# Patient Record
Sex: FEMALE | Race: WHITE | Employment: UNEMPLOYED | URBAN - METROPOLITAN AREA
[De-identification: names, ages, dates, MRNs, and addresses within clinical notes are randomized per-mention and may not be internally consistent; named-entity substitution may affect disease eponyms.]

---

## 2018-07-28 ENCOUNTER — HOSPITAL ENCOUNTER (INPATIENT)
Age: 58
LOS: 5 days | Discharge: HOME | DRG: 121 | End: 2018-08-02
Attending: EMERGENCY MEDICINE | Admitting: INTERNAL MEDICINE
Payer: MEDICAID

## 2018-07-28 ENCOUNTER — APPOINTMENT (OUTPATIENT)
Dept: GENERAL RADIOLOGY | Age: 58
DRG: 121 | End: 2018-07-28
Attending: EMERGENCY MEDICINE
Payer: MEDICAID

## 2018-07-28 DIAGNOSIS — E11.9 DIABETES MELLITUS WITHOUT COMPLICATION (HCC): Chronic | ICD-10-CM

## 2018-07-28 DIAGNOSIS — R26.9 ABNORMALITY OF GAIT AND MOBILITY: ICD-10-CM

## 2018-07-28 DIAGNOSIS — R91.8 PULMONARY INFILTRATES: ICD-10-CM

## 2018-07-28 DIAGNOSIS — J96.01 ACUTE RESPIRATORY FAILURE WITH HYPOXIA AND HYPERCAPNIA (HCC): Primary | ICD-10-CM

## 2018-07-28 DIAGNOSIS — J96.22 ACUTE ON CHRONIC RESPIRATORY FAILURE WITH HYPOXIA AND HYPERCAPNIA (HCC): ICD-10-CM

## 2018-07-28 DIAGNOSIS — E66.01 MORBID OBESITY (HCC): Chronic | ICD-10-CM

## 2018-07-28 DIAGNOSIS — G47.33 OSA (OBSTRUCTIVE SLEEP APNEA): Chronic | ICD-10-CM

## 2018-07-28 DIAGNOSIS — R91.8 MASS OF LEFT LUNG: ICD-10-CM

## 2018-07-28 DIAGNOSIS — I27.81 COR PULMONALE (HCC): ICD-10-CM

## 2018-07-28 DIAGNOSIS — C34.12 PRIMARY CANCER OF LEFT UPPER LOBE OF LUNG (HCC): ICD-10-CM

## 2018-07-28 DIAGNOSIS — J96.21 ACUTE ON CHRONIC RESPIRATORY FAILURE WITH HYPOXIA AND HYPERCAPNIA (HCC): ICD-10-CM

## 2018-07-28 DIAGNOSIS — J44.1 COPD EXACERBATION (HCC): ICD-10-CM

## 2018-07-28 DIAGNOSIS — R59.0 MEDIASTINAL LYMPHADENOPATHY: ICD-10-CM

## 2018-07-28 DIAGNOSIS — I27.0 PRIMARY PULMONARY HYPERTENSION (HCC): ICD-10-CM

## 2018-07-28 DIAGNOSIS — J43.9 PULMONARY EMPHYSEMA, UNSPECIFIED EMPHYSEMA TYPE (HCC): Chronic | ICD-10-CM

## 2018-07-28 DIAGNOSIS — J96.02 ACUTE RESPIRATORY FAILURE WITH HYPOXIA AND HYPERCAPNIA (HCC): Primary | ICD-10-CM

## 2018-07-28 DIAGNOSIS — E66.2: Chronic | ICD-10-CM

## 2018-07-28 DIAGNOSIS — C34.92 SQUAMOUS CELL CARCINOMA OF LEFT LUNG (HCC): ICD-10-CM

## 2018-07-28 PROBLEM — G89.29 CHRONIC PAIN: Status: ACTIVE | Noted: 2018-07-28

## 2018-07-28 PROBLEM — J96.20 ACUTE ON CHRONIC RESPIRATORY FAILURE (HCC): Status: ACTIVE | Noted: 2018-07-28

## 2018-07-28 PROBLEM — J96.00 ACUTE RESPIRATORY FAILURE (HCC): Status: ACTIVE | Noted: 2018-07-28
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
## 2018-07-28 LAB
ALBUMIN SERPL-MCNC: 3.1 G/DL (ref 3.5–5)
ALBUMIN/GLOB SERPL: 0.6 {RATIO} (ref 1.2–3.5)
ALP SERPL-CCNC: 82 U/L (ref 50–136)
ALT SERPL-CCNC: 22 U/L (ref 12–65)
AMPHET UR QL SCN: NEGATIVE
ANION GAP SERPL CALC-SCNC: 3 MMOL/L (ref 7–16)
ANION GAP SERPL CALC-SCNC: 3 MMOL/L (ref 7–16)
ARTERIAL PATENCY WRIST A: POSITIVE
ARTERIAL PATENCY WRIST A: POSITIVE
AST SERPL-CCNC: 21 U/L (ref 15–37)
ATRIAL RATE: 86 BPM
BARBITURATES UR QL SCN: NEGATIVE
BASE EXCESS BLDA CALC-SCNC: 3.3 MMOL/L (ref 0–3)
BASE EXCESS BLDA CALC-SCNC: 5.4 MMOL/L (ref 0–3)
BASOPHILS # BLD: 0 K/UL (ref 0–0.2)
BASOPHILS NFR BLD: 0 % (ref 0–2)
BDY SITE: ABNORMAL
BDY SITE: ABNORMAL
BENZODIAZ UR QL: NEGATIVE
BILIRUB SERPL-MCNC: 0.7 MG/DL (ref 0.2–1.1)
BNP SERPL-MCNC: 308 PG/ML
BUN SERPL-MCNC: 20 MG/DL (ref 6–23)
BUN SERPL-MCNC: 24 MG/DL (ref 6–23)
CALCIUM SERPL-MCNC: 8.5 MG/DL (ref 8.3–10.4)
CALCIUM SERPL-MCNC: 8.6 MG/DL (ref 8.3–10.4)
CALCULATED P AXIS, ECG09: 53 DEGREES
CALCULATED R AXIS, ECG10: 113 DEGREES
CALCULATED T AXIS, ECG11: 17 DEGREES
CANNABINOIDS UR QL SCN: NEGATIVE
CHLORIDE SERPL-SCNC: 98 MMOL/L (ref 98–107)
CHLORIDE SERPL-SCNC: 98 MMOL/L (ref 98–107)
CO2 SERPL-SCNC: 35 MMOL/L (ref 21–32)
CO2 SERPL-SCNC: 37 MMOL/L (ref 21–32)
COCAINE UR QL SCN: NEGATIVE
COHGB MFR BLD: 1.5 % (ref 0.5–1.5)
COHGB MFR BLD: 1.6 % (ref 0.5–1.5)
CREAT SERPL-MCNC: 1.18 MG/DL (ref 0.6–1)
CREAT SERPL-MCNC: 1.48 MG/DL (ref 0.6–1)
DIAGNOSIS, 93000: NORMAL
DIFFERENTIAL METHOD BLD: ABNORMAL
DO-HGB BLD-MCNC: 8 % (ref 0–5)
DO-HGB BLD-MCNC: 9 % (ref 0–5)
EOSINOPHIL # BLD: 0 K/UL (ref 0–0.8)
EOSINOPHIL NFR BLD: 0 % (ref 0.5–7.8)
ERYTHROCYTE [DISTWIDTH] IN BLOOD BY AUTOMATED COUNT: 16.8 % (ref 11.9–14.6)
FIO2 ON VENT: 45 %
GAS FLOW.O2 O2 DELIVERY SYS: 4 L/MIN
GLOBULIN SER CALC-MCNC: 5.2 G/DL (ref 2.3–3.5)
GLUCOSE BLD STRIP.AUTO-MCNC: 193 MG/DL (ref 65–100)
GLUCOSE BLD STRIP.AUTO-MCNC: 204 MG/DL (ref 65–100)
GLUCOSE SERPL-MCNC: 206 MG/DL (ref 65–100)
GLUCOSE SERPL-MCNC: 217 MG/DL (ref 65–100)
HCO3 BLDA-SCNC: 34 MMOL/L (ref 22–26)
HCO3 BLDA-SCNC: 37 MMOL/L (ref 22–26)
HCT VFR BLD AUTO: 45.1 % (ref 35.8–46.3)
HGB BLD-MCNC: 13 G/DL (ref 11.7–15.4)
HGB BLDMV-MCNC: 13.3 GM/DL (ref 11.7–15)
HGB BLDMV-MCNC: 13.5 GM/DL (ref 11.7–15)
IMM GRANULOCYTES # BLD: 0.1 K/UL (ref 0–0.5)
IMM GRANULOCYTES NFR BLD AUTO: 1 % (ref 0–5)
LACTATE BLD-SCNC: 1.1 MMOL/L (ref 0.5–1.9)
LYMPHOCYTES # BLD: 1 K/UL (ref 0.5–4.6)
LYMPHOCYTES NFR BLD: 11 % (ref 13–44)
MAGNESIUM SERPL-MCNC: 2.2 MG/DL (ref 1.8–2.4)
MAGNESIUM SERPL-MCNC: 2.3 MG/DL (ref 1.8–2.4)
MCH RBC QN AUTO: 28.8 PG (ref 26.1–32.9)
MCHC RBC AUTO-ENTMCNC: 28.8 G/DL (ref 31.4–35)
MCV RBC AUTO: 100 FL (ref 79.6–97.8)
METHADONE UR QL: NEGATIVE
METHGB MFR BLD: 0.4 % (ref 0–1.5)
METHGB MFR BLD: 0.4 % (ref 0–1.5)
MONOCYTES # BLD: 0.4 K/UL (ref 0.1–1.3)
MONOCYTES NFR BLD: 5 % (ref 4–12)
NEUTS SEG # BLD: 7.4 K/UL (ref 1.7–8.2)
NEUTS SEG NFR BLD: 83 % (ref 43–78)
OPIATES UR QL: POSITIVE
OXYHGB MFR BLDA: 88.8 % (ref 94–97)
OXYHGB MFR BLDA: 89.7 % (ref 94–97)
P-R INTERVAL, ECG05: 150 MS
PCO2 BLDA: 84 MMHG (ref 35–45)
PCO2 BLDA: 97 MMHG (ref 35–45)
PCP UR QL: NEGATIVE
PH BLDA: 7.2 [PH] (ref 7.35–7.45)
PH BLDA: 7.22 [PH] (ref 7.35–7.45)
PHOSPHATE SERPL-MCNC: 3.2 MG/DL (ref 2.5–4.5)
PLATELET # BLD AUTO: 227 K/UL (ref 150–450)
PMV BLD AUTO: 10 FL (ref 10.8–14.1)
PO2 BLDA: 67 MMHG (ref 80–105)
PO2 BLDA: 70 MMHG (ref 80–105)
POTASSIUM SERPL-SCNC: 4.7 MMOL/L (ref 3.5–5.1)
POTASSIUM SERPL-SCNC: 5 MMOL/L (ref 3.5–5.1)
PROCALCITONIN SERPL-MCNC: <0.1 NG/ML
PROT SERPL-MCNC: 8.3 G/DL (ref 6.3–8.2)
Q-T INTERVAL, ECG07: 338 MS
QRS DURATION, ECG06: 64 MS
QTC CALCULATION (BEZET), ECG08: 404 MS
RBC # BLD AUTO: 4.51 M/UL (ref 4.05–5.25)
RESP RATE: 16
SAO2 % BLD: 91 % (ref 92–98.5)
SAO2 % BLD: 91 % (ref 92–98.5)
SERVICE CMNT-IMP: ABNORMAL
SERVICE CMNT-IMP: ABNORMAL
SODIUM SERPL-SCNC: 136 MMOL/L (ref 136–145)
SODIUM SERPL-SCNC: 138 MMOL/L (ref 136–145)
TROPONIN I BLD-MCNC: 0.05 NG/ML (ref 0.02–0.05)
VENTILATION MODE VENT: ABNORMAL
VENTILATION MODE VENT: ABNORMAL
VENTRICULAR RATE, ECG03: 86 BPM
WBC # BLD AUTO: 8.9 K/UL (ref 4.3–11.1)

## 2018-07-28 PROCEDURE — 74011636637 HC RX REV CODE- 636/637: Performed by: INTERNAL MEDICINE

## 2018-07-28 PROCEDURE — 84100 ASSAY OF PHOSPHORUS: CPT | Performed by: INTERNAL MEDICINE

## 2018-07-28 PROCEDURE — 77030019605

## 2018-07-28 PROCEDURE — 74011000250 HC RX REV CODE- 250: Performed by: EMERGENCY MEDICINE

## 2018-07-28 PROCEDURE — 74011250637 HC RX REV CODE- 250/637: Performed by: INTERNAL MEDICINE

## 2018-07-28 PROCEDURE — 94640 AIRWAY INHALATION TREATMENT: CPT

## 2018-07-28 PROCEDURE — 93005 ELECTROCARDIOGRAM TRACING: CPT | Performed by: EMERGENCY MEDICINE

## 2018-07-28 PROCEDURE — 99223 1ST HOSP IP/OBS HIGH 75: CPT | Performed by: INTERNAL MEDICINE

## 2018-07-28 PROCEDURE — 82962 GLUCOSE BLOOD TEST: CPT

## 2018-07-28 PROCEDURE — 80307 DRUG TEST PRSMV CHEM ANLYZR: CPT | Performed by: NURSE PRACTITIONER

## 2018-07-28 PROCEDURE — 94660 CPAP INITIATION&MGMT: CPT

## 2018-07-28 PROCEDURE — 84145 PROCALCITONIN (PCT): CPT | Performed by: EMERGENCY MEDICINE

## 2018-07-28 PROCEDURE — 83735 ASSAY OF MAGNESIUM: CPT | Performed by: EMERGENCY MEDICINE

## 2018-07-28 PROCEDURE — 74011250636 HC RX REV CODE- 250/636: Performed by: EMERGENCY MEDICINE

## 2018-07-28 PROCEDURE — 51702 INSERT TEMP BLADDER CATH: CPT | Performed by: EMERGENCY MEDICINE

## 2018-07-28 PROCEDURE — 87040 BLOOD CULTURE FOR BACTERIA: CPT | Performed by: EMERGENCY MEDICINE

## 2018-07-28 PROCEDURE — 82803 BLOOD GASES ANY COMBINATION: CPT

## 2018-07-28 PROCEDURE — 83880 ASSAY OF NATRIURETIC PEPTIDE: CPT | Performed by: EMERGENCY MEDICINE

## 2018-07-28 PROCEDURE — 83735 ASSAY OF MAGNESIUM: CPT | Performed by: INTERNAL MEDICINE

## 2018-07-28 PROCEDURE — 77010033678 HC OXYGEN DAILY

## 2018-07-28 PROCEDURE — 77030005515 HC CATH URETH FOL14 BARD -B

## 2018-07-28 PROCEDURE — 74011250636 HC RX REV CODE- 250/636: Performed by: INTERNAL MEDICINE

## 2018-07-28 PROCEDURE — 80053 COMPREHEN METABOLIC PANEL: CPT | Performed by: EMERGENCY MEDICINE

## 2018-07-28 PROCEDURE — 99285 EMERGENCY DEPT VISIT HI MDM: CPT | Performed by: EMERGENCY MEDICINE

## 2018-07-28 PROCEDURE — 65610000001 HC ROOM ICU GENERAL

## 2018-07-28 PROCEDURE — 36415 COLL VENOUS BLD VENIPUNCTURE: CPT | Performed by: INTERNAL MEDICINE

## 2018-07-28 PROCEDURE — 36600 WITHDRAWAL OF ARTERIAL BLOOD: CPT

## 2018-07-28 PROCEDURE — 74011250636 HC RX REV CODE- 250/636: Performed by: NURSE PRACTITIONER

## 2018-07-28 PROCEDURE — 84484 ASSAY OF TROPONIN QUANT: CPT

## 2018-07-28 PROCEDURE — 85025 COMPLETE CBC W/AUTO DIFF WBC: CPT | Performed by: EMERGENCY MEDICINE

## 2018-07-28 PROCEDURE — 71045 X-RAY EXAM CHEST 1 VIEW: CPT

## 2018-07-28 PROCEDURE — 74011000250 HC RX REV CODE- 250: Performed by: INTERNAL MEDICINE

## 2018-07-28 PROCEDURE — 83605 ASSAY OF LACTIC ACID: CPT

## 2018-07-28 PROCEDURE — 96374 THER/PROPH/DIAG INJ IV PUSH: CPT | Performed by: EMERGENCY MEDICINE

## 2018-07-28 RX ORDER — ALBUTEROL SULFATE 2.5 MG/.5ML
10 SOLUTION RESPIRATORY (INHALATION)
Status: COMPLETED | OUTPATIENT
Start: 2018-07-28 | End: 2018-07-28

## 2018-07-28 RX ORDER — GABAPENTIN 400 MG/1
800 CAPSULE ORAL 4 TIMES DAILY
Status: DISCONTINUED | OUTPATIENT
Start: 2018-07-28 | End: 2018-08-02 | Stop reason: HOSPADM

## 2018-07-28 RX ORDER — MELATONIN 5 MG
5 CAPSULE ORAL
COMMUNITY
End: 2019-03-14

## 2018-07-28 RX ORDER — INSULIN LISPRO 100 [IU]/ML
INJECTION, SOLUTION INTRAVENOUS; SUBCUTANEOUS EVERY 6 HOURS
Status: DISCONTINUED | OUTPATIENT
Start: 2018-07-28 | End: 2018-07-31

## 2018-07-28 RX ORDER — POTASSIUM CHLORIDE 750 MG/1
20 TABLET, FILM COATED, EXTENDED RELEASE ORAL DAILY
COMMUNITY
End: 2018-08-04

## 2018-07-28 RX ORDER — GABAPENTIN 800 MG/1
800 TABLET ORAL 4 TIMES DAILY
COMMUNITY
End: 2019-03-14

## 2018-07-28 RX ORDER — AZITHROMYCIN 250 MG/1
500 TABLET, FILM COATED ORAL EVERY 24 HOURS
Status: DISCONTINUED | OUTPATIENT
Start: 2018-07-28 | End: 2018-07-30

## 2018-07-28 RX ORDER — ENOXAPARIN SODIUM 100 MG/ML
40 INJECTION SUBCUTANEOUS EVERY 24 HOURS
Status: DISCONTINUED | OUTPATIENT
Start: 2018-07-28 | End: 2018-07-28

## 2018-07-28 RX ORDER — FAMOTIDINE 20 MG/1
20 TABLET, FILM COATED ORAL 2 TIMES DAILY
Status: DISCONTINUED | OUTPATIENT
Start: 2018-07-28 | End: 2018-07-30

## 2018-07-28 RX ORDER — LISINOPRIL 20 MG/1
20 TABLET ORAL DAILY
Status: DISCONTINUED | OUTPATIENT
Start: 2018-07-29 | End: 2018-08-02 | Stop reason: HOSPADM

## 2018-07-28 RX ORDER — SODIUM CHLORIDE 0.9 % (FLUSH) 0.9 %
5-10 SYRINGE (ML) INJECTION AS NEEDED
Status: DISCONTINUED | OUTPATIENT
Start: 2018-07-28 | End: 2018-08-02 | Stop reason: HOSPADM

## 2018-07-28 RX ORDER — ENOXAPARIN SODIUM 100 MG/ML
40 INJECTION SUBCUTANEOUS EVERY 12 HOURS
Status: DISCONTINUED | OUTPATIENT
Start: 2018-07-28 | End: 2018-08-02 | Stop reason: HOSPADM

## 2018-07-28 RX ORDER — LANOLIN ALCOHOL/MO/W.PET/CERES
400 CREAM (GRAM) TOPICAL
COMMUNITY
End: 2018-08-04

## 2018-07-28 RX ORDER — SPIRONOLACTONE 25 MG/1
25 TABLET ORAL DAILY
Status: DISCONTINUED | OUTPATIENT
Start: 2018-07-29 | End: 2018-08-02 | Stop reason: HOSPADM

## 2018-07-28 RX ORDER — FUROSEMIDE 10 MG/ML
40 INJECTION INTRAMUSCULAR; INTRAVENOUS ONCE
Status: COMPLETED | OUTPATIENT
Start: 2018-07-28 | End: 2018-07-28

## 2018-07-28 RX ORDER — LANOLIN ALCOHOL/MO/W.PET/CERES
400 CREAM (GRAM) TOPICAL
Status: DISCONTINUED | OUTPATIENT
Start: 2018-07-28 | End: 2018-08-02 | Stop reason: HOSPADM

## 2018-07-28 RX ORDER — IPRATROPIUM BROMIDE AND ALBUTEROL SULFATE 2.5; .5 MG/3ML; MG/3ML
3 SOLUTION RESPIRATORY (INHALATION)
Status: COMPLETED | OUTPATIENT
Start: 2018-07-28 | End: 2018-07-28

## 2018-07-28 RX ORDER — SODIUM CHLORIDE 0.9 % (FLUSH) 0.9 %
5-10 SYRINGE (ML) INJECTION EVERY 8 HOURS
Status: DISCONTINUED | OUTPATIENT
Start: 2018-07-28 | End: 2018-08-02 | Stop reason: HOSPADM

## 2018-07-28 RX ORDER — POTASSIUM CHLORIDE 20 MEQ/1
20 TABLET, EXTENDED RELEASE ORAL DAILY
Status: DISCONTINUED | OUTPATIENT
Start: 2018-07-29 | End: 2018-07-31

## 2018-07-28 RX ORDER — MONTELUKAST SODIUM 10 MG/1
10 TABLET ORAL
Status: DISCONTINUED | OUTPATIENT
Start: 2018-07-28 | End: 2018-08-02 | Stop reason: HOSPADM

## 2018-07-28 RX ORDER — SIMVASTATIN 20 MG/1
TABLET, FILM COATED ORAL
COMMUNITY
End: 2018-08-04

## 2018-07-28 RX ORDER — FUROSEMIDE 40 MG/1
40 TABLET ORAL DAILY
Status: DISCONTINUED | OUTPATIENT
Start: 2018-07-29 | End: 2018-07-29

## 2018-07-28 RX ORDER — ALBUTEROL SULFATE 0.83 MG/ML
2.5 SOLUTION RESPIRATORY (INHALATION)
Status: DISCONTINUED | OUTPATIENT
Start: 2018-07-28 | End: 2018-08-02 | Stop reason: HOSPADM

## 2018-07-28 RX ORDER — ATENOLOL 25 MG/1
50 TABLET ORAL DAILY
Status: DISCONTINUED | OUTPATIENT
Start: 2018-07-29 | End: 2018-08-02 | Stop reason: HOSPADM

## 2018-07-28 RX ORDER — BACLOFEN 10 MG/1
20 TABLET ORAL 4 TIMES DAILY
Status: DISCONTINUED | OUTPATIENT
Start: 2018-07-28 | End: 2018-08-02 | Stop reason: HOSPADM

## 2018-07-28 RX ADMIN — MONTELUKAST SODIUM 10 MG: 10 TABLET, FILM COATED ORAL at 21:32

## 2018-07-28 RX ADMIN — Medication 10 ML: at 21:38

## 2018-07-28 RX ADMIN — FUROSEMIDE 40 MG: 10 INJECTION, SOLUTION INTRAMUSCULAR; INTRAVENOUS at 14:39

## 2018-07-28 RX ADMIN — METHYLPREDNISOLONE SODIUM SUCCINATE 60 MG: 125 INJECTION, POWDER, FOR SOLUTION INTRAMUSCULAR; INTRAVENOUS at 21:31

## 2018-07-28 RX ADMIN — IPRATROPIUM BROMIDE AND ALBUTEROL SULFATE 3 ML: .5; 3 SOLUTION RESPIRATORY (INHALATION) at 11:04

## 2018-07-28 RX ADMIN — GABAPENTIN 800 MG: 400 CAPSULE ORAL at 18:03

## 2018-07-28 RX ADMIN — ALBUTEROL SULFATE 10 MG: 2.5 SOLUTION RESPIRATORY (INHALATION) at 11:37

## 2018-07-28 RX ADMIN — METHYLPREDNISOLONE SODIUM SUCCINATE 60 MG: 125 INJECTION, POWDER, FOR SOLUTION INTRAMUSCULAR; INTRAVENOUS at 16:15

## 2018-07-28 RX ADMIN — BACLOFEN 20 MG: 10 TABLET ORAL at 21:32

## 2018-07-28 RX ADMIN — AZITHROMYCIN 500 MG: 250 TABLET, FILM COATED ORAL at 16:15

## 2018-07-28 RX ADMIN — BACLOFEN 20 MG: 10 TABLET ORAL at 18:03

## 2018-07-28 RX ADMIN — Medication 10 ML: at 16:00

## 2018-07-28 RX ADMIN — INSULIN LISPRO 4 UNITS: 100 INJECTION, SOLUTION INTRAVENOUS; SUBCUTANEOUS at 21:31

## 2018-07-28 RX ADMIN — Medication 400 MG: at 21:32

## 2018-07-28 RX ADMIN — ENOXAPARIN SODIUM 40 MG: 40 INJECTION, SOLUTION INTRAVENOUS; SUBCUTANEOUS at 18:03

## 2018-07-28 RX ADMIN — FAMOTIDINE 20 MG: 20 TABLET ORAL at 18:03

## 2018-07-28 RX ADMIN — GABAPENTIN 800 MG: 400 CAPSULE ORAL at 21:32

## 2018-07-28 RX ADMIN — ALBUTEROL SULFATE 2.5 MG: 2.5 SOLUTION RESPIRATORY (INHALATION) at 23:01

## 2018-07-28 RX ADMIN — METHYLPREDNISOLONE SODIUM SUCCINATE 125 MG: 125 INJECTION, POWDER, FOR SOLUTION INTRAMUSCULAR; INTRAVENOUS at 12:49

## 2018-07-28 RX ADMIN — ALBUTEROL SULFATE 2.5 MG: 2.5 SOLUTION RESPIRATORY (INHALATION) at 19:13

## 2018-07-28 RX ADMIN — ALBUTEROL SULFATE 2.5 MG: 2.5 SOLUTION RESPIRATORY (INHALATION) at 15:59

## 2018-07-28 NOTE — IP AVS SNAPSHOT
[address] [facility] 
 
 
 [address] 
[phone] Patient: Gwendolyn Lynch MRN: [id number] About your hospitalization You were admitted on:  July 28, 2018 You last received care in the:  [address] You were discharged on:  August 2, 2018 Why you were hospitalized Your primary diagnosis was:  Acute On Chronic Respiratory Failure (Hcc) Your diagnoses also included: Morbid Obesity (Hcc), Charanjit (Obstructive Sleep Apnea), Diabetes Mellitus Without Complication (Hcc), Fall, Pulmonary Infiltrates, Obesity With Alveolar Hypoventilation (Hcc), Chronic Pain, Acute Respiratory Failure (Hcc), Copd Exacerbation (Hcc), Mass Of Left Lung, Mediastinal Lymphadenopathy, Primary Cancer Of Left Upper Lobe Of Lung (Hcc) Follow-up Information Follow up With Details Comments Contact Info Not On File Bshsi   Not On File (62) Patient has a PCP but that physician is not listed in [address] [facility]. Chandra Andres NP On 8/10/2018 appointment at 8:30. [address] [place] [address] 300 [facility] [address] 
[phone] Merly [facility]  scan in to [employer]. [address] 
[phone] [phone] Your Scheduled Appointments Friday August 10, 2018  9:00 AM EDT  
(Arrive by 8:30 AM) HOSPITAL with Chandra Andres NP [facility] ([facility]) [facility] [address]  
[phone] Discharge Orders None A check caitlyn indicates which time of day the medication should be taken. My Medications START taking these medications Instructions Each Dose to Equal  
 Morning Noon Evening Bedtime  
 levoFLOXacin 750 mg tablet Commonly known as:  Patricia Pugh Your last dose was: Your next dose is: Take 1 Tab by mouth daily for 4 days. 750 mg  
    
   
   
   
  
 predniSONE 10 mg tablet Commonly known as:  Marcelene Im Your last dose was: Your next dose is:    
   
   
 Four tabs daily x 3 days then three tabs daily x 3 days then two tabs daily x 3 days then one tab daily x 3 days CONTINUE taking these medications Instructions Each Dose to Equal  
 Morning Noon Evening Bedtime ADVAIR -21 mcg/actuation inhaler Generic drug:  fluticasone-salmeterol Your last dose was: Your next dose is: Take 2 Puffs by inhalation two (2) times a day. 2 Puff * albuterol 2.5 mg /3 mL (0.083 %) nebulizer solution Commonly known as:  PROVENTIL VENTOLIN Your last dose was: Your next dose is:    
   
   
 2 puffs as needed * PROAIR HFA 90 mcg/actuation inhaler Generic drug:  albuterol Your last dose was: Your next dose is:    
   
   
 1-2 puffs Q 6hours ALDACTONE 25 mg tablet Generic drug:  spironolactone Your last dose was: Your next dose is: Take 25 mg by mouth daily. 25 mg  
    
   
   
   
  
 AMBIEN 5 mg tablet Generic drug:  zolpidem Your last dose was: Your next dose is: Take 5 mg by mouth as needed for Sleep.  
 5 mg  
    
   
   
   
  
 atenolol 50 mg tablet Commonly known as:  TENORMIN Your last dose was: Your next dose is: Take 50 mg by mouth daily. 50 mg  
    
   
   
   
  
 ATROVENT 0.03 % nasal spray Generic drug:  ipratropium Your last dose was: Your next dose is: 2 Sprays as needed. 2 Spray  
    
   
   
   
  
 baclofen 20 mg tablet Commonly known as:  LIORESAL Your last dose was: Your next dose is: Take 20 mg by mouth four (4) times daily.   
 20 mg  
    
   
   
 CLARITIN 10 mg tablet Generic drug:  loratadine Your last dose was: Your next dose is: Take 10 mg by mouth daily. 10 mg  
    
   
   
   
  
 famotidine 20 mg tablet Commonly known as:  PEPCID Your last dose was: Your next dose is:    
   
   
 1 1/2  Three times a day FISH OIL 1,000 mg Cap Generic drug:  omega-3 fatty acids-vitamin e Your last dose was: Your next dose is: Take 1 Cap by mouth daily. 1 Cap FISH -160-1,000 mg Cap Generic drug:  omega 3-dha-epa-fish oil Your last dose was: Your next dose is: Take 1,000 mg by mouth daily. 1000 mg  
    
   
   
   
  
 gabapentin 800 mg tablet Commonly known as:  NEURONTIN Your last dose was: Your next dose is: Take 800 mg by mouth four (4) times daily. 800 mg  
    
   
   
   
  
 glipiZIDE SR 10 mg CR tablet Commonly known as:  GLUCOTROL XL Your last dose was: Your next dose is: Take 10 mg by mouth daily. 10 mg  
    
   
   
   
  
 ibuprofen 800 mg tablet Commonly known as:  MOTRIN Your last dose was: Your next dose is: Take 800 mg by mouth. 1 to 3 times a day 800 mg JANUVIA 100 mg tablet Generic drug:  SITagliptin Your last dose was: Your next dose is: Take 100 mg by mouth daily. 100 mg  
    
   
   
   
  
 LASIX 40 mg tablet Generic drug:  furosemide Your last dose was: Your next dose is: Take 40 mg by mouth daily. 40 mg  
    
   
   
   
  
 lisinopril 20 mg tablet Commonly known as:  Alivia Primmer Your last dose was: Your next dose is: Take 20 mg by mouth daily. 20 mg  
    
   
   
   
  
 magnesium oxide 400 mg tablet Commonly known as:  MAG-OX Your last dose was: Your next dose is: Take 400 mg by mouth nightly. 400 mg  
    
   
   
   
  
 melatonin 5 mg Cap capsule Your last dose was: Your next dose is: Take 5 mg by mouth nightly. 5 mg  
    
   
   
   
  
 metFORMIN 500 mg Tg24 24 hour tablet Commonly known Juan ALONZO Your last dose was: Your next dose is: Take 2 Tabs by mouth daily. 2 Tab NORCO 7.5-325 mg per tablet Generic drug:  HYDROcodone-acetaminophen Your last dose was: Your next dose is: Take 1 Tab by mouth four (4) times daily. 1 Tab ONE-A-DAY WOMEN'S PETITES PO Your last dose was: Your next dose is:    
   
   
 2 times daily  
     
   
   
   
  
 potassium chloride SR 10 mEq tablet Commonly known as:  KLOR-CON 10 Your last dose was: Your next dose is: Take 20 mEq by mouth daily. 20 mEq  
    
   
   
   
  
 simvastatin 20 mg tablet Commonly known as:  ZOCOR Your last dose was: Your next dose is: Take  by mouth nightly. SINGULAIR 10 mg tablet Generic drug:  montelukast  
   
Your last dose was: Your next dose is: Take 10 mg by mouth daily. 10 mg  
    
   
   
   
  
 traMADol 50 mg tablet Commonly known as:  ULTRAM  
   
Your last dose was: Your next dose is: Take 50 mg by mouth daily. 50 mg  
    
   
   
   
  
 traZODone 100 mg tablet Commonly known as:  Carry Allenton Your last dose was: Your next dose is: Take 100 mg by mouth daily. 100 mg  
    
   
   
   
  
 VITAMIN D3 2,000 unit Tab Generic drug:  cholecalciferol (vitamin D3) Your last dose was: Your next dose is: Take 2,000 Units by mouth daily. 2000 Units * Notice: This list has 2 medication(s) that are the same as other medications prescribed for you. Read the directions carefully, and ask your doctor or other care provider to review them with you. Where to Get Your Medications Information on where to get these meds will be given to you by the nurse or doctor. ! Ask your nurse or doctor about these medications  
  levoFLOXacin 750 mg tablet  
 predniSONE 10 mg tablet Opioid Education Prescription Opioids: What You Need to Know: 
 
 
Report the following to your doctor: 
· Excessive pain, swelling, redness or odor of or around the IV area · Temperature over 100.5 · Nausea and vomiting lasting longer than 4 hours or if unable to take medications · Any signs of decreased circulation or nerve impairment to extremity: change in color, persistent  numbness, tingling, coldness or increase pain · Increased shortness of breath · Any questions What to do at Home: 
Recommended activity: Activity as tolerated. *  Please give a list of your current medications to your Primary Care Provider. *  Please update this list whenever your medications are discontinued, doses are 
    changed, or new medications (including over-the-counter products) are added. *  Please carry medication information at all times in case of emergency situations. These are general instructions for a healthy lifestyle: No smoking/ No tobacco products/ Avoid exposure to second hand smoke Surgeon General's Warning:  Quitting smoking now greatly reduces serious risk to your health. Obesity, smoking, and sedentary lifestyle greatly increases your risk for illness A healthy diet, regular physical exercise & weight monitoring are important for maintaining a healthy lifestyle You may be retaining fluid if you have a history of heart failure or if you experience any of the following symptoms:  Weight gain of 3 pounds or more overnight or 5 pounds in a week, increased swelling in our hands or feet or shortness of breath while lying flat in bed. Please call your doctor as soon as you notice any of these symptoms; do not wait until your next office visit. Recognize signs and symptoms of STROKE: 
 
F-face looks uneven A-arms unable to move or move unevenly S-speech slurred or non-existent T-time-call 911 as soon as signs and symptoms begin-DO NOT go Back to bed or wait to see if you get better-TIME IS BRAIN. Warning Signs of HEART ATTACK Call 911 if you have these symptoms: 
? Chest discomfort. Most heart attacks involve discomfort in the center of the chest that lasts more than a few minutes, or that goes away and comes back. It can feel like uncomfortable pressure, squeezing, fullness, or pain. ? Discomfort in other areas of the upper body. Symptoms can include pain or discomfort in one or both arms, the back, neck, jaw, or stomach. ? Shortness of breath with or without chest discomfort. ? Other signs may include breaking out in a cold sweat, nausea, or lightheadedness. Don't wait more than five minutes to call [address]! Fast action can save your life. Calling 911 is almost always the fastest way to get lifesaving treatment. Emergency Medical Services staff can begin treatment when they arrive  up to an hour sooner than if someone gets to the hospital by car. The discharge information has been reviewed with the patient. The patient verbalized understanding. Discharge medications reviewed with the patient and appropriate educational materials and side effects teaching were provided. ___________________________________________________________________________________________________________________________________ MyChart Announcement We are excited to announce that we are making your provider's discharge notes available to you in [employer]. You will see these notes when they are completed and signed by the physician that discharged you from your recent hospital stay. If you have any questions or concerns about any information you see in [employer], please call the Health Information Department where you were seen or reach out to your Primary Care Provider for more information about your plan of care. Introducing [facility]! Arabella Epstein introduces [employer] patient portal. Now you can access parts of your medical record, email your doctor's office, and request medication refills online. 1. In your internet browser, go to [email or web address] 2. Click on the First Time User? Click Here link in the Sign In box. You will see the New Member Sign Up page. 3. Enter your [employer] Access Code exactly as it appears below. You will not need to use this code after youve completed the sign-up process. If you do not sign up before the expiration date, you must request a new code. · [employer] Access Code: [email or web address] Expires: 10/26/2018 10:28 AM 
 
4. Enter the last four digits of your Social Security Number (xxxx) and Date of Birth (mm/dd/yyyy) as indicated and click Submit. You will be taken to the next sign-up page. 5. Create a [employer] ID. This will be your [employer] login ID and cannot be changed, so think of one that is secure and easy to remember. 6. Create a [employer] password. You can change your password at any time. 7. Enter your Password Reset Question and Answer. This can be used at a later time if you forget your password. 8. Enter your e-mail address. You will receive e-mail notification when new information is available in [address] 9. Click Sign Up. You can now view and download portions of your medical record. 10. Click the Download Summary menu link to download a portable copy of your medical information. If you have questions, please visit the Frequently Asked Questions section of the [email or web address] website. Remember, [employer] is NOT to be used for urgent needs. For medical emergencies, dial 911. Now available from your iPhone and Android! Introducing Micky Covarrubias As a [facility] patient, I wanted to make you aware of our electronic visit tool called Micky Covarrubias. [employer] 24/7 allows you to connect within minutes with a medical provider 24 hours a day, seven days a week via a mobile device or tablet or logging into a secure website from your computer. You can access Micky Covarrubias from anywhere in the [place]. A virtual visit might be right for you when you have a simple condition and feel like you just dont want to get out of bed, or cant get away from work for an appointment, when your regular [facility] provider is not available (evenings, weekends or holidays), or when youre out of town and need minor care. Electronic visits cost only $49 and if the TapiaAppMakr [facility] 24/7 provider determines a prescription is needed to treat your condition, one can be electronically transmitted to a nearby pharmacy*. Please take a moment to enroll today if you have not already done so. The enrollment process is free and takes just a few minutes. To enroll, please download the [employer] 24/7 aixa to your tablet or phone, or visit [email or web address] to enroll on your computer. And, as an [address] [place] [address] patient with a [employer] account, the results of your visits will be scanned into your electronic medical record and your primary care provider will be able to view the scanned results.    
We urge you to continue to see your regular [facility] provider for your ongoing medical care. And while your primary care provider may not be the one available when you seek a Micky Fishmanfin virtual visit, the peace of mind you get from getting a real diagnosis real time can be priceless. For more information on Micky Covarrubias, view our Frequently Asked Questions (FAQs) at [email or web address]. Sincerely, 
 
Emani Woods MD 
Chief Medical Officer Cecille Johnson *:  certain medications cannot be prescribed via Micky Wesleymarce Providers Seen During Your Hospitalization Provider Specialty Primary office phone Ru Borja MD Emergency Medicine [phone] Oksana Granger MD Pulmonary Disease [phone] Gi Edgar MD Internal Medicine [phone] Virginia Hall MD Pulmonary Disease [phone] Azalea Bernabe, [address] [place] [address] Internal Medicine [phone] Your Primary Care Physician (PCP) Primary Care Physician Office Phone Office Fax NOT ON FILE ** None ** ** None ** You are allergic to the following Allergen Reactions Iodides Tincture (Iodine) Hives Recent Documentation Height Weight BMI OB Status Smoking Status 1.676 m (!) 169.7 kg 60.4 kg/m2 Postmenopausal Former Smoker Emergency Contacts Name Discharge Info Relation Home Work Mobile Yun Patel  Child [2] [phone] Patient Belongings The following personal items are in your possession at time of discharge: 
  Dental Appliances: None  Visual Aid: Glasses      Home Medications: Kept at bedside   Jewelry: None  Clothing: Pajamas    Other Valuables: Cell Phone, Parmjit Mons Please provide this summary of care documentation to your next provider. Signatures-by signing, you are acknowledging that this After Visit Summary has been reviewed with you and you have received a copy. Patient Signature:  ____________________________________________________________ Date:  ____________________________________________________________  
  
[employer] Provider Signature:  ____________________________________________________________ Date:  ____________________________________________________________

## 2018-07-28 NOTE — H&P
HISTORY AND PHYSICAL Raoul Anna 7/28/2018 Date of Admission:  7/28/2018 The patient's chart is reviewed and the patient is discussed with the staff. Subjective:  
 
Patient is a 59 y.o.  female presents with falls. Patient has a history of morbid obesity, FLETCHER/OHS not on CPAP, chronic respiratory failure, DM, HTN, HL, and chronic CHF. Apparently patient has sustained multiple recent falls. Patient is very sleepy but states that she fell yesterday secondary to dizziness. She states that her breathing has been different from her baseline but is not able to describe in what way. Her son lives with her but is not currently at the bedside to provide additional detail. In the ER her LA 1.1, WBC 8.9, PCT <0.1, Creat 1.48, . Patient was given nebulizer tx and placed on BIPAP. We are consulted for admission ABG:  
Recent Labs  
   07/28/18 
 1110 PH  7.20* PCO2  97* PO2  67* HCO3  37* Home DME company unknown. Review of Systems Review of systems not obtained due to patient factors. Patient Active Problem List  
Diagnosis Code  Hypertension, essential, benign I10  
 Hyperlipidemia E78.5  COPD with emphysema ([facility] [address] J43.9  Morbid obesity (HCC) E66.01  
 Hypomagnesemia E83.42  
 Primary pulmonary hypertension (HCC) I27.0  Diabetes mellitus without complication (HCC) A70.0  
 Orthopnea R06.01  
 Abnormality of gait and mobility R26.9  Insomnia G47.00  Extreme obesity with respiratory disorder (HCC) E66.01, J98.9  Asthma J45.909  FLETCHER (obstructive sleep apnea) G47.33  
 Chronic hypoxemic respiratory failure (HCC) J96.11  
 Cor pulmonale (HCC) I27.81  
 CHF (congestive heart failure), NYHA class I ([facility]) I50.9  Obesity with alveolar hypoventilation ([facility]) E66.2  
 Pulmonary edema, noncardiac J81.1  Arthritis of knee M17.10 Prior to Admission Medications Prescriptions Last Dose Informant Patient Reported? Taking? HYDROcodone-acetaminophen (NORCO) 7.5-325 mg per tablet   Yes No  
Sig: Take 1 Tab by mouth four (4) times daily. MULTIVITS,CA,MINERALS/IRON/FA (ONE-A-DAY WOMEN'S PETITES PO)   Yes No  
Si times daily SITagliptin (JANUVIA) 100 mg tablet   Yes No  
Sig: Take 100 mg by mouth daily. albuterol (PROAIR HFA) 90 mcg/actuation inhaler   Yes No  
Si-2 puffs Q 6hours  
albuterol (PROVENTIL VENTOLIN) 2.5 mg /3 mL (0.083 %) nebulizer solution   Yes No  
Si puffs as needed  
atenolol (TENORMIN) 50 mg tablet   Yes No  
Sig: Take 50 mg by mouth daily. baclofen (LIORESAL) 20 mg tablet   Yes No  
Sig: Take 20 mg by mouth four (4) times daily. cholecalciferol, vitamin D3, (VITAMIN D3) 2,000 unit tab   Yes No  
Sig: Take 2,000 Units by mouth daily. famotidine (PEPCID) 20 mg tablet   Yes No  
Si 1/2  Three times a day  
fluticasone-salmeterol (ADVAIR HFA) 115-21 mcg/actuation inhaler   Yes No  
Sig: Take 2 Puffs by inhalation two (2) times a day. furosemide (LASIX) 40 mg tablet   Yes No  
Sig: Take 40 mg by mouth daily. gabapentin (NEURONTIN) 600 mg tablet   Yes No  
Sig: Take 800 mg by mouth four (4) times daily. glipiZIDE SR (GLUCOTROL XL) 10 mg CR tablet   Yes No  
Sig: Take 10 mg by mouth daily. ibuprofen (MOTRIN) 800 mg tablet   Yes No  
Sig: Take 800 mg by mouth. 1 to 3 times a day  
ipratropium (ATROVENT) 0.03 % nasal spray   Yes No  
Si Sprays as needed. lisinopril (PRINIVIL, ZESTRIL) 20 mg tablet   Yes No  
Sig: Take 20 mg by mouth daily. loratadine (CLARITIN) 10 mg tablet   Yes No  
Sig: Take 10 mg by mouth daily. magnesium oxide (MAG-OX) 400 mg tablet   Yes Yes Sig: Take 400 mg by mouth nightly. melatonin 5 mg cap capsule   Yes Yes Sig: Take 5 mg by mouth nightly. metFORMIN (GLUMETZA ER) 500 mg TG24 24 hour tablet   Yes No  
Sig: Take 2 Tabs by mouth daily. montelukast (SINGULAIR) 10 mg tablet   Yes No  
Sig: Take 10 mg by mouth daily.   
omega 3-dha-epa-fish oil (FISH OIL) 100-160-1,000 mg cap   Yes Yes Sig: Take 1,000 mg by mouth daily. omega-3 fatty acids-vitamin e (FISH OIL) 1,000 mg cap   Yes No  
Sig: Take 1 Cap by mouth daily. potassium chloride SR (KLOR-CON 10) 10 mEq tablet   Yes Yes Sig: Take 20 mEq by mouth daily. simvastatin (ZOCOR) 20 mg tablet   Yes Yes Sig: Take  by mouth nightly. spironolactone (ALDACTONE) 25 mg tablet   Yes No  
Sig: Take 25 mg by mouth daily. traMADol (ULTRAM) 50 mg tablet   Yes No  
Sig: Take 50 mg by mouth daily. traZODone (DESYREL) 100 mg tablet   Yes No  
Sig: Take 100 mg by mouth daily. zolpidem (AMBIEN) 5 mg tablet   Yes No  
Sig: Take 5 mg by mouth as needed for Sleep. Facility-Administered Medications: None Past Medical History:  
Diagnosis Date  Abnormality of gait and mobility  Ambulatory dysfunction  Arthritis of knee  Asthma  CHF (congestive heart failure), NYHA class I ([address] diastolic  Chronic hypoxemic respiratory failure ([address]  COPD (chronic obstructive pulmonary disease) with emphysema (HCC)  Cor pulmonale ([address]  Diabetes mellitus without complication ([address]  Dyslipidemia  Extreme obesity with respiratory disorder ([address]  Hyperlipidemia  Hypertension, essential, benign  Hypomagnesemia  Insomnia 12/27/2016  Morbid obesity ([address]  Nocturnal hypoxia  Obesity with alveolar hypoventilation ([address]  Orthopnea  FLETCHER (obstructive sleep apnea)  Primary pulmonary hypertension ([address]  Pulmonary edema, noncardiac  Sleeps in sitting position due to orthopnea Past Surgical History:  
Procedure Laterality Date  HX CHOLECYSTECTOMY  1991  
 HX TONSILLECTOMY [place] Social History Social History  Marital status:  Spouse name: N/A  
 Number of children: N/A  
 Years of education: N/A Occupational History  Not on file.   
 
Social History Main Topics  Smoking status: Former Smoker Quit date: 11/13/2014  Smokeless tobacco: Not on file  Alcohol use Not on file  Drug use: Not on file  Sexual activity: Not on file Other Topics Concern  Not on file Social History Narrative History reviewed. No pertinent family history. Allergies Allergen Reactions  Iodides Tincture [Iodine] Unknown (comments) No current facility-administered medications for this encounter. Current Outpatient Prescriptions Medication Sig  potassium chloride SR (KLOR-CON 10) 10 mEq tablet Take 20 mEq by mouth daily.  simvastatin (ZOCOR) 20 mg tablet Take  by mouth nightly.  magnesium oxide (MAG-OX) 400 mg tablet Take 400 mg by mouth nightly.  melatonin 5 mg cap capsule Take 5 mg by mouth nightly.  omega 3-dha-epa-fish oil (FISH OIL) 100-160-1,000 mg cap Take 1,000 mg by mouth daily.  fluticasone-salmeterol (ADVAIR HFA) 115-21 mcg/actuation inhaler Take 2 Puffs by inhalation two (2) times a day.  albuterol (PROVENTIL VENTOLIN) 2.5 mg /3 mL (0.083 %) nebulizer solution 2 puffs as needed  spironolactone (ALDACTONE) 25 mg tablet Take 25 mg by mouth daily.  zolpidem (AMBIEN) 5 mg tablet Take 5 mg by mouth as needed for Sleep.  atenolol (TENORMIN) 50 mg tablet Take 50 mg by mouth daily.  ipratropium (ATROVENT) 0.03 % nasal spray 2 Sprays as needed.  baclofen (LIORESAL) 20 mg tablet Take 20 mg by mouth four (4) times daily.  loratadine (CLARITIN) 10 mg tablet Take 10 mg by mouth daily.  famotidine (PEPCID) 20 mg tablet 1 1/2  Three times a day  omega-3 fatty acids-vitamin e (FISH OIL) 1,000 mg cap Take 1 Cap by mouth daily.  gabapentin (NEURONTIN) 600 mg tablet Take 800 mg by mouth four (4) times daily.  glipiZIDE SR (GLUCOTROL XL) 10 mg CR tablet Take 10 mg by mouth daily.  ibuprofen (MOTRIN) 800 mg tablet Take 800 mg by mouth. 1 to 3 times a day  SITagliptin (JANUVIA) 100 mg tablet Take 100 mg by mouth daily.  furosemide (LASIX) 40 mg tablet Take 40 mg by mouth daily.  lisinopril (PRINIVIL, ZESTRIL) 20 mg tablet Take 20 mg by mouth daily.  metFORMIN (GLUMETZA ER) 500 mg TG24 24 hour tablet Take 2 Tabs by mouth daily.  HYDROcodone-acetaminophen (NORCO) 7.5-325 mg per tablet Take 1 Tab by mouth four (4) times daily.  MULTIVITS,CA,MINERALS/IRON/FA (ONE-A-DAY WOMEN'S PETITES PO) 2 times daily  albuterol (PROAIR HFA) 90 mcg/actuation inhaler 1-2 puffs Q 6hours  montelukast (SINGULAIR) 10 mg tablet Take 10 mg by mouth daily.  traMADol (ULTRAM) 50 mg tablet Take 50 mg by mouth daily.  traZODone (DESYREL) 100 mg tablet Take 100 mg by mouth daily.  cholecalciferol, vitamin D3, (VITAMIN D3) 2,000 unit tab Take 2,000 Units by mouth daily. Objective:  
 
Vitals:  
 07/28/18 1231 07/28/18 1246 07/28/18 1300 07/28/18 1317 BP: 128/64 121/57 113/55 113/55 Pulse: 76 80 75 78 Resp:  14 23 20 Temp:      
SpO2: 96% 95% 97% 97% Weight:      
Height: PHYSICAL EXAM  
 
Constitutional:  the patient is well developed and in no acute distress EENMT:  Sclera clear, pupils equal, oral mucosa moist 
Respiratory: diminished bilaterally, BIPAP Cardiovascular:  RRR without M,G,R 
Gastrointestinal: soft and non-tender; with positive bowel sounds. Musculoskeletal: warm without cyanosis. There is 1+ lower leg edema. Skin:  no jaundice or rashes, no open wounds Neurologic: no gross neuro deficits Psychiatric:  alert and oriented x 3 CXR: 
7/28 Recent Labs  
   07/28/18 
 1127 WBC  8.9 HGB  13.0 HCT  45.1 PLT  227 Recent Labs  
   07/28/18 
 1127 NA  136  
K  5.0  
CL  98  
GLU  206* CO2  35* BUN  24* CREA  1.48* MG  2.3 CA  8.6 ALB  3.1* TBILI  0.7 ALT  22 SGOT  21 Recent Labs  
   07/28/18 
 1110 PH  7.20* PCO2  97* PO2  67* HCO3  37* No results for input(s): LCAD, LAC in the last 72 hours.   
 
 
 
Assessment:  (Medical Decision Making) Hospital Problems  Date Reviewed: 7/28/2018 Codes Class Noted POA * (Principal)Acute on chronic respiratory failure (HCC) ICD-10-CM: J96.20 ICD-9-CM: 518.84  7/28/2018 Yes States she wears O2 at home - not able to quantify Currently requiring BIPAP in ER Fall ICD-10-CM: W19. Crow Grief ICD-9-CM: E888.9  7/28/2018 Yes States secondary to dizziness Pulmonary infiltrates ICD-10-CM: R91.8 ICD-9-CM: 793.19  7/28/2018 Yes Volume overload vs infiltrate BNP elevated at 308 / WBC and PCT WNL Chronic pain ICD-10-CM: G89.29 ICD-9-CM: 338.29  7/28/2018 Yes On multiple pain medications as an outpatient Morbid obesity (HCC) (Chronic) ICD-10-CM: E66.01 
ICD-9-CM: 278.01  Unknown Yes Lending to the complexity of care Diabetes mellitus without complication (HCC) (Chronic) ICD-10-CM: E11.9 ICD-9-CM: 250.00  Unknown Yes FLETCHER (obstructive sleep apnea) (Chronic) ICD-10-CM: G47.33 
ICD-9-CM: 327.23  Unknown Yes Obesity with alveolar hypoventilation (HCC) (Chronic) ICD-10-CM: J83.1 ICD-9-CM: 278.03  Unknown Yes States she is not maintained on CPAP as an outpatient Seen at [facility] in the past - most recent notes from 2016 Plan:  (Medical Decision Making) --Will admit to ICU for further medical management 
--Supplemental O2 via BIPAP - repeat ABG now 
--Respiratory nebulizer treatments 
--steroids --culture --antibiotic therapy 
--give lasix in ER - small for accurate UOP measurement More than 50% of the time documented was spent in face-to-face contact with the patient and in the care of the patient on the floor/unit where the patient is located. Raven Lees NP Lungs:   clear Heart:  RRR with no Murmur/Rubs/Gallops Additional Comments:  Admit to icu on bipap,  Iv lasix, -infiltrates present but procal <0.01 I have spoken with and examined the patient. I agree with the above assessment and plan as documented.  
 
Delta Albin Carlton Max MD

## 2018-07-28 NOTE — ROUTINE PROCESS
Respiratory Care Services Policy Number: [id number] Title: Noninvasive Positive Pressure                         Ventilation, (BIPAP VISION) and 
          Respironics (V60) Effective Date: 2005, 2017 Revised Date: 2012, 2014, 2015, 2016 I. Description: Non Invasive ventilation (NIV) is a ventilatory assist technique used as an alternative to endotracheal intubation. NIV is pressure ventilation delivered as BIPAP (Bi-level Positive Airway Pressure) which is a low pressure electronically driven device intended for use as a ventilatory support system for patients who have an intact respiratory drive. The device provides non-invasive ventilatory assistance through the use of a nasal or full face mask. BiPAP  (two levels of ventilatory support) known as inspired positive airway pressure (IPAP) and  positive airway support (EPAP). One level of support can also be delivered which is delivered as EPAP or CPAP which is delivered throughout the respiratory cycle. The aim is to maintain adequate ventilation and minimize the effort of breathing. The BIPAP Vision System and the Respironics V60 are the two systems available for non-invasive ventilation. These devices are also capable of being used for invasive ventilation in the critical care units only. BIPAP Vision: This device uses an electronic pressure control sensing monitor pressure differential in the patient circuit. This feedback allows for adjustment of the flow and pressure output 
to assist in inhalation or exhalation through the administration at two distinct levels of positive pressure. During inspiration, the level is variably positive and is always higher than the expiratory level. During exhalation, pressure is variably positive or near ambient. In addition, this device has the ability to compensate for leaks through automatic               adjustment of the trigger threshold.  This capability allows for the application of BIPAP    for mask-applied ventilation assistance. Respironics V60 The Respironics V60 uses Auto-Trak technology to help ensure patient synchrony and therapy acceptance and is designed to address the specific challenges of NIV. By providing auto-adaptive leak compensation, inspiratory triggering, and expiratory cycling, Auto-Trak delivers optimal synchrony in the face of dynamic leak and changing patient demand. The Respironics V60 is designed to include pediatric use and is equipped with several modes, which allows the practitioner to meet the specific needs of the patients. See Appendix 1 for definitions of settings. II. Policy: The administration of non-invasive positive pressure ventilation (NPPV) will be the responsibility of the Respiratory Care Practitioner (RCP). Upon receipt of a physician order, proper patient instruction, set up and monitoring will be provided to ensure patient understanding, compliance and proper utilization of prescribed therapy. A. A patient may be allowed to use their own NPPV machine after having their equipment checked and approved by [employer]. B. A consent and Release for Home Ventilator form must be signed by the patient and witnessed by a health care provider if the patient chooses to use his home ventilator. C. A patient that is being treated for acute respiratory failure and placed on non-invasive ventilation must be placed in a critical care unit, per Medical Director. D.  A patient who is being treated for sleep apnea may be treated on the floors. E.   A patient has the option of using a hospital owned NPPV unit. F.   A patient may use a hospital unit and their own mask if they choose. G. Patients may be placed on full face mask with NPPV units on the hospital floors            under the following conditions: 1. Patient has an end stage disease process.  
2. Patient was placed on full face mask and NPPV unit in the Critical Care Units and it has been established as safe and effective therapy. 3. Patient is ordered full face mask with NPPV unit for home use. 4. In the event patient is to be set up or transitioned to home on a NPPV unit, the Respironics V60 (in the AVAPS mode) shall be utilized while the patient is in the hospital. If a [facility] is unavailable, a home NPPV unit may be provided by the DME provider for no more than 48 hours. III. Responsibility: Director, Clemente Valencia, and all Respiratory Care          Practitioners with documented competency. IV. Indications for non-invasive ventilation: A. Acute respiratory failure (Patient must be in Critical Care Unit) B. Chronic respiratory failure C. Alveolar hypoventilation D. Documented sleep apnea V. Contraindications: A. Patients with severe respiratory failure without a spontaneous respiratory drive B. Noninvasive ventilation may be contraindicated for patients with the followin. Inability to maintain a patent airway or adequately clear secretions 2. Acute sinusitis or otitis media 3. Risk for aspiration of gastric contents 4. Hypotension 5. Pre-existing pneumothorax or pneumomediastinum 6. Epistaxis 7. Recent facial, oral or skull surgery or trauma 8. history of allergy or sensitivity to mask materials where the risk from allergic reaction outweighs the benefit of ventilatory assistance C. Potential Complications: 
1. Cardiovascular compromise 2. Skin breakdown and discomfort from mask 3. Gastric distention 4. Increased intracranial pressure 5. Pulmonary barotraumas VI. Mask fit A. It is essential that the patient be correctly fitted with an appropriate size mask. 1. Choose mask according to sizing template on disposable setups.  
2.  The mask should fit comfortably on the patients nose,  not occluding the            nares and the base of the mask should fit comfortably between the chin and bottom lip see picture on setup guide. 3.  Headgear should fit comfortably not tight. The bottom edge of the headgear        should sit at the base of the nape of the neck with side straps underneath the        earlobes. 4. The face masks are better for patients who are dyspneic and tachypneic as            they tend to mouth breathe with a nasal mask and reduce the effectiveness          of the ventilation. 5. Masks that are too tight are uncomfortable and cause pressure areas and              masks that are too loose leak which reduce the efficiency of the system. 6. When using a nasal mask the patient must keep their mouth closed to obtain       the desired effect of the ventilation. 7. Place an Allevyn Gentle Border dressing over bridge of nose to avoid skin          breakdown. VII. Equipment for BIPAP Vision A. BIPAP Vision Ventilatory Support Unit B. BIPAP Vision disposable circuit with proximal pressure and exhalation port. C. Main flow bacterial filter D. Nasal or face mask and disposable head strap F. Smooth inner lumen tubing for connecting the humidifier system to the BIPAP unit. G. Pulse oximetry equipment and supplies H. Oxygen analyzer with circuit adapter I. Device specific humidification system which must be used when using BIPAP. VIII. Procedure for Non-invasive ventilation via BIPAP Vision: A. BIPAP Vision Set Up 1. Assemble the circuit with exhalation port proximal to the patient. 2.  A bacterial filter and oxygen analyzer should be place between the                             machines patient interface port and the patient circuit. If using the oxygen               module, connect to a 50 psi oxygen source. 3. Attach humidifier to the system. 4. Plug electrical cord in AC outlet. Press START on the back of the machine.   
5. The Vision will perform a self-test as indicated by the display screen,                        System Self-Test in Progress.  6. Perform the Test Exhalation Port second button from top, left of screen. a) Insure that whistle port and pressure tubing are in line. b) Occlude end of whistle port at end of tubing throughout the test. 
c) Press START TEST, top button right screen; this tests the leak of the            circuit. 7. Assess appropriateness of physicians orders and set ventilatory parameters accordingly. Initial settings as well as changes to ventilatory parameters must be accompanied by a physician order. 8. Select the proper mode by first selecting the mode button at the bottom of screen. a) Choose CPAP or ST mode, top button, right side of screen per  order. b) Activate view mode by pressing the [facility] button, bottom right of screen. 9. Select the parameters button below the screen. a) Choose a parameter from the left and right sides of screen. Press the soft button for the parameter of choice. Once it is highlighted, spin knob clockwise to increase value, and counter clockwise to decrease value in the parameter block. Repress the button for that particular parameter to activate the new value. b) Consult the BIPAP Vision Ventilatory Support System Clinical Manual for specific information on the modes of operation and set parameters. 10.   Select alarms button, below screen. Set values for Hi Pressure, Lo Pressure, Lo      Pressure Delay, Apnea, Lo Min Vent, Hi Rate, Lo Rate as appropriate for patient. B. Post Procedure -Cleaning and Sterilization for the BIPAP Vision 1. Before cleaning the unit, turn the Start/Stop switch to the Stop position and unplug the electrical cord from the wall and from the rear of the unit. 2. Clean the front panel with water or 70% Isopropyl Alcohol only. Do not immerse the Vision unit in water. 3. Clean the exterior of the enclosure with a hospital approved disinfectant solution. Do not allow any liquid to enter the inside of the ventilator.  
4. Refer to the BIPAP Vision Ventilatory Support System Clinical Manual, Chapter 15 Cleaning and Routine Maintenance and Replacing the [facility]. IX. Procedure for non-invasive ventilation using the V60: 
A. Set up machine circuit using disposable mask and circuit setups only. 1.  Ensure that there is always an exhalation port at the base of the mask for C02 to be . 2. Set mode and settings as per physician orders. 3.  See Appendix 1 for definitions of all modes, usual and alarm settings. 4. Set Alarms on machine. See usual alarm settings in appendix. 5.  Turn on machine and gently hold mask over nose/face until patient becomes accustomed to the airflow. 6. Attach the head strap. 7. Stay with patient until the 02 saturations and observations are stable. B. Monitor patients response for: 1. Decreased Heart rate, respiratory rate, and blood pressure. 2. Decreased sweating 3. Decreased work of breathing (as per baseline observations) 4. Patient feels more comfortable 5. Patient finds it easier to breathe X. Monitoring: A. While in use, the RCP should check the patient and non-invasive unit no less than every four hours. B. Failure of NIV should be suspected if: 1. The patient is unable to maintain adequate oxygenation, decreasing 02 saturations despite increases in 02. 
2. There is a reduction in neurological or conscious state. 3. The patient has excessive secretions or increasing respiratory rate. 4. Failure of PaCO2 or PH to improve on ABG sample. 5. The patient has poorly compliant lungs. XI. Weaning A. Weaning or cessation of non-invasive ventilation should occur under the following                        conditions: 1. PaC02 returns to normal and patient maintains O2 saturations with minimal                oxygen. 2. Respiratory rate is returned within normal limits. 3. Patient is unable to tolerate non-invasive ventilation.  
4. Patients dyspnea is reduced. 5. Patient exhibits normal overnight ventilation. 6. Patient is receiving palliative treatment. XII. Documentation: A. Documentation should include the followin. Ventilator settings comply with physician order. 2. Ventilator is functioning properly as evidenced by a check of measured volumes, rates, pressures and FIO2. 3. Alarms are set appropriately. 4. Measured inspired gas temperature (invasive mode only) 5. Vital signs (pulse, respiratory rate, oxygen saturation, breath sounds) 6. Patient tolerance to therapy. 7. Manual resuscitator and appropriate size mask at patient bedside REFERENCES 
BIPAP Vision Ventilatory Support System Clinical Manual. 
 
[address] Therapy and Respiratory Care Section. BOLA Mendez 2001. Introducing non-invasive positive pressure ventilation. Nursing Standard. 15,26, 42-45. Brayan Hussein. 2003. Using non-invasive ventilation in acute wards: part 2. Nursing Standard. 18,1, 41-44. Samira 47. Use of continuous positive airway pressure (CPAP) in the critically ill-physiological principles. Critical Care 12 (4 154-58 DELONTE Saucedo2002. Bi-level positive airway pressure (BiPAP) and acute cardiogenicpulmonary edema ( ACPO) in the emergency department. Critical Care 15 (2):51-63 COSTA Dumont2002. Non-invasive BiPap-implementation of a new service. Intensive and Critical Care Nursing 45,343-410 DELONTE Gonzáles,et al 2002. Non-invasive ventilation in acute respiratory failure. [place] 87:876-649 DEFINITIONS AND USUAL SETTINGS                                                            APPENDIX 1 Settings Settings in Active CPAP Settings in Active BiPAP Description Range Usual Setting Used in NIV 
 
  
 
CPAP Mode   Continuous Positive Airway Pressure 4-24 cmH20 8-14 ST or  BiPAP MODE         Spontaneous Timed or BiLevel Positive Airway Pressure Ventilation. Two level system of alternating during non- invasive ventilation in sync with breathing-set IPAP and EPAP   
 
__  
 
__ AVAPS Mode (only available  on V60)          The volume-targeted AVAPS (average volume-assured pressure support) mode combines the attributes of pressure-controlled and volume-targeted ventilation. __  
 
__  
 
 
EPAP 
 
         
 
            
 
        Positive Airway Pressure. Recruits under ventilated alveoli to remain open during expiration by providing a constant pressure throughout resp. cycle. Must be less than or equal to IPAP  
 4-25 cmH20 5-14 Settings Settings in Active CPAP Settings in Active BiPAP Description Range Usual Setting Used in NIV  
 
IPAP  Inspired Positive Airway Pressure provides pressure throughout the inspiratory phase to support patient ventilation  4-40 cmH20 10-20 I-time  Inspiratory time- time taken to inspire in seconds  0.3  3.0 sec 1:3  
FIO2          Oxygen delivered  21- 100% As ordered Ramp time       
 
 
       The Ramp Time function helps your patient adapt to ventilation by gradually increasing inspiratory and expiratory pressure over a set interval (minutes). This time gradually delivers pressures so to reduce patient anxiety and increases comfort. Off 
or 
5-45 minutes 10 minutes Rate (RR)          Patients respiratory rate 4- 60 BPM 4 Rise time          Speed at which the inspiratory pressure rises to the set pressure. 1-5 
(1 is the fastest) Set at 3 Patient Data Data Description Range Usual setting in NIV Breath phase/trigger Indicator  Bar in left hand corner. Colored according to breath trigger. n/a n/a  
PIP Positive Inspiratory pressure  0-50 n/a Patient total leak Est. or unintentional leak 0-200 n/a Patient trigger Patient triggered breaths as a percentage  0-100% Should be 100% Respiratory rate Respiratory rate 0-90 n/a Ti/Ttot Inspiratory duty cycle or inspiratory time divided by total cycle time  0-91% n/a Minute volume Est. minute ventilation. TV x rate=MV  0-99 LPM n/a Tidal volume Est.  tidal volume  0-3000 ml n/a Alarms Alarm Description Range Set  at Hi Rate High respiratory rate 5-90 10 breaths above patients breath rate Low Rate Low respiratory rate alarm 1-89 BPM 5 breaths below patients breath rate Hi VT High tidal volume alarm 200-2500 ml 200 ml above patients own Low VT Low tidal volume alarm OFF  1500 ml OFF  
HIP High Inspiratory pressure alarm 5-50 cm H20 10 cm above IPAP  
LIP Low inspiratory pressure alarm OFF, 1-40 cmH20 OFF Lo VE Low minute vent alarm OFF, 0.1 to 99L/min OFF  
LIP T Low inspiratory delay time 5-60 seconds 5 seconds CPAP Settings BiPAP Settings Set CPAP level as ordered Set breath rate as ordered Set FIO2 as ordered Set I-time as 1.3 Set Ramp time as ordered Set EPAP as ordered Set C-Flex at 3 Set IPAP as ordered Set Rise time as ordered Set FIO2 as ordered Respiratory Care Services Consent and Release for Home Ventilator Patient Name: _________________________________________ Room: ___________ 
 
SSN: _______________________________           Account Number:  __________________ Use and care of my personal home ventilator, (invasive or non-invasive) mechanical life support device while at [facility] system has been discussed with me by my physician and I have been provided with an opportunity to ask questions which have been answered to my satisfaction. I also understand a respiratory care practitioner is not expected to remain in my room or general vicinity at all times.  
 
It is understood that every precaution consistent with the best medical practice will be taken and I give Respiratory Care Services permission to monitor my ventilator during my hospital stay. I hereby release [facility] system, its agents, employees and medical staff from liability arising from any and all claims, demands, losses and damages to person and/or property as a result of the above mentioned requests. I certify that I have read and understand this consent agreement and release and that I am legally qualified to claude this authorization. ___________________ Date 
 
_____________________________________        ________________________ Signature of Patient or Parent (of minor patient,                  Relationship (if other than Patient) intermediate parent) if applicable. Closest Relative, 
Guardian or legal Representative 
 
_____________________________________ Witness Legal representative is defined as person named by the court as a guardian, executor or , or having power of  specifically set forth to authorize this action. [phone] (3/02, 7/14)   Consent and Release for Home Ventilator

## 2018-07-28 NOTE — ED NOTES
TRANSFER - OUT REPORT: 
 
Verbal report given to MIRANDA Santos(name) on Edward Dillard  being transferred to [facility](unit) for routine progression of care Report consisted of patients Situation, Background, Assessment and  
Recommendations(SBAR). Information from the following report(s) SBAR, Kardex, ED Summary, [facility] and Recent Results was reviewed with the receiving nurse. Lines:  
Peripheral IV 07/28/18 Left Antecubital (Active) Site Assessment Clean, dry, & intact 7/28/2018 11:30 AM  
Phlebitis Assessment 0 7/28/2018 11:30 AM  
Infiltration Assessment 0 7/28/2018 11:30 AM  
Dressing Status Clean, dry, & intact 7/28/2018 11:30 AM  
Hub Color/Line Status Pink 7/28/2018 11:30 AM  
   
Peripheral IV 07/28/18 Left Hand (Active) Site Assessment Clean, dry, & intact 7/28/2018 11:30 AM  
Phlebitis Assessment 0 7/28/2018 11:30 AM  
Dressing Status Clean, dry, & intact 7/28/2018 11:30 AM  
Hub Color/Line Status Blue 7/28/2018 11:30 AM  
   
Peripheral IV 07/28/18 Right Antecubital (Active) Site Assessment Clean, dry, & intact 7/28/2018 11:31 AM  
Phlebitis Assessment 0 7/28/2018 11:31 AM  
Infiltration Assessment 0 7/28/2018 11:31 AM  
Dressing Status Clean, dry, & intact 7/28/2018 11:31 AM  
Hub Color/Line Status Pink 7/28/2018 11:31 AM  
  
 
Opportunity for questions and clarification was provided. Patient transported with: 
 Registered Nurse & Julián Pap

## 2018-07-28 NOTE — PROGRESS NOTES
TRANSFER - IN REPORT: 
 
Verbal report received from MIRANDA Weber(name) on [facility]  being received from Emergency Dept.(unit) for routine progression of care Report consisted of patients Situation, Background, Assessment and  
Recommendations(SBAR). Information from the following report(s) SBAR, Kardex, ED Summary, Intake/Output, MAR, Recent Results, Med Rec Status and Cardiac Rhythm NSR was reviewed with the receiving nurse. Opportunity for questions and clarification was provided. Assessment completed upon patients arrival to unit and care assumed. Patient arrived on unit at 1515. Bipap initiated, patient cleaned and linens changed. Patient is covered in scabs and abrasions that she states are from her itching her skin while sleeping. VSS and oxygenation adequate on bipap.   Dual skin assessment with Keyona Varma RN

## 2018-07-28 NOTE — ED PROVIDER NOTES
Patient is a 59 y.o. female presenting with COPD. The history is provided by the patient and the EMS personnel. The history is limited by the condition of the patient. COPD This is a recurrent problem. The average episode lasts 1 day. The problem occurs continuously. The current episode started 6 to 12 hours ago. The problem has not changed since onset. Associated symptoms include cough, wheezing and orthopnea. Pertinent negatives include no fever, no headaches, no coryza, no rhinorrhea, no sore throat, no swollen glands, no ear pain, no neck pain, no sputum production, no hemoptysis, no PND, no chest pain, no syncope, no vomiting, no abdominal pain, no rash, no leg pain, no leg swelling and no claudication. It is unknown what precipitated the problem. She has tried beta-agonist inhalers for the symptoms. The treatment provided no relief. She has had prior hospitalizations. She has had prior ED visits. She has had prior ICU admissions. Associated medical issues include COPD, pneumonia, chronic lung disease and heart failure. Associated medical issues do not include asthma, PE, CAD, past MI, DVT or recent surgery. Past Medical History:  
Diagnosis Date  Abnormality of gait and mobility  Ambulatory dysfunction  Arthritis of knee  Asthma  CHF (congestive heart failure), NYHA class I ([address] diastolic  Chronic hypoxemic respiratory failure ([address]  COPD (chronic obstructive pulmonary disease) with emphysema (HCC)  Cor pulmonale ([address]  Diabetes mellitus without complication ([address]  Dyslipidemia  Extreme obesity with respiratory disorder ([address]  Hyperlipidemia  Hypertension, essential, benign  Hypomagnesemia  Insomnia 12/27/2016  Morbid obesity ([address]  Nocturnal hypoxia  Obesity with alveolar hypoventilation ([address]  Orthopnea  FLETCHER (obstructive sleep apnea)  Primary pulmonary hypertension ([address]  Pulmonary edema, noncardiac  Sleeps in sitting position due to orthopnea Past Surgical History:  
Procedure Laterality Date  HX CHOLECYSTECTOMY  1991  
 HX TONSILLECTOMY [address] No family history on file. Social History Social History  Marital status:  Spouse name: N/A  
 Number of children: N/A  
 Years of education: N/A Occupational History  Not on file. Social History Main Topics  Smoking status: Former Smoker Quit date: 11/13/2014  Smokeless tobacco: Not on file  Alcohol use Not on file  Drug use: Not on file  Sexual activity: Not on file Other Topics Concern  Not on file Social History Narrative  No narrative on file ALLERGIES: Iodides tincture [iodine] Review of Systems Constitutional: Positive for activity change, appetite change and fatigue. Negative for chills and fever. HENT: Negative for ear pain, rhinorrhea and sore throat. Respiratory: Positive for cough, shortness of breath and wheezing. Negative for hemoptysis, sputum production and chest tightness. Cardiovascular: Positive for orthopnea. Negative for chest pain, palpitations, claudication, leg swelling, syncope and PND. Gastrointestinal: Negative for abdominal pain and vomiting. Musculoskeletal: Negative for neck pain. Skin: Negative for rash. Neurological: Negative for headaches. All other systems reviewed and are negative. Vitals:  
 07/28/18 1042 07/28/18 1105 BP: (!) 164/106 Pulse: 92 Resp: 22 Temp: 98.1 °F (36.7 °C) SpO2: 100% 94% Weight: (!) 165.6 kg (365 lb) Height: 5' 6\" (1.676 m) Physical Exam  
Constitutional: She is oriented to person, place, and time. She appears well-developed and well-nourished. She appears lethargic. She has a sickly appearance. She appears distressed. HENT:  
Head: Normocephalic and atraumatic.   
Right Ear: Tympanic membrane and external ear normal.  
Left Ear: Tympanic membrane and external ear normal.  
Mouth/Throat: Oropharynx is clear and moist.  
Eyes: Conjunctivae and EOM are normal. Pupils are equal, round, and reactive to light. Neck: Normal range of motion. Neck supple. No tracheal deviation present. Cardiovascular: Normal rate, regular rhythm, normal heart sounds and intact distal pulses. Exam reveals no gallop and no friction rub. No murmur heard. Pulmonary/Chest: Accessory muscle usage present. Bradypnea noted. She is in respiratory distress. She has wheezes (all fields). She has no rhonchi. She has no rales. Abdominal: Soft. Bowel sounds are normal. She exhibits no distension and no mass. There is no hepatosplenomegaly. There is no tenderness. There is no rebound and no guarding. Musculoskeletal: Normal range of motion. She exhibits no edema. Lymphadenopathy:  
  She has no cervical adenopathy. Neurological: She is oriented to person, place, and time. She has normal strength. She appears lethargic. She displays normal reflexes. No cranial nerve deficit or sensory deficit. Skin: Skin is warm and dry. No rash noted. She is not diaphoretic. No erythema. Psychiatric: She has a normal mood and affect. Judgment and thought content normal. Her speech is delayed. She is slowed. Cognition and memory are normal.  
Nursing note and vitals reviewed. MDM Number of Diagnoses or Management Options Acute respiratory failure with hypoxia and hypercapnia ([address]): new and requires workup Amount and/or Complexity of Data Reviewed Clinical lab tests: ordered and reviewed Tests in the radiology section of CPT®: ordered and reviewed Decide to obtain previous medical records or to obtain history from someone other than the patient: yes Obtain history from someone other than the patient: yes Review and summarize past medical records: yes Discuss the patient with other providers: yes Independent visualization of images, tracings, or specimens: yes Risk of Complications, Morbidity, and/or Mortality Presenting problems: high Diagnostic procedures: moderate Management options: high Critical Care Total time providing critical care: (=================================================================== This patient is critically ill and there is a high probability of of imminent or life threatening deterioration in the patient's condition without immediate management. The nature of the patient's clinical problem is: acute respiratory failure with hypoxia and hypercapnia I have spent 45 minutes in direct patient care, documentation, review of labs/xrays/old records, discussion with Family, Staff, Colleague, Nursing . The time involved in the performance of separately reportable procedures was not counted toward critical care time. Bob Elena MD; 7/28/2018 @12:50 PM 
=================================================================== 
 
) Patient Progress Patient progress: stable ED Course Procedures The patient was observed in the ED. Moderate improvement in respiratory efforts with BiPAP, and improved lung sounds with albuterol and Atrovent. Case is discussed with the intensivist who will evaluate in the emergency room. Results Reviewed: 
 
 
Recent Results (from the past 24 hour(s)) EKG, 12 LEAD, INITIAL Collection Time: 07/28/18 11:00 AM  
Result Value Ref Range Ventricular Rate 86 BPM  
 Atrial Rate 86 BPM  
 P-R Interval 150 ms QRS Duration 64 ms Q-T Interval 338 ms QTC Calculation (Bezet) 404 ms Calculated P Axis 53 degrees Calculated R Axis 113 degrees Calculated T Axis 17 degrees Diagnosis    
  !! AGE AND GENDER SPECIFIC ECG ANALYSIS !! Normal sinus rhythm Right axis deviation Low voltage QRS Cannot rule out Anterior infarct , age undetermined Abnormal ECG No previous ECGs available BLOOD GAS, ARTERIAL Collection Time: 07/28/18 11:10 AM  
Result Value Ref Range  pH 7.20 (L) 7.35 - 7.45    
 PCO2 97 (H) 35 - 45 mmHg PO2 67 (L) 80 - 105 mmHg BICARBONATE 37 (H) 22 - 26 mmol/L  
 BASE EXCESS 5.4 (H) 0 - 3 mmol/L  
 TOTAL HEMOGLOBIN 13.5 11.7 - 15.0 GM/DL  
 O2 SAT 91 (L) 92 - 98.5 % Arterial O2 Hgb 88.8 (L) 94 - 97 % CARBOXYHEMOGLOBIN 1.6 (H) 0.5 - 1.5 % METHEMOGLOBIN 0.4 0.0 - 1.5 % DEOXYHEMOGLOBIN 9 (H) 0.0 - 5.0 % SITE RR   
 ALLENS TEST POSITIVE    
 MODE NC   
 O2 FLOW 4.00 L/min Respiratory comment: ER TBA at 7 28 2018 11 16 05 AM. Read back. POC TROPONIN-I Collection Time: 07/28/18 11:12 AM  
Result Value Ref Range Troponin-I (POC) 0.05 0.02 - 0.05 ng/ml CBC WITH AUTOMATED DIFF Collection Time: 07/28/18 11:27 AM  
Result Value Ref Range WBC 8.9 4.3 - 11.1 K/uL  
 RBC 4.51 4.05 - 5.25 M/uL  
 HGB 13.0 11.7 - 15.4 g/dL HCT 45.1 35.8 - 46.3 % .0 (H) 79.6 - 97.8 FL  
 MCH 28.8 26.1 - 32.9 PG  
 MCHC 28.8 (L) 31.4 - 35.0 g/dL  
 RDW 16.8 (H) 11.9 - 14.6 % PLATELET 612 999 - 137 K/uL MPV 10.0 (L) 10.8 - 14.1 FL  
 DF AUTOMATED NEUTROPHILS 83 (H) 43 - 78 % LYMPHOCYTES 11 (L) 13 - 44 % MONOCYTES 5 4.0 - 12.0 % EOSINOPHILS 0 (L) 0.5 - 7.8 % BASOPHILS 0 0.0 - 2.0 % IMMATURE GRANULOCYTES 1 0.0 - 5.0 %  
 ABS. NEUTROPHILS 7.4 1.7 - 8.2 K/UL  
 ABS. LYMPHOCYTES 1.0 0.5 - 4.6 K/UL  
 ABS. MONOCYTES 0.4 0.1 - 1.3 K/UL  
 ABS. EOSINOPHILS 0.0 0.0 - 0.8 K/UL  
 ABS. BASOPHILS 0.0 0.0 - 0.2 K/UL  
 ABS. IMM. GRANS. 0.1 0.0 - 0.5 K/UL METABOLIC PANEL, COMPREHENSIVE Collection Time: 07/28/18 11:27 AM  
Result Value Ref Range Sodium 136 136 - 145 mmol/L Potassium 5.0 3.5 - 5.1 mmol/L Chloride 98 98 - 107 mmol/L  
 CO2 35 (H) 21 - 32 mmol/L Anion gap 3 (L) 7 - 16 mmol/L Glucose 206 (H) 65 - 100 mg/dL BUN 24 (H) 6 - 23 MG/DL Creatinine 1.48 (H) 0.6 - 1.0 MG/DL  
 GFR est AA 46 (L) >60 ml/min/1.73m2 GFR est non-AA 38 (L) >60 ml/min/1.73m2 Calcium 8.6 8.3 - 10.4 MG/DL  Bilirubin, total 0.7 0.2 - 1.1 MG/DL  
 ALT (SGPT) 22 12 - 65 U/L  
 AST (SGOT) 21 15 - 37 U/L Alk. phosphatase 82 50 - 136 U/L Protein, total 8.3 (H) 6.3 - 8.2 g/dL Albumin 3.1 (L) 3.5 - 5.0 g/dL Globulin 5.2 (H) 2.3 - 3.5 g/dL A-G Ratio 0.6 (L) 1.2 - 3.5 XR CHEST SNGL V Final Result Impression: 1. Left suprahilar opacity could represent focal pneumonia versus a mass. Consider CT imaging if there is further clinical concern. 2. Mild diffuse interstitial prominence of uncertain chronicity without prior  
studies. Differential diagnosis would include underlying chronic interstitial  
lung disease, interstitial edema or atypical infection.

## 2018-07-28 NOTE — ED TRIAGE NOTES
Pt arrives EMS from home. Multiple recent falls, weakness, dizziness, hx of COPD, CHF, asthma. Given duo-neb and albuterol tx en route. /86, HR 95 sinus,  en route. Lethargic with EMS. More responsive after treatment given. O2 level unable to be obtained with EMS due to hands being cold.

## 2018-07-28 NOTE — IP AVS SNAPSHOT
[address] 
[phone] Patient: Edward Dillard MRN: [id number] EDK:[birth year] A check caitlyn indicates which time of day the medication should be taken. My Medications START taking these medications Instructions Each Dose to Equal  
 Morning Noon Evening Bedtime  
 levoFLOXacin 750 mg tablet Commonly known as:  Finas Michael Your last dose was: Your next dose is: Take 1 Tab by mouth daily for 4 days. 750 mg  
    
   
   
   
  
 predniSONE 10 mg tablet Commonly known as:  Rica Esters Your last dose was: Your next dose is:    
   
   
 Four tabs daily x 3 days then three tabs daily x 3 days then two tabs daily x 3 days then one tab daily x 3 days CONTINUE taking these medications Instructions Each Dose to Equal  
 Morning Noon Evening Bedtime ADVAIR -21 mcg/actuation inhaler Generic drug:  fluticasone-salmeterol Your last dose was: Your next dose is: Take 2 Puffs by inhalation two (2) times a day. 2 Puff * albuterol 2.5 mg /3 mL (0.083 %) nebulizer solution Commonly known as:  PROVENTIL VENTOLIN Your last dose was: Your next dose is:    
   
   
 2 puffs as needed * PROAIR HFA 90 mcg/actuation inhaler Generic drug:  albuterol Your last dose was: Your next dose is:    
   
   
 1-2 puffs Q 6hours ALDACTONE 25 mg tablet Generic drug:  spironolactone Your last dose was: Your next dose is: Take 25 mg by mouth daily. 25 mg  
    
   
   
   
  
 AMBIEN 5 mg tablet Generic drug:  zolpidem Your last dose was: Your next dose is: Take 5 mg by mouth as needed for Sleep.  
 5 mg  
    
   
   
   
  
 atenolol 50 mg tablet Commonly known as:  TENORMIN Your last dose was: Your next dose is: Take 50 mg by mouth daily. 50 mg  
    
   
   
   
  
 ATROVENT 0.03 % nasal spray Generic drug:  ipratropium Your last dose was: Your next dose is: 2 Sprays as needed. 2 Spray  
    
   
   
   
  
 baclofen 20 mg tablet Commonly known as:  LIORESAL Your last dose was: Your next dose is: Take 20 mg by mouth four (4) times daily. 20 mg CLARITIN 10 mg tablet Generic drug:  loratadine Your last dose was: Your next dose is: Take 10 mg by mouth daily. 10 mg  
    
   
   
   
  
 famotidine 20 mg tablet Commonly known as:  PEPCID Your last dose was: Your next dose is:    
   
   
 1 1/2  Three times a day FISH OIL 1,000 mg Cap Generic drug:  omega-3 fatty acids-vitamin e Your last dose was: Your next dose is: Take 1 Cap by mouth daily. 1 Cap FISH -160-1,000 mg Cap Generic drug:  omega 3-dha-epa-fish oil Your last dose was: Your next dose is: Take 1,000 mg by mouth daily. 1000 mg  
    
   
   
   
  
 gabapentin 800 mg tablet Commonly known as:  NEURONTIN Your last dose was: Your next dose is: Take 800 mg by mouth four (4) times daily. 800 mg  
    
   
   
   
  
 glipiZIDE SR 10 mg CR tablet Commonly known as:  GLUCOTROL XL Your last dose was: Your next dose is: Take 10 mg by mouth daily. 10 mg  
    
   
   
   
  
 ibuprofen 800 mg tablet Commonly known as:  MOTRIN Your last dose was: Your next dose is: Take 800 mg by mouth. 1 to 3 times a day 800 mg JANUVIA 100 mg tablet Generic drug:  SITagliptin Your last dose was: Your next dose is: Take 100 mg by mouth daily. 100 mg  
    
   
   
   
  
 LASIX 40 mg tablet Generic drug:  furosemide Your last dose was: Your next dose is: Take 40 mg by mouth daily. 40 mg  
    
   
   
   
  
 lisinopril 20 mg tablet Commonly known as:  Phoebe Bowie Your last dose was: Your next dose is: Take 20 mg by mouth daily. 20 mg  
    
   
   
   
  
 magnesium oxide 400 mg tablet Commonly known as:  MAG-OX Your last dose was: Your next dose is: Take 400 mg by mouth nightly. 400 mg  
    
   
   
   
  
 melatonin 5 mg Cap capsule Your last dose was: Your next dose is: Take 5 mg by mouth nightly. 5 mg  
    
   
   
   
  
 metFORMIN 500 mg Tg24 24 hour tablet Commonly known Darling Garg ER Your last dose was: Your next dose is: Take 2 Tabs by mouth daily. 2 Tab NORCO 7.5-325 mg per tablet Generic drug:  HYDROcodone-acetaminophen Your last dose was: Your next dose is: Take 1 Tab by mouth four (4) times daily. 1 Tab ONE-A-DAY WOMEN'S PETITES PO Your last dose was: Your next dose is:    
   
   
 2 times daily  
     
   
   
   
  
 potassium chloride SR 10 mEq tablet Commonly known as:  KLOR-CON 10 Your last dose was: Your next dose is: Take 20 mEq by mouth daily. 20 mEq  
    
   
   
   
  
 simvastatin 20 mg tablet Commonly known as:  ZOCOR Your last dose was: Your next dose is: Take  by mouth nightly. SINGULAIR 10 mg tablet Generic drug:  montelukast  
   
Your last dose was: Your next dose is: Take 10 mg by mouth daily. 10 mg  
    
   
   
   
  
 traMADol 50 mg tablet Commonly known as:  Kennieth Peoples  
   
 Your last dose was: Your next dose is: Take 50 mg by mouth daily. 50 mg  
    
   
   
   
  
 traZODone 100 mg tablet Commonly known as:  Alfonso Thomson Your last dose was: Your next dose is: Take 100 mg by mouth daily. 100 mg  
    
   
   
   
  
 VITAMIN D3 2,000 unit Tab Generic drug:  cholecalciferol (vitamin D3) Your last dose was: Your next dose is: Take 2,000 Units by mouth daily. 2000 Units * Notice: This list has 2 medication(s) that are the same as other medications prescribed for you. Read the directions carefully, and ask your doctor or other care provider to review them with you. Where to Get Your Medications Information on where to get these meds will be given to you by the nurse or doctor. ! Ask your nurse or doctor about these medications  
  levoFLOXacin 750 mg tablet  
 predniSONE 10 mg tablet

## 2018-07-29 ENCOUNTER — APPOINTMENT (OUTPATIENT)
Dept: CT IMAGING | Age: 58
DRG: 121 | End: 2018-07-29
Attending: INTERNAL MEDICINE
Payer: MEDICAID

## 2018-07-29 ENCOUNTER — APPOINTMENT (OUTPATIENT)
Dept: GENERAL RADIOLOGY | Age: 58
DRG: 121 | End: 2018-07-29
Attending: INTERNAL MEDICINE
Payer: MEDICAID

## 2018-07-29 LAB
ALBUMIN SERPL-MCNC: 2.1 G/DL (ref 3.5–5)
ALBUMIN/GLOB SERPL: 0.4 {RATIO} (ref 1.2–3.5)
ALP SERPL-CCNC: 74 U/L (ref 50–136)
ALT SERPL-CCNC: 22 U/L (ref 12–65)
ANION GAP SERPL CALC-SCNC: 13 MMOL/L (ref 7–16)
ARTERIAL PATENCY WRIST A: POSITIVE
AST SERPL-CCNC: 31 U/L (ref 15–37)
BASE EXCESS BLDA CALC-SCNC: 10.7 MMOL/L (ref 0–3)
BDY SITE: ABNORMAL
BILIRUB SERPL-MCNC: 0.7 MG/DL (ref 0.2–1.1)
BUN SERPL-MCNC: 18 MG/DL (ref 6–23)
CALCIUM SERPL-MCNC: 8.5 MG/DL (ref 8.3–10.4)
CHLORIDE SERPL-SCNC: 99 MMOL/L (ref 98–107)
CO2 SERPL-SCNC: 27 MMOL/L (ref 21–32)
COHGB MFR BLD: 1.4 % (ref 0.5–1.5)
CREAT SERPL-MCNC: 1.03 MG/DL (ref 0.6–1)
DO-HGB BLD-MCNC: 11 % (ref 0–5)
GAS FLOW.O2 O2 DELIVERY SYS: 4 L/MIN
GLOBULIN SER CALC-MCNC: 5.4 G/DL (ref 2.3–3.5)
GLUCOSE BLD STRIP.AUTO-MCNC: 186 MG/DL (ref 65–100)
GLUCOSE BLD STRIP.AUTO-MCNC: 191 MG/DL (ref 65–100)
GLUCOSE BLD STRIP.AUTO-MCNC: 211 MG/DL (ref 65–100)
GLUCOSE BLD STRIP.AUTO-MCNC: 266 MG/DL (ref 65–100)
GLUCOSE BLD STRIP.AUTO-MCNC: 286 MG/DL (ref 65–100)
GLUCOSE SERPL-MCNC: 187 MG/DL (ref 65–100)
HCO3 BLDA-SCNC: 39 MMOL/L (ref 22–26)
HGB BLDMV-MCNC: 13.5 GM/DL (ref 11.7–15)
METHGB MFR BLD: 0.5 % (ref 0–1.5)
OXYHGB MFR BLDA: 87.3 % (ref 94–97)
PCO2 BLDA: 70 MMHG (ref 35–45)
PH BLDA: 7.36 [PH] (ref 7.35–7.45)
PO2 BLDA: 57 MMHG (ref 80–105)
POTASSIUM SERPL-SCNC: 5.3 MMOL/L (ref 3.5–5.1)
PROT SERPL-MCNC: 7.5 G/DL (ref 6.3–8.2)
SAO2 % BLD: 89 % (ref 92–98.5)
SERVICE CMNT-IMP: ABNORMAL
SODIUM SERPL-SCNC: 139 MMOL/L (ref 136–145)
VENTILATION MODE VENT: ABNORMAL

## 2018-07-29 PROCEDURE — 74011250637 HC RX REV CODE- 250/637: Performed by: INTERNAL MEDICINE

## 2018-07-29 PROCEDURE — 74011000258 HC RX REV CODE- 258: Performed by: FAMILY MEDICINE

## 2018-07-29 PROCEDURE — 99232 SBSQ HOSP IP/OBS MODERATE 35: CPT | Performed by: INTERNAL MEDICINE

## 2018-07-29 PROCEDURE — 36415 COLL VENOUS BLD VENIPUNCTURE: CPT | Performed by: INTERNAL MEDICINE

## 2018-07-29 PROCEDURE — 82803 BLOOD GASES ANY COMBINATION: CPT

## 2018-07-29 PROCEDURE — 36600 WITHDRAWAL OF ARTERIAL BLOOD: CPT

## 2018-07-29 PROCEDURE — 94640 AIRWAY INHALATION TREATMENT: CPT

## 2018-07-29 PROCEDURE — 65270000029 HC RM PRIVATE

## 2018-07-29 PROCEDURE — 71250 CT THORAX DX C-: CPT

## 2018-07-29 PROCEDURE — 74011250636 HC RX REV CODE- 250/636: Performed by: FAMILY MEDICINE

## 2018-07-29 PROCEDURE — 80053 COMPREHEN METABOLIC PANEL: CPT | Performed by: INTERNAL MEDICINE

## 2018-07-29 PROCEDURE — 74011250636 HC RX REV CODE- 250/636: Performed by: INTERNAL MEDICINE

## 2018-07-29 PROCEDURE — 82962 GLUCOSE BLOOD TEST: CPT

## 2018-07-29 PROCEDURE — 74011000250 HC RX REV CODE- 250: Performed by: INTERNAL MEDICINE

## 2018-07-29 PROCEDURE — 74011636637 HC RX REV CODE- 636/637: Performed by: INTERNAL MEDICINE

## 2018-07-29 PROCEDURE — 94760 N-INVAS EAR/PLS OXIMETRY 1: CPT

## 2018-07-29 PROCEDURE — 77010033678 HC OXYGEN DAILY

## 2018-07-29 PROCEDURE — 71045 X-RAY EXAM CHEST 1 VIEW: CPT

## 2018-07-29 RX ORDER — HYDROCODONE BITARTRATE AND ACETAMINOPHEN 7.5; 325 MG/1; MG/1
1 TABLET ORAL
Status: DISCONTINUED | OUTPATIENT
Start: 2018-07-29 | End: 2018-08-02 | Stop reason: HOSPADM

## 2018-07-29 RX ORDER — FUROSEMIDE 10 MG/ML
40 INJECTION INTRAMUSCULAR; INTRAVENOUS EVERY 12 HOURS
Status: COMPLETED | OUTPATIENT
Start: 2018-07-29 | End: 2018-07-29

## 2018-07-29 RX ADMIN — Medication 10 ML: at 05:48

## 2018-07-29 RX ADMIN — ALBUTEROL SULFATE 2.5 MG: 2.5 SOLUTION RESPIRATORY (INHALATION) at 11:17

## 2018-07-29 RX ADMIN — BACLOFEN 20 MG: 10 TABLET ORAL at 08:42

## 2018-07-29 RX ADMIN — FAMOTIDINE 20 MG: 20 TABLET ORAL at 16:30

## 2018-07-29 RX ADMIN — ATENOLOL 50 MG: 25 TABLET ORAL at 08:43

## 2018-07-29 RX ADMIN — Medication 10 ML: at 20:30

## 2018-07-29 RX ADMIN — Medication 10 ML: at 16:32

## 2018-07-29 RX ADMIN — METHYLPREDNISOLONE SODIUM SUCCINATE 60 MG: 125 INJECTION, POWDER, FOR SOLUTION INTRAMUSCULAR; INTRAVENOUS at 16:32

## 2018-07-29 RX ADMIN — SPIRONOLACTONE 25 MG: 25 TABLET, FILM COATED ORAL at 08:43

## 2018-07-29 RX ADMIN — ALBUTEROL SULFATE 2.5 MG: 2.5 SOLUTION RESPIRATORY (INHALATION) at 07:27

## 2018-07-29 RX ADMIN — FUROSEMIDE 40 MG: 10 INJECTION, SOLUTION INTRAMUSCULAR; INTRAVENOUS at 08:42

## 2018-07-29 RX ADMIN — GABAPENTIN 800 MG: 400 CAPSULE ORAL at 13:04

## 2018-07-29 RX ADMIN — GABAPENTIN 800 MG: 400 CAPSULE ORAL at 08:42

## 2018-07-29 RX ADMIN — INSULIN LISPRO 4 UNITS: 100 INJECTION, SOLUTION INTRAVENOUS; SUBCUTANEOUS at 00:16

## 2018-07-29 RX ADMIN — MONTELUKAST SODIUM 10 MG: 10 TABLET, FILM COATED ORAL at 20:30

## 2018-07-29 RX ADMIN — INSULIN LISPRO 6 UNITS: 100 INJECTION, SOLUTION INTRAVENOUS; SUBCUTANEOUS at 13:05

## 2018-07-29 RX ADMIN — ALBUTEROL SULFATE 2.5 MG: 2.5 SOLUTION RESPIRATORY (INHALATION) at 03:50

## 2018-07-29 RX ADMIN — LISINOPRIL 20 MG: 20 TABLET ORAL at 08:42

## 2018-07-29 RX ADMIN — ENOXAPARIN SODIUM 40 MG: 40 INJECTION, SOLUTION INTRAVENOUS; SUBCUTANEOUS at 16:31

## 2018-07-29 RX ADMIN — BACLOFEN 20 MG: 10 TABLET ORAL at 16:30

## 2018-07-29 RX ADMIN — HYDROCODONE BITARTRATE AND ACETAMINOPHEN 1 TABLET: 7.5; 325 TABLET ORAL at 16:33

## 2018-07-29 RX ADMIN — INSULIN LISPRO 2 UNITS: 100 INJECTION, SOLUTION INTRAVENOUS; SUBCUTANEOUS at 16:32

## 2018-07-29 RX ADMIN — GABAPENTIN 800 MG: 400 CAPSULE ORAL at 20:30

## 2018-07-29 RX ADMIN — INSULIN LISPRO 2 UNITS: 100 INJECTION, SOLUTION INTRAVENOUS; SUBCUTANEOUS at 05:47

## 2018-07-29 RX ADMIN — FUROSEMIDE 40 MG: 10 INJECTION, SOLUTION INTRAMUSCULAR; INTRAVENOUS at 20:24

## 2018-07-29 RX ADMIN — METHYLPREDNISOLONE SODIUM SUCCINATE 60 MG: 125 INJECTION, POWDER, FOR SOLUTION INTRAMUSCULAR; INTRAVENOUS at 05:47

## 2018-07-29 RX ADMIN — ALBUTEROL SULFATE 2.5 MG: 2.5 SOLUTION RESPIRATORY (INHALATION) at 20:55

## 2018-07-29 RX ADMIN — Medication 400 MG: at 20:30

## 2018-07-29 RX ADMIN — GABAPENTIN 800 MG: 400 CAPSULE ORAL at 16:30

## 2018-07-29 RX ADMIN — CEFTRIAXONE SODIUM 1 G: 1 INJECTION, POWDER, FOR SOLUTION INTRAMUSCULAR; INTRAVENOUS at 20:30

## 2018-07-29 RX ADMIN — HYDROCODONE BITARTRATE AND ACETAMINOPHEN 1 TABLET: 7.5; 325 TABLET ORAL at 02:21

## 2018-07-29 RX ADMIN — AZITHROMYCIN 500 MG: 250 TABLET, FILM COATED ORAL at 16:30

## 2018-07-29 RX ADMIN — BACLOFEN 20 MG: 10 TABLET ORAL at 13:04

## 2018-07-29 RX ADMIN — METHYLPREDNISOLONE SODIUM SUCCINATE 60 MG: 125 INJECTION, POWDER, FOR SOLUTION INTRAMUSCULAR; INTRAVENOUS at 20:24

## 2018-07-29 RX ADMIN — POTASSIUM CHLORIDE 20 MEQ: 20 TABLET, EXTENDED RELEASE ORAL at 08:42

## 2018-07-29 RX ADMIN — ALBUTEROL SULFATE 2.5 MG: 2.5 SOLUTION RESPIRATORY (INHALATION) at 16:44

## 2018-07-29 RX ADMIN — ENOXAPARIN SODIUM 40 MG: 40 INJECTION, SOLUTION INTRAVENOUS; SUBCUTANEOUS at 05:47

## 2018-07-29 RX ADMIN — BACLOFEN 20 MG: 10 TABLET ORAL at 20:30

## 2018-07-29 RX ADMIN — FAMOTIDINE 20 MG: 20 TABLET ORAL at 08:42

## 2018-07-29 RX ADMIN — HYDROCODONE BITARTRATE AND ACETAMINOPHEN 1 TABLET: 7.5; 325 TABLET ORAL at 08:42

## 2018-07-29 NOTE — PROGRESS NOTES
Patient states she absolutely refuses to wear BIPAP or mask of any kind. Educated patient on benefits.

## 2018-07-29 NOTE — PROGRESS NOTES
Verbal bedside report received from [facility]. Shift assessment completed. Patient alert and oriented x 4. States no pain. Says breathing feels okay. Wanting to sit up on side of bed, 02 sat did not drop but patient felt dizzy and was periodically closing eyes. Now in chair position in bed. NSR on monitor. 02 sat low 90's on 4 L NC. Dyspnea with exertion, lung sounds diminished, some crackles in bases. Abdomen obese, semi-soft. Skin red, sindhu, numerous scattered scabs/abrasions. Clear yellow urine draining via Leyva. Small amount vaginal discharge present. Lines: 
 #20 L AC #20 R AC #20 L Hand Drips: 
none Drains: 
Leyva

## 2018-07-29 NOTE — PROGRESS NOTES
Hospitalist Progress Note Admit Date:  2018 10:39 AM  
Name:  Gwendolyn Lynch Age:  59 y.o. 
:  1960 MRN:  [id number] PCP:  Not On File [facility] Treatment Team: Attending Provider: Gi Edgar MD; Consulting Provider: Oksana Granger MD 
 
Subjective: As per h&p Patient is a 59 y.o.  female presents with falls. Patient has a history of morbid obesity, FLETCHER/OHS not on CPAP, chronic respiratory failure, DM, HTN, HL, and chronic CHF. Apparently patient has sustained multiple recent falls. Patient is very sleepy but states that she fell yesterday secondary to dizziness. She states that her breathing has been different from her baseline but is not able to describe in what way. Her son lives with her but is not currently at the bedside to provide additional detail. In the ER her LA 1.1, WBC 8.9, PCT <0.1, Creat 1.48, .   
 
7/ Pt on nasal cannula oxygen Awake alert Says sob better Discussed code status- wants to full code. Hospitalist service consulted to take over pt management. Objective:  
Patient Vitals for the past 24 hrs: 
 Temp Pulse Resp BP SpO2  
18 1644 - - - - 94 %  
18 1510 98.2 °F (36.8 °C) (!) 24 24 101/63 96 %  
18 1350 98.2 °F (36.8 °C) 80 20 106/67 94 %  
18 1200 98.5 °F (36.9 °C) 80 18 109/58 91 %  
18 1117 - - - - 94 %  
18 1100 - 76 (!) 38 107/55 92 %  
18 1034 - 74 (!) 50 103/54 90 % 18 0900 - 85 25 125/63 94 %  
18 0800 - 79 27 117/58 91 %  
18 0739 - - - - 92 %  
18 0727 - - - - 92 %  
18 0701 - 79 (!) 31 126/59 94 %  
18 0600 - 87 - 116/59 92 %  
18 0501 - 82 15 119/58 91 %  
18 0404 98.7 °F (37.1 °C) 88 26 123/64 92 %  
18 0403 - 88 26 - 92 %  
18 0400 - 83 23 123/64 93 % 18 0350 - - - - 94 %  
18 0300 - 78 20 134/66 94 %  
18 0200 - 74 23 150/85 93 % 18 0100 - 77 17 107/52 95 %  
18 0013 99.2 °F (37.3 °C) 82 19 128/61 93 % 07/29/18 0001 - 83 15 - 94 %  
07/29/18 0000 - - - 128/61 -  
07/28/18 2301 - - - - 92 %  
07/28/18 2300 - 83 23 143/66 91 %  
07/28/18 2201 - 80 20 148/65 94 %  
07/28/18 2100 - 80 16 114/60 93 % 07/28/18 2001 - 72 24 122/58 93 % 07/28/18 1953 - 84 - 131/61 92 %  
07/28/18 1951 - - 29 - -  
07/28/18 1913 - - - - 92 % Oxygen Therapy O2 Sat (%): 94 % (07/29/18 1644) Pulse via Oximetry: 67 beats per minute (07/29/18 1644) O2 Device: Nasal cannula (07/29/18 1644) O2 Flow Rate (L/min): 5 l/min (07/29/18 1644) FIO2 (%): 35 % (07/28/18 2301) Intake/Output Summary (Last 24 hours) at 07/29/18 1912 Last data filed at 07/29/18 4116 Gross per 24 hour Intake             1615 ml Output             1450 ml Net              165 ml General:    Well nourished. Alert. Oxygen HEENT- normal 
CV:   RRR. No murmur, rub, or gallop. Lungs:   Basilar crepitations Abdomen:   Soft, nontender, nondistended. Morbidly obese CNS- No focal neurological deficits. Extremities: Warm and dry. No cyanosis. No edema Skin:     No  jaundice. Data Review: 
I have reviewed all labs, meds, telemetry events, and studies from the last 24 hours. Recent Results (from the past 24 hour(s)) GLUCOSE, POC Collection Time: 07/28/18  9:24 PM  
Result Value Ref Range Glucose (POC) 204 (H) 65 - 100 mg/dL METABOLIC PANEL, BASIC Collection Time: 07/28/18 10:00 PM  
Result Value Ref Range Sodium 138 136 - 145 mmol/L Potassium 4.7 3.5 - 5.1 mmol/L Chloride 98 98 - 107 mmol/L  
 CO2 37 (H) 21 - 32 mmol/L Anion gap 3 (L) 7 - 16 mmol/L Glucose 217 (H) 65 - 100 mg/dL BUN 20 6 - 23 MG/DL Creatinine 1.18 (H) 0.6 - 1.0 MG/DL  
 GFR est AA >60 >60 ml/min/1.73m2 GFR est non-AA 50 (L) >60 ml/min/1.73m2 Calcium 8.5 8.3 - 10.4 MG/DL MAGNESIUM Collection Time: 07/28/18 10:00 PM  
Result Value Ref Range Magnesium 2.2 1.8 - 2.4 mg/dL PHOSPHORUS  
 Collection Time: 07/28/18 10:00 PM  
Result Value Ref Range Phosphorus 3.2 2.5 - 4.5 MG/DL  
GLUCOSE, POC Collection Time: 07/29/18 12:10 AM  
Result Value Ref Range Glucose (POC) 211 (H) 65 - 100 mg/dL METABOLIC PANEL, COMPREHENSIVE Collection Time: 07/29/18  3:35 AM  
Result Value Ref Range Sodium 139 136 - 145 mmol/L Potassium 5.3 (H) 3.5 - 5.1 mmol/L Chloride 99 98 - 107 mmol/L  
 CO2 27 21 - 32 mmol/L Anion gap 13 7 - 16 mmol/L Glucose 187 (H) 65 - 100 mg/dL BUN 18 6 - 23 MG/DL Creatinine 1.03 (H) 0.6 - 1.0 MG/DL  
 GFR est AA >60 >60 ml/min/1.73m2 GFR est non-AA 58 (L) >60 ml/min/1.73m2 Calcium 8.5 8.3 - 10.4 MG/DL Bilirubin, total 0.7 0.2 - 1.1 MG/DL  
 ALT (SGPT) 22 12 - 65 U/L  
 AST (SGOT) 31 15 - 37 U/L Alk. phosphatase 74 50 - 136 U/L Protein, total 7.5 6.3 - 8.2 g/dL Albumin 2.1 (L) 3.5 - 5.0 g/dL Globulin 5.4 (H) 2.3 - 3.5 g/dL A-G Ratio 0.4 (L) 1.2 - 3.5 BLOOD GAS, ARTERIAL Collection Time: 07/29/18  3:40 AM  
Result Value Ref Range pH 7.36 7.35 - 7.45    
 PCO2 70 (H) 35 - 45 mmHg PO2 57 (L) 80 - 105 mmHg BICARBONATE 39 (H) 22 - 26 mmol/L  
 BASE EXCESS 10.7 (H) 0 - 3 mmol/L  
 TOTAL HEMOGLOBIN 13.5 11.7 - 15.0 GM/DL  
 O2 SAT 89 (L) 92 - 98.5 % Arterial O2 Hgb 87.3 (L) 94 - 97 % CARBOXYHEMOGLOBIN 1.4 0.5 - 1.5 % METHEMOGLOBIN 0.5 0.0 - 1.5 % DEOXYHEMOGLOBIN 11 (H) 0.0 - 5.0 % SITE RR   
 ALLENS TEST POSITIVE    
 MODE NC   
 O2 FLOW 4.00 L/min Respiratory comment: Kira JEAN at 7 29 2018 3 47 33 AM. Read back. GLUCOSE, POC Collection Time: 07/29/18  5:43 AM  
Result Value Ref Range Glucose (POC) 186 (H) 65 - 100 mg/dL GLUCOSE, POC Collection Time: 07/29/18 12:58 PM  
Result Value Ref Range Glucose (POC) 266 (H) 65 - 100 mg/dL GLUCOSE, POC Collection Time: 07/29/18  4:09 PM  
Result Value Ref Range Glucose (POC) 191 (H) 65 - 100 mg/dL All Micro Results  Procedure Component Value Units Date/Time CULTURE, BLOOD [[id number]] Collected:  07/28/18 1127 Order Status:  Completed Specimen:  Blood from Blood Updated:  07/29/18 1405 Special Requests: --     
  LEFT Antecubital 
  
  Culture result: NO GROWTH 1 DAY     
 CULTURE, BLOOD [[id number]] Collected:  07/28/18 1153 Order Status:  Completed Specimen:  Blood from Blood Updated:  07/29/18 1405 Special Requests: --     
  LEFT Antecubital 
  
  Culture result: NO GROWTH 1 DAY     
 CULTURE, RESPIRATORY/SPUTUM/BRONCH Verito Dayhoff STAIN [[id number]] Order Status:  Sent Specimen:  Sputum from Sputum Current Meds: 
Current Facility-Administered Medications Medication Dose Route Frequency  HYDROcodone-acetaminophen (NORCO) 7.5-325 mg per tablet 1 Tab  1 Tab Oral Q6H PRN  
 furosemide (LASIX) injection 40 mg  40 mg IntraVENous Q12H  
 atenolol (TENORMIN) tablet 50 mg  50 mg Oral DAILY  baclofen (LIORESAL) tablet 20 mg  20 mg Oral QID  famotidine (PEPCID) tablet 20 mg  20 mg Oral BID  
 gabapentin (NEURONTIN) capsule 800 mg  800 mg Oral QID  lisinopril (PRINIVIL, ZESTRIL) tablet 20 mg  20 mg Oral DAILY  magnesium oxide (MAG-OX) tablet 400 mg  400 mg Oral QHS  montelukast (SINGULAIR) tablet 10 mg  10 mg Oral QHS  potassium chloride (K-DUR, KLOR-CON) SR tablet 20 mEq  20 mEq Oral DAILY  spironolactone (ALDACTONE) tablet 25 mg  25 mg Oral DAILY  sodium chloride (NS) flush 5-10 mL  5-10 mL IntraVENous Q8H  
 sodium chloride (NS) flush 5-10 mL  5-10 mL IntraVENous PRN  
 albuterol (PROVENTIL VENTOLIN) nebulizer solution 2.5 mg  2.5 mg Nebulization Q4H RT  
 methylPREDNISolone (PF) (SOLU-MEDROL) injection 60 mg  60 mg IntraVENous Q8H  
 azithromycin (ZITHROMAX) tablet 500 mg  500 mg Oral Q24H  
 enoxaparin (LOVENOX) injection 40 mg  40 mg SubCUTAneous Q12H  
 insulin lispro (HUMALOG) injection   SubCUTAneous Q6H Other Studies (last 24 hours): Xr Chest Sngl V Result Date: 7/29/2018 Portable AP upright chest dated 7/29/2018 at 0359 hours Comparison 7/28/2018 CLINICAL INFORMATION: Infiltrate Heart is moderately enlarged. Mediastinum is unremarkable. Left suprahilar density is unchanged. Right lung clear. No pleural effusion. IMPRESSION: No significant change Ct Chest Wo Cont Result Date: 7/29/2018 CT chest without contrast History: Left suprahilar opacity. Abnormal chest x-ray. Technique: Helically acquired images were obtained from the lung apices to the domes of the diaphragms reconstructed at 5 mm thickness. Radiation dose reduction techniques were used for this study:  Our CT scanners use one or all of the following: Automated exposure control, adjustment of the mA and/or kVp according to patient's size, iterative reconstruction. Comparison: None. Correlation is made to the chest x-ray performed earlier on the same day. Findings: There is a trace right pleural effusion. There is no pericardial effusion. There is a masslike opacity at the left superhilar region measured at approximately 4.1 x 4.5 cm in AP and transverse dimensions, respectively. The margins are not well delineated in the absence of intravenous contrast. There is postobstructive subsegmental atelectasis involving a portion of the left upper lobe medially. The findings would be most suspicious for neoplasm. The heart is mildly enlarged. Several mediastinal lymph nodes are present including a right lower paratracheal lymph node measuring 1.5 cm in short axis. Smaller AP and prevascular lymph nodes are present. A subcarinal lymph node measures 1.1 cm. Imaging of the upper and reveals normal appearing adrenal glands. IMPRESSION: 1. Masslike opacity at the left superhilar region with postobstructive atelectasis involving a portion of the left upper lobe medially. The imaging features would be suspicious for neoplasm until proven otherwise. Consider bronchoscopy for further evaluation.  2. Several small to mildly prominent mediastinal lymph nodes. Assessment and Plan:  
 
Hospital Problems as of 7/29/2018  Date Reviewed: 7/28/2018 Codes Class Noted - Resolved POA * (Principal)Acute on chronic respiratory failure (HCC) ICD-10-CM: J96.20 ICD-9-CM: 518.84  7/28/2018 - Present Yes Fall ICD-10-CM: W19. Laura Limbo ICD-9-CM: E888.9  7/28/2018 - Present Yes Pulmonary infiltrates ICD-10-CM: R91.8 ICD-9-CM: 793.19  7/28/2018 - Present Yes Chronic pain ICD-10-CM: G89.29 ICD-9-CM: 338.29  7/28/2018 - Present Yes Acute respiratory failure ([facility] ICD-10-CM: J96.00 
ICD-9-CM: 518.81  7/28/2018 - Present Unknown COPD exacerbation ([facility] ICD-10-CM: J44.1 ICD-9-CM: 491.21  7/28/2018 - Present Unknown Morbid obesity (HCC) (Chronic) ICD-10-CM: E66.01 
ICD-9-CM: 278.01  Unknown - Present Yes Diabetes mellitus without complication (HCC) (Chronic) ICD-10-CM: E11.9 ICD-9-CM: 250.00  Unknown - Present Yes  
   
 FLETCHER (obstructive sleep apnea) (Chronic) ICD-10-CM: G47.33 
ICD-9-CM: 327.23  Unknown - Present Yes Obesity with alveolar hypoventilation (HCC) (Chronic) ICD-10-CM: O79.7 ICD-9-CM: 278.03  Unknown - Present Yes PLAN:   
Acute hypercapnic and hypoxic resp failure- off bipap- notes says refusing to use bipap- co2 improved. on nasal cannula oxygen. Copd exa - nebs Pul infiltrate- rocephin and zithromax Chronic pain Morbid obesity 
fletcher DC planning/Dispo: DVT ppx:  Lovenox Signed: 
Scar Sanchez MD

## 2018-07-29 NOTE — PROGRESS NOTES
Critical Care Daily Progress Note: 7/29/2018 Duy Sampson Admission Date: 7/28/2018 The patient's chart is reviewed and the patient is discussed with the staff. Subjective:  
 
59 y.o.  female presents with falls. Patient has a history of morbid obesity, FLETCHER/OHS not on CPAP, chronic respiratory failure, DM, HTN, HL, and chronic CHF. Apparently patient has sustained multiple recent falls. Patient is very sleepy but states that she fell yesterday secondary to dizziness. She states that her breathing has been different from her baseline but is not able to describe in what way. Her son lives with her but is not currently at the bedside to provide additional detail. In the ER her LA 1.1, WBC 8.9, PCT <0.1, Creat 1.48, . I/o 900/2900 Current Facility-Administered Medications Medication Dose Route Frequency  HYDROcodone-acetaminophen (NORCO) 7.5-325 mg per tablet 1 Tab  1 Tab Oral Q6H PRN  
 atenolol (TENORMIN) tablet 50 mg  50 mg Oral DAILY  baclofen (LIORESAL) tablet 20 mg  20 mg Oral QID  famotidine (PEPCID) tablet 20 mg  20 mg Oral BID  furosemide (LASIX) tablet 40 mg  40 mg Oral DAILY  gabapentin (NEURONTIN) capsule 800 mg  800 mg Oral QID  lisinopril (PRINIVIL, ZESTRIL) tablet 20 mg  20 mg Oral DAILY  magnesium oxide (MAG-OX) tablet 400 mg  400 mg Oral QHS  montelukast (SINGULAIR) tablet 10 mg  10 mg Oral QHS  potassium chloride (K-DUR, KLOR-CON) SR tablet 20 mEq  20 mEq Oral DAILY  spironolactone (ALDACTONE) tablet 25 mg  25 mg Oral DAILY  sodium chloride (NS) flush 5-10 mL  5-10 mL IntraVENous Q8H  
 sodium chloride (NS) flush 5-10 mL  5-10 mL IntraVENous PRN  
 albuterol (PROVENTIL VENTOLIN) nebulizer solution 2.5 mg  2.5 mg Nebulization Q4H RT  
 methylPREDNISolone (PF) (SOLU-MEDROL) injection 60 mg  60 mg IntraVENous Q8H  
 azithromycin (ZITHROMAX) tablet 500 mg  500 mg Oral Q24H  
 enoxaparin (LOVENOX) injection 40 mg  40 mg SubCUTAneous Q12H  
 insulin lispro (HUMALOG) injection   SubCUTAneous Q6H Review of Systems Constitutional:  negative for fever, chills, sweats Cardiovascular:  negative for chest pain, palpitations, syncope, edema Gastrointestinal:  negative for dysphagia, reflux, vomiting, diarrhea, abdominal pain, or melena Neurologic:  negative for focal weakness, numbness, headache Objective:  
 
Vitals:  
 07/29/18 0551 07/29/18 0600 07/29/18 0701 07/29/18 2105 BP:  116/59 126/59 Pulse:  87 79 Resp:   (!) 31 Temp:      
SpO2:  92% 94% 92% Weight: (!) 391 lb 1.5 oz (177.4 kg) Height:      
 
 
Intake and Output:  
07/27 1901 - 07/29 0700 In: 900 [P.O.:900] Out: 2925 [Urine:2925] Physical Exam:         
Constitutional:  the patient is well developed and in no acute distress EENMT:  Sclera clear, pupils equal, oral mucosa moist 
Respiratory:  Clear with decreased breath sounds Cardiovascular:  RRR without M,G,R 
Gastrointestinal: soft and non-tender; with positive bowel sounds. Musculoskeletal: warm without cyanosis. There is no pitting lower leg edema. Skin:  no jaundice or rashes, no wounds Neurologic: no gross neuro deficits Psychiatric:  alert and oriented x 3 LINES:  peripheral 
 
DRIPS:   none CXR:  
 
 
LAB Recent Labs  
   07/29/18 
 0543  07/29/18 
 0010  07/28/18 
 2124  07/28/18 
 1822 GLUCPOC  186*  211*  204*  193* Recent Labs  
   07/28/18 
 1127 WBC  8.9 HGB  13.0 HCT  45.1 PLT  227 Recent Labs  
   07/29/18 
 0335  07/28/18 
 2200  07/28/18 
 1127 NA  139  138  136  
K  5.3*  4.7  5.0  
CL  99  98  98 CO2  27  37*  35* GLU  187*  217*  206* BUN  18  20  24* CREA  1.03*  1.18*  1.48* MG   --   2.2  2.3 PHOS   --   3.2   --   
CA  8.5  8.5  8.6 ALB  2.1*   --   3.1* TBILI  0.7   --   0.7 ALT  22   --   22 SGOT  31   --   21 Recent Labs  
   07/29/18 
 0340  07/28/18 
 1400  07/28/18 1110  
PH  7.36  7.22*  7.20* PCO2  70*  84*  97* PO2  57*  70*  67* HCO3  39*  34*  37* No results for input(s): LCAD, LAC in the last 72 hours. Assessment:  (Medical Decision Making) Hospital Problems  Date Reviewed: 7/28/2018 Codes Class Noted POA * (Principal)Acute on chronic respiratory failure (HCC) ICD-10-CM: J96.20 ICD-9-CM: 518.84  7/28/2018 Yes Fall ICD-10-CM: W19. Fritzi Anastasia ICD-9-CM: E888.9  7/28/2018 Yes Pulmonary infiltrates ICD-10-CM: R91.8 ICD-9-CM: 793.19  7/28/2018 Yes Better but has left hilar density Chronic pain ICD-10-CM: G89.29 ICD-9-CM: 338.29  7/28/2018 Yes Acute respiratory failure ([facility] ICD-10-CM: J96.00 
ICD-9-CM: 518.81  7/28/2018 Unknown  
 co2 has decreased to 70 COPD exacerbation ([facility] ICD-10-CM: J44.1 ICD-9-CM: 491.21  7/28/2018 Unknown Morbid obesity (HCC) (Chronic) ICD-10-CM: E66.01 
ICD-9-CM: 278.01  Unknown Yes Diabetes mellitus without complication (HCC) (Chronic) ICD-10-CM: E11.9 ICD-9-CM: 250.00  Unknown Yes FLETCHER (obstructive sleep apnea) (Chronic) ICD-10-CM: G47.33 
ICD-9-CM: 327.23  Unknown Yes Not on rx Obesity with alveolar hypoventilation (HCC) (Chronic) ICD-10-CM: Q22.5 ICD-9-CM: 278.03  Unknown Yes Plan:  (Medical Decision Making) 1    Pt refusing bipap 2    Does not want vent or cpr- no code 3    Move to floor 4    lasix -- More than 50% of the time documented was spent in face-to-face contact with the patient and in the care of the patient on the floor/unit where the patient is located.  
 
Sunni Cormier MD

## 2018-07-29 NOTE — PROGRESS NOTES
Pt resting in bed on 4L nc. No acute distress. Leyva patent and draining yellow urine. Door open and call bell in reach.

## 2018-07-29 NOTE — PROGRESS NOTES
Pt admitted to room 828. Alert to person, place, time. No acute distress on 4L nc. VSS. Denies pain. Dual skin assessment completed with Silver Carrillo RN. Pt has scabs and open sores over entire body, including scalp and bilateral breasts. Pt states, \" I pick at my skin. \" Redness under breasts and abdominal folds. Bloody discharge from vagina. Leyva intact, patent and draining yellow urine. Obese. Family at bedside. Oriented to room, instructed to call for help.

## 2018-07-29 NOTE — PROGRESS NOTES
Problem: Falls - Risk of 
Goal: *Absence of Falls Document Jonathan Granger Fall Risk and appropriate interventions in the flowsheet. Outcome: Progressing Towards Goal 
Fall Risk Interventions: 
Mobility Interventions: Bed/chair exit alarm, Communicate number of staff needed for ambulation/transfer, Patient to call before getting OOB, Strengthening exercises (ROM-active/passive), Utilize walker, cane, or other assistive device Medication Interventions: Bed/chair exit alarm, Evaluate medications/consider consulting pharmacy, Patient to call before getting OOB, Teach patient to arise slowly Elimination Interventions: Bed/chair exit alarm, Call light in reach, Patient to call for help with toileting needs, Toileting schedule/hourly rounds History of Falls Interventions: Bed/chair exit alarm, Door open when patient unattended, Evaluate medications/consider consulting pharmacy, Room close to nurse's station Problem: Pressure Injury - Risk of 
Goal: *Prevention of pressure injury Document Gunnar Scale and appropriate interventions in the flowsheet. Outcome: Progressing Towards Goal 
Pressure Injury Interventions: Activity Interventions: Assess need for specialty bed, Increase time out of bed, Pressure redistribution bed/mattress(bed type) Mobility Interventions: Assess need for specialty bed, Float heels, HOB 30 degrees or less, Pressure redistribution bed/mattress (bed type), Turn and reposition approx. every two hours(pillow and wedges) Nutrition Interventions: Document food/fluid/supplement intake Friction and Shear Interventions: Apply protective barrier, creams and emollients, Foam dressings/transparent film/skin sealants, HOB 30 degrees or less, Lift sheet, Minimize layers

## 2018-07-30 ENCOUNTER — ANESTHESIA (OUTPATIENT)
Dept: ENDOSCOPY | Age: 58
DRG: 121 | End: 2018-07-30
Payer: MEDICAID

## 2018-07-30 ENCOUNTER — ANESTHESIA EVENT (OUTPATIENT)
Dept: ENDOSCOPY | Age: 58
DRG: 121 | End: 2018-07-30
Payer: MEDICAID

## 2018-07-30 ENCOUNTER — APPOINTMENT (OUTPATIENT)
Dept: GENERAL RADIOLOGY | Age: 58
DRG: 121 | End: 2018-07-30
Attending: INTERNAL MEDICINE
Payer: MEDICAID

## 2018-07-30 PROBLEM — R91.8 MASS OF LEFT LUNG: Status: ACTIVE | Noted: 2018-07-30

## 2018-07-30 PROBLEM — R59.0 MEDIASTINAL LYMPHADENOPATHY: Status: ACTIVE | Noted: 2018-07-30

## 2018-07-30 PROBLEM — J96.00 ACUTE RESPIRATORY FAILURE (HCC): Status: RESOLVED | Noted: 2018-07-28 | Resolved: 2018-07-30

## 2018-07-30 PROBLEM — C34.12 PRIMARY CANCER OF LEFT UPPER LOBE OF LUNG (HCC): Status: ACTIVE | Noted: 2018-07-30

## 2018-07-30 LAB
ANION GAP SERPL CALC-SCNC: 5 MMOL/L (ref 7–16)
ARTERIAL PATENCY WRIST A: POSITIVE
BASE EXCESS BLDA CALC-SCNC: 8 MMOL/L (ref 0–3)
BDY SITE: ABNORMAL
BUN SERPL-MCNC: 22 MG/DL (ref 6–23)
CA-I BLD-SCNC: 1.17 MMOL/L (ref 1–1.3)
CALCIUM SERPL-MCNC: 9 MG/DL (ref 8.3–10.4)
CANCER AG125 SERPL-ACNC: 19 U/ML (ref 1.5–35)
CANCER AG19-9 SERPL-ACNC: 50.9 U/ML (ref 2–37)
CEA SERPL-MCNC: 3.6 NG/ML (ref 0–3)
CHLORIDE BLDA-SCNC: 95 MMOL/L (ref 98–106)
CHLORIDE SERPL-SCNC: 94 MMOL/L (ref 98–107)
CO2 SERPL-SCNC: 38 MMOL/L (ref 21–32)
COHGB MFR BLD: 0.8 % (ref 0.5–1.5)
CREAT SERPL-MCNC: 1.11 MG/DL (ref 0.6–1)
DO-HGB BLD-MCNC: 3 % (ref 0–5)
GLUCOSE BLD STRIP.AUTO-MCNC: 203 MG/DL (ref 65–100)
GLUCOSE BLD STRIP.AUTO-MCNC: 250 MG/DL (ref 65–100)
GLUCOSE BLD STRIP.AUTO-MCNC: 258 MG/DL (ref 65–100)
GLUCOSE BLD STRIP.AUTO-MCNC: 268 MG/DL (ref 65–100)
GLUCOSE BLD STRIP.AUTO-MCNC: 273 MG/DL (ref 65–100)
GLUCOSE BLD STRIP.AUTO-MCNC: 280 MG/DL (ref 65–100)
GLUCOSE SERPL-MCNC: 266 MG/DL (ref 65–100)
HCO3 BLDA-SCNC: 36 MMOL/L (ref 22–26)
HGB BLDMV-MCNC: 13.1 GM/DL (ref 11.7–15)
METHGB MFR BLD: 0.7 % (ref 0–1.5)
OXYHGB MFR BLDA: 95.5 % (ref 94–97)
PCO2 BLDA: 64 MMHG (ref 35–45)
PEEP RESPIRATORY: 8 CM[H2O]
PH BLDA: 7.37 [PH] (ref 7.35–7.45)
PO2 BLDA: 96 MMHG (ref 80–105)
POTASSIUM BLDA-SCNC: 4.59 MMOL/L (ref 3.5–5.3)
POTASSIUM SERPL-SCNC: 4.1 MMOL/L (ref 3.5–5.1)
RESP RATE: 16
SAO2 % BLD: 97 % (ref 92–98.5)
SERVICE CMNT-IMP: ABNORMAL
SODIUM BLDA-SCNC: 137.9 MMOL/L (ref 135–148)
SODIUM SERPL-SCNC: 137 MMOL/L (ref 136–145)
VENTILATION MODE VENT: ABNORMAL
VT SETTING VENT: 450 ML

## 2018-07-30 PROCEDURE — 77030012699 HC VLV SUC CNTRL OCOA -A: Performed by: INTERNAL MEDICINE

## 2018-07-30 PROCEDURE — 77010033678 HC OXYGEN DAILY

## 2018-07-30 PROCEDURE — 82962 GLUCOSE BLOOD TEST: CPT

## 2018-07-30 PROCEDURE — 99233 SBSQ HOSP IP/OBS HIGH 50: CPT | Performed by: INTERNAL MEDICINE

## 2018-07-30 PROCEDURE — 65620000000 HC RM CCU GENERAL

## 2018-07-30 PROCEDURE — 31653 BRONCH EBUS SAMPLNG 3/> NODE: CPT | Performed by: INTERNAL MEDICINE

## 2018-07-30 PROCEDURE — 76060000034 HC ANESTHESIA 1.5 TO 2 HR: Performed by: INTERNAL MEDICINE

## 2018-07-30 PROCEDURE — 88305 TISSUE EXAM BY PATHOLOGIST: CPT | Performed by: INTERNAL MEDICINE

## 2018-07-30 PROCEDURE — 86301 IMMUNOASSAY TUMOR CA 19-9: CPT | Performed by: INTERNAL MEDICINE

## 2018-07-30 PROCEDURE — 76040000027: Performed by: INTERNAL MEDICINE

## 2018-07-30 PROCEDURE — 74011250636 HC RX REV CODE- 250/636: Performed by: INTERNAL MEDICINE

## 2018-07-30 PROCEDURE — 77030012341 HC CHMB SPCR OPTC MDI VYRM -A

## 2018-07-30 PROCEDURE — 77030008703 HC TU ET UNCUF COVD -A: Performed by: ANESTHESIOLOGY

## 2018-07-30 PROCEDURE — 77030021668 HC NEB PREFIL KT VYRM -A

## 2018-07-30 PROCEDURE — 88173 CYTOPATH EVAL FNA REPORT: CPT | Performed by: INTERNAL MEDICINE

## 2018-07-30 PROCEDURE — 77030008477 HC STYL SATN SLP COVD -A: Performed by: ANESTHESIOLOGY

## 2018-07-30 PROCEDURE — 77030013670 HC FCPS BIOP DISP ENDOSC OCOA -B: Performed by: INTERNAL MEDICINE

## 2018-07-30 PROCEDURE — 82803 BLOOD GASES ANY COMBINATION: CPT

## 2018-07-30 PROCEDURE — 77030020263 HC SOL INJ SOD CL0.9% LFCR 1000ML

## 2018-07-30 PROCEDURE — 31625 BRONCHOSCOPY W/BIOPSY(S): CPT | Performed by: INTERNAL MEDICINE

## 2018-07-30 PROCEDURE — 74011636637 HC RX REV CODE- 636/637: Performed by: INTERNAL MEDICINE

## 2018-07-30 PROCEDURE — 36415 COLL VENOUS BLD VENIPUNCTURE: CPT | Performed by: INTERNAL MEDICINE

## 2018-07-30 PROCEDURE — 74011000250 HC RX REV CODE- 250: Performed by: INTERNAL MEDICINE

## 2018-07-30 PROCEDURE — 77030009046 HC CATH BRNCH BLLN OCOA -B: Performed by: INTERNAL MEDICINE

## 2018-07-30 PROCEDURE — 74011250636 HC RX REV CODE- 250/636

## 2018-07-30 PROCEDURE — 71045 X-RAY EXAM CHEST 1 VIEW: CPT

## 2018-07-30 PROCEDURE — 82378 CARCINOEMBRYONIC ANTIGEN: CPT | Performed by: INTERNAL MEDICINE

## 2018-07-30 PROCEDURE — 94760 N-INVAS EAR/PLS OXIMETRY 1: CPT

## 2018-07-30 PROCEDURE — 94640 AIRWAY INHALATION TREATMENT: CPT

## 2018-07-30 PROCEDURE — 74011250636 HC RX REV CODE- 250/636: Performed by: FAMILY MEDICINE

## 2018-07-30 PROCEDURE — 74011000258 HC RX REV CODE- 258: Performed by: FAMILY MEDICINE

## 2018-07-30 PROCEDURE — 36600 WITHDRAWAL OF ARTERIAL BLOOD: CPT

## 2018-07-30 PROCEDURE — 74011250636 HC RX REV CODE- 250/636: Performed by: ANESTHESIOLOGY

## 2018-07-30 PROCEDURE — 74011000250 HC RX REV CODE- 250

## 2018-07-30 PROCEDURE — 86304 IMMUNOASSAY TUMOR CA 125: CPT | Performed by: INTERNAL MEDICINE

## 2018-07-30 PROCEDURE — 80048 BASIC METABOLIC PNL TOTAL CA: CPT | Performed by: INTERNAL MEDICINE

## 2018-07-30 PROCEDURE — 88172 CYTP DX EVAL FNA 1ST EA SITE: CPT | Performed by: INTERNAL MEDICINE

## 2018-07-30 PROCEDURE — 87070 CULTURE OTHR SPECIMN AEROBIC: CPT | Performed by: INTERNAL MEDICINE

## 2018-07-30 PROCEDURE — 74011250637 HC RX REV CODE- 250/637: Performed by: INTERNAL MEDICINE

## 2018-07-30 PROCEDURE — 88177 CYTP FNA EVAL EA ADDL: CPT | Performed by: INTERNAL MEDICINE

## 2018-07-30 PROCEDURE — 0BB88ZX EXCISION OF LEFT UPPER LOBE BRONCHUS, VIA NATURAL OR ARTIFICIAL OPENING ENDOSCOPIC, DIAGNOSTIC: ICD-10-PCS | Performed by: INTERNAL MEDICINE

## 2018-07-30 PROCEDURE — 77030003406 HC NDL ASPIR BIOP OCOA -C: Performed by: INTERNAL MEDICINE

## 2018-07-30 PROCEDURE — 07978ZX DRAINAGE OF THORAX LYMPHATIC, VIA NATURAL OR ARTIFICIAL OPENING ENDOSCOPIC APPROACH, DIAGNOSTIC: ICD-10-PCS | Performed by: INTERNAL MEDICINE

## 2018-07-30 RX ORDER — LIDOCAINE HYDROCHLORIDE 10 MG/ML
0.1 INJECTION INFILTRATION; PERINEURAL AS NEEDED
Status: DISCONTINUED | OUTPATIENT
Start: 2018-07-30 | End: 2018-07-30 | Stop reason: HOSPADM

## 2018-07-30 RX ORDER — ONDANSETRON 2 MG/ML
INJECTION INTRAMUSCULAR; INTRAVENOUS AS NEEDED
Status: DISCONTINUED | OUTPATIENT
Start: 2018-07-30 | End: 2018-07-30 | Stop reason: HOSPADM

## 2018-07-30 RX ORDER — SODIUM CHLORIDE, SODIUM LACTATE, POTASSIUM CHLORIDE, CALCIUM CHLORIDE 600; 310; 30; 20 MG/100ML; MG/100ML; MG/100ML; MG/100ML
100 INJECTION, SOLUTION INTRAVENOUS CONTINUOUS
Status: DISCONTINUED | OUTPATIENT
Start: 2018-07-30 | End: 2018-07-30 | Stop reason: HOSPADM

## 2018-07-30 RX ORDER — FENTANYL CITRATE 50 UG/ML
INJECTION, SOLUTION INTRAMUSCULAR; INTRAVENOUS AS NEEDED
Status: DISCONTINUED | OUTPATIENT
Start: 2018-07-30 | End: 2018-07-30 | Stop reason: HOSPADM

## 2018-07-30 RX ORDER — MIDAZOLAM HYDROCHLORIDE 1 MG/ML
INJECTION, SOLUTION INTRAMUSCULAR; INTRAVENOUS AS NEEDED
Status: DISCONTINUED | OUTPATIENT
Start: 2018-07-30 | End: 2018-07-30 | Stop reason: HOSPADM

## 2018-07-30 RX ORDER — LIDOCAINE HYDROCHLORIDE 20 MG/ML
INJECTION, SOLUTION EPIDURAL; INFILTRATION; INTRACAUDAL; PERINEURAL AS NEEDED
Status: DISCONTINUED | OUTPATIENT
Start: 2018-07-30 | End: 2018-07-30 | Stop reason: HOSPADM

## 2018-07-30 RX ORDER — NALOXONE HYDROCHLORIDE 0.4 MG/ML
0.04 INJECTION, SOLUTION INTRAMUSCULAR; INTRAVENOUS; SUBCUTANEOUS
Status: DISCONTINUED | OUTPATIENT
Start: 2018-07-30 | End: 2018-07-30 | Stop reason: HOSPADM

## 2018-07-30 RX ORDER — PROPOFOL 10 MG/ML
0-50 VIAL (ML) INTRAVENOUS
Status: DISCONTINUED | OUTPATIENT
Start: 2018-07-30 | End: 2018-08-01

## 2018-07-30 RX ORDER — FENTANYL CITRATE-0.9 % NACL/PF 25 MCG/ML
25-200 PLASTIC BAG, INJECTION (ML) INJECTION
Status: DISCONTINUED | OUTPATIENT
Start: 2018-07-30 | End: 2018-08-01

## 2018-07-30 RX ORDER — OXYCODONE HYDROCHLORIDE 5 MG/1
5 TABLET ORAL
Status: DISCONTINUED | OUTPATIENT
Start: 2018-07-30 | End: 2018-07-30 | Stop reason: HOSPADM

## 2018-07-30 RX ORDER — MIDAZOLAM HYDROCHLORIDE 1 MG/ML
2 INJECTION, SOLUTION INTRAMUSCULAR; INTRAVENOUS ONCE
Status: DISCONTINUED | OUTPATIENT
Start: 2018-07-30 | End: 2018-07-30 | Stop reason: HOSPADM

## 2018-07-30 RX ORDER — PROPOFOL 10 MG/ML
INJECTION, EMULSION INTRAVENOUS AS NEEDED
Status: DISCONTINUED | OUTPATIENT
Start: 2018-07-30 | End: 2018-07-30 | Stop reason: HOSPADM

## 2018-07-30 RX ORDER — HYDROMORPHONE HYDROCHLORIDE 2 MG/ML
0.2 INJECTION, SOLUTION INTRAMUSCULAR; INTRAVENOUS; SUBCUTANEOUS
Status: DISCONTINUED | OUTPATIENT
Start: 2018-07-30 | End: 2018-07-30 | Stop reason: HOSPADM

## 2018-07-30 RX ORDER — FENTANYL CITRATE 50 UG/ML
100 INJECTION, SOLUTION INTRAMUSCULAR; INTRAVENOUS ONCE
Status: DISCONTINUED | OUTPATIENT
Start: 2018-07-30 | End: 2018-07-30 | Stop reason: HOSPADM

## 2018-07-30 RX ORDER — MIDAZOLAM HYDROCHLORIDE 1 MG/ML
2 INJECTION, SOLUTION INTRAMUSCULAR; INTRAVENOUS
Status: DISCONTINUED | OUTPATIENT
Start: 2018-07-30 | End: 2018-07-30 | Stop reason: HOSPADM

## 2018-07-30 RX ORDER — ROCURONIUM BROMIDE 10 MG/ML
INJECTION, SOLUTION INTRAVENOUS AS NEEDED
Status: DISCONTINUED | OUTPATIENT
Start: 2018-07-30 | End: 2018-07-30 | Stop reason: HOSPADM

## 2018-07-30 RX ORDER — PROPOFOL 10 MG/ML
INJECTION, EMULSION INTRAVENOUS
Status: COMPLETED
Start: 2018-07-30 | End: 2018-07-30

## 2018-07-30 RX ORDER — SUCCINYLCHOLINE CHLORIDE 20 MG/ML
INJECTION INTRAMUSCULAR; INTRAVENOUS AS NEEDED
Status: DISCONTINUED | OUTPATIENT
Start: 2018-07-30 | End: 2018-07-30 | Stop reason: HOSPADM

## 2018-07-30 RX ORDER — SODIUM CHLORIDE, SODIUM LACTATE, POTASSIUM CHLORIDE, CALCIUM CHLORIDE 600; 310; 30; 20 MG/100ML; MG/100ML; MG/100ML; MG/100ML
100 INJECTION, SOLUTION INTRAVENOUS CONTINUOUS
Status: DISCONTINUED | OUTPATIENT
Start: 2018-07-30 | End: 2018-07-30

## 2018-07-30 RX ADMIN — ALBUTEROL SULFATE 2.5 MG: 2.5 SOLUTION RESPIRATORY (INHALATION) at 23:18

## 2018-07-30 RX ADMIN — BACLOFEN 20 MG: 10 TABLET ORAL at 12:53

## 2018-07-30 RX ADMIN — ROCURONIUM BROMIDE 5 MG: 10 INJECTION, SOLUTION INTRAVENOUS at 15:53

## 2018-07-30 RX ADMIN — Medication 10 ML: at 05:35

## 2018-07-30 RX ADMIN — INSULIN LISPRO 4 UNITS: 100 INJECTION, SOLUTION INTRAVENOUS; SUBCUTANEOUS at 18:37

## 2018-07-30 RX ADMIN — CEFTRIAXONE SODIUM 1 G: 1 INJECTION, POWDER, FOR SOLUTION INTRAMUSCULAR; INTRAVENOUS at 20:00

## 2018-07-30 RX ADMIN — FAMOTIDINE 20 MG: 20 TABLET ORAL at 08:01

## 2018-07-30 RX ADMIN — BACLOFEN 20 MG: 10 TABLET ORAL at 08:01

## 2018-07-30 RX ADMIN — METHYLPREDNISOLONE SODIUM SUCCINATE 60 MG: 125 INJECTION, POWDER, FOR SOLUTION INTRAMUSCULAR; INTRAVENOUS at 05:35

## 2018-07-30 RX ADMIN — Medication 75 MCG/HR: at 19:26

## 2018-07-30 RX ADMIN — PROPOFOL 20 MCG/KG/MIN: 10 INJECTION, EMULSION INTRAVENOUS at 17:30

## 2018-07-30 RX ADMIN — ENOXAPARIN SODIUM 40 MG: 40 INJECTION, SOLUTION INTRAVENOUS; SUBCUTANEOUS at 18:37

## 2018-07-30 RX ADMIN — ALBUTEROL SULFATE 2.5 MG: 2.5 SOLUTION RESPIRATORY (INHALATION) at 19:37

## 2018-07-30 RX ADMIN — PROPOFOL 50 MG: 10 INJECTION, EMULSION INTRAVENOUS at 17:00

## 2018-07-30 RX ADMIN — PROPOFOL 200 MG: 10 INJECTION, EMULSION INTRAVENOUS at 15:53

## 2018-07-30 RX ADMIN — HYDROCODONE BITARTRATE AND ACETAMINOPHEN 1 TABLET: 7.5; 325 TABLET ORAL at 11:01

## 2018-07-30 RX ADMIN — AZITHROMYCIN MONOHYDRATE 500 MG: 500 INJECTION, POWDER, LYOPHILIZED, FOR SOLUTION INTRAVENOUS at 19:55

## 2018-07-30 RX ADMIN — INSULIN LISPRO 6 UNITS: 100 INJECTION, SOLUTION INTRAVENOUS; SUBCUTANEOUS at 00:07

## 2018-07-30 RX ADMIN — PROPOFOL 10 MCG/KG/MIN: 10 INJECTION, EMULSION INTRAVENOUS at 17:15

## 2018-07-30 RX ADMIN — FENTANYL CITRATE 100 MCG: 50 INJECTION, SOLUTION INTRAMUSCULAR; INTRAVENOUS at 15:53

## 2018-07-30 RX ADMIN — ALBUTEROL SULFATE 2.5 MG: 2.5 SOLUTION RESPIRATORY (INHALATION) at 11:27

## 2018-07-30 RX ADMIN — PROPOFOL 30 MCG/KG/MIN: 10 INJECTION, EMULSION INTRAVENOUS at 19:25

## 2018-07-30 RX ADMIN — Medication 10 ML: at 23:07

## 2018-07-30 RX ADMIN — ALBUTEROL SULFATE 2.5 MG: 2.5 SOLUTION RESPIRATORY (INHALATION) at 08:03

## 2018-07-30 RX ADMIN — ROCURONIUM BROMIDE 35 MG: 10 INJECTION, SOLUTION INTRAVENOUS at 15:59

## 2018-07-30 RX ADMIN — METHYLPREDNISOLONE SODIUM SUCCINATE 40 MG: 40 INJECTION, POWDER, FOR SOLUTION INTRAMUSCULAR; INTRAVENOUS at 20:49

## 2018-07-30 RX ADMIN — ONDANSETRON 4 MG: 2 INJECTION INTRAMUSCULAR; INTRAVENOUS at 16:43

## 2018-07-30 RX ADMIN — GABAPENTIN 800 MG: 400 CAPSULE ORAL at 12:53

## 2018-07-30 RX ADMIN — INSULIN LISPRO 6 UNITS: 100 INJECTION, SOLUTION INTRAVENOUS; SUBCUTANEOUS at 05:35

## 2018-07-30 RX ADMIN — LISINOPRIL 20 MG: 20 TABLET ORAL at 08:01

## 2018-07-30 RX ADMIN — INSULIN LISPRO 6 UNITS: 100 INJECTION, SOLUTION INTRAVENOUS; SUBCUTANEOUS at 12:14

## 2018-07-30 RX ADMIN — FAMOTIDINE 20 MG: 10 INJECTION, SOLUTION INTRAVENOUS at 20:48

## 2018-07-30 RX ADMIN — PROPOFOL 30 MCG/KG/MIN: 10 INJECTION, EMULSION INTRAVENOUS at 17:42

## 2018-07-30 RX ADMIN — HYDROCODONE BITARTRATE AND ACETAMINOPHEN 1 TABLET: 7.5; 325 TABLET ORAL at 04:04

## 2018-07-30 RX ADMIN — GABAPENTIN 800 MG: 400 CAPSULE ORAL at 08:01

## 2018-07-30 RX ADMIN — SUCCINYLCHOLINE CHLORIDE 180 MG: 20 INJECTION INTRAMUSCULAR; INTRAVENOUS at 15:53

## 2018-07-30 RX ADMIN — ATENOLOL 50 MG: 25 TABLET ORAL at 08:01

## 2018-07-30 RX ADMIN — MIDAZOLAM HYDROCHLORIDE 2 MG: 1 INJECTION, SOLUTION INTRAMUSCULAR; INTRAVENOUS at 16:43

## 2018-07-30 RX ADMIN — ALBUTEROL SULFATE 2.5 MG: 2.5 SOLUTION RESPIRATORY (INHALATION) at 04:25

## 2018-07-30 RX ADMIN — POTASSIUM CHLORIDE 20 MEQ: 20 TABLET, EXTENDED RELEASE ORAL at 08:01

## 2018-07-30 RX ADMIN — ENOXAPARIN SODIUM 40 MG: 40 INJECTION, SOLUTION INTRAVENOUS; SUBCUTANEOUS at 05:35

## 2018-07-30 RX ADMIN — PROPOFOL 30 MCG/KG/MIN: 10 INJECTION, EMULSION INTRAVENOUS at 18:30

## 2018-07-30 RX ADMIN — SPIRONOLACTONE 25 MG: 25 TABLET, FILM COATED ORAL at 11:02

## 2018-07-30 RX ADMIN — ALBUTEROL SULFATE 2.5 MG: 2.5 SOLUTION RESPIRATORY (INHALATION) at 00:05

## 2018-07-30 RX ADMIN — PROPOFOL 40 MCG/KG/MIN: 10 INJECTION, EMULSION INTRAVENOUS at 21:47

## 2018-07-30 RX ADMIN — SODIUM CHLORIDE, SODIUM LACTATE, POTASSIUM CHLORIDE, AND CALCIUM CHLORIDE 100 ML/HR: 600; 310; 30; 20 INJECTION, SOLUTION INTRAVENOUS at 15:11

## 2018-07-30 RX ADMIN — LIDOCAINE HYDROCHLORIDE 100 MG: 20 INJECTION, SOLUTION EPIDURAL; INFILTRATION; INTRACAUDAL; PERINEURAL at 15:53

## 2018-07-30 NOTE — PROGRESS NOTES
Date of Outreach Update: 
Myrla Runner was seen and assessed. MEWS Score: 1 (07/30/18 0324) Vitals:  
 07/30/18 1048 07/30/18 0425 07/30/18 3130 07/30/18 8223 BP: 114/73  106/65 Pulse: 85  82 Resp: 20  18 Temp: 98.6 °F (37 °C)  98.1 °F (36.7 °C) SpO2: 93% 92% 92% 97% Weight: (!) 170.7 kg (376 lb 4.8 oz) Height:      
  
 
 Pain Assessment Pain Intensity 1: 8 (07/30/18 0404) Pain Location 1: Back Pain Intervention(s) 1: Medication (see MAR) Patient Stated Pain Goal: 0 Previous Outreach assessment has been reviewed. There have been no significant clinical changes since the completion of the last dated Outreach assessment. sats 92% on 4lnc. RT at bedside with pt educating on COPD. Will continue to follow up per outreach protocol.  
 
Signed By:   Letitia Iglesias RN 
  July 30, 2018 8:56 AM

## 2018-07-30 NOTE — PROGRESS NOTES
Date of Outreach Update: 
Trell Garza was seen and assessed. MEWS Score: 1 (07/30/18 0324) Vitals:  
 07/30/18 7377 07/30/18 0226 07/30/18 0425 07/30/18 9914 BP: 118/72 114/73  106/65 Pulse: 94 85  82 Resp: 22 20 18 Temp: 98.1 °F (36.7 °C) 98.6 °F (37 °C)  98.1 °F (36.7 °C) SpO2: 94% 93% 92% 92% Weight:  (!) 170.7 kg (376 lb 4.8 oz) Height:      
  
 
 Pain Assessment Pain Intensity 1: 8 (07/30/18 0404) Pain Location 1: Back Pain Intervention(s) 1: Medication (see MAR) Patient Stated Pain Goal: 0 Previous Outreach assessment has been reviewed. There have been no significant clinical changes since the completion of the last dated Outreach assessment. Pt sitting up in bed eating breakfast.  Denies needs at this time. D/w primary nurse who is replacing potassium this am (k=3.0). Will continue to be available for nurse and pt as needed. Will continue to follow up per outreach protocol.  
 
Signed By:   April Montgomery RN 
  July 30, 2018 7:59 AM

## 2018-07-30 NOTE — PROCEDURES
PROCEDURE  Bronchoscopy with endobronchial ultrasound guided fine needle aspiration of hilar/mediastinal lymph nodes and airway inspection with endobronchial biopsy of endobronchial tumor. INDICATION   Diagnosis of Mediastinal Lymphadenopathy/Lung Mass    IMAGING  CT Chest 7/29/18        POST OP DIAGNOSIS:  Stations 4R, 7, 4L, and 11L were biopsied and were Negative for malignancy on VOLODYMYR. Based on imaging and EBUS pt is a STAGE [id number] (at least IIA) non-small cell lung cancer. ANESTHESIA  General anesthesia and intubation by anesthesia. AIRWAY INSPECTION  After obtaining informed consent, using a bite block, an Olympus Q 180 video bronchoscope was  introduced into the trachea through the vocal cords without complications. RIGHT  LOCATION NORM/ABNORMAL DESCRIPTION   VOCAL CORDS NL    TRACHEA NL    TAWNYA NL    RMSB NL    RUL NL    BI NL    RML NL    RLL NL    SUP SEGM RLL NL    MED BASAL NL    ANTERIOR BASAL NL    LATERAL BASAL NL    POSTERIOR BASAL NL                    LEFT  LOCATION NORM/ABNORMAL DESCRIPTION   LMSB NL    JUAN ABNL Endobronchial tumor occluding both anterior and apico-posterior segments   LINGULA NL    LLL NL    SUPERIOR SEG LLL NL    MARIANN-MEDIAL LLL NL    LATERAL LLL NL    POSTERIOR LLL NL              EBUS  After completing the airway inspection an Olympus  F EBUS bronchoscope was introduced into the trachea through the vocal chords without complication.   The balloon was inflated with saline and a mediastinal inspection commenced:      STATION SIZE IN CM   11R inf No target   11R sup No target   10R No target   4R 1cm   2R No target   7 1cm   2L No target   4L 8mm   10L No target   11L 1.5cm         After identifying targets the following samples were obtained:    STATION PASS# LYMPHOCYTES ATYPIA GRANULOMA DIAGNOSIS   4R 1 bronchs - - -    2 bronchs - - -    3 bronchs - - -   7 1 + - - -    2 + - - -    3 + - - -   4L 1 blood - - -    2 blood - - - 3 bronchs/blood - - -   11L 1 blood - - -    2 + - - -    3 + - - -    4 blood - - -              After completing the EBUS an Olympus Q 180 bronchoscope was re introduced into the airways to obtain:    EBBX: JUAN x 6 - VOLODYMYR revealed malignant cells      The procedure was completed without complication and the patient tolerated the procedure well. EBL: <30cc    Diagnosis:  JUAN lung cancer - likely stage IIA unless distant disease is found on PET scan. Plan:  Patient was intubated for the procedure given her hypercarbia and hypoxia on ABG yesterday and morbid obesity. Patient will move to ICU still intubated and remain so overnight. Will get ABG in AM to determine if ready for extubation. Will f/u final pathology and will need oncology evaluate and PET scan.      Katie Vaughn MD

## 2018-07-30 NOTE — PROGRESS NOTES
1900. Assumed care of pt 
 
1926. Pt restless; sitting up; bucking vent; Fentanyl drip started @ 75 mcg/hr and titrated to RASS -2 
 
1945. RT notified of positioning of ETT; RT advanced tube from 24 @lip to [email or web address]; TVs & o2 sats good 
 
2000. Dr. Manpreet Kat called and notified of ETT repositioning and okay'd to wait and get cxr in AM; MD also advised not to start ordered LR infusion. 0000. No changes; pt VSS; periods of restlessness with stimulus; Fentanyl @200 mcg/hr and Propofol @35 mcg/kg/hr 
 
0230. Pt doing SBT; alert & interactive; restless and agitated at times; VSS and good TVs; reassurance provided 5175. Pt unable to wean from vent d/t high C02 during SBT; per RT will resume patient on PRVC 
 
0645.  Report given to oncoming RN

## 2018-07-30 NOTE — PROGRESS NOTES
[facility] OUTREACH NURSE PROGRESS REPORT SUBJECTIVE: Called to assess patient secondary to transfer from ICU. MEWS Score: 2 (07/29/18 0404) Vitals:  
 07/29/18 1644 07/29/18 2023 07/29/18 2024 07/29/18 2055 BP:  107/69 110/63 Pulse:  88 77 Resp:  20 Temp:  98.8 °F (37.1 °C) SpO2: 94% 95%  94% Weight:      
Height:      
  
LAB DATA: 
 
Recent Labs  
   07/29/18 
 0335  07/28/18 
 2200  07/28/18 
 1127 NA  139  138  136  
K  5.3*  4.7  5.0  
CL  99  98  98 CO2  27  37*  35* AGAP  13  3*  3*  
GLU  187*  217*  206* BUN  18  20  24* CREA  1.03*  1.18*  1.48* GFRAA  >60  >60  46* GFRNA  58*  50*  38* CA  8.5  8.5  8.6 MG   --   2.2  2.3 PHOS   --   3.2   --   
ALB  2.1*   --   3.1* TP  7.5   --   8.3*  
GLOB  5.4*   --   5.2* AGRAT  0.4*   --   0.6* ALT  22   --   22 Recent Labs  
   07/28/18 
 1127 WBC  8.9 HGB  13.0 HCT  45.1 PLT  227 OBJECTIVE: On arrival to room, I found patient to be resting in bed, nurse at bedside. General appearance: alert, cooperative, no distress, appears stated age Neurologic: Grossly normal 
 
 
 Pain Assessment Pain Intensity 1: 5 (07/29/18 2964) Pain Location 1: Back Pain Intervention(s) 1: Medication (see MAR) Patient Stated Pain Goal: 2 
 
  
  
  
  
 
  
  
  
   
 
ASSESSMENT:  Pt resting in bed NAD. Respirations even and unlabored. Pt with no complaints at this time, denies pain, SOB. PLAN:  Continue to follow per outreach protocol.

## 2018-07-30 NOTE — PROGRESS NOTES
Pulmonary Daily Progress Note: 7/30/2018 Jero Loomis Admission Date: 7/28/2018 The patient's chart is reviewed and the patient is discussed with the staff. 
 
59 y.o.  female presents with falls. Patient has a history of morbid obesity, FLETCHER/OHS not on CPAP, chronic respiratory failure, DM, HTN, HL, and chronic CHF. Apparently patient has sustained multiple recent falls. Patient is very sleepy but states that she fell yesterday secondary to dizziness. She states that her breathing has been different from her baseline but is not able to describe in what way. Her son lives with her but is not currently at the bedside to provide additional detail. In the ER her LA 1.1, WBC 8.9, PCT <0.1, Creat 1.48, . Patient was given nebulizer tx and placed on BIPAP. Sent to ICU and now on Floor Subjective:  
 
Patient is awake and not in distress, on 4 LPm oxygen and normally on 1 LPM at home. Aware CT yesterday showed she had a lung mass. She reports wants to know if it is a cancer. Has smoked for 80+ pack years despite quitting 5 years ago. Started smoking at age 7. Review of Systems: 
-Fever 
-Headaches 
-Chest pain 
-Dyspnea, -wheezing,- cough 
-Abdominal pain,- constipation 
-Leg swelling All other organ systems grossly normal. 
 
Current Facility-Administered Medications Medication Dose Route Frequency  HYDROcodone-acetaminophen (NORCO) 7.5-325 mg per tablet 1 Tab  1 Tab Oral Q6H PRN  
 cefTRIAXone (ROCEPHIN) 1 g in 0.9% sodium chloride (MBP/ADV) 50 mL  1 g IntraVENous Q24H  
 atenolol (TENORMIN) tablet 50 mg  50 mg Oral DAILY  baclofen (LIORESAL) tablet 20 mg  20 mg Oral QID  famotidine (PEPCID) tablet 20 mg  20 mg Oral BID  
 gabapentin (NEURONTIN) capsule 800 mg  800 mg Oral QID  lisinopril (PRINIVIL, ZESTRIL) tablet 20 mg  20 mg Oral DAILY  magnesium oxide (MAG-OX) tablet 400 mg  400 mg Oral QHS  montelukast (SINGULAIR) tablet 10 mg  10 mg Oral QHS  potassium chloride (K-DUR, KLOR-CON) SR tablet 20 mEq  20 mEq Oral DAILY  spironolactone (ALDACTONE) tablet 25 mg  25 mg Oral DAILY  sodium chloride (NS) flush 5-10 mL  5-10 mL IntraVENous Q8H  
 sodium chloride (NS) flush 5-10 mL  5-10 mL IntraVENous PRN  
 albuterol (PROVENTIL VENTOLIN) nebulizer solution 2.5 mg  2.5 mg Nebulization Q4H RT  
 methylPREDNISolone (PF) (SOLU-MEDROL) injection 60 mg  60 mg IntraVENous Q8H  
 azithromycin (ZITHROMAX) tablet 500 mg  500 mg Oral Q24H  
 enoxaparin (LOVENOX) injection 40 mg  40 mg SubCUTAneous Q12H  
 insulin lispro (HUMALOG) injection   SubCUTAneous Q6H Review of Systems Constitutional:  negative for fever, chills, sweats Cardiovascular:  negative for chest pain, palpitations, syncope, edema Gastrointestinal:  negative for dysphagia, reflux, vomiting, diarrhea, abdominal pain, or melena Neurologic:  negative for focal weakness, numbness, headache Objective:  
 
Vitals:  
 07/30/18 1739 07/30/18 0425 07/30/18 3599 07/30/18 1294 BP: 114/73  106/65 Pulse: 85  82 Resp: 20  18 Temp: 98.6 °F (37 °C)  98.1 °F (36.7 °C) SpO2: 93% 92% 92% 97% Weight: (!) 376 lb 4.8 oz (170.7 kg) Height:      
 
 
Intake and Output:  
07/28 1901 - 07/30 0700 In: [address] Out: Fallon Putty [[id number]] Physical Exam:         
Constitutional:  the patient is well developed and in no acute distress on 4 LPM 
EENMT:  Sclera clear, pupils equal, oral mucosa moist 
Respiratory:  Clear with decreased breath sounds Cardiovascular:  RRR without M,G,R 
Gastrointestinal: soft and non-tender; with positive bowel sounds. Musculoskeletal: warm without cyanosis. There is no pitting lower leg edema. Skin:  no jaundice or rashes, no wounds Neurologic: no gross neuro deficits Psychiatric:  alert and oriented x 3 LINES:  peripheral 
 
DRIPS:   none CT chest: left hilar mass with adenopathy IMPRESSION: 
1.  Masslike opacity at the left superhilar region with postobstructive 
atelectasis involving a portion of the left upper lobe medially. The imaging 
features would be suspicious for neoplasm until proven otherwise. Consider 
bronchoscopy for further evaluation. 2. Several small to mildly prominent mediastinal lymph nodes. LAB Recent Labs  
   07/30/18 
 3133  07/30/18 
 0136  07/29/18 
 2353  07/29/18 
 2050  07/29/18 
 1609 GLUCPOC  250*  258*  280*  286*  191* Recent Labs  
   07/28/18 
 1127 WBC  8.9 HGB  13.0 HCT  45.1 PLT  227 Recent Labs  
   07/30/18 
 0531  07/29/18 
 0335  07/28/18 
 2200  07/28/18 
 1127 NA  137  139  138  136  
K  4.1  5.3*  4.7  5.0  
CL  94*  99  98  98 CO2  38*  27  37*  35* GLU  266*  187*  217*  206* BUN  22  18  20  24* CREA  1.11*  1.03*  1.18*  1.48* MG   --    --   2.2  2.3 PHOS   --    --   3.2   --   
CA  9.0  8.5  8.5  8.6 ALB   --   2.1*   --   3.1* TBILI   --   0.7   --   0.7 ALT   --   22   --   22 SGOT   --   31   --   21 Recent Labs  
   07/29/18 
 0340  07/28/18 
 1400  07/28/18 
 1110 PH  7.36  7.22*  7.20* PCO2  70*  84*  97* PO2  57*  70*  67* HCO3  39*  34*  37* No results for input(s): LCAD, LAC in the last 72 hours. Assessment:  (Medical Decision Making) Hospital Problems  Date Reviewed: 7/28/2018 Codes Class Noted POA * (Principal)Acute on chronic respiratory failure (HCC) ICD-10-CM: J96.20 ICD-9-CM: 518.84  7/28/2018 Yes Fall ICD-10-CM: W19. Sinan Reno ICD-9-CM: E888.9  7/28/2018 Yes Pulmonary infiltrates ICD-10-CM: R91.8 ICD-9-CM: 793.19  7/28/2018 Yes Better but has left hilar density Chronic pain ICD-10-CM: G89.29 ICD-9-CM: 338.29  7/28/2018 Yes Acute respiratory failure ([facility] ICD-10-CM: J96.00 
ICD-9-CM: 518.81  7/28/2018 Unknown  
 co2 has decreased to 70 COPD exacerbation ([facility] ICD-10-CM: J44.1 ICD-9-CM: 491.21  7/28/2018 Unknown  Morbid obesity ([facility] (Chronic) ICD-10-CM: E66.01 
ICD-9-CM: 278.01  Unknown Yes Diabetes mellitus without complication (HCC) (Chronic) ICD-10-CM: E11.9 ICD-9-CM: 250.00  Unknown Yes FLETCHER (obstructive sleep apnea) (Chronic) ICD-10-CM: G47.33 
ICD-9-CM: 327.23  Unknown Yes Not on rx Obesity with alveolar hypoventilation (HCC) (Chronic) ICD-10-CM: I36.4 ICD-9-CM: 278.03  Unknown Yes Left hilar mass 
--4.1 x 4.5 cm in left chest with mediastinal adenopathy Anxiety 
--does not even want oxygen touching her face  
--had to work with her and able to tolerate it. Plan:  (Medical Decision Making) --Aware has CT with lung mass now and would like to know the results. Called. Dr. Flavio Melgoza to check with Bruce Horta if can do an EBUS ?tomorrow, given large mediastinal adenopathy. Awaiting return phone call. --taper steroids to 40 q12 IV 
--taper oxygen, I cut to 3 LPM from 4 and baseline in 1 LPM 
--continue nebs 
--continue abx 
--send of some tumor markers. --likely with OHS given elevated PCO2 as well. Will likely need triology in the future if agreeable, but here did not even want BIPAP per notes --continue remaining treatment. More than 50% of the time documented was spent in face-to-face contact with the patient and in the care of the patient on the floor/unit where the patient is located. Khadar Stubbs MD 
 
 
Addendum Just called Dr. Seth Bang and can do EBUS today. Will make NPO. Will need to be on Vent and anesthesia will monitor. She is agreeable -- since wants to know the diagnosis. Will likely end up in ICU later today, since last abg with PCO2 in th 70's. Spoke with Dr. Elena Gibbs who is working in the ICU and aware. Time spent with care today including talking to multiple personal was 35 minutes.   
 
Khadar Stubbs MD

## 2018-07-30 NOTE — PROGRESS NOTES
Ventilator check complete; patient has a #9.0 ET tube secured at the 23 at the lip. Patient is not sedated. Patient is not able to follow commands. Breath sounds are coarse/deminished. Trachea is midline, Negative for subcutaneous air, and chest excursion is symmetric. Patient is also Negative for cyanosis and is Positive for pitting edema. All alarms are set and audible. Resuscitation bag is at the head of the bed. Ventilator Settings Mode FIO2 Rate Tidal Volume Pressure PEEP I:E Ratio 50 %          8 cm H20  1:3.70 Peak airway pressure: 33.9 cm H2O Minute ventilation: 6.5 l/min ABG:  
Recent Labs  
   07/29/18 
 0340  07/28/18 
 1400  07/28/18 
 1110 PH  7.36  7.22*  7.20* PCO2  70*  84*  97* PO2  57*  70*  67* HCO3  39*  34*  37* Cherise Guevara

## 2018-07-30 NOTE — PROGRESS NOTES
Patient admitted from [address] [place] [address] intubated. .. #9 at 24cm at lip. .. Very agitated upon admission to CCU propofol started for sedation/comfort. .. Folds under bilat breasts reddened and bilat groin reddened and slightly excoriated on left side. . Coccyx and heals without breakdown. .. Pt with multiple \"scabs\" on belly and bilat LE which patient admits to \"picking\" at . [place]

## 2018-07-30 NOTE — ANESTHESIA PREPROCEDURE EVALUATION
Anesthetic History No history of anesthetic complications Review of Systems / Medical History Patient summary reviewed and pertinent labs reviewed Pulmonary COPD (80 pack year history, stopped smoking 5 years ago): severe Sleep apnea Shortness of breath Comments: Pulmonary HTN Neuro/Psych Cardiovascular Hypertension CHF: orthopnea, PND Exercise tolerance: <4 METS 
  
GI/Hepatic/Renal 
  
 
 
 
 
 
 Endo/Other Diabetes Morbid obesity Other Findings Physical Exam 
 
Airway Mallampati: I 
TM Distance: 4 - 6 cm Neck ROM: normal range of motion Mouth opening: Normal 
 
 Cardiovascular Rhythm: regular Rate: normal 
 
Murmur: Grade 1, Mitral area Dental 
 
Dentition: Edentulous Pulmonary Decreased breath sounds: bilateral 
 
 
 
 
 Abdominal 
 
 
 
 Other Findings Anesthetic Plan ASA: 4 Anesthesia type: general 
 
 
 
Post procedure ventilation Induction: Intravenous Anesthetic plan and risks discussed with: Patient

## 2018-07-30 NOTE — PROGRESS NOTES
Per Dr. Driss Rivera and Dr. Carrie Skiff patient to be transported straight to ICU room 3305 instead of PACU post procedure. Patient remained intubated during transport and on VS monitor continuously. VSS. Patient transported by Danilo Ratliff RN, Barbie Aguirre CRNA, and Dr. Driss Rivera, anesthiaologist. Bedside report given to nurses and RRT in ICU. Patient placed on ventilator in room.

## 2018-07-30 NOTE — ANESTHESIA POSTPROCEDURE EVALUATION
Post-Anesthesia Evaluation and Assessment Patient: Taylor Junior MRN: [id number]  SSN: [id number] YOB: 1960  Age: 59 y.o. Sex: female Cardiovascular Function/Vital Signs Visit Vitals  /60  Pulse 65  Temp 37.2 °C (99 °F)  Resp (!) 44  
 Ht 5' 6\" (1.676 m)  Wt (!) 170.7 kg (376 lb 4.8 oz)  SpO2 96%  BMI 60.74 kg/m2 Patient is status post general anesthesia for Procedure(s): ENDOSCOPIC BRONCHOSCOPY ULTRASOUND (EBUS) BRONCHOSCOPY 
FINE NEEDLE ASPIRATION 
TRANSBRONCHIAL BIOPSY. Nausea/Vomiting: None Postoperative hydration reviewed and adequate. Pain: 
Pain Scale 1: Numeric (0 - 10) (07/30/18 1446) Pain Intensity 1: 6 (07/30/18 1102) Managed Neurological Status:  
   
sedated Mental Status and Level of Consciousness: Sedated Pulmonary Status:  
O2 Device: Ventilator (07/30/18 1724) Adequate oxygenation and airway patent Complications related to anesthesia: None Post-anesthesia assessment completed. No immediate concerns Signed By: Charmayne November, MD   
 July 30, 2018

## 2018-07-30 NOTE — PROGRESS NOTES
Hospitalist Progress Note Admit Date:  2018 10:39 AM  
Name:  Edward Dillard Age:  59 y.o. 
:  1960 MRN:  [id number] PCP:  Not On File Bsi Treatment Team: Attending Provider: Eb Jack MD; Consulting Provider: Fina Ross MD; Utilization Review: Deepa Contreras RN Subjective:  
Pt having procedure and will intubated in ICU. I discussed the Case with Dr. Randolph Ambrocio. Our service will resume care once out of ICU and off ventilator. Objective:  
Patient Vitals for the past 24 hrs: 
 Temp Pulse Resp BP SpO2  
18 1446 - 69 20 134/63 91 %  
18 1128 - - - - 91 %  
18 1123 98 °F (36.7 °C) 76 19 123/75 91 %  
18 0803 - - - - 97 % 18 0722 98.1 °F (36.7 °C) 82 18 106/65 92 % 18 0425 - - - - 92 % 18 0324 98.6 °F (37 °C) 85 20 114/73 93 % 18 0143 98.1 °F (36.7 °C) 94 22 118/72 94 % 18 0006 - - - - 92 %  
18 2355 98.6 °F (37 °C) 87 22 114/66 92 %  
185 - - - - 94 %  
18 2024 - 77 - 110/63 -  
18 98.8 °F (37.1 °C) 88 20 107/69 95 %  
18 1644 - - - - 94 % Oxygen Therapy O2 Sat (%): 91 % (18 1446) Pulse via Oximetry: 80 beats per minute (18 1128) O2 Device: Nasal cannula (18 1446) O2 Flow Rate (L/min): 2.5 l/min (18 1446) FIO2 (%): 35 % (18 2301) Intake/Output Summary (Last 24 hours) at 18 1638 Last data filed at 18 1626 Gross per 24 hour Intake             2520 ml Output             5625 ml Net            -3105 ml General:    Well nourished. Alert. CV:   RRR. No murmur, rub, or gallop. Lungs:   CTAB. No wheezing, rhonchi, or rales. Abdomen:   Soft, nontender, nondistended. Extremities: Warm and dry. No cyanosis or edema. Skin:     No rashes or jaundice. Data Review: 
I have reviewed all labs, meds, telemetry events, and studies from the last 24 hours.  
 
Recent Results (from the past 24 hour(s)) GLUCOSE, POC Collection Time: 07/29/18  8:50 PM  
Result Value Ref Range Glucose (POC) 286 (H) 65 - 100 mg/dL GLUCOSE, POC Collection Time: 07/29/18 11:53 PM  
Result Value Ref Range Glucose (POC) 280 (H) 65 - 100 mg/dL GLUCOSE, POC Collection Time: 07/30/18  1:36 AM  
Result Value Ref Range Glucose (POC) 258 (H) 65 - 100 mg/dL GLUCOSE, POC Collection Time: 07/30/18  5:11 AM  
Result Value Ref Range Glucose (POC) 250 (H) 65 - 100 mg/dL METABOLIC PANEL, BASIC Collection Time: 07/30/18  5:31 AM  
Result Value Ref Range Sodium 137 136 - 145 mmol/L Potassium 4.1 3.5 - 5.1 mmol/L Chloride 94 (L) 98 - 107 mmol/L  
 CO2 38 (H) 21 - 32 mmol/L Anion gap 5 (L) 7 - 16 mmol/L Glucose 266 (H) 65 - 100 mg/dL BUN 22 6 - 23 MG/DL Creatinine 1.11 (H) 0.6 - 1.0 MG/DL  
 GFR est AA >60 >60 ml/min/1.73m2 GFR est non-AA 53 (L) >60 ml/min/1.73m2 Calcium 9.0 8.3 - 10.4 MG/DL  
GLUCOSE, POC Collection Time: 07/30/18 11:17 AM  
Result Value Ref Range Glucose (POC) 273 (H) 65 - 100 mg/dL CEA Collection Time: 07/30/18 11:21 AM  
Result Value Ref Range CEA 3.6 (H) 0.0 - 3.0 ng/mL CANCER AG 19-9 Collection Time: 07/30/18 11:21 AM  
Result Value Ref Range Cancer antigen 19-9 50.90 (H) 2.0 - 37.0 U/mL CANCER ANTIGEN 125 Collection Time: 07/30/18 11:21 AM  
Result Value Ref Range CA-125 19 1.5 - 35.0 U/mL GLUCOSE, POC Collection Time: 07/30/18  2:39 PM  
Result Value Ref Range Glucose (POC) 268 (H) 65 - 100 mg/dL All Micro Results Procedure Component Value Units Date/Time CULTURE, BLOOD [[id number]] Collected:  07/28/18 1127 Order Status:  Completed Specimen:  Blood from Blood Updated:  07/30/18 6626 Special Requests: --     
  LEFT Antecubital 
  
  Culture result: NO GROWTH 2 DAYS     
 CULTURE, BLOOD [[id number]] Collected:  07/28/18 1153 Order Status:  Completed Specimen:  Blood from Blood Updated:  07/30/18 5549 Special Requests: --     
  LEFT Antecubital 
  
  Culture result: NO GROWTH 2 DAYS     
 CULTURE, RESPIRATORY/SPUTUM/BRONCH Cortes Cordial STAIN [[id number]] Order Status:  Sent Specimen:  Sputum from Sputum Current Meds: 
Current Facility-Administered Medications Medication Dose Route Frequency  methylPREDNISolone (PF) (SOLU-MEDROL) injection 40 mg  40 mg IntraVENous Q12H  
 lidocaine (XYLOCAINE) 10 mg/mL (1 %) injection 0.1 mL  0.1 mL SubCUTAneous PRN  
 lactated Ringers infusion  100 mL/hr IntraVENous CONTINUOUS  
 fentaNYL citrate (PF) injection 100 mcg  100 mcg IntraVENous ONCE  
 midazolam (VERSED) injection 2 mg  2 mg IntraVENous ONCE PRN  
 midazolam (VERSED) injection 2 mg  2 mg IntraVENous ONCE  
 HYDROcodone-acetaminophen (NORCO) 7.5-325 mg per tablet 1 Tab  1 Tab Oral Q6H PRN  
 cefTRIAXone (ROCEPHIN) 1 g in 0.9% sodium chloride (MBP/ADV) 50 mL  1 g IntraVENous Q24H  
 atenolol (TENORMIN) tablet 50 mg  50 mg Oral DAILY  baclofen (LIORESAL) tablet 20 mg  20 mg Oral QID  famotidine (PEPCID) tablet 20 mg  20 mg Oral BID  
 gabapentin (NEURONTIN) capsule 800 mg  800 mg Oral QID  lisinopril (PRINIVIL, ZESTRIL) tablet 20 mg  20 mg Oral DAILY  magnesium oxide (MAG-OX) tablet 400 mg  400 mg Oral QHS  montelukast (SINGULAIR) tablet 10 mg  10 mg Oral QHS  potassium chloride (K-DUR, KLOR-CON) SR tablet 20 mEq  20 mEq Oral DAILY  spironolactone (ALDACTONE) tablet 25 mg  25 mg Oral DAILY  sodium chloride (NS) flush 5-10 mL  5-10 mL IntraVENous Q8H  
 sodium chloride (NS) flush 5-10 mL  5-10 mL IntraVENous PRN  
 albuterol (PROVENTIL VENTOLIN) nebulizer solution 2.5 mg  2.5 mg Nebulization Q4H RT  
 azithromycin (ZITHROMAX) tablet 500 mg  500 mg Oral Q24H  
 enoxaparin (LOVENOX) injection 40 mg  40 mg SubCUTAneous Q12H  
 insulin lispro (HUMALOG) injection   SubCUTAneous Q6H Facility-Administered Medications Ordered in Other Encounters Medication Dose Route Frequency  propofol (DIPRIVAN) 10 mg/mL injection   IntraVENous PRN  
 lidocaine (PF) (XYLOCAINE) 20 mg/mL (2 %) injection   IntraVENous PRN  
 fentaNYL citrate (PF) injection   IntraVENous PRN  
 rocuronium (ZEMURON) injection    PRN  
 succinylcholine (ANECTINE) injection    PRN Other Studies (last 24 hours): No results found. Assessment and Plan:  
 
Hospital Problems as of 7/30/2018  Date Reviewed: 7/28/2018 Codes Class Noted - Resolved POA Mass of left lung ICD-10-CM: R91.8 ICD-9-CM: 786.6  7/30/2018 - Present Unknown * (Principal)Acute on chronic respiratory failure (HCC) ICD-10-CM: J96.20 ICD-9-CM: 518.84  7/28/2018 - Present Yes Fall ICD-10-CM: W19. Yuliana Lay ICD-9-CM: E888.9  7/28/2018 - Present Yes Pulmonary infiltrates ICD-10-CM: R91.8 ICD-9-CM: 793.19  7/28/2018 - Present Yes Chronic pain ICD-10-CM: G89.29 ICD-9-CM: 338.29  7/28/2018 - Present Yes Acute respiratory failure ([facility] ICD-10-CM: J96.00 
ICD-9-CM: 518.81  7/28/2018 - Present Unknown COPD exacerbation ([facility] ICD-10-CM: J44.1 ICD-9-CM: 491.21  7/28/2018 - Present Unknown Morbid obesity (HCC) (Chronic) ICD-10-CM: E66.01 
ICD-9-CM: 278.01  Unknown - Present Yes Diabetes mellitus without complication (HCC) (Chronic) ICD-10-CM: E11.9 ICD-9-CM: 250.00  Unknown - Present Yes  
   
 FLETCHER (obstructive sleep apnea) (Chronic) ICD-10-CM: G47.33 
ICD-9-CM: 327.23  Unknown - Present Yes Obesity with alveolar hypoventilation (HCC) (Chronic) ICD-10-CM: Y43.6 ICD-9-CM: 278.03  Unknown - Present Yes PLAN:   
·  
 
DC planning/Dispo: DVT ppx:   
 
Signed: 
Ruben Ochoa MD

## 2018-07-30 NOTE — CDMP QUERY
Please clarify if this patient is being treated/managed for: 
 
ACUTE DIASTOLIC CHF in the setting of respiratory failure requiring treatment with IV lasix =>Other Explanation of clinical findings =>Unable to Determine (no explanation of clinical findings) The medical record reflects the following: 
 
Risk Factors: chronic diastolic CHF, acute respiratory failure Clinical Indicators: , LE edema, CXR with poss interstitial edema Treatment: IV Lasix Please clarify and document your clinical opinion in the progress notes and discharge summary including the definitive and/or presumptive diagnosis, (suspected or probable), related to the above clinical findings. Please include clinical findings supporting your diagnosis. Thanks, 
Julia Granados RN, [employer]S Compliant Documentation Management Program 
[phone]

## 2018-07-30 NOTE — INTERVAL H&P NOTE
H&P Update: 
Kofi Chang was seen and examined. History and physical has been reviewed. The patient has been examined.  There have been no significant clinical changes since the completion of the originally dated History and Physical. 
 
Signed By: Madhav Dawn MD   
 July 30, 2018 3:47 PM

## 2018-07-30 NOTE — PROGRESS NOTES
Patient awake throughout the night. Patient awake in bed at this time. Respirations present and chest expansion symmetrical.  No complaints at this time. Will continue to monitor.

## 2018-07-30 NOTE — PROGRESS NOTES
Date of Outreach Update: 
Duy Sampson was seen and assessed. MEWS Score: 1 (07/30/18 0324) Vitals:  
 07/30/18 0006 07/30/18 0143 07/30/18 0064 07/30/18 0425 BP:  118/72 114/73 Pulse:  94 85 Resp:  22 20 Temp:  98.1 °F (36.7 °C) 98.6 °F (37 °C) SpO2: 92% 94% 93% 92% Weight:   (!) 170.7 kg (376 lb 4.8 oz) Height:      
  
 
 Pain Assessment Pain Intensity 1: 8 (07/30/18 0404) Pain Location 1: Back Pain Intervention(s) 1: Medication (see MAR) Patient Stated Pain Goal: 0 Previous Outreach assessment has been reviewed. There have been no significant clinical changes since the completion of the last dated Outreach assessment. Will continue to follow up per outreach protocol.  
 
Signed By:   Makenzie Romo RN 
  July 30, 2018 6:40 AM

## 2018-07-31 ENCOUNTER — APPOINTMENT (OUTPATIENT)
Dept: GENERAL RADIOLOGY | Age: 58
DRG: 121 | End: 2018-07-31
Attending: INTERNAL MEDICINE
Payer: MEDICAID

## 2018-07-31 LAB
ANION GAP SERPL CALC-SCNC: 4 MMOL/L (ref 7–16)
ARTERIAL PATENCY WRIST A: ABNORMAL
ARTERIAL PATENCY WRIST A: POSITIVE
BASE EXCESS BLDA CALC-SCNC: 10.8 MMOL/L (ref 0–3)
BASE EXCESS BLDA CALC-SCNC: 12.6 MMOL/L (ref 0–3)
BASOPHILS # BLD: 0 K/UL (ref 0–0.2)
BASOPHILS NFR BLD: 0 % (ref 0–2)
BDY SITE: ABNORMAL
BDY SITE: ABNORMAL
BUN SERPL-MCNC: 22 MG/DL (ref 6–23)
CALCIUM SERPL-MCNC: 8.8 MG/DL (ref 8.3–10.4)
CHLORIDE SERPL-SCNC: 97 MMOL/L (ref 98–107)
CO2 SERPL-SCNC: 39 MMOL/L (ref 21–32)
COHGB MFR BLD: 0.7 % (ref 0.5–1.5)
COHGB MFR BLD: 0.9 % (ref 0.5–1.5)
CREAT SERPL-MCNC: 0.91 MG/DL (ref 0.6–1)
DIFFERENTIAL METHOD BLD: ABNORMAL
DO-HGB BLD-MCNC: 4 % (ref 0–5)
DO-HGB BLD-MCNC: 5 % (ref 0–5)
EOSINOPHIL # BLD: 0 K/UL (ref 0–0.8)
EOSINOPHIL NFR BLD: 0 % (ref 0.5–7.8)
ERYTHROCYTE [DISTWIDTH] IN BLOOD BY AUTOMATED COUNT: 17.1 % (ref 11.9–14.6)
FIO2 ON VENT: 50 %
GLUCOSE BLD STRIP.AUTO-MCNC: 178 MG/DL (ref 65–100)
GLUCOSE BLD STRIP.AUTO-MCNC: 187 MG/DL (ref 65–100)
GLUCOSE BLD STRIP.AUTO-MCNC: 222 MG/DL (ref 65–100)
GLUCOSE BLD STRIP.AUTO-MCNC: 229 MG/DL (ref 65–100)
GLUCOSE BLD STRIP.AUTO-MCNC: 275 MG/DL (ref 65–100)
GLUCOSE SERPL-MCNC: 233 MG/DL (ref 65–100)
HCO3 BLDA-SCNC: 39 MMOL/L (ref 22–26)
HCO3 BLDA-SCNC: 41 MMOL/L (ref 22–26)
HCT VFR BLD AUTO: 43.3 % (ref 35.8–46.3)
HGB BLD-MCNC: 12.8 G/DL (ref 11.7–15.4)
HGB BLDMV-MCNC: 13.3 GM/DL (ref 11.7–15)
HGB BLDMV-MCNC: 13.8 GM/DL (ref 11.7–15)
IMM GRANULOCYTES # BLD: 0 K/UL (ref 0–0.5)
IMM GRANULOCYTES NFR BLD AUTO: 0 % (ref 0–5)
LYMPHOCYTES # BLD: 0.6 K/UL (ref 0.5–4.6)
LYMPHOCYTES NFR BLD: 6 % (ref 13–44)
MAGNESIUM SERPL-MCNC: 2.5 MG/DL (ref 1.8–2.4)
MCH RBC QN AUTO: 28.6 PG (ref 26.1–32.9)
MCHC RBC AUTO-ENTMCNC: 29.6 G/DL (ref 31.4–35)
MCV RBC AUTO: 96.7 FL (ref 79.6–97.8)
METHGB MFR BLD: 0.6 % (ref 0–1.5)
METHGB MFR BLD: 0.6 % (ref 0–1.5)
MONOCYTES # BLD: 0.4 K/UL (ref 0.1–1.3)
MONOCYTES NFR BLD: 4 % (ref 4–12)
NEUTS SEG # BLD: 7.6 K/UL (ref 1.7–8.2)
NEUTS SEG NFR BLD: 90 % (ref 43–78)
OXYHGB MFR BLDA: 93.3 % (ref 94–97)
OXYHGB MFR BLDA: 94.5 % (ref 94–97)
PCO2 BLDA: 69 MMHG (ref 35–45)
PCO2 BLDA: 71 MMHG (ref 35–45)
PEEP RESPIRATORY: 8 CM[H2O]
PEEP RESPIRATORY: 8 CM[H2O]
PH BLDA: 7.36 [PH] (ref 7.35–7.45)
PH BLDA: 7.39 [PH] (ref 7.35–7.45)
PHOSPHATE SERPL-MCNC: 3.6 MG/DL (ref 2.5–4.5)
PLATELET # BLD AUTO: 252 K/UL (ref 150–450)
PMV BLD AUTO: 10.4 FL (ref 10.8–14.1)
PO2 BLDA: 77 MMHG (ref 80–105)
PO2 BLDA: 85 MMHG (ref 80–105)
POTASSIUM SERPL-SCNC: 4.8 MMOL/L (ref 3.5–5.1)
RBC # BLD AUTO: 4.48 M/UL (ref 4.05–5.25)
RESP RATE: 12
SAO2 % BLD: 95 % (ref 92–98.5)
SAO2 % BLD: 96 % (ref 92–98.5)
SERVICE CMNT-IMP: ABNORMAL
SERVICE CMNT-IMP: ABNORMAL
SODIUM SERPL-SCNC: 140 MMOL/L (ref 136–145)
T4 FREE SERPL-MCNC: 1.7 NG/DL (ref 0.78–1.46)
TSH SERPL DL<=0.005 MIU/L-ACNC: 0.3 UIU/ML (ref 0.36–3.74)
VENTILATION MODE VENT: ABNORMAL
VENTILATION MODE VENT: ABNORMAL
VT SETTING VENT: 480 ML
WBC # BLD AUTO: 8.5 K/UL (ref 4.3–11.1)

## 2018-07-31 PROCEDURE — 94003 VENT MGMT INPAT SUBQ DAY: CPT

## 2018-07-31 PROCEDURE — 65620000000 HC RM CCU GENERAL

## 2018-07-31 PROCEDURE — 80048 BASIC METABOLIC PNL TOTAL CA: CPT | Performed by: INTERNAL MEDICINE

## 2018-07-31 PROCEDURE — 36600 WITHDRAWAL OF ARTERIAL BLOOD: CPT

## 2018-07-31 PROCEDURE — 74011250637 HC RX REV CODE- 250/637: Performed by: INTERNAL MEDICINE

## 2018-07-31 PROCEDURE — 74011000258 HC RX REV CODE- 258: Performed by: INTERNAL MEDICINE

## 2018-07-31 PROCEDURE — 77030020263 HC SOL INJ SOD CL0.9% LFCR 1000ML

## 2018-07-31 PROCEDURE — 74011000250 HC RX REV CODE- 250: Performed by: INTERNAL MEDICINE

## 2018-07-31 PROCEDURE — 85025 COMPLETE CBC W/AUTO DIFF WBC: CPT | Performed by: INTERNAL MEDICINE

## 2018-07-31 PROCEDURE — 74011636637 HC RX REV CODE- 636/637: Performed by: INTERNAL MEDICINE

## 2018-07-31 PROCEDURE — 99233 SBSQ HOSP IP/OBS HIGH 50: CPT | Performed by: INTERNAL MEDICINE

## 2018-07-31 PROCEDURE — 94640 AIRWAY INHALATION TREATMENT: CPT

## 2018-07-31 PROCEDURE — 84443 ASSAY THYROID STIM HORMONE: CPT | Performed by: INTERNAL MEDICINE

## 2018-07-31 PROCEDURE — 71045 X-RAY EXAM CHEST 1 VIEW: CPT

## 2018-07-31 PROCEDURE — 74011250636 HC RX REV CODE- 250/636: Performed by: INTERNAL MEDICINE

## 2018-07-31 PROCEDURE — 36415 COLL VENOUS BLD VENIPUNCTURE: CPT | Performed by: INTERNAL MEDICINE

## 2018-07-31 PROCEDURE — 84481 FREE ASSAY (FT-3): CPT | Performed by: INTERNAL MEDICINE

## 2018-07-31 PROCEDURE — 82962 GLUCOSE BLOOD TEST: CPT

## 2018-07-31 PROCEDURE — 84100 ASSAY OF PHOSPHORUS: CPT | Performed by: INTERNAL MEDICINE

## 2018-07-31 PROCEDURE — 83735 ASSAY OF MAGNESIUM: CPT | Performed by: INTERNAL MEDICINE

## 2018-07-31 PROCEDURE — 84439 ASSAY OF FREE THYROXINE: CPT | Performed by: INTERNAL MEDICINE

## 2018-07-31 PROCEDURE — 82803 BLOOD GASES ANY COMBINATION: CPT

## 2018-07-31 RX ORDER — NYSTATIN 100000 [USP'U]/G
POWDER TOPICAL 2 TIMES DAILY
Status: DISCONTINUED | OUTPATIENT
Start: 2018-07-31 | End: 2018-08-02 | Stop reason: HOSPADM

## 2018-07-31 RX ORDER — INSULIN LISPRO 100 [IU]/ML
INJECTION, SOLUTION INTRAVENOUS; SUBCUTANEOUS
Status: DISCONTINUED | OUTPATIENT
Start: 2018-07-31 | End: 2018-08-02 | Stop reason: HOSPADM

## 2018-07-31 RX ORDER — POTASSIUM CHLORIDE 20 MEQ/1
20 TABLET, EXTENDED RELEASE ORAL DAILY
Status: DISCONTINUED | OUTPATIENT
Start: 2018-08-01 | End: 2018-08-01

## 2018-07-31 RX ADMIN — ENOXAPARIN SODIUM 40 MG: 40 INJECTION, SOLUTION INTRAVENOUS; SUBCUTANEOUS at 18:03

## 2018-07-31 RX ADMIN — ALBUTEROL SULFATE 2.5 MG: 2.5 SOLUTION RESPIRATORY (INHALATION) at 19:42

## 2018-07-31 RX ADMIN — INSULIN LISPRO 6 UNITS: 100 INJECTION, SOLUTION INTRAVENOUS; SUBCUTANEOUS at 21:46

## 2018-07-31 RX ADMIN — FAMOTIDINE 20 MG: 10 INJECTION, SOLUTION INTRAVENOUS at 10:07

## 2018-07-31 RX ADMIN — FAMOTIDINE 20 MG: 10 INJECTION, SOLUTION INTRAVENOUS at 20:05

## 2018-07-31 RX ADMIN — PROPOFOL 40 MCG/KG/MIN: 10 INJECTION, EMULSION INTRAVENOUS at 04:37

## 2018-07-31 RX ADMIN — GABAPENTIN 800 MG: 400 CAPSULE ORAL at 21:46

## 2018-07-31 RX ADMIN — BACLOFEN 20 MG: 10 TABLET ORAL at 21:46

## 2018-07-31 RX ADMIN — ENOXAPARIN SODIUM 40 MG: 40 INJECTION, SOLUTION INTRAVENOUS; SUBCUTANEOUS at 05:33

## 2018-07-31 RX ADMIN — ALBUTEROL SULFATE 2.5 MG: 2.5 SOLUTION RESPIRATORY (INHALATION) at 12:05

## 2018-07-31 RX ADMIN — Medication 10 ML: at 05:33

## 2018-07-31 RX ADMIN — Medication 400 MG: at 21:46

## 2018-07-31 RX ADMIN — PROPOFOL 40 MCG/KG/MIN: 10 INJECTION, EMULSION INTRAVENOUS at 00:45

## 2018-07-31 RX ADMIN — MONTELUKAST SODIUM 10 MG: 10 TABLET, FILM COATED ORAL at 21:46

## 2018-07-31 RX ADMIN — ALBUTEROL SULFATE 2.5 MG: 2.5 SOLUTION RESPIRATORY (INHALATION) at 15:46

## 2018-07-31 RX ADMIN — INSULIN LISPRO 2 UNITS: 100 INJECTION, SOLUTION INTRAVENOUS; SUBCUTANEOUS at 00:18

## 2018-07-31 RX ADMIN — METHYLPREDNISOLONE SODIUM SUCCINATE 20 MG: 40 INJECTION, POWDER, FOR SOLUTION INTRAMUSCULAR; INTRAVENOUS at 20:05

## 2018-07-31 RX ADMIN — INSULIN LISPRO 4 UNITS: 100 INJECTION, SOLUTION INTRAVENOUS; SUBCUTANEOUS at 17:01

## 2018-07-31 RX ADMIN — ALBUTEROL SULFATE 2.5 MG: 2.5 SOLUTION RESPIRATORY (INHALATION) at 04:19

## 2018-07-31 RX ADMIN — BACLOFEN 20 MG: 10 TABLET ORAL at 18:03

## 2018-07-31 RX ADMIN — ALBUTEROL SULFATE 2.5 MG: 2.5 SOLUTION RESPIRATORY (INHALATION) at 08:13

## 2018-07-31 RX ADMIN — ALBUTEROL SULFATE 2.5 MG: 2.5 SOLUTION RESPIRATORY (INHALATION) at 23:55

## 2018-07-31 RX ADMIN — AZITHROMYCIN MONOHYDRATE 500 MG: 500 INJECTION, POWDER, LYOPHILIZED, FOR SOLUTION INTRAVENOUS at 20:05

## 2018-07-31 RX ADMIN — GABAPENTIN 800 MG: 400 CAPSULE ORAL at 18:03

## 2018-07-31 RX ADMIN — Medication 10 ML: at 15:10

## 2018-07-31 RX ADMIN — INSULIN LISPRO 4 UNITS: 100 INJECTION, SOLUTION INTRAVENOUS; SUBCUTANEOUS at 05:47

## 2018-07-31 RX ADMIN — HYDROCODONE BITARTRATE AND ACETAMINOPHEN 1 TABLET: 7.5; 325 TABLET ORAL at 20:19

## 2018-07-31 RX ADMIN — INSULIN LISPRO 2 UNITS: 100 INJECTION, SOLUTION INTRAVENOUS; SUBCUTANEOUS at 12:20

## 2018-07-31 RX ADMIN — Medication 10 ML: at 21:39

## 2018-07-31 RX ADMIN — NYSTATIN: 100000 POWDER TOPICAL at 19:16

## 2018-07-31 RX ADMIN — NYSTATIN: 100000 POWDER TOPICAL at 10:34

## 2018-07-31 RX ADMIN — METHYLPREDNISOLONE SODIUM SUCCINATE 40 MG: 40 INJECTION, POWDER, FOR SOLUTION INTRAMUSCULAR; INTRAVENOUS at 10:07

## 2018-07-31 RX ADMIN — CEFTRIAXONE SODIUM 1 G: 1 INJECTION, POWDER, FOR SOLUTION INTRAMUSCULAR; INTRAVENOUS at 20:07

## 2018-07-31 NOTE — PROGRESS NOTES
Following ABG results, Dr. Rolan Sargent requested patient be turned back to [facility] to facilitate better ventilation before extubation. ABG showed hypercapnia. Patient needs a break from PS/weaning, and RN turned sedation medications back on. Patient is now resting comfortably on PRVC.

## 2018-07-31 NOTE — PROGRESS NOTES
Met with pt in CCU. She is alert and oriented at present. Family at bedside. She is open to [facility] for RN, aid, and possible PT/OT. They are okay with [facility], but do not think they accept pt insurance. Explained that PT/OT will evaluate when medically stable for d/c needs. She is not open to STR at present. Questions answered regarding [facility] aid at home. Daughter will start paperwork with DSS, as pt already has [facility]. CM to follow and assist with d/c needs. Care Management Interventions PCP Verified by CM: Yes (Dr. Leti Rubalcava) Mode of Transport at Discharge: Other (see comment) Transition of Care Consult (CM Consult): Discharge Planning, Home Health Discharge Durable Medical Equipment:  (Uses rolator to ambulate with , oxygen with [employer], nebulizer, glucometer, cane, and w/c at home. ) Current Support Network: Own Home, Other (sonConor and his wife live with pt. Pt has 4 children that live fairly close by. She would mohan to do HCPOA, for daughterLisa to be her decision maker. [phone]) Confirm Follow Up Transport: Family Plan discussed with Pt/Family/Caregiver: Yes Freedom of Choice Offered: Yes [employer] Information Provided?:  (confirms Blue choice MIRIAM) Discharge Location Discharge Placement: Home with home health

## 2018-07-31 NOTE — PROGRESS NOTES
Patient switched from [facility] to PS to facilitate weaning. Patient is awake and alert and following commands/responding appropriately/writing. Patient is maintaining adequate Ve on PS of 15 cmH2O. VS are stable, pt is comfortable.

## 2018-07-31 NOTE — PROGRESS NOTES
Ventilator check complete; patient has a #9.0 ET tube secured at the 24 at the gumline. Patient is not able to follow commands. Breath sounds are clear and diminished. Trachea is midline, Negative for subcutaneous air, and chest excursion is symmetric. Patient is also Negative for cyanosis and is Negative for pitting edema. All alarms are set and audible. Resuscitation bag is at the head of the bed. Ventilator Settings Mode FIO2 Rate Tidal Volume Pressure PEEP I:E Ratio PRVC  50 %   16 450 ml     8 cm H20  1:3.70 Peak airway pressure: 26 cm H2O Minute ventilation: 7.8 l/min ABG:  
Recent Labs  
   07/30/18 
 1755  07/29/18 
 0340  07/28/18 
 1400 PH  7.37  7.36  7.22* PCO2  64*  70*  84* PO2  96  57*  70* HCO3  36*  39*  34* Patient's CXR was reviewed and MD requested that the ETT be pushed in more. ETT was pushed in 1 more cm, and pt's tube placement is now 24 at the gumline. Bilateral BS and symmetrical chest movement noted following change of placement. RN notified. Angelica 18

## 2018-07-31 NOTE — ROUTINE PROCESS
1845. Bedside, Verbal and Written shift change report given to Gina Segovia, MIRANDA and Vinicio Tapia RN (oncoming nurse) by Leonel Barillas (offgoing nurse). Report included the following information SBAR, Kardex, ED Summary, Procedure Summary, Intake/Output, MAR, Accordion, Recent Results, Med Rec Status, Cardiac Rhythm SR and Alarm Parameters . 2557. Bedside, Verbal and Written shift change report given to Brooklynn Eason (oncoming nurse) by Gina Segovia RN and Vinicio Tapia RN (offgoing nurse). Report included the following information SBAR, Kardex, ED Summary, Procedure Summary, Intake/Output, MAR, Accordion, Recent Results, Med Rec Status, Cardiac Rhythm SR and Alarm Parameters .

## 2018-07-31 NOTE — PROGRESS NOTES
Tried to switch patient to PS to facilitate weaning. Patient is not alert and does not respond or wake up to verbal stimuli. When switched to PS, patient did not make an effort to trigger any breaths, and mode ws changed back to [facility]. Will attempt again when patient is more awake and alert.

## 2018-07-31 NOTE — PROGRESS NOTES
Care Daily Progress Note: 7/31/2018 Admission Date: 7/28/2018 The patient's chart is reviewed and the patient is discussed with the staff. 
 
59 y.o.  female presents with falls. Agnieszka Reid has a history of morbid obesity, FLETCHER/OHS not on CPAP, chronic respiratory failure, DM, HTN, HL, and chronic CHF.  Apparently patient has sustained multiple recent falls.  Patient is very sleepy but states that she fell yesterday secondary to dizziness.  She states that her breathing has been different from her baseline but is not able to describe in what way. Ursula Enamorado son lives with her but is not currently at the bedside to provide additional detail.  In the ER her LA 1.1, WBC 8.9, PCT <0.1, Creat 1.48, . Patient was given nebulizer tx and placed on BIPAP. Sent to ICU and now on Floor. CT of chest with L lung mass. Had bronch with EBUS after intubation yesterday with NSCLC diagnosed. Nodes were negative. Subjective:  
 
Remains on vent. Looks comfortable. Current Facility-Administered Medications Medication Dose Route Frequency  nystatin (MYCOSTATIN) 100,000 unit/gram powder   Topical BID  [START ON 8/1/2018] potassium chloride (K-DUR, KLOR-CON) SR tablet 20 mEq  20 mEq Oral DAILY  methylPREDNISolone (PF) (SOLU-MEDROL) injection 40 mg  40 mg IntraVENous Q12H  propofol (DIPRIVAN) infusion  0-50 mcg/kg/min IntraVENous TITRATE  fentaNYL in normal saline (pf) 25 mcg/mL infusion   mcg/hr IntraVENous TITRATE  famotidine (PF) (PEPCID) 20 mg in sodium chloride 0.9% 10 mL injection  20 mg IntraVENous Q12H  
 azithromycin (ZITHROMAX) 500 mg in 0.9% sodium chloride (MBP/ADV) 250 mL  500 mg IntraVENous Q24H  
 HYDROcodone-acetaminophen (NORCO) 7.5-325 mg per tablet 1 Tab  1 Tab Oral Q6H PRN  
 cefTRIAXone (ROCEPHIN) 1 g in 0.9% sodium chloride (MBP/ADV) 50 mL  1 g IntraVENous Q24H  
 atenolol (TENORMIN) tablet 50 mg  50 mg Oral DAILY  baclofen (LIORESAL) tablet 20 mg  20 mg Oral QID  gabapentin (NEURONTIN) capsule 800 mg  800 mg Oral QID  lisinopril (PRINIVIL, ZESTRIL) tablet 20 mg  20 mg Oral DAILY  magnesium oxide (MAG-OX) tablet 400 mg  400 mg Oral QHS  montelukast (SINGULAIR) tablet 10 mg  10 mg Oral QHS  spironolactone (ALDACTONE) tablet 25 mg  25 mg Oral DAILY  sodium chloride (NS) flush 5-10 mL  5-10 mL IntraVENous Q8H  
 sodium chloride (NS) flush 5-10 mL  5-10 mL IntraVENous PRN  
 albuterol (PROVENTIL VENTOLIN) nebulizer solution 2.5 mg  2.5 mg Nebulization Q4H RT  
 enoxaparin (LOVENOX) injection 40 mg  40 mg SubCUTAneous Q12H  
 insulin lispro (HUMALOG) injection   SubCUTAneous Q6H Review of Systems Constitutional:  negative for fever, chills, sweats Cardiovascular:  negative for chest pain, palpitations, syncope, edema Gastrointestinal:  negative for dysphagia, reflux, vomiting, diarrhea, abdominal pain, or melena Neurologic:  negative for focal weakness, numbness, headache Objective:  
 
Vitals:  
 07/31/18 0830 07/31/18 0900 07/31/18 0915 07/31/18 0930 BP: 129/67 121/59 130/60 142/74 Pulse: 65 62 61 64 Resp: 15 13 12 (!) 37 Temp:      
SpO2: 94% 94% 95% 97% Weight:      
Height:      
 
 
Intake and Output:  
07/29 1901 - 07/31 0700 In: 1345 [P.O.:2160; I.V.:1432] Out: Sena Blanco 07/31 0701 - 07/31 1900 In: -  
Out: 100 [Urine:100] Physical Exam:         
Constitutional:  intubated and mechanically ventilated. EENMT:  Sclera clear, pupils equal, oral mucosa moist 
Respiratory: decreased BS Cardiovascular:  RRR with no M,G,R; 
Gastrointestinal:  soft with no tenderness; positive bowel sounds present Musculoskeletal:  warm with no cyanosis, ++ lower leg edema, multiple macular lesions of skin Skin:  no jaundice or ecchymosis Neurologic: no gross neuro deficits Psychiatric:  alert and oriented x 3 LINES:   
ETT, small, PIV 
 
DRIPS:   
None CXR:   
 
 Chest CT: 
 
 
 
Ventilator Settings Mode FIO2 Rate Tidal Volume Pressure PEEP  
SIMV, Pressure support (weaned)  40 %    450 ml  16 cm H2O  8 cm H20 Peak airway pressure: 22.6 cm H2O Minute ventilation: 5 l/min ABG:  
Recent Labs  
   07/31/18 
 0423  07/30/18 
 1755  07/29/18 
 0340 PH  7.36  7.37  7.36  
PCO2  71*  64*  70* PO2  85  96  57* HCO3  39*  36*  39* LAB Recent Labs  
   07/31/18 
 0541  07/31/18 
 0008  07/30/18 [phone]  07/30/18 9633 0859  07/30/18 3533 [facility] GLUCPOC  222*  187*  203*  268*  273* Recent Labs  
   07/31/18 
 0340  07/28/18 
 1127 WBC  8.5  8.9 HGB  12.8  13.0 HCT  43.3  45.1 PLT  252  227 Recent Labs  
   07/31/18 
 0340  07/30/18 
 0531  07/29/18 
 0335  07/28/18 
 2200  07/28/18 
 1127 NA  140  137  139  138  136  
K  4.8  4.1  5.3*  4.7  5.0  
CL  97*  94*  99  98  98 CO2  39*  38*  27  37*  35* GLU  233*  266*  187*  217*  206* BUN  22  22  18  20  24* CREA  0.91  1.11*  1.03*  1.18*  1.48* MG  2.5*   --    --   2.2  2.3 PHOS  3.6   --    --   3.2   --   
CA  8.8  9.0  8.5  8.5  8.6 ALB   --    --   2.1*   --   3.1*  
SGOT   --    --   31   --   21 No results for input(s): LCAD, LAC in the last 72 hours. Assessment:  (Medical Decision Making) Hospital Problems  Date Reviewed: 7/28/2018 Codes Class Noted POA Mass of left lung ICD-10-CM: R91.8 ICD-9-CM: 786.6  7/30/2018 Unknown NSCLC on VOLODYMYR at bronch yesterday Mediastinal lymphadenopathy ICD-10-CM: R59.0 ICD-9-CM: 785.6  7/30/2018 Unknown Nodes were negative on path Primary cancer of left upper lobe of lung ([facility] [address] ICD-10-CM: C34.12 
ICD-9-CM: 162.3  7/30/2018 Unknown NSCLC  
 * (Principal)Acute on chronic respiratory failure (HCC) ICD-10-CM: J96.20 ICD-9-CM: 518.84  7/28/2018 Yes Remains on vent Fall ICD-10-CM: W19. Fritzi Anastasia ICD-9-CM: E888.9  7/28/2018 Yes Pulmonary infiltrates ICD-10-CM: R91.8 ICD-9-CM: 793.19  7/28/2018 Yes Post obstructive atx present. Chronic pain ICD-10-CM: G89.29 ICD-9-CM: 338.29  7/28/2018 Yes COPD exacerbation ([address] ICD-10-CM: J44.1 ICD-9-CM: 491.21  7/28/2018 Unknown Morbid obesity (HCC) (Chronic) ICD-10-CM: E66.01 
ICD-9-CM: 278.01  Unknown Yes Diabetes mellitus without complication (HCC) (Chronic) ICD-10-CM: E11.9 ICD-9-CM: 250.00  Unknown Yes FLETCHER (obstructive sleep apnea) (Chronic) ICD-10-CM: G47.33 
ICD-9-CM: 327.23  Unknown Yes Obesity with alveolar hypoventilation (HCC) (Chronic) ICD-10-CM: M25.0 ICD-9-CM: 278.03  Unknown Yes High PCO2s in the past. Need to check TSH. Plan:  (Medical Decision Making) TSH. Wean vent. Extubate if tolerates wean. APAP with sleep post extubation. Oncology eval when extubated. -- 
 
More than 50% of the time documented was spent in face-to-face contact with the patient and in the care of the patient on the floor/unit where the patient is located.  
 
Kenn Beckett MD

## 2018-07-31 NOTE — PROGRESS NOTES
Ventilator check complete; patient has a #9.0 ET tube secured at the 26 at the lip. Breath sounds are diminished. Trachea is midline, Negative for subcutaneous air, and chest excursion is symmetric. Patient is also Negative for cyanosis and is Negative for pitting edema. All alarms are set and audible. Resuscitation bag is at the head of the bed. Ventilator Settings Mode FIO2 Rate Tidal Volume Pressure PEEP I:E Ratio SIMV, Pressure support (weaned)  40 %    450 ml  16 cm H2O  8 cm H20  1:3.45 Peak airway pressure: 22.6 cm H2O Minute ventilation: 5 l/min ABG:  
Recent Labs  
   07/31/18 
 0423  07/30/18 
 1755  07/29/18 
 0340 PH  7.36  7.37  7.36  
PCO2  71*  64*  70* PO2  85  96  57* HCO3  39*  36*  39* Rufus Kincaid, RT

## 2018-07-31 NOTE — PROGRESS NOTES
Patient extubated at 1140am... Patient on NC and appears in no distress. .. Remains on 3L NC. .. Will continue to monitor

## 2018-07-31 NOTE — PROGRESS NOTES
Respiratory Mechanics completed and are as follows: 
Weaning Parameters Spontaneous Breathing Trial Complete: Yes Resp Rate Observed: 19 Ve: 6.4 VT: 400 RSBI: 47.5 [place]asley Agitation Sedation Scale (RASS): Alert and calm Patient extubated to a 4L NC. Patient is able to communicate and is negative for stridor. Breath sounds are diminished. No complications with extubation.   
 
Leila Pabon, RT

## 2018-08-01 ENCOUNTER — APPOINTMENT (OUTPATIENT)
Dept: GENERAL RADIOLOGY | Age: 58
DRG: 121 | End: 2018-08-01
Attending: INTERNAL MEDICINE
Payer: MEDICAID

## 2018-08-01 LAB
ANION GAP SERPL CALC-SCNC: 5 MMOL/L (ref 7–16)
BASOPHILS # BLD: 0 K/UL (ref 0–0.2)
BASOPHILS NFR BLD: 0 % (ref 0–2)
BUN SERPL-MCNC: 22 MG/DL (ref 6–23)
CALCIUM SERPL-MCNC: 8.6 MG/DL (ref 8.3–10.4)
CHLORIDE SERPL-SCNC: 97 MMOL/L (ref 98–107)
CO2 SERPL-SCNC: 39 MMOL/L (ref 21–32)
CREAT SERPL-MCNC: 0.95 MG/DL (ref 0.6–1)
DIFFERENTIAL METHOD BLD: ABNORMAL
EOSINOPHIL # BLD: 0 K/UL (ref 0–0.8)
EOSINOPHIL NFR BLD: 0 % (ref 0.5–7.8)
ERYTHROCYTE [DISTWIDTH] IN BLOOD BY AUTOMATED COUNT: 16.9 % (ref 11.9–14.6)
EST. AVERAGE GLUCOSE BLD GHB EST-MCNC: 174 MG/DL
GLUCOSE BLD STRIP.AUTO-MCNC: 180 MG/DL (ref 65–100)
GLUCOSE BLD STRIP.AUTO-MCNC: 212 MG/DL (ref 65–100)
GLUCOSE BLD STRIP.AUTO-MCNC: 227 MG/DL (ref 65–100)
GLUCOSE BLD STRIP.AUTO-MCNC: 280 MG/DL (ref 65–100)
GLUCOSE SERPL-MCNC: 293 MG/DL (ref 65–100)
HBA1C MFR BLD: 7.7 % (ref 4.8–6)
HCT VFR BLD AUTO: 43 % (ref 35.8–46.3)
HGB BLD-MCNC: 12.8 G/DL (ref 11.7–15.4)
IMM GRANULOCYTES # BLD: 0 K/UL (ref 0–0.5)
IMM GRANULOCYTES NFR BLD AUTO: 0 % (ref 0–5)
LYMPHOCYTES # BLD: 0.4 K/UL (ref 0.5–4.6)
LYMPHOCYTES NFR BLD: 5 % (ref 13–44)
MAGNESIUM SERPL-MCNC: 2.3 MG/DL (ref 1.8–2.4)
MCH RBC QN AUTO: 28.4 PG (ref 26.1–32.9)
MCHC RBC AUTO-ENTMCNC: 29.8 G/DL (ref 31.4–35)
MCV RBC AUTO: 95.6 FL (ref 79.6–97.8)
MONOCYTES # BLD: 0.6 K/UL (ref 0.1–1.3)
MONOCYTES NFR BLD: 7 % (ref 4–12)
NEUTS SEG # BLD: 7.4 K/UL (ref 1.7–8.2)
NEUTS SEG NFR BLD: 88 % (ref 43–78)
PHOSPHATE SERPL-MCNC: 3.4 MG/DL (ref 2.5–4.5)
PLATELET # BLD AUTO: 208 K/UL (ref 150–450)
PMV BLD AUTO: 10 FL (ref 10.8–14.1)
POTASSIUM SERPL-SCNC: 4.8 MMOL/L (ref 3.5–5.1)
PROCALCITONIN SERPL-MCNC: <0.1 NG/ML
RBC # BLD AUTO: 4.5 M/UL (ref 4.05–5.25)
SODIUM SERPL-SCNC: 141 MMOL/L (ref 136–145)
T3FREE SERPL-MCNC: 2.3 PG/ML (ref 2–4.4)
WBC # BLD AUTO: 8.4 K/UL (ref 4.3–11.1)

## 2018-08-01 PROCEDURE — 82962 GLUCOSE BLOOD TEST: CPT

## 2018-08-01 PROCEDURE — 36415 COLL VENOUS BLD VENIPUNCTURE: CPT | Performed by: INTERNAL MEDICINE

## 2018-08-01 PROCEDURE — 94640 AIRWAY INHALATION TREATMENT: CPT

## 2018-08-01 PROCEDURE — 65660000000 HC RM CCU STEPDOWN

## 2018-08-01 PROCEDURE — 74011250636 HC RX REV CODE- 250/636: Performed by: INTERNAL MEDICINE

## 2018-08-01 PROCEDURE — 71045 X-RAY EXAM CHEST 1 VIEW: CPT

## 2018-08-01 PROCEDURE — 83735 ASSAY OF MAGNESIUM: CPT | Performed by: INTERNAL MEDICINE

## 2018-08-01 PROCEDURE — 80048 BASIC METABOLIC PNL TOTAL CA: CPT | Performed by: INTERNAL MEDICINE

## 2018-08-01 PROCEDURE — 74011250637 HC RX REV CODE- 250/637: Performed by: INTERNAL MEDICINE

## 2018-08-01 PROCEDURE — 84100 ASSAY OF PHOSPHORUS: CPT | Performed by: INTERNAL MEDICINE

## 2018-08-01 PROCEDURE — 74011636637 HC RX REV CODE- 636/637: Performed by: INTERNAL MEDICINE

## 2018-08-01 PROCEDURE — 74011000250 HC RX REV CODE- 250: Performed by: INTERNAL MEDICINE

## 2018-08-01 PROCEDURE — 77030020263 HC SOL INJ SOD CL0.9% LFCR 1000ML

## 2018-08-01 PROCEDURE — 97161 PT EVAL LOW COMPLEX 20 MIN: CPT

## 2018-08-01 PROCEDURE — 94660 CPAP INITIATION&MGMT: CPT

## 2018-08-01 PROCEDURE — 99232 SBSQ HOSP IP/OBS MODERATE 35: CPT | Performed by: INTERNAL MEDICINE

## 2018-08-01 PROCEDURE — 84145 PROCALCITONIN (PCT): CPT | Performed by: INTERNAL MEDICINE

## 2018-08-01 PROCEDURE — 85025 COMPLETE CBC W/AUTO DIFF WBC: CPT | Performed by: INTERNAL MEDICINE

## 2018-08-01 PROCEDURE — 83036 HEMOGLOBIN GLYCOSYLATED A1C: CPT | Performed by: INTERNAL MEDICINE

## 2018-08-01 PROCEDURE — 97530 THERAPEUTIC ACTIVITIES: CPT

## 2018-08-01 PROCEDURE — 74011000258 HC RX REV CODE- 258: Performed by: INTERNAL MEDICINE

## 2018-08-01 RX ORDER — AZITHROMYCIN 250 MG/1
500 TABLET, FILM COATED ORAL EVERY 24 HOURS
Status: DISCONTINUED | OUTPATIENT
Start: 2018-08-01 | End: 2018-08-02 | Stop reason: HOSPADM

## 2018-08-01 RX ORDER — PREDNISONE 10 MG/1
10 TABLET ORAL
Status: DISCONTINUED | OUTPATIENT
Start: 2018-08-02 | End: 2018-08-02 | Stop reason: HOSPADM

## 2018-08-01 RX ORDER — FAMOTIDINE 20 MG/1
20 TABLET, FILM COATED ORAL EVERY 12 HOURS
Status: DISCONTINUED | OUTPATIENT
Start: 2018-08-01 | End: 2018-08-02 | Stop reason: HOSPADM

## 2018-08-01 RX ORDER — INSULIN GLARGINE 100 [IU]/ML
10 INJECTION, SOLUTION SUBCUTANEOUS
Status: DISCONTINUED | OUTPATIENT
Start: 2018-08-01 | End: 2018-08-02 | Stop reason: HOSPADM

## 2018-08-01 RX ADMIN — BACLOFEN 20 MG: 10 TABLET ORAL at 09:25

## 2018-08-01 RX ADMIN — GABAPENTIN 800 MG: 400 CAPSULE ORAL at 22:29

## 2018-08-01 RX ADMIN — ENOXAPARIN SODIUM 40 MG: 40 INJECTION, SOLUTION INTRAVENOUS; SUBCUTANEOUS at 06:03

## 2018-08-01 RX ADMIN — GABAPENTIN 800 MG: 400 CAPSULE ORAL at 09:25

## 2018-08-01 RX ADMIN — AZITHROMYCIN 500 MG: 250 TABLET, FILM COATED ORAL at 20:29

## 2018-08-01 RX ADMIN — Medication 400 MG: at 22:28

## 2018-08-01 RX ADMIN — Medication 10 ML: at 22:36

## 2018-08-01 RX ADMIN — ALBUTEROL SULFATE 2.5 MG: 2.5 SOLUTION RESPIRATORY (INHALATION) at 15:25

## 2018-08-01 RX ADMIN — FAMOTIDINE 20 MG: 10 INJECTION, SOLUTION INTRAVENOUS at 09:24

## 2018-08-01 RX ADMIN — FAMOTIDINE 20 MG: 20 TABLET ORAL at 22:29

## 2018-08-01 RX ADMIN — BACLOFEN 20 MG: 10 TABLET ORAL at 17:21

## 2018-08-01 RX ADMIN — ALBUTEROL SULFATE 2.5 MG: 2.5 SOLUTION RESPIRATORY (INHALATION) at 19:55

## 2018-08-01 RX ADMIN — ALBUTEROL SULFATE 2.5 MG: 2.5 SOLUTION RESPIRATORY (INHALATION) at 04:25

## 2018-08-01 RX ADMIN — Medication 10 ML: at 06:04

## 2018-08-01 RX ADMIN — INSULIN LISPRO 6 UNITS: 100 INJECTION, SOLUTION INTRAVENOUS; SUBCUTANEOUS at 16:44

## 2018-08-01 RX ADMIN — INSULIN LISPRO 2 UNITS: 100 INJECTION, SOLUTION INTRAVENOUS; SUBCUTANEOUS at 22:36

## 2018-08-01 RX ADMIN — ENOXAPARIN SODIUM 40 MG: 40 INJECTION, SOLUTION INTRAVENOUS; SUBCUTANEOUS at 17:21

## 2018-08-01 RX ADMIN — ALBUTEROL SULFATE 2.5 MG: 2.5 SOLUTION RESPIRATORY (INHALATION) at 12:05

## 2018-08-01 RX ADMIN — LISINOPRIL 20 MG: 20 TABLET ORAL at 09:25

## 2018-08-01 RX ADMIN — NYSTATIN: 100000 POWDER TOPICAL at 17:21

## 2018-08-01 RX ADMIN — INSULIN LISPRO 4 UNITS: 100 INJECTION, SOLUTION INTRAVENOUS; SUBCUTANEOUS at 07:30

## 2018-08-01 RX ADMIN — ATENOLOL 50 MG: 25 TABLET ORAL at 09:25

## 2018-08-01 RX ADMIN — NYSTATIN: 100000 POWDER TOPICAL at 09:25

## 2018-08-01 RX ADMIN — HYDROCODONE BITARTRATE AND ACETAMINOPHEN 1 TABLET: 7.5; 325 TABLET ORAL at 09:25

## 2018-08-01 RX ADMIN — CEFTRIAXONE SODIUM 1 G: 1 INJECTION, POWDER, FOR SOLUTION INTRAMUSCULAR; INTRAVENOUS at 22:31

## 2018-08-01 RX ADMIN — BACLOFEN 20 MG: 10 TABLET ORAL at 22:29

## 2018-08-01 RX ADMIN — MONTELUKAST SODIUM 10 MG: 10 TABLET, FILM COATED ORAL at 22:29

## 2018-08-01 RX ADMIN — INSULIN LISPRO 4 UNITS: 100 INJECTION, SOLUTION INTRAVENOUS; SUBCUTANEOUS at 12:51

## 2018-08-01 RX ADMIN — METHYLPREDNISOLONE SODIUM SUCCINATE 20 MG: 40 INJECTION, POWDER, FOR SOLUTION INTRAMUSCULAR; INTRAVENOUS at 09:25

## 2018-08-01 RX ADMIN — GABAPENTIN 800 MG: 400 CAPSULE ORAL at 17:21

## 2018-08-01 RX ADMIN — HYDROCODONE BITARTRATE AND ACETAMINOPHEN 1 TABLET: 7.5; 325 TABLET ORAL at 02:04

## 2018-08-01 RX ADMIN — ALBUTEROL SULFATE 2.5 MG: 2.5 SOLUTION RESPIRATORY (INHALATION) at 07:54

## 2018-08-01 RX ADMIN — SPIRONOLACTONE 25 MG: 25 TABLET, FILM COATED ORAL at 09:25

## 2018-08-01 RX ADMIN — INSULIN GLARGINE 10 UNITS: 100 INJECTION, SOLUTION SUBCUTANEOUS at 22:36

## 2018-08-01 RX ADMIN — HYDROCODONE BITARTRATE AND ACETAMINOPHEN 1 TABLET: 7.5; 325 TABLET ORAL at 18:14

## 2018-08-01 RX ADMIN — Medication 10 ML: at 16:47

## 2018-08-01 NOTE — PROGRESS NOTES
Problem: Mobility Impaired (Adult and Pediatric) Goal: *Acute Goals and Plan of Care (Insert Text) STG: 
(1.)Ms. Arminda Rubalcava will move from supine to sit and sit to supine , scoot up and down and roll side to side with MODERATE ASSIST X 2 within 3 treatment day(s). (2.)Ms. Arminda Rubalcava will transfer from bed to chair and chair to bed with CONTACT GUARD ASSIST using the least restrictive device within 3 treatment day(s). (3.)Ms. Arminda Rubalcava will ambulate with CONTACT GUARD ASSIST for 30 feet with the least restrictive device within 3 treatment day(s). LTG: 
(1.)Ms. Arminda Rubalcava will move from supine to sit and sit to supine , scoot up and down and roll side to side in bed with STAND BY ASSIST within 7 treatment day(s). (2.)Ms. Arminda Rubalcava will transfer from bed to chair and chair to bed with STAND BY ASSIST using the least restrictive device within 7 treatment day(s). (3.)Ms. Arminda Rubalcava will ambulate with STAND BY ASSIST for 150+ feet with the least restrictive device within 7 treatment day(s). (4.)Ms. Arminda Rubalcava will ascend/descend 3 steps with one hand rail with STAND BY ASSIST within 7 treatment days. ________________________________________________________________________________________________ PHYSICAL THERAPY: Initial Assessment, Treatment Day: Day of Assessment, AM 8/1/2018 INPATIENT: Hospital Day: 5 Payor: GLORIA Franklin / Plan: [place] GLORIA Franklin / Product Type: Managed Care Medicaid /  
  
NAME/AGE/GENDER: Miriam Bolton is a 59 y.o. female PRIMARY DIAGNOSIS: Lung mass [R91.8] Acute on chronic respiratory failure (HCC) Acute on chronic respiratory failure (HCC) Procedure(s) (LRB): ENDOSCOPIC BRONCHOSCOPY ULTRASOUND (EBUS) (N/A) BRONCHOSCOPY (N/A) FINE NEEDLE ASPIRATION (N/A) TRANSBRONCHIAL BIOPSY (N/A) 2 Days Post-Op ICD-10: Treatment Diagnosis:  
 · Generalized Muscle Weakness (M62.81) · Difficulty in walking, Not elsewhere classified (R26.2) · Repeated Falls (R29.6) · History of falling (Z91.81) Precaution/Allergies: Iodides tincture [iodine] ASSESSMENT:  
 
Ms. Naomi Ramesh is a 59 y.o. Female s/p listed above. Pt is sitting up in bedside chair, A&O x 4, and agreeable to PT Evaluation. Pt states she lives with her son and daughter in law in single story home with 3 steps to enter with B handrails, and a tub/shower combo that she cannot use before it is too small. Pt states she is independent with ADLs at baseline, and uses rollator and WC for mobility, using rollator to get to [facility] mostly. Pt states she is 1L supplemental O2 at all times at home. Pt reports two falls in the last week due to dizziness and lightheadedness. Pt reports minimal low back discomfort currently (~2/10) and is sitting with O2 sats at 96%. Pt performed STS with Pretty x 2 and RW, demonstrating fair standing balance with RW for support and verbal and tactile cuing for sequencing. Pt ambulated 5 ft to bed with CGA-Pretty, demonstrating slow, shuffling gait with wide KATHIA, and requiring CGa-Pretty for controlled descent to bed. Pt performed seated LAQ and Marching 10x/side with verbal and visual cuing for body mechanics. Pt O2 sats in low 80's after exercises, so educated pt on deep breathing techniques with O2 sats improving to 92% within ~30 seconds. Pt required modA and verbal/visual/manual cuing for bed mobility technique to transfer from sitting EOB to supine. Pt able to laterally scooting with verbal cuing for bridging, but required total A x 2 with airtap to scoot up in bed. Pt O2 sats were 93% after mobility. Pt left supine in bed with HOB elevated and all needs met and within reach. Lauren Kelly will benefit from skilled PT (medically necessary) to address decreased strength, decreased balance, decreased functional tolerance, decreased cardiopulmonary endurance affecting participation in basic ADLs and functional tasks. Pt may benefit from skilled therapy upon discharge.  
 
This section established at most recent assessment PROBLEM LIST (Impairments causing functional limitations): 1. Decreased Strength 2. Decreased ADL/Functional Activities 3. Decreased Transfer Abilities 4. Decreased Ambulation Ability/Technique 5. Decreased Balance 6. Increased Pain 7. Decreased Activity Tolerance 8. Decreased Pacing Skills 9. Decreased Work Simplification/Energy Conservation Techniques 10. Increased Shortness of Breath 11. Decreased [place] with Home Exercise Program 
 INTERVENTIONS PLANNED: (Benefits and precautions of physical therapy have been discussed with the patient.) 1. Balance Exercise 2. Bed Mobility 3. Family Education 4. Gait Training 5. Home Exercise Program (HEP) 6. Manual Therapy 7. Neuromuscular Re-education/Strengthening 8. Range of Motion (ROM) 9. Therapeutic Activites 10. Therapeutic Exercise/Strengthening 11. Transfer Training 12. Group Therapy TREATMENT PLAN: Frequency/Duration: 3 times a week for duration of hospital stay Rehabilitation Potential For Stated Goals: Good RECOMMENDED REHABILITATION/EQUIPMENT: (at time of discharge pending progress): Due to the probability of continued deficits (see above) this patient will likely need continued skilled physical therapy after discharge. Equipment:  
 None at this time HISTORY:  
History of Present Injury/Illness (Reason for Referral): 
Procedure(s) (LRB): ENDOSCOPIC BRONCHOSCOPY ULTRASOUND (EBUS) (N/A) BRONCHOSCOPY (N/A) FINE NEEDLE ASPIRATION (N/A) TRANSBRONCHIAL BIOPSY (N/A) Past Medical History/Comorbidities: Ms. Enrique Bynum  has a past medical history of Abnormality of gait and mobility; Ambulatory dysfunction; Arthritis of knee; Asthma; CHF (congestive heart failure), NYHA class I ([address] Chronic hypoxemic respiratory failure (HCC); COPD (chronic obstructive pulmonary disease) with emphysema ([address] Cor pulmonale ([address] Diabetes mellitus without complication ([address] Dyslipidemia;  Extreme obesity with respiratory disorder ([facility] [address] Hyperlipidemia; Hypertension, essential, benign; Hypomagnesemia; Insomnia (12/27/2016); Morbid obesity ([facility] [address] Nocturnal hypoxia; Obesity with alveolar hypoventilation ([facility] [address] Orthopnea; FLETCHER (obstructive sleep apnea); Primary pulmonary hypertension ([facility] [address] Pulmonary edema, noncardiac; and Sleeps in sitting position due to orthopnea. Ms. Kate Jenkins  has a past surgical history that includes hx cholecystectomy (1991); hx tonsillectomy; and hx tubal ligation (1993). Social History/Living Environment:  
Home Environment: Private residence # Steps to Enter: 3 Rails to Enter: Yes Hand Rails : Bilateral 
Wheelchair Ramp: No 
One/Two Story Residence: One story Living Alone: No 
Support Systems: Child(kira) Patient Expects to be Discharged to[de-identified] Private residence Current DME Used/Available at Home: Gunnar Canavan, rollator, Wheelchair Tub or Shower Type: Other (comment) (tub/shower, but states she is unable to fit in it) Prior Level of Function/Work/Activity: 
Independent with ADLs, rollator and WC for household mobility, lives with children, two recent falls due to dizziness, 1L supplemental O2 at all times Number of Personal Factors/Comorbidities that affect the Plan of Care: 3+: HIGH COMPLEXITY EXAMINATION:  
Most Recent Physical Functioning:  
Gross Assessment: 
AROM: Generally decreased, functional 
Strength: Generally decreased, functional 
Coordination: Within functional limits Tone: Normal 
Sensation: Intact Posture: 
  
Balance: 
Sitting: Impaired Sitting - Static: Good (unsupported) Sitting - Dynamic: Prop sitting Standing: Impaired Standing - Static: Good;Constant support Standing - Dynamic : Fair Bed Mobility: 
Rolling: Minimum assistance Supine to Sit: Moderate assistance Scooting: Total assistance;Assist x2; Other (comment) (airtap) Wheelchair Mobility: 
  
Transfers: 
Sit to Stand: Minimum assistance;Assist x2 Stand to Sit: Minimum assistance;Assist x2 
Gait: 
  
Base of Support: Widened Speed/Margo: Shuffled; Slow Step Length: Left shortened;Right shortened Gait Abnormalities: Decreased step clearance;Trunk sway increased Distance (ft): 5 Feet (ft) Assistive Device: Walker, rolling Ambulation - Level of Assistance: Contact guard assistance;Minimal assistance Body Structures Involved: 1. Heart 2. Lungs 3. Bones 4. Joints 5. Muscles 6. Ligaments Body Functions Affected: 1. Cardio 2. Respiratory 3. Neuromusculoskeletal 
4. Movement Related Activities and Participation Affected: 1. Mobility 2. Self Care 3. Domestic Life 4. Interpersonal Interactions and Relationships 5. Community, Social and [facility] Number of elements that affect the Plan of Care: 4+: HIGH COMPLEXITY CLINICAL PRESENTATION:  
Presentation: Evolving clinical presentation with changing clinical characteristics: MODERATE COMPLEXITY CLINICAL DECISION MAKIN[address] [place] [id number] AM-PAC 6 Clicks Basic Mobility Inpatient Short Form How much difficulty does the patient currently have. .. Unable A Lot A Little None 1. Turning over in bed (including adjusting bedclothes, sheets and blankets)? [] 1   [x] 2   [] 3   [] 4  
2. Sitting down on and standing up from a chair with arms ( e.g., wheelchair, bedside commode, etc.)   [] 1   [x] 2   [] 3   [] 4  
3. Moving from lying on back to sitting on the side of the bed? [] 1   [x] 2   [] 3   [] 4 How much help from another person does the patient currently need. .. Total A Lot A Little None 4. Moving to and from a bed to a chair (including a wheelchair)? [] 1   [x] 2   [] 3   [] 4  
5. Need to walk in hospital room? [] 1   [x] 2   [] 3   [] 4  
6. Climbing 3-5 steps with a railing? [x] 1   [] 2   [] 3   [] 4  
© , Trustees of [address] [place] [id number], under license to [employer]. All rights reserved Score:  Initial: 11 Most Recent: X (Date: -- ) Interpretation of Tool:  Represents activities that are increasingly more difficult (i.e. Bed mobility, Transfers, Gait). Score 24 23 22-20 19-15 14-10 9-7 6 Modifier CH CI CJ CK CL CM CN   
 
? Mobility - Walking and Moving Around:  
  - CURRENT STATUS: CL - 60%-79% impaired, limited or restricted  - GOAL STATUS: CK - 40%-59% impaired, limited or restricted  - D/C STATUS:  ---------------To be determined--------------- Payor: [employer] MEDICAID / Plan[place] [employer] [place] / Product Type: Managed Care Medicaid /   
 
Medical Necessity:    
· Patient demonstrates good rehab potential due to higher previous functional level. Reason for Services/Other Comments: 
· Patient continues to require skilled intervention due to impaired activity tolerance and  functional mobility. Use of outcome tool(s) and clinical judgement create a POC that gives a: Clear prediction of patient's progress: LOW COMPLEXITY  
  
 
 
 
TREATMENT:  
(In addition to Assessment/Re-Assessment sessions the following treatments were rendered) Pre-treatment Symptoms/Complaints: \"Ready to get back to bed\" Pain: Initial:  
Pain Intensity 1: 2 Pain Location 1: Back Pain Orientation 1: Lower  Post Session:  2/10, unchanged with mobility In addition to PT Evaluation: 
Therapeutic Activity: (    10 minutes): Therapeutic activities including Bed transfers, Chair transfers, Ambulation on level ground, seated exercises, and education on deep breathing techniques, bed mobility techniques, and pacing to improve mobility, strength, balance and coordination. Required moderate   to promote coordination of bilateral, upper extremity(s), lower extremity(s). Braces/Orthotics/Lines/Etc:  
· small catheter · O2 Device: Nasal cannula Treatment/Session Assessment:   
· Response to Treatment:  See above · Interdisciplinary Collaboration:  
o Physical Therapist 
o Registered Nurse 
o Rehab technician · After treatment position/precautions:  
o Supine in bed 
o Bed/Chair-wheels locked 
o Bed in low position 
o Call light within reach · Compliance with Program/Exercises: Will assess as treatment progresses. · Recommendations/Intent for next treatment session: \"Next visit will focus on reduction in assistance provided\". Total Treatment Duration: PT Patient Time In/Time Out Time In: 7118 Time Out: 1128 Sally Silva, PT

## 2018-08-01 NOTE — H&P
HOSPITALIST H&P/CONSULT 
NAME:  Ulisses Manrique Age:  59 y.o. 
:   1960 MRN:   [id number] PCP: Not On File [facility] Consulting MD: Treatment Team: Attending Provider: Ria Lopez MD; Consulting Provider: Jacob Julio MD; Utilization Review: Chang Crenshaw RN; Care Manager: Luis A Cleary RN 
HPI:  
Patient 89V with pmhx of frequent falls at home, morbid obesity, chronic resp failure, FLETCHER/OHS not on CPAP at home but on 1LNC cont, DM, HTN, HLP and chronic CHF. LIves with son at home. Presented with increased confusion and dizziness initially requiring bipap support. She was admitted to the ICU initially under pulm/cc service. CT chest showing left lung mass. She is s/p EBUS on  with path pos for Squamous Cell CA. Nodes negative. Heme/onc has been consulted. She is now stable on NC and hospitalist asked to assume care.  - feeling much better. Anxious to go home. 2L HF Complete ROS done and is as stated in HPI or otherwise negative Past Medical History:  
Diagnosis Date  Abnormality of gait and mobility  Ambulatory dysfunction  Arthritis of knee  Asthma  CHF (congestive heart failure), NYHA class I ([address] diastolic  Chronic hypoxemic respiratory failure ([address]  COPD (chronic obstructive pulmonary disease) with emphysema (HCC)  Cor pulmonale ([address]  Diabetes mellitus without complication ([address]  Dyslipidemia  Extreme obesity with respiratory disorder ([address]  Hyperlipidemia  Hypertension, essential, benign  Hypomagnesemia  Insomnia 2016  Morbid obesity ([address]  Nocturnal hypoxia  Obesity with alveolar hypoventilation ([address]  Orthopnea  FLETCHER (obstructive sleep apnea)  Primary pulmonary hypertension ([address]  Pulmonary edema, noncardiac  Sleeps in sitting position due to orthopnea Past Surgical History:  
Procedure Laterality Date  HX CHOLECYSTECTOMY    
 HX TONSILLECTOMY  HX 3024 [facility] Prior to Admission Medications Prescriptions Last Dose Informant Patient Reported? Taking? HYDROcodone-acetaminophen (NORCO) 7.5-325 mg per tablet 2018 at Unknown time  Yes Yes Sig: Take 1 Tab by mouth four (4) times daily. MULTIVITS,CA,MINERALS/IRON/FA (ONE-A-DAY WOMEN'S PETITES PO) 2018 at Unknown time  Yes Yes Si times daily SITagliptin (JANUVIA) 100 mg tablet 2018 at Unknown time  Yes Yes Sig: Take 100 mg by mouth daily. albuterol (PROAIR HFA) 90 mcg/actuation inhaler 2018 at Unknown time  Yes Yes Si-2 puffs Q 6hours  
albuterol (PROVENTIL VENTOLIN) 2.5 mg /3 mL (0.083 %) nebulizer solution Unknown at Unknown time  Yes No  
Si puffs as needed  
atenolol (TENORMIN) 50 mg tablet 2018 at Unknown time  Yes Yes Sig: Take 50 mg by mouth daily. baclofen (LIORESAL) 20 mg tablet 2018 at Unknown time  Yes Yes Sig: Take 20 mg by mouth four (4) times daily. cholecalciferol, vitamin D3, (VITAMIN D3) 2,000 unit tab 2018 at Unknown time  Yes Yes Sig: Take 2,000 Units by mouth daily. famotidine (PEPCID) 20 mg tablet Not Taking at Unknown time  Yes No  
Si 1/2  Three times a day  
fluticasone-salmeterol (ADVAIR HFA) 115-21 mcg/actuation inhaler 2018 at Unknown time  Yes Yes Sig: Take 2 Puffs by inhalation two (2) times a day. furosemide (LASIX) 40 mg tablet 2018 at Unknown time  Yes Yes Sig: Take 40 mg by mouth daily. gabapentin (NEURONTIN) 800 mg tablet 2018 at Unknown time  Yes Yes Sig: Take 800 mg by mouth four (4) times daily. glipiZIDE SR (GLUCOTROL XL) 10 mg CR tablet 2018 at Unknown time  Yes Yes Sig: Take 10 mg by mouth daily. ibuprofen (MOTRIN) 800 mg tablet Unknown at Unknown time  Yes No  
Sig: Take 800 mg by mouth. 1 to 3 times a day  
ipratropium (ATROVENT) 0.03 % nasal spray 2018 at Unknown time  Yes Yes Si Sprays as needed.   
lisinopril (PRINIVIL, ZESTRIL) 20 mg tablet Not Taking at Unknown time  Yes No  
Sig: Take 20 mg by mouth daily. loratadine (CLARITIN) 10 mg tablet Not Taking at Unknown time  Yes No  
Sig: Take 10 mg by mouth daily. magnesium oxide (MAG-OX) 400 mg tablet 7/27/2018 at Unknown time  Yes Yes Sig: Take 400 mg by mouth nightly. melatonin 5 mg cap capsule 7/27/2018 at Unknown time  Yes Yes Sig: Take 5 mg by mouth nightly. metFORMIN (GLUMETZA ER) 500 mg TG24 24 hour tablet 7/27/2018 at Unknown time  Yes Yes Sig: Take 2 Tabs by mouth daily. montelukast (SINGULAIR) 10 mg tablet 7/27/2018 at Unknown time  Yes Yes Sig: Take 10 mg by mouth daily. omega 3-dha-epa-fish oil (FISH OIL) 100-160-1,000 mg cap   Yes Yes Sig: Take 1,000 mg by mouth daily. omega-3 fatty acids-vitamin e (FISH OIL) 1,000 mg cap Unknown at Unknown time  Yes No  
Sig: Take 1 Cap by mouth daily. potassium chloride SR (KLOR-CON 10) 10 mEq tablet   Yes Yes Sig: Take 20 mEq by mouth daily. simvastatin (ZOCOR) 20 mg tablet   Yes Yes Sig: Take  by mouth nightly. spironolactone (ALDACTONE) 25 mg tablet Unknown at Unknown time  Yes No  
Sig: Take 25 mg by mouth daily. traMADol (ULTRAM) 50 mg tablet Unknown at Unknown time  Yes No  
Sig: Take 50 mg by mouth daily. traZODone (DESYREL) 100 mg tablet Unknown at Unknown time  Yes No  
Sig: Take 100 mg by mouth daily. zolpidem (AMBIEN) 5 mg tablet Unknown at Unknown time  Yes No  
Sig: Take 5 mg by mouth as needed for Sleep. Facility-Administered Medications: None Allergies Allergen Reactions  Iodides Tincture [Iodine] Unknown (comments) Social History Substance Use Topics  Smoking status: Former Smoker Quit date: 11/13/2014  Smokeless tobacco: Not on file  Alcohol use Not on file History reviewed. No pertinent family history. Objective:  
 
Visit Vitals  BP (!) 134/99  Pulse 66  Temp 99.1 °F (37.3 °C)  Resp 21  
 Ht 5' 6\" (1.676 m)  Wt (!) 173.5 kg (382 lb 8 oz)  
 LMP Comment: post menopausal  
 SpO2 96%  BMI 61.74 kg/m2 Temp (24hrs), Av.9 °F (37.2 °C), Min:98.7 °F (37.1 °C), Max:99.1 °F (37.3 °C) Oxygen Therapy O2 Sat (%): 96 % (18 1526) Pulse via Oximetry: 76 beats per minute (18 1526) O2 Device: Nasal cannula (18 152) PEEP/CPAP (cm H2O):  (autoset 5-20) (18 0000) O2 Flow Rate (L/min): 3 l/min (18 1617) FIO2 (%): 40 % (18 1139) Physical Exam: 
General:    Alert, cooperative, no distress, appears stated age. Morbidly obese Head:   Normocephalic, without obvious abnormality, atraumatic. Nose:  Nares normal. No drainage or sinus tenderness. Lungs:   diinished bilaterally Heart:   Regular rate and rhythm,  no murmur, rub or gallop. Abdomen:   Soft, non-tender. Not distended. Bowel sounds normal.  
Extremities: No cyanosis. No edema. No clubbing Skin:     Texture, turgor normal. No rashes or lesions. Not Jaundiced Neurologic: Alert and oriented x 3, no focal deficits Data Review:  
Recent Results (from the past 24 hour(s)) GLUCOSE, POC Collection Time: 18  9:37 PM  
Result Value Ref Range Glucose (POC) 275 (H) 65 - 100 mg/dL METABOLIC PANEL, BASIC Collection Time: 18  3:12 AM  
Result Value Ref Range Sodium 141 136 - 145 mmol/L Potassium 4.8 3.5 - 5.1 mmol/L Chloride 97 (L) 98 - 107 mmol/L  
 CO2 39 (H) 21 - 32 mmol/L Anion gap 5 (L) 7 - 16 mmol/L Glucose 293 (H) 65 - 100 mg/dL BUN 22 6 - 23 MG/DL Creatinine 0.95 0.6 - 1.0 MG/DL  
 GFR est AA >60 >60 ml/min/1.73m2 GFR est non-AA >60 >60 ml/min/1.73m2 Calcium 8.6 8.3 - 10.4 MG/DL MAGNESIUM Collection Time: 18  3:12 AM  
Result Value Ref Range Magnesium 2.3 1.8 - 2.4 mg/dL PHOSPHORUS Collection Time: 18  3:12 AM  
Result Value Ref Range Phosphorus 3.4 2.5 - 4.5 MG/DL  
CBC WITH AUTOMATED DIFF Collection Time: 18  3:12 AM  
Result Value Ref Range  WBC 8.4 4.3 - 11.1 K/uL  
 RBC 4.50 4.05 - 5.25 M/uL  
 HGB 12.8 11.7 - 15.4 g/dL HCT 43.0 35.8 - 46.3 % MCV 95.6 79.6 - 97.8 FL  
 MCH 28.4 26.1 - 32.9 PG  
 MCHC 29.8 (L) 31.4 - 35.0 g/dL  
 RDW 16.9 (H) 11.9 - 14.6 % PLATELET 907 935 - 550 K/uL MPV 10.0 (L) 10.8 - 14.1 FL  
 DF AUTOMATED NEUTROPHILS 88 (H) 43 - 78 % LYMPHOCYTES 5 (L) 13 - 44 % MONOCYTES 7 4.0 - 12.0 % EOSINOPHILS 0 (L) 0.5 - 7.8 % BASOPHILS 0 0.0 - 2.0 % IMMATURE GRANULOCYTES 0 0.0 - 5.0 %  
 ABS. NEUTROPHILS 7.4 1.7 - 8.2 K/UL  
 ABS. LYMPHOCYTES 0.4 (L) 0.5 - 4.6 K/UL  
 ABS. MONOCYTES 0.6 0.1 - 1.3 K/UL  
 ABS. EOSINOPHILS 0.0 0.0 - 0.8 K/UL  
 ABS. BASOPHILS 0.0 0.0 - 0.2 K/UL  
 ABS. IMM. GRANS. 0.0 0.0 - 0.5 K/UL PROCALCITONIN Collection Time: 08/01/18  3:12 AM  
Result Value Ref Range Procalcitonin <0.1 ng/mL GLUCOSE, POC Collection Time: 08/01/18  7:13 AM  
Result Value Ref Range Glucose (POC) 227 (H) 65 - 100 mg/dL GLUCOSE, POC Collection Time: 08/01/18 12:48 PM  
Result Value Ref Range Glucose (POC) 212 (H) 65 - 100 mg/dL GLUCOSE, POC Collection Time: 08/01/18  4:39 PM  
Result Value Ref Range Glucose (POC) 280 (H) 65 - 100 mg/dL I Final result (Exam End: 7/29/2018  9:46 AM) Reviewed   
   
Study Result   
  CT chest without contrast  
  
History: Left suprahilar opacity. Abnormal chest x-ray. 
  
Technique: Helically acquired images were obtained from the lung apices to the 
domes of the diaphragms reconstructed at 5 mm thickness. 
  
Radiation dose reduction techniques were used for this study:  Our CT scanners 
use one or all of the following: Automated exposure control, adjustment of the 
mA and/or kVp according to patient's size, iterative reconstruction. 
  
Comparison: None. Correlation is made to the chest x-ray performed earlier on 
the same day. 
  
Findings: There is a trace right pleural effusion. There is no pericardial 
effusion.  There is a masslike opacity at the left superhilar region measured at 
approximately 4.1 x 4.5 cm in AP and transverse dimensions, respectively. The 
margins are not well delineated in the absence of intravenous contrast. There is 
postobstructive subsegmental atelectasis involving a portion of the left upper 
lobe medially. The findings would be most suspicious for neoplasm. The heart is 
mildly enlarged. Several mediastinal lymph nodes are present including a right 
lower paratracheal lymph node measuring 1.5 cm in short axis. Smaller AP and 
prevascular lymph nodes are present. A subcarinal lymph node measures 1.1 cm. Imaging of the upper and reveals normal appearing adrenal glands. 
  
IMPRESSION IMPRESSION: 
1. Masslike opacity at the left superhilar region with postobstructive 
atelectasis involving a portion of the left upper lobe medially. The imaging 
features would be suspicious for neoplasm until proven otherwise. Consider 
bronchoscopy for further evaluation. 2. Several small to mildly prominent mediastinal lymph nodes. Fito Peguero Assessment and Plan: Active Hospital Problems Diagnosis Date Noted  Mass of left lung 07/30/2018  Mediastinal lymphadenopathy 07/30/2018  Primary cancer of left upper lobe of lung ([address]- squamous cell 07/30/2018  Acute on chronic respiratory failure ([address] 07/28/2018  Fall 07/28/2018  Pulmonary infiltrates 07/28/2018  Chronic pain 07/28/2018  COPD exacerbation ([address] 07/28/2018  Morbid obesity ([address]  FLETCHER (obstructive sleep apnea)  Diabetes mellitus without complication ([address]  Obesity with alveolar hypoventilation ([address] A/P 
- Squamous cell lung CA - awaiting oncology consult. pulm following - Chronic resp failure - on 1LNC at home, stable on 2lnc today. - Falls/debility - refusing idea of STR. Needs PT/OT HHC at discharge 
- COPD exacerbation - continue scheduled nebs, prednisone - pulmonary infiltrates - rocephin/azithromycin 
- DM2 - suboptimal control. Oral meds held. Add Lantus. Cont SSI. Check a1c 
- FLETCHER/OHS - ? NIPPV. Defer to pulm. Code Status: Full code Anticipated discharge: 1-2 days pending oncology plans Signed By: Andrae Yarbrough DO August 1, 2018

## 2018-08-01 NOTE — PROGRESS NOTES
Initial visit was made, emotional support and a spiritual presence was provided.  card was left with the patient. Patient requested a copy of the HCPOA, which was given to her. Patient shared that her daughter would visit tomorrow and they would discuss the HCPOA. Hermilo Nava

## 2018-08-01 NOTE — PROGRESS NOTES
Placed pt on nasal cpap. Pt wore for a few minutes before stating she could not tolerate. Pt placed back on nasal cannula.

## 2018-08-01 NOTE — PROGRESS NOTES
Spoke with Dr. Bonnie Plata hospitalist service regarding patient transferring out of ICU. Mary Britt He is aware that hospitalist service to take primary

## 2018-08-01 NOTE — PROGRESS NOTES
Care Daily Progress Note: 8/1/2018 Admission Date: 7/28/2018 The patient's chart is reviewed and the patient is discussed with the staff. 
 
59 y.o.  female presents with falls. Francoise Mcknight has a history of morbid obesity, FLETCHER/OHS not on CPAP, chronic respiratory failure, DM, HTN, HL, and chronic CHF.  Apparently patient has sustained multiple recent falls.  Patient is very sleepy but states that she fell yesterday secondary to dizziness.  She states that her breathing has been different from her baseline but is not able to describe in what way. Primitivo Dye son lives with her but is not currently at the bedside to provide additional detail.  In the ER her LA 1.1, WBC 8.9, PCT <0.1, Creat 1.48, . Patient was given nebulizer tx and placed on BIPAP. Sent to ICU and now on Floor. CT of chest with L lung mass. Had bronch with EBUS after intubation yesterday with NSCLC diagnosed. Nodes were negative. Subjective:  
 
Extubated yesterday and tolerating well. Says she tried APAP for 5 minutes and was not able to tolerate even at lowest setting. Path Squamous cell CA. Current Facility-Administered Medications Medication Dose Route Frequency  nystatin (MYCOSTATIN) 100,000 unit/gram powder   Topical BID  potassium chloride (K-DUR, KLOR-CON) SR tablet 20 mEq  20 mEq Oral DAILY  methylPREDNISolone (PF) (SOLU-MEDROL) injection 20 mg  20 mg IntraVENous Q12H  cefTRIAXone (ROCEPHIN) 1 g in 0.9% sodium chloride (MBP/ADV) 50 mL  1 g IntraVENous Q24H  
 insulin lispro (HUMALOG) injection   SubCUTAneous AC&HS  propofol (DIPRIVAN) infusion  0-50 mcg/kg/min IntraVENous TITRATE  fentaNYL in normal saline (pf) 25 mcg/mL infusion   mcg/hr IntraVENous TITRATE  famotidine (PF) (PEPCID) 20 mg in sodium chloride 0.9% 10 mL injection  20 mg IntraVENous Q12H  
 azithromycin (ZITHROMAX) 500 mg in 0.9% sodium chloride (MBP/ADV) 250 mL  500 mg IntraVENous Q24H  HYDROcodone-acetaminophen (NORCO) 7.5-325 mg per tablet 1 Tab  1 Tab Oral Q6H PRN  
 atenolol (TENORMIN) tablet 50 mg  50 mg Oral DAILY  baclofen (LIORESAL) tablet 20 mg  20 mg Oral QID  gabapentin (NEURONTIN) capsule 800 mg  800 mg Oral QID  lisinopril (PRINIVIL, ZESTRIL) tablet 20 mg  20 mg Oral DAILY  magnesium oxide (MAG-OX) tablet 400 mg  400 mg Oral QHS  montelukast (SINGULAIR) tablet 10 mg  10 mg Oral QHS  spironolactone (ALDACTONE) tablet 25 mg  25 mg Oral DAILY  sodium chloride (NS) flush 5-10 mL  5-10 mL IntraVENous Q8H  
 sodium chloride (NS) flush 5-10 mL  5-10 mL IntraVENous PRN  
 albuterol (PROVENTIL VENTOLIN) nebulizer solution 2.5 mg  2.5 mg Nebulization Q4H RT  
 enoxaparin (LOVENOX) injection 40 mg  40 mg SubCUTAneous Q12H Review of Systems Constitutional:  negative for fever, chills, sweats Cardiovascular:  negative for chest pain, palpitations, syncope, edema Gastrointestinal:  negative for dysphagia, reflux, vomiting, diarrhea, abdominal pain, or melena Neurologic:  negative for focal weakness, numbness, headache Objective:  
 
Vitals:  
 08/01/18 0630 08/01/18 0701 08/01/18 1921 08/01/18 9105 BP: 111/69 152/78 147/81 Pulse: (!) 141 81 78 Resp: (!) 35 27 23 Temp:   99.1 °F (37.3 °C) SpO2: 95% 94% 95% 96% Weight:      
Height:      
 
 
Intake and Output:  
07/30 1901 - 08/01 0700 In: 1200.2 [I.V.:1200.2] Out: 5150 [TALLB:0405] 08/01 0701 - 08/01 1900 In: -  
Out: 550 [[id number]:643] Physical Exam:         
Constitutional:  Comfortable on NC 
EENMT:  Sclera clear, pupils equal, oral mucosa moist 
Respiratory: decreased BS Cardiovascular:  RRR with no M,G,R; 
Gastrointestinal:  soft with no tenderness; positive bowel sounds present Musculoskeletal:  warm with no cyanosis, + lower leg edema, multiple macular lesions of skin Skin:  no jaundice or ecchymosis Neurologic: no gross neuro deficits Psychiatric:  alert and oriented x 3 LINES:   
PIV 
 
DRIPS:   
None CXR:   
 
             
 
Chest CT: 
 
 
 
Ventilator Settings Mode FIO2 Rate Tidal Volume Pressure PEEP Pressure support (weaned per Dr. Kera Garza)  40 %    450 ml  10 cm H2O  8 cm H20 Peak airway pressure: 18 cm H2O Minute ventilation: 8.3 l/min ABG:  
Recent Labs  
   07/31/18 78 439 444  07/31/18 
 0423  07/30/18 
 1755 PH  7.39  7.36  7.37  
PCO2  69*  71*  64* PO2  77*  85  96 HCO3  41*  39*  36* LAB Recent Labs 08/01/18 
 0713  07/31/18 
 2137  07/31/18 
 1655  07/31/18 22 [id number]  07/31/18 
 0541 GLUCPOC  227*  275*  229*  178*  222* Recent Labs 08/01/18 
 5163  07/31/18 
 0340 WBC  8.4  8.5 HGB  12.8  12.8 HCT  43.0  43.3 PLT  208  252 Recent Labs 08/01/18 
 8663  07/31/18 
 0340  07/30/18 
 6007 NA  141  140  137  
K  4.8  4.8  4.1 CL  97*  97*  94* CO2  39*  39*  38* GLU  293*  233*  266* BUN  22  22  22 CREA  0.95  0.91  1.11* MG  2.3  2.5*   --   
PHOS  3.4  3.6   --   
CA  8.6  8.8  9.0 No results for input(s): LCAD, LAC in the last 72 hours. Path: moderately differentiated squamous cell carcinoma TSH: low with low T4 and normal T3 Assessment:  (Medical Decision Making) Hospital Problems  Date Reviewed: 7/28/2018 Codes Class Noted POA Mass of left lung ICD-10-CM: R91.8 ICD-9-CM: 786.6  7/30/2018 Unknown Squamous cell CA Mediastinal lymphadenopathy ICD-10-CM: R59.0 ICD-9-CM: 785.6  7/30/2018 Unknown Nodes were negative on path Primary cancer of left upper lobe of lung ([facility] ICD-10-CM: C34.12 
ICD-9-CM: 162.3  7/30/2018 Unknown Squamous cell CA * (Principal)Acute on chronic respiratory failure (HCC) ICD-10-CM: J96.20 ICD-9-CM: 518.84  7/28/2018 Yes Remains on vent Fall ICD-10-CM: W19. [place] ICD-9-CM: E888.9  7/28/2018 Yes Pulmonary infiltrates ICD-10-CM: R91.8 ICD-9-CM: 793.19  7/28/2018 Yes Post obstructive atx present.   
 Chronic pain ICD-10-CM: G89.29 ICD-9-CM: 338.29  7/28/2018 Yes COPD exacerbation ([address] ICD-10-CM: J44.1 ICD-9-CM: 491.21  7/28/2018 Unknown No wheezing. Morbid obesity (HCC) (Chronic) ICD-10-CM: E66.01 
ICD-9-CM: 278.01  Unknown Yes Diabetes mellitus without complication (HCC) (Chronic) ICD-10-CM: E11.9 ICD-9-CM: 250.00  Unknown Yes FLETCHER (obstructive sleep apnea) (Chronic) ICD-10-CM: G47.33 
ICD-9-CM: 327.23  Unknown Yes Obesity with alveolar hypoventilation (HCC) (Chronic) ICD-10-CM: U57.6 ICD-9-CM: 278.03  Unknown Yes High PCO2s chronically in the past. Not surgical candidate. Plan:  (Medical Decision Making) Oncology eval. 
Move to floor. Engage hospitalists to assume primary care and address thyroid function. -- 
 
More than 50% of the time documented was spent in face-to-face contact with the patient and in the care of the patient on the floor/unit where the patient is located.  
 
Ria Lopez MD

## 2018-08-01 NOTE — PROGRESS NOTES
Bedside shift change report given to [address] (oncoming nurse) by Shashank Snowden (offgoing nurse). Report included the following information Kardex, MAR, Recent Results, Med Rec Status and Cardiac Rhythm sr pacs. Alert and oriented x4 on ra. Assisted x3 staff back to bed without injury. No DTIs/No pressure ulcers observed.

## 2018-08-02 ENCOUNTER — HOSPICE ADMISSION (OUTPATIENT)
Dept: HOSPICE | Facility: HOSPICE | Age: 58
End: 2018-08-02
Payer: MEDICAID

## 2018-08-02 VITALS
DIASTOLIC BLOOD PRESSURE: 54 MMHG | TEMPERATURE: 99.1 F | HEART RATE: 73 BPM | WEIGHT: 293 LBS | HEIGHT: 66 IN | BODY MASS INDEX: 47.09 KG/M2 | RESPIRATION RATE: 18 BRPM | SYSTOLIC BLOOD PRESSURE: 127 MMHG | OXYGEN SATURATION: 91 %

## 2018-08-02 LAB
ANION GAP SERPL CALC-SCNC: 6 MMOL/L (ref 7–16)
BACTERIA SPEC CULT: NORMAL
BUN SERPL-MCNC: 18 MG/DL (ref 6–23)
CALCIUM SERPL-MCNC: 9.4 MG/DL (ref 8.3–10.4)
CHLORIDE SERPL-SCNC: 99 MMOL/L (ref 98–107)
CO2 SERPL-SCNC: 39 MMOL/L (ref 21–32)
CREAT SERPL-MCNC: 0.81 MG/DL (ref 0.6–1)
ERYTHROCYTE [DISTWIDTH] IN BLOOD BY AUTOMATED COUNT: 16.9 % (ref 11.9–14.6)
GLUCOSE BLD STRIP.AUTO-MCNC: 109 MG/DL (ref 65–100)
GLUCOSE BLD STRIP.AUTO-MCNC: 264 MG/DL (ref 65–100)
GLUCOSE SERPL-MCNC: 111 MG/DL (ref 65–100)
GRAM STN SPEC: NORMAL
HCT VFR BLD AUTO: 42.6 % (ref 35.8–46.3)
HGB BLD-MCNC: 12.3 G/DL (ref 11.7–15.4)
MAGNESIUM SERPL-MCNC: 2.2 MG/DL (ref 1.8–2.4)
MCH RBC QN AUTO: 27.8 PG (ref 26.1–32.9)
MCHC RBC AUTO-ENTMCNC: 28.9 G/DL (ref 31.4–35)
MCV RBC AUTO: 96.4 FL (ref 79.6–97.8)
PLATELET # BLD AUTO: 199 K/UL (ref 150–450)
PMV BLD AUTO: 10.9 FL (ref 10.8–14.1)
POTASSIUM SERPL-SCNC: 4.2 MMOL/L (ref 3.5–5.1)
RBC # BLD AUTO: 4.42 M/UL (ref 4.05–5.25)
SERVICE CMNT-IMP: NORMAL
SODIUM SERPL-SCNC: 144 MMOL/L (ref 136–145)
WBC # BLD AUTO: 9 K/UL (ref 4.3–11.1)

## 2018-08-02 PROCEDURE — 74011000250 HC RX REV CODE- 250: Performed by: INTERNAL MEDICINE

## 2018-08-02 PROCEDURE — 85027 COMPLETE CBC AUTOMATED: CPT

## 2018-08-02 PROCEDURE — 83735 ASSAY OF MAGNESIUM: CPT

## 2018-08-02 PROCEDURE — 74011636637 HC RX REV CODE- 636/637: Performed by: NURSE PRACTITIONER

## 2018-08-02 PROCEDURE — C8929 TTE W OR WO FOL WCON,DOPPLER: HCPCS

## 2018-08-02 PROCEDURE — 77010033678 HC OXYGEN DAILY

## 2018-08-02 PROCEDURE — 74011250636 HC RX REV CODE- 250/636: Performed by: INTERNAL MEDICINE

## 2018-08-02 PROCEDURE — 74011250637 HC RX REV CODE- 250/637: Performed by: INTERNAL MEDICINE

## 2018-08-02 PROCEDURE — 74011636637 HC RX REV CODE- 636/637: Performed by: INTERNAL MEDICINE

## 2018-08-02 PROCEDURE — 99232 SBSQ HOSP IP/OBS MODERATE 35: CPT | Performed by: INTERNAL MEDICINE

## 2018-08-02 PROCEDURE — 80048 BASIC METABOLIC PNL TOTAL CA: CPT

## 2018-08-02 PROCEDURE — 94640 AIRWAY INHALATION TREATMENT: CPT

## 2018-08-02 PROCEDURE — 36415 COLL VENOUS BLD VENIPUNCTURE: CPT

## 2018-08-02 PROCEDURE — 99222 1ST HOSP IP/OBS MODERATE 55: CPT | Performed by: INTERNAL MEDICINE

## 2018-08-02 PROCEDURE — 94760 N-INVAS EAR/PLS OXIMETRY 1: CPT

## 2018-08-02 PROCEDURE — 82962 GLUCOSE BLOOD TEST: CPT

## 2018-08-02 RX ORDER — SODIUM CHLORIDE 0.9 % (FLUSH) 0.9 %
10 SYRINGE (ML) INJECTION
Status: DISCONTINUED | OUTPATIENT
Start: 2018-08-02 | End: 2018-08-02 | Stop reason: HOSPADM

## 2018-08-02 RX ORDER — LEVOFLOXACIN 750 MG/1
750 TABLET ORAL DAILY
Qty: 4 TAB | Refills: 0 | Status: SHIPPED | OUTPATIENT
Start: 2018-08-02 | End: 2018-08-06

## 2018-08-02 RX ORDER — PREDNISONE 50 MG/1
50 TABLET ORAL ONCE
Status: COMPLETED | OUTPATIENT
Start: 2018-08-02 | End: 2018-08-02

## 2018-08-02 RX ORDER — PREDNISONE 10 MG/1
TABLET ORAL
Qty: 30 TAB | Refills: 0 | Status: SHIPPED | OUTPATIENT
Start: 2018-08-02 | End: 2018-08-06

## 2018-08-02 RX ORDER — PREDNISONE 50 MG/1
50 TABLET ORAL ONCE
Status: DISCONTINUED | OUTPATIENT
Start: 2018-08-02 | End: 2018-08-02 | Stop reason: HOSPADM

## 2018-08-02 RX ORDER — PREDNISONE 50 MG/1
50 TABLET ORAL ONCE
Status: DISCONTINUED | OUTPATIENT
Start: 2018-08-03 | End: 2018-08-02 | Stop reason: HOSPADM

## 2018-08-02 RX ORDER — DIPHENHYDRAMINE HCL 25 MG
50 CAPSULE ORAL ONCE
Status: DISCONTINUED | OUTPATIENT
Start: 2018-08-03 | End: 2018-08-02 | Stop reason: HOSPADM

## 2018-08-02 RX ADMIN — ENOXAPARIN SODIUM 40 MG: 40 INJECTION, SOLUTION INTRAVENOUS; SUBCUTANEOUS at 05:01

## 2018-08-02 RX ADMIN — HYDROCODONE BITARTRATE AND ACETAMINOPHEN 1 TABLET: 7.5; 325 TABLET ORAL at 01:35

## 2018-08-02 RX ADMIN — Medication 10 ML: at 14:24

## 2018-08-02 RX ADMIN — FAMOTIDINE 20 MG: 20 TABLET ORAL at 07:43

## 2018-08-02 RX ADMIN — GABAPENTIN 800 MG: 400 CAPSULE ORAL at 07:43

## 2018-08-02 RX ADMIN — ALBUTEROL SULFATE 2.5 MG: 2.5 SOLUTION RESPIRATORY (INHALATION) at 08:01

## 2018-08-02 RX ADMIN — GABAPENTIN 800 MG: 400 CAPSULE ORAL at 12:04

## 2018-08-02 RX ADMIN — LISINOPRIL 20 MG: 20 TABLET ORAL at 07:43

## 2018-08-02 RX ADMIN — BACLOFEN 20 MG: 10 TABLET ORAL at 07:43

## 2018-08-02 RX ADMIN — ALBUTEROL SULFATE 2.5 MG: 2.5 SOLUTION RESPIRATORY (INHALATION) at 12:16

## 2018-08-02 RX ADMIN — HYDROCODONE BITARTRATE AND ACETAMINOPHEN 1 TABLET: 7.5; 325 TABLET ORAL at 07:50

## 2018-08-02 RX ADMIN — ATENOLOL 50 MG: 25 TABLET ORAL at 07:43

## 2018-08-02 RX ADMIN — ALBUTEROL SULFATE 2.5 MG: 2.5 SOLUTION RESPIRATORY (INHALATION) at 04:50

## 2018-08-02 RX ADMIN — BACLOFEN 20 MG: 10 TABLET ORAL at 12:04

## 2018-08-02 RX ADMIN — SPIRONOLACTONE 25 MG: 25 TABLET, FILM COATED ORAL at 07:44

## 2018-08-02 RX ADMIN — INSULIN LISPRO 6 UNITS: 100 INJECTION, SOLUTION INTRAVENOUS; SUBCUTANEOUS at 12:04

## 2018-08-02 RX ADMIN — NYSTATIN: 100000 POWDER TOPICAL at 07:50

## 2018-08-02 RX ADMIN — ALBUTEROL SULFATE 2.5 MG: 2.5 SOLUTION RESPIRATORY (INHALATION) at 00:52

## 2018-08-02 RX ADMIN — PERFLUTREN 1 ML: 6.52 INJECTION, SUSPENSION INTRAVENOUS at 13:00

## 2018-08-02 RX ADMIN — ALBUTEROL SULFATE 2.5 MG: 2.5 SOLUTION RESPIRATORY (INHALATION) at 15:00

## 2018-08-02 RX ADMIN — PREDNISONE 10 MG: 10 TABLET ORAL at 07:42

## 2018-08-02 RX ADMIN — Medication 10 ML: at 05:02

## 2018-08-02 RX ADMIN — PREDNISONE 50 MG: 50 TABLET ORAL at 12:04

## 2018-08-02 NOTE — PROGRESS NOTES
[facility] OUTREACH NURSE PROGRESS REPORT SUBJECTIVE: Called to assess patient secondary to outreach protocol. MEWS Score: 1 (08/01/18 2325) Vitals:  
 08/01/18 2000 08/01/18 2057 08/01/18 2325 08/02/18 6670 BP: 108/58 128/67 115/59 Pulse: 62 75 69 Resp: 14 18 18 Temp: 98.4 °F (36.9 °C) 97.6 °F (36.4 °C) 98.2 °F (36.8 °C) SpO2: 90% 95% 94% 93% Weight:   (!) 169.7 kg (374 lb 3.2 oz) Height:      
  
 
LAB DATA: 
 
Recent Labs 08/01/18 
 7335  07/31/18 
 0340  07/30/18 
 6027 NA  141  140  137  
K  4.8  4.8  4.1 CL  97*  97*  94* CO2  39*  39*  38* AGAP  5*  4*  5*  
GLU  293*  233*  266* BUN  22  22  22 CREA  0.95  0.91  1.11* GFRAA  >60  >60  >60 GFRNA  >60  >60  53* CA  8.6  8.8  9.0 MG  2.3  2.5*   --   
PHOS  3.4  3.6   --   
  
 
Recent Labs 08/01/18 
 0218  07/31/18 
 0340 WBC  8.4  8.5 HGB  12.8  12.8 HCT  43.0  43.3 PLT  208  252 OBJECTIVE: On arrival to room, I found patient to be resting in bed. Pain Assessment Pain Intensity 1: 0 (08/02/18 0209) Pain Location 1: Back Pain Intervention(s) 1: Medication (see MAR) Patient Stated Pain Goal: 0 
 
  
  
  
  
 
  
  
  
   
 
ASSESSMENT:  Patient is alert and oriented x4. Patient states she has pain 7/10. Pt encouraged to call her primary nurse for meds. O2=93% and HR=80. PLAN:  Will continue to round per protocol.

## 2018-08-02 NOTE — PROGRESS NOTES
Pulmonary Daily Progress Note: 8/2/2018 Admission Date: 7/28/2018 The patient's chart is reviewed and the patient is discussed with the staff. 
 
59 y.o.  female presents with falls. Phillips Cooks has a history of morbid obesity, FLETCHER/OHS not on CPAP, chronic respiratory failure, DM, HTN, HL, and chronic CHF.  Apparently patient has sustained multiple recent falls.  Patient is very sleepy but states that she fell yesterday secondary to dizziness.  She states that her breathing has been different from her baseline but is not able to describe in what way. Roni Cha son lives with her but is not currently at the bedside to provide additional detail.  In the ER her LA 1.1, WBC 8.9, PCT <0.1, Creat 1.48, . Patient was given nebulizer tx and placed on BIPAP. Sent to ICU and now on Floor. CT of chest with L lung mass. Had bronch with EBUS after intubation yesterday with NSCLC diagnosed. Path Squamous cell CA. Node 11 L with rare suspicious cell for malignancy other were negative. Subjective:  
 
Patient awake doing well. Aware has stage IIIB lung CA and will need outpatient therapy. On 3 LPM oxygen. Not sure how much needs with exertion. Aware that does retain PCO2 and likely combination of size and COPD but does not want to use CPAP or other option even if will lead to her death. Current Facility-Administered Medications Medication Dose Route Frequency  predniSONE (DELTASONE) tablet 50 mg  50 mg Oral ONCE  
 [START ON 8/3/2018] predniSONE (DELTASONE) tablet 50 mg  50 mg Oral ONCE  
 [START ON 8/3/2018] diphenhydrAMINE (BENADRYL) capsule 50 mg  50 mg Oral ONCE  
 [START ON 8/3/2018] diatrizoate janet-diatrizoat sod (MD-GASTROVIEW,GASTROGRAFIN) 66-10 % contrast solution 15 mL  15 mL Oral ONCE  
 saline peripheral flush soln 10 mL  10 mL InterCATHeter RAD ONCE  
 sodium chloride 0.9 % bolus infusion 100 mL  100 mL IntraVENous RAD ONCE  
 [START ON 8/3/2018] iopamidol (ISOVUE-370) 76 % injection 100 mL  100 mL IntraVENous RAD ONCE  
 azithromycin (ZITHROMAX) tablet 500 mg  500 mg Oral Q24H  
 famotidine (PEPCID) tablet 20 mg  20 mg Oral Q12H  predniSONE (DELTASONE) tablet 10 mg  10 mg Oral DAILY WITH BREAKFAST  insulin glargine (LANTUS) injection 10 Units  10 Units SubCUTAneous QHS  nystatin (MYCOSTATIN) 100,000 unit/gram powder   Topical BID  cefTRIAXone (ROCEPHIN) 1 g in 0.9% sodium chloride (MBP/ADV) 50 mL  1 g IntraVENous Q24H  
 insulin lispro (HUMALOG) injection   SubCUTAneous AC&HS  
 HYDROcodone-acetaminophen (NORCO) 7.5-325 mg per tablet 1 Tab  1 Tab Oral Q6H PRN  
 atenolol (TENORMIN) tablet 50 mg  50 mg Oral DAILY  baclofen (LIORESAL) tablet 20 mg  20 mg Oral QID  gabapentin (NEURONTIN) capsule 800 mg  800 mg Oral QID  lisinopril (PRINIVIL, ZESTRIL) tablet 20 mg  20 mg Oral DAILY  magnesium oxide (MAG-OX) tablet 400 mg  400 mg Oral QHS  montelukast (SINGULAIR) tablet 10 mg  10 mg Oral QHS  spironolactone (ALDACTONE) tablet 25 mg  25 mg Oral DAILY  sodium chloride (NS) flush 5-10 mL  5-10 mL IntraVENous Q8H  
 sodium chloride (NS) flush 5-10 mL  5-10 mL IntraVENous PRN  
 albuterol (PROVENTIL VENTOLIN) nebulizer solution 2.5 mg  2.5 mg Nebulization Q4H RT  
 enoxaparin (LOVENOX) injection 40 mg  40 mg SubCUTAneous Q12H Review of Systems Constitutional:  negative for fever, chills, sweats Cardiovascular:  negative for chest pain, palpitations, syncope, edema Gastrointestinal:  negative for dysphagia, reflux, vomiting, diarrhea, abdominal pain, or melena Neurologic:  negative for focal weakness, numbness, headache Objective:  
 
Vitals:  
 08/02/18 0750 08/02/18 0801 08/02/18 1123 08/02/18 1216 BP: 135/79  127/54 Pulse: 61  73 Resp: 18  18 Temp: 98.4 °F (36.9 °C)  99.1 °F (37.3 °C) SpO2: 94% 93% 93% 91% Weight:      
Height:      
 
 
Intake and Output:  
07/31 1901 - 08/02 0700 In: 950 [P.O.:600; I.V.:350] Out: Blade Child 08/02 0701 - 08/02 1900 In: 710 [P.O.:710] Out: 700 [Urine:700] Physical Exam:         
Constitutional:  Comfortable on NC 
EENMT:  Sclera clear, pupils equal, oral mucosa moist 
Respiratory: CTA b/l. No wheezing on 3 LPM 
Cardiovascular:  RRR with no M,G,R; 
Gastrointestinal:  soft with no tenderness; positive bowel sounds present Musculoskeletal:  warm with no cyanosis, + lower leg edema, multiple macular lesions of skin Skin:  no jaundice or ecchymosis Neurologic: no gross neuro deficits Psychiatric:  alert and oriented x 3 LINES:   
PIV 
 
DRIPS:   
None CXR:   
 
             
 
Chest CT: 
 
 
 
  
 
ABG:  
Recent Labs  
   07/31/18 78 439 444  07/31/18 
 0423  07/30/18 
 1755 PH  7.39  7.36  7.37  
PCO2  69*  71*  64* PO2  77*  85  96 HCO3  41*  39*  36* LAB Recent Labs 08/02/18 [id number]  08/02/18 
 0743  08/01/18 
 2146  08/01/18 
 1639  08/01/18 
 1248 GLUCPOC  264*  109*  180*  280*  212* Recent Labs 08/02/18 
 9997  08/01/18 
 5937  07/31/18 
 0340 WBC  9.0  8.4  8.5 HGB  12.3  12.8  12.8 HCT  42.6  43.0  43.3 PLT  199  208  252 Recent Labs 08/02/18 
 1030  08/01/18 
 9700  07/31/18 
 0340 NA  144  141  140  
K  4.2  4.8  4.8  
CL  99  97*  97* CO2  39*  39*  39* GLU  111*  293*  233* BUN  18  22  22 CREA  0.81  0.95  0.91  
MG  2.2  2.3  2.5* PHOS   --   3.4  3.6 CA  9.4  8.6  8.8 No results for input(s): LCAD, LAC in the last 72 hours. Path: moderately differentiated squamous cell carcinoma TSH: low with low T4 and normal T3 Assessment:  (Medical Decision Making) Hospital Problems  Date Reviewed: 7/28/2018 Codes Class Noted POA Mass of left lung ICD-10-CM: R91.8 ICD-9-CM: 786.6  7/30/2018 Unknown Squamous cell CA Mediastinal lymphadenopathy ICD-10-CM: R59.0 ICD-9-CM: 785.6  7/30/2018 Unknown Nodes were negative on path  Primary cancer of left upper lobe of lung ([facility] ICD-10-CM: C34.12 
ICD-9-CM: 162.3  7/30/2018 Unknown Squamous cell CA * (Principal)Acute on chronic respiratory failure (HCC) ICD-10-CM: J96.20 ICD-9-CM: 518.84  7/28/2018 Yes On NC Fall ICD-10-CM: W19. Crow Grief ICD-9-CM: E888.9  7/28/2018 Yes Pulmonary infiltrates ICD-10-CM: R91.8 ICD-9-CM: 793.19  7/28/2018 Yes Post obstructive atx present. Chronic pain ICD-10-CM: G89.29 ICD-9-CM: 338.29  7/28/2018 Yes COPD exacerbation ([facility] ICD-10-CM: J44.1 ICD-9-CM: 491.21  7/28/2018 Unknown No wheezing. Morbid obesity (HCC) (Chronic) ICD-10-CM: E66.01 
ICD-9-CM: 278.01  Unknown Yes Diabetes mellitus without complication (HCC) (Chronic) ICD-10-CM: E11.9 ICD-9-CM: 250.00  Unknown Yes FLETCHER (obstructive sleep apnea) (Chronic) ICD-10-CM: G47.33 
ICD-9-CM: 327.23  Unknown Yes Obesity with alveolar hypoventilation (HCC) (Chronic) ICD-10-CM: U12.8 ICD-9-CM: 278.03  Unknown Yes High PCO2s chronically in the past. Not surgical candidate. Clearly refuses CPAP/BIPAP/NIPPV even with risk of death an option if does not use it. Plan:  (Medical Decision Making) --spoke with respiratory or ambulated with walker and check if 3 LPM is enough 
--can place on steroids taper then 30 mg daily x 2 days, then 20 mg daily x 2 days, then 10 mg daily x 2 days, then stop. --will need nebs as outpatient 
--f/u with oncology and likely outpatient chemo 
--spoke with hospitalist and posible discharge later today 
--no CPAP/BIPAP/NIPPV when discharged -- see above. --can f/u in pulmonary office in 4 weeks to see how she is doing with regards to oxygen, chemo, etc. Consider spirometry on that visit. I ordered the f/u already. More than 50% of the time documented was spent in face-to-face contact with the patient and in the care of the patient on the floor/unit where the patient is located.  
 
Miladis Lopez MD

## 2018-08-02 NOTE — PROGRESS NOTES
Mrs. Lawyer Martinez notified her family via telephone of transfer to . Personal belongings with Mrs. Lawyer Martinez.

## 2018-08-02 NOTE — PROGRESS NOTES
Consulted called to [facility] NP oncology.  Order to place oncology consult under Danelle Mendez MD.

## 2018-08-02 NOTE — ADT AUTH CERT NOTES
Utilization Review  
  
  
  REQUESTED REVIEW FOR 7/31/18 by Anthony Urena, RN     
  Review Status Review Entered    
  In Primary 8/1/2018    
  Details    
    7/31/18 
  
CT of chest with L lung mass.  Had bronch with EBUS after intubation yesterday with NSCLC diagnosed. Nodes were negative.                     
  
Subjective:  
  
Remains on vent. Looks comfortable.  
  
Plan:  (Medical Decision Making)  
  
TSH. Wean vent. Extubate if tolerates wean. APAP with sleep post extubation. Oncology eval when extubated. 
  
 NSR 85, /70, EXT TO 4L HFNC, SAT 92%, CXR- Mild improvement left upper lobe infiltrate Chloride: 97 (L) 
CO2: 39 (H) Anion gap: 4 (L) Glucose: 233 (H) Magnesium: 2.5 (H) TSH: 0.296 (L) pH: 7.36 PCO2: 71 (H) PO2: 85 BICARBONATE: 39 (H) O2 SAT: 96  
T4, Free: 1.7 (H) 
  
PROVENTIL NEBS Q 4,  BACLOFEN, ROCEPHIN 1 GM IV QD, LOVENOX Q 12, NEURONTIN, NORCO, SSI,  MAG OX, SINGULAR,   ZITHROMAX 500 MG IV QD, PEPCID IV  Q 12 , FENTANYL IV D/C, DIPRIVAN DRIP D/C, SOLUMEDROL 40 MG DECR TO 20 MG IV Q 12, 
  
   
   
  
   
  Respiratory Failure GRG - Care Day 5 (8/1/2018) by Anthony Urena, MIRANDA     
  Review Status Review Entered    
  Completed 8/1/2018    
  Details    
      
  Care Day: 5 Care Date: 8/1/2018 Level of Care: ICU    
  Guideline Day 3     
  Level Of Care    
  ( ) * Activity level acceptable    
      
  Clinical Status    
  (X) * Ventilation status appropriate    
  8/1/2018 2:37 PM EDT by Omaira Sheth    
    2L NC, SAT 94%    
      
  (X) * Airway status acceptable    
  8/1/2018 2:37 PM EDT by Omaira Sheth    
    EXTUBATED    
      
  ( ) * Respiratory status acceptable    
  8/1/2018 2:37 PM EDT by Omaira Sheth    
    RR 21    
      
  (X) * Stable chest findings    
  8/1/2018 2:37 PM EDT by Omaira Sheth    
    decreased BS    
      
  (X) * Neurologic status acceptable    
  8/1/2018 2:37 PM EDT by Omaira Sheth    
    alert and oriented x 3         
  (X) * Temperature status acceptable    
  8/1/2018 2:37 PM EDT by Mena Barrera    
    T 99.1, NSR 66, /69,    
      
  ( ) * No infection, or status acceptable    
  ( ) * General Discharge Criteria met    
      
  Interventions    
  (X) * No chest tube, or status acceptable    
  8/1/2018 2:37 PM EDT by Mena Barrera    
    NA    
      
  (X) * Intake acceptable    
  8/1/2018 2:37 PM EDT by Mena Barrera    
    DM DIET    
      
  ( ) * No inpatient interventions needed    
  8/1/2018 2:37 PM EDT by Mena Barrera    
    PROVENTIL NEBS Q 4, TENORMIN, BACLOFEN, ROCEPHIN 1 GM IV Q D, LOVENOX Q 12, NEURONTIN, NORCO, SSI, PRINIVIL, MAG OX, SINGULAR, ALDACTONE,  ZITHROMAX 500 MG PO QD, PEPCID IV TO PO Q 12 , SOLUMEDROL 20 MG IV Q 12,    
      
      
  8/1/2018 2:37 PM EDT by Mena Barrera    
  Subject: Additional Clinical Information    
  Extubated yesterday and tolerating well. Says she tried APAP for 5 minutes and was not able to tolerate even at lowest setting. Path Squamous cell CA. Plan:   (Medical Decision Making)  Oncology eval.Move to floor. Engage hospitalists to assume primary care and address thyroid function. CXR-. Left upper lobe infiltrate persists, right lung clear. Vascularity normal. No pleural effusion. No significant change       Chloride: 97 (L)   CO2: 39 (H)   Anion gap: 5 (L)   Glucose: 293 (H)      
      
      
      
      
  * Milestone

## 2018-08-02 NOTE — PROGRESS NOTES
Met with patient in the room per MD request (states patient is asking about possible home hospice). Patient states she lives with her son and \"soon to be daughter in law. \"  Per patient she and her daughter would like a home hospice consult. Briefly discussed with patient the difference between hospice and home health to be sure she understood. Patient states she still would like hospice to come out to the home and present to her and her daughter. Called Matt and made her aware / MD aware and in agreement with plan. Per patient , she is already on Home O2 with [facility]. Per MD, patient now needs to be on 2L resting and 3L exertion. CM will contact [facility].

## 2018-08-02 NOTE — PROGRESS NOTES
Per RN, Merry Valdez, will follow premedication protocol starting at 12pm on 08/02/18 for CT w/ cont due to iodine allergy. Pt to receive oral contrast and last dose of premed @ 12am and to be scanned @ 1am. RN to bring pt down to CT dept.

## 2018-08-02 NOTE — PROGRESS NOTES
Critical Care Outreach Nurse Progress Report: 
 
Subjective: In to assess pt secondary to recent tx from CCU. MEWS Score: 1 (08/02/18 0750) Vitals:  
 08/02/18 4101 08/02/18 7545 08/02/18 6713 08/02/18 9085 BP: 149/78  135/79 Pulse: 74  61 Resp: 18  18 Temp: 98.3 °F (36.8 °C)  98.4 °F (36.9 °C) SpO2: 95% 96% 94% 93% Weight:      
Height:      
  
 
Objective: Adult female pt lying in bed. Pain Intensity 1: 0 (08/02/18 0840) Pain Location 1: Back Pain Intervention(s) 1: Medication (see MAR) Patient Stated Pain Goal: 0 Assessment: Adult female pt sitting up in bed watching TV. Pt is alert and oriented. Lung sounds clear/diminished bilaterally, anteriorly and posteriorly. No dyspnea noted. Pt has no complaints at this time. Pt is currently on 2LNC. Pt reports she normally wears 1L NC at home. No distress noted. Stable at this time. Plan: Will continue to follow per outreach protocol.

## 2018-08-02 NOTE — DISCHARGE INSTRUCTIONS
DISCHARGE SUMMARY from Nurse    PATIENT INSTRUCTIONS:    Report the following to your doctor:  · Excessive pain, swelling, redness or odor of or around the IV area  · Temperature over 100.5  · Nausea and vomiting lasting longer than 4 hours or if unable to take medications  · Any signs of decreased circulation or nerve impairment to extremity: change in color, persistent  numbness, tingling, coldness or increase pain  · Increased shortness of breath  · Any questions    What to do at Home:  Recommended activity: Activity as tolerated. *  Please give a list of your current medications to your Primary Care Provider. *  Please update this list whenever your medications are discontinued, doses are      changed, or new medications (including over-the-counter products) are added. *  Please carry medication information at all times in case of emergency situations. These are general instructions for a healthy lifestyle:    No smoking/ No tobacco products/ Avoid exposure to second hand smoke  Surgeon General's Warning:  Quitting smoking now greatly reduces serious risk to your health. Obesity, smoking, and sedentary lifestyle greatly increases your risk for illness    A healthy diet, regular physical exercise & weight monitoring are important for maintaining a healthy lifestyle    You may be retaining fluid if you have a history of heart failure or if you experience any of the following symptoms:  Weight gain of 3 pounds or more overnight or 5 pounds in a week, increased swelling in our hands or feet or shortness of breath while lying flat in bed. Please call your doctor as soon as you notice any of these symptoms; do not wait until your next office visit.     Recognize signs and symptoms of STROKE:    F-face looks uneven    A-arms unable to move or move unevenly    S-speech slurred or non-existent    T-time-call 911 as soon as signs and symptoms begin-DO NOT go       Back to bed or wait to see if you get better-TIME IS BRAIN. Warning Signs of HEART ATTACK     Call 911 if you have these symptoms:   Chest discomfort. Most heart attacks involve discomfort in the center of the chest that lasts more than a few minutes, or that goes away and comes back. It can feel like uncomfortable pressure, squeezing, fullness, or pain.  Discomfort in other areas of the upper body. Symptoms can include pain or discomfort in one or both arms, the back, neck, jaw, or stomach.  Shortness of breath with or without chest discomfort.  Other signs may include breaking out in a cold sweat, nausea, or lightheadedness. Don't wait more than five minutes to call 911 - MINUTES MATTER! Fast action can save your life. Calling 911 is almost always the fastest way to get lifesaving treatment. Emergency Medical Services staff can begin treatment when they arrive -- up to an hour sooner than if someone gets to the hospital by car. The discharge information has been reviewed with the patient. The patient verbalized understanding. Discharge medications reviewed with the patient and appropriate educational materials and side effects teaching were provided.   ___________________________________________________________________________________________________________________________________

## 2018-08-02 NOTE — PROGRESS NOTES
Date of Outreach Update: 
Edward Dillard was seen and assessed. Previous Outreach assessment has been reviewed. There have been no significant clinical changes since the completion of the last dated Outreach assessment. Pt being dc'd home. Will continue to follow up per outreach protocol. Signed By:   Franny Berkowitz RN   August 2, 2018 6:03 PM

## 2018-08-02 NOTE — PROGRESS NOTES
08/01/18 2057 Skin Integumentary Skin Integumentary (WDL) X Skin Color Ecchymosis (comment) 
(right forearm) Skin Condition/Temp Warm;Dry Skin Integrity Scars (comment) (scattered scabs pt reports from scratching;) Turgor Non-tenting Hair Growth Present Varicosities Absent Pressure  Injury Documentation No Pressure Injury Noted-Pressure Ulcer Prevention Initiated Scattered scabs/sores \"from scratching\" especially across both breast.

## 2018-08-02 NOTE — DISCHARGE SUMMARY
Hospitalist Discharge Summary     Patient ID:  Trell Garza  [id number]  89 y.o.  1960  Admit date: 7/28/2018 10:39 AM  Discharge date and time: 8/2/2018  Attending: Aubrey Vargas DO  PCP:  Not On File [facility]  Treatment Team: Attending Provider: Aubrey Vargas DO; Consulting Provider: Jimmy House MD; Utilization Review: Montana Gongora RN; Care Manager: Linnette Oliveros RN; Consulting Provider: Max Dalal MD    Principal Diagnosis Acute on chronic respiratory failure [facility])   Principal Problem:    Acute on chronic respiratory failure ([address] (7/28/2018)    Active Problems: Morbid obesity ([address] ()      Diabetes mellitus without complication (HCC) ()      FLETCHER (obstructive sleep apnea) ()      Obesity with alveolar hypoventilation ([address] ()      Fall (7/28/2018)      Pulmonary infiltrates (7/28/2018)      Chronic pain (7/28/2018)      COPD exacerbation ([address] (7/28/2018)      Mass of left lung (7/30/2018)      Mediastinal lymphadenopathy (7/30/2018)      Primary cancer of left upper lobe of lung ([address]- squamous cell (7/30/2018)             Hospital Course:  Please refer to the admission H&P for details of presentation. In summary, the patient is 57F with pmhx of frequent falls at home, morbid obesity, chronic resp failure, FLETCHER/OHS not on CPAP at home but on 1LNC cont, DM, HTN, HLP and chronic CHF. LIves with son at home. Presented with increased confusion and dizziness initially requiring bipap support. She was admitted to the ICU initially under pulm/cc service. CT chest showing left lung mass. She is s/p EBUS on 7/31 with path pos for Squamous Cell CA. Nodes negative. Heme/onc has been consulted. She is now stable on NC and hospitalist asked to assume care. She was started on iv steroids and scheduled BD's with good improvement. Walked with RT - requiring 3LNC with exertion and 2LNC at rest. Will continue oral steroid taper at discharge in addition to levaquin.  Will need outpatient PET vs CT AP for staging as outpatient. Needs close follow-up with pulm and oncology. Significant Diagnostic Studies:   Status Provider Status         Final result (Exam End: 7/29/2018  9:46 AM) Reviewed        Study Result      CT chest without contrast      History: Left suprahilar opacity. Abnormal chest x-ray.     Technique: Helically acquired images were obtained from the lung apices to the  domes of the diaphragms reconstructed at 5 mm thickness.     Radiation dose reduction techniques were used for this study:  Our CT scanners  use one or all of the following: Automated exposure control, adjustment of the  mA and/or kVp according to patient's size, iterative reconstruction.     Comparison: None. Correlation is made to the chest x-ray performed earlier on  the same day.     Findings: There is a trace right pleural effusion. There is no pericardial  effusion. There is a masslike opacity at the left superhilar region measured at  approximately 4.1 x 4.5 cm in AP and transverse dimensions, respectively. The  margins are not well delineated in the absence of intravenous contrast. There is  postobstructive subsegmental atelectasis involving a portion of the left upper  lobe medially. The findings would be most suspicious for neoplasm. The heart is  mildly enlarged. Several mediastinal lymph nodes are present including a right  lower paratracheal lymph node measuring 1.5 cm in short axis. Smaller AP and  prevascular lymph nodes are present. A subcarinal lymph node measures 1.1 cm. Imaging of the upper and reveals normal appearing adrenal glands.     IMPRESSION  IMPRESSION:  1. Masslike opacity at the left superhilar region with postobstructive  atelectasis involving a portion of the left upper lobe medially. The imaging  features would be suspicious for neoplasm until proven otherwise. Consider  bronchoscopy for further evaluation. 2. Several small to mildly prominent mediastinal lymph nodes.          Labs: Results: Chemistry Recent Labs      08/02/18   0434  08/01/18 0312  07/31/18   0340   GLU  111*  293*  233*   NA  144  141  140   K  4.2  4.8  4.8   CL  99  97*  97*   CO2  39*  39*  39*   BUN  18  22  22   CREA  0.81  0.95  0.91   CA  9.4  8.6  8.8   AGAP  6*  5*  4*      CBC w/Diff Recent Labs      08/02/18   0434  08/01/18 0312  07/31/18   0340   WBC  9.0  8.4  8.5   RBC  4.42  4.50  4.48   HGB  12.3  12.8  12.8   HCT  42.6  43.0  43.3   PLT  199  208  252   GRANS   --   88*  90*   LYMPH   --   5*  6*   EOS   --   0*  0*      Cardiac Enzymes No results for input(s): CPK, CKND1, ADELA in the last 72 hours. No lab exists for component: CKRMB, TROIP   Coagulation No results for input(s): PTP, INR, APTT in the last 72 hours. No lab exists for component: INREXT    Lipid Panel No results found for: CHOL, CHOLPOCT, CHOLX, CHLST, CHOLV, 566050, HDL, LDL, LDLC, DLDLP, 518858, VLDLC, VLDL, TGLX, TRIGL, TRIGP, TGLPOCT, CHHD, CHHDX   BNP No results for input(s): BNPP in the last 72 hours. Liver Enzymes No results for input(s): TP, ALB, TBIL, AP, SGOT, GPT in the last 72 hours. No lab exists for component: DBIL   Thyroid Studies Lab Results   Component Value Date/Time    TSH 0.296 (L) 07/31/2018 03:40 AM            Discharge Exam:  Visit Vitals    /54 (BP 1 Location: Right arm, BP Patient Position: At rest)    Pulse 73    Temp 99.1 °F (37.3 °C)    Resp 18    Ht 5' 6\" (1.676 m)    Wt (!) 169.7 kg (374 lb 3.2 oz)    LMP Comment: post menopausal    SpO2 91%    BMI 60.4 kg/m2     General appearance: alert, cooperative, no distress, appears stated age, morbidly obese  Lungs: diminished bilaterally  Heart: regular rate and rhythm, S1, S2 normal, no murmur, click, rub or gallop  Abdomen: soft, non-tender.  Bowel sounds normal. No masses,  no organomegaly  Extremities: no cyanosis or edema  Neurologic: Grossly normal    Disposition: home  Discharge Condition: stable  Patient Instructions:   Current Discharge Medication List      START taking these medications    Details   levoFLOXacin (LEVAQUIN) 750 mg tablet Take 1 Tab by mouth daily for 4 days. Qty: 4 Tab, Refills: 0         CONTINUE these medications which have NOT CHANGED    Details   potassium chloride SR (KLOR-CON 10) 10 mEq tablet Take 20 mEq by mouth daily. simvastatin (ZOCOR) 20 mg tablet Take  by mouth nightly.      magnesium oxide (MAG-OX) 400 mg tablet Take 400 mg by mouth nightly. melatonin 5 mg cap capsule Take 5 mg by mouth nightly. omega 3-dha-epa-fish oil (FISH OIL) 100-160-1,000 mg cap Take 1,000 mg by mouth daily. gabapentin (NEURONTIN) 800 mg tablet Take 800 mg by mouth four (4) times daily. fluticasone-salmeterol (ADVAIR HFA) 115-21 mcg/actuation inhaler Take 2 Puffs by inhalation two (2) times a day. atenolol (TENORMIN) 50 mg tablet Take 50 mg by mouth daily. ipratropium (ATROVENT) 0.03 % nasal spray 2 Sprays as needed. baclofen (LIORESAL) 20 mg tablet Take 20 mg by mouth four (4) times daily. glipiZIDE SR (GLUCOTROL XL) 10 mg CR tablet Take 10 mg by mouth daily. SITagliptin (JANUVIA) 100 mg tablet Take 100 mg by mouth daily. furosemide (LASIX) 40 mg tablet Take 40 mg by mouth daily. metFORMIN (GLUMETZA ER) 500 mg TG24 24 hour tablet Take 2 Tabs by mouth daily. HYDROcodone-acetaminophen (NORCO) 7.5-325 mg per tablet Take 1 Tab by mouth four (4) times daily. MULTIVITS,CA,MINERALS/IRON/FA (ONE-A-DAY WOMEN'S PETITES PO) 2 times daily      albuterol (PROAIR HFA) 90 mcg/actuation inhaler 1-2 puffs Q 6hours      montelukast (SINGULAIR) 10 mg tablet Take 10 mg by mouth daily. cholecalciferol, vitamin D3, (VITAMIN D3) 2,000 unit tab Take 2,000 Units by mouth daily. albuterol (PROVENTIL VENTOLIN) 2.5 mg /3 mL (0.083 %) nebulizer solution 2 puffs as needed      spironolactone (ALDACTONE) 25 mg tablet Take 25 mg by mouth daily.       zolpidem (AMBIEN) 5 mg tablet Take 5 mg by mouth as needed for Sleep.      loratadine (CLARITIN) 10 mg tablet Take 10 mg by mouth daily. famotidine (PEPCID) 20 mg tablet 1 1/2  Three times a day      omega-3 fatty acids-vitamin e (FISH OIL) 1,000 mg cap Take 1 Cap by mouth daily. ibuprofen (MOTRIN) 800 mg tablet Take 800 mg by mouth. 1 to 3 times a day      lisinopril (PRINIVIL, ZESTRIL) 20 mg tablet Take 20 mg by mouth daily. traMADol (ULTRAM) 50 mg tablet Take 50 mg by mouth daily. traZODone (DESYREL) 100 mg tablet Take 100 mg by mouth daily. Activity: as tolerated  Diet: diabetic      Follow-up  ·   1-2 weeks oncology, pulmonary.  2 weeks PCP  Time spent to discharge patient 35 minutes  Signed:  Lois Grayson DO  8/2/2018  2:41 PM

## 2018-08-02 NOTE — PROGRESS NOTES
TRANSFER - OUT REPORT: 
 
Verbal report given to [facility]. RN(name) on Rosemary Duarte  being transferred to 5th floor(unit) for routine progression of care Report consisted of patients Situation, Background, Assessment and  
Recommendations(SBAR). Information from the following report(s) Kardex, MAR, Accordion, Recent Results and Cardiac Rhythm SR/SB PACs was reviewed with the receiving nurse. Lines:  
Peripheral IV 07/28/18 Left Antecubital (Active) Site Assessment Clean, dry, & intact 7/31/2018  7:30 PM  
Phlebitis Assessment 0 7/31/2018  7:30 PM  
Infiltration Assessment 0 7/31/2018  7:30 PM  
Dressing Status Clean, dry, & intact 7/31/2018  7:30 PM  
Dressing Type Transparent;Tape 7/31/2018  7:30 PM  
Hub Color/Line Status Pink;Patent; Flushed 7/31/2018  7:30 PM  
Alcohol Cap Used No 7/31/2018  7:30 PM  
   
Peripheral IV 07/31/18 Left Other(comment) (Active) Site Assessment Clean, dry, & intact 7/31/2018 10:00 PM  
Phlebitis Assessment 0 7/31/2018 10:00 PM  
Infiltration Assessment 0 7/31/2018 10:00 PM  
Dressing Status Clean, dry, & intact 7/31/2018 10:00 PM  
Dressing Type Transparent;Tape 7/31/2018 10:00 PM  
Hub Color/Line Status Yellow;Patent; Infusing 7/31/2018 10:00 PM  
Alcohol Cap Used No 7/31/2018 10:00 PM  
  
 
Opportunity for questions and clarification was provided. Patient transported with: 
 O2 @ 2 liters Telemetry. Oncology consult to be called by Elvira Gabriel in am per [facility]..

## 2018-08-02 NOTE — CONSULTS
[facility] Hematology & Oncology        Inpatient Hematology / Oncology Consult Note    Reason for Consult:  Lung mass [R91.8]  Referring Physician:  Nadia Montague DO    History of Present Illness:  Ms. Arminda Rubalcava is a 59 y.o. female admitted on 7/28/2018. Past medical history: frequent falls at home, morbid obesity, chronic resp failure,FLETCHER/OHS not on CPAP at home but on 1LNC cont, DM, HTN, HLP and chronic CHF. LIves with son and daughter in law. She is ECOG 2. Ambulates with a walker around her home and is able to fix herself simple meals. She was admitted with increased confusion and dizziness. Admitted to ICU for BIPAP. CT chest done and showed 4x4.5cm mass in left superhilar region. She had several mediastinal LN enlarged including right lower paratracheal lymph node measuring 1.5cm. There is also a 1.1 cm subcarinal lymph node. She underwent an EBUS on 7/30. Path back and consistent with squamous cell lung cancer. She has not had further staging imaging. We have been consulted regarding her new diagnosis. She continues on nebs/steroids for COPD exacerbation as well as antibiotics for possible postobstructive PNA. She is feeling better and states she is eager to discharge home. Review of Systems:  Constitutional Denies fever, chills, weight loss, appetite changes, fatigue, night sweats. HEENT Denies trauma, blurry vision, hearing loss, ear pain, nosebleeds, sore throat, neck pain    Skin Denies lesions or rashes. Lungs + dyspnea    Cardiovascular Denies chest pain, palpitations, or lower extremity edema. Gastrointestinal Denies nausea, vomiting, changes in bowel habits, bloody or black stools, abdominal pain.  Denies dysuria, frequency or hesitancy of urination. Neuro Denies headaches, visual changes or ataxia. Denies dizziness, tingling, tremors, sensory change, speech change, focal weakness     Hematology Denies easy bruising or bleeding, denies gingival bleeding or epistaxis. Endo Denies heat/cold intolerance, denies diabetes or thyroid abnormalities. MSK Denies back pain, arthralgias, myalgias or frequent falls. Psychiatric/Behavioral Denies depression and substance abuse. The patient is not nervous/anxious. Allergies   Allergen Reactions    Iodides Tincture [Iodine] Hives     Past Medical History:   Diagnosis Date    Abnormality of gait and mobility     Ambulatory dysfunction     Arthritis of knee     Asthma     CHF (congestive heart failure), NYHA class I (HCC)     diastolic    Chronic hypoxemic respiratory failure (HCC)     COPD (chronic obstructive pulmonary disease) with emphysema (HCC)     Cor pulmonale (HCC)     Diabetes mellitus without complication (HCC)     Dyslipidemia     Extreme obesity with respiratory disorder (HCC)     Hyperlipidemia     Hypertension, essential, benign     Hypomagnesemia     Insomnia 12/27/2016    Morbid obesity ([facility] [address]     Nocturnal hypoxia     Obesity with alveolar hypoventilation (HCC)     Orthopnea     FLETCHER (obstructive sleep apnea)     Primary pulmonary hypertension (HCC)     Pulmonary edema, noncardiac     Sleeps in sitting position due to orthopnea      Past Surgical History:   Procedure Laterality Date    HX CHOLECYSTECTOMY  1991    HX TONSILLECTOMY      HX TUBAL LIGATION  1993     History reviewed. No pertinent family history. Social History     Social History    Marital status:      Spouse name: N/A    Number of children: N/A    Years of education: N/A     Occupational History    Not on file.      Social History Main Topics    Smoking status: Former Smoker     Quit date: 11/13/2014    Smokeless tobacco: Not on file    Alcohol use Not on file    Drug use: Not on file    Sexual activity: Not on file     Other Topics Concern    Not on file     Social History Narrative     Current Facility-Administered Medications   Medication Dose Route Frequency Provider Last Rate Last Dose    predniSONE (DELTASONE) tablet 50 mg  50 mg Oral ONCE Eric Petra, NP        predniSONE (DELTASONE) tablet 50 mg  50 mg Oral ONCE Eric Petra, NP        [START ON 8/3/2018] predniSONE (DELTASONE) tablet 50 mg  50 mg Oral ONCE Eric Lambert, NP        [START ON 8/3/2018] diphenhydrAMINE (BENADRYL) capsule 50 mg  50 mg Oral ONCE Eric Petra, NP        [START ON 8/3/2018] diatrizoate janet-diatrizoat sod (MD-GASTROVIEW,GASTROGRAFIN) 66-10 % contrast solution 15 mL  15 mL Oral ONCE Eric Lambert, PAUL        saline peripheral flush soln 10 mL  10 mL InterCATHeter RAD ONCE Ranjeet Lobato DO        sodium chloride 0.9 % bolus infusion 100 mL  100 mL IntraVENous RAD ONCE Ranjeet Lobato DO        [START ON 8/3/2018] iopamidol (ISOVUE-370) 76 % injection 100 mL  100 mL IntraVENous RAD ONCE Ranjeet Lobato DO        [facility]) tablet 500 mg  500 mg Oral Q24H Sheron Maya MD   500 mg at 08/01/18 2029    famotidine (PEPCID) tablet 20 mg  20 mg Oral Q12H Sheron Maya MD   20 mg at 08/02/18 0743    predniSONE (DELTASONE) tablet 10 mg  10 mg Oral DAILY WITH BREAKFAST Samantha Mendoza MD   10 mg at 08/02/18 0742    insulin glargine (LANTUS) injection 10 Units  10 Units SubCUTAneous QHS Ranjeet Lobato DO   10 Units at 08/01/18 2236    nystatin (MYCOSTATIN) 100,000 unit/gram powder   Topical BID Sheron Maya MD        cefTRIAXone (ROCEPHIN) 1 g in 0.9% sodium chloride (MBP/ADV) 50 mL  1 g IntraVENous Q24H Hollie JACINTO  mL/hr at 08/01/18 2231 1 g at 08/01/18 2231    insulin lispro (HUMALOG) injection   SubCUTAneous AC&HS Samantha Mendoza MD   Stopped at 08/02/18 0730    HYDROcodone-acetaminophen (NORCO) 7.5-325 mg per tablet 1 Tab  1 Tab Oral Q6H PRN Felton Lloyd MD   1 Tab at 08/02/18 0750    atenolol (TENORMIN) tablet 50 mg  50 mg Oral DAILY Mateus Song MD   50 mg at 08/02/18 0743    baclofen (LIORESAL) tablet 20 mg  20 mg Oral QID Mateus Song MD   20 mg at 18 0743    gabapentin (NEURONTIN) capsule 800 mg  800 mg Oral QID La cAe MD   800 mg at 18 0743    lisinopril (PRINIVIL, ZESTRIL) tablet 20 mg  20 mg Oral DAILY La Ace MD   20 mg at 18 0743    magnesium oxide (MAG-OX) tablet 400 mg  400 mg Oral QHS La Ace MD   400 mg at 18 2228    montelukast (SINGULAIR) tablet 10 mg  10 mg Oral QHS La Ace MD   10 mg at 18 2229    spironolactone (ALDACTONE) tablet 25 mg  25 mg Oral DAILY La Ace MD   25 mg at 18 0744    sodium chloride (NS) flush 5-10 mL  5-10 mL IntraVENous Q8H La Ace MD   10 mL at 18 2236    sodium chloride (NS) flush 5-10 mL  5-10 mL IntraVENous PRN La Ace MD   10 mL at 18 0502    albuterol (PROVENTIL VENTOLIN) nebulizer solution 2.5 mg  2.5 mg Nebulization Q4H RT La Ace MD   2.5 mg at 18 0801    enoxaparin (LOVENOX) injection 40 mg  40 mg SubCUTAneous Q12H La Ace MD   40 mg at 18 0501       OBJECTIVE:  Patient Vitals for the past 8 hrs:   BP Temp Pulse Resp SpO2   18 1123 127/54 99.1 °F (37.3 °C) 73 18 93 %   18 0801 - - - - 93 %   18 0750 135/79 98.4 °F (36.9 °C) 61 18 94 %   18 0516 - - - - 96 %     Temp (24hrs), Av.4 °F (36.9 °C), Min:97.6 °F (36.4 °C), Max:99.1 °F (37.3 °C)    701 -  1900  In: 36 [P.O.:355]  Out: -     Physical Exam:  Constitutional: Morbidly obese female in no acute distress, lying comfortably in the hospital bed. HEENT: Normocephalic and atraumatic. Oropharynx is clear, mucous membranes are moist.   Neck supple     Lymph node   No palpable submandibular, cervical, supraclavicular, axillary or inguinal lymph nodes. Skin Warm and dry. No bruising and no rash noted. No erythema. No pallor. Respiratory Lungs diminished    CVS Normal rate, regular rhythm and normal S1 and S2. No murmurs, gallops, or rubs.    Abdomen Soft, nontender and nondistended, normoactive bowel sounds. Neuro Grossly nonfocal with no obvious sensory or motor deficits. MSK Normal range of motion in general.  No edema and no tenderness. Psych Appropriate mood and affect.         Labs:    Recent Results (from the past 24 hour(s))   GLUCOSE, POC    Collection Time: 08/01/18 12:48 PM   Result Value Ref Range    Glucose (POC) 212 (H) 65 - 100 mg/dL   GLUCOSE, POC    Collection Time: 08/01/18  4:39 PM   Result Value Ref Range    Glucose (POC) 280 (H) 65 - 100 mg/dL   GLUCOSE, POC    Collection Time: 08/01/18  9:46 PM   Result Value Ref Range    Glucose (POC) 180 (H) 65 - 100 mg/dL   MAGNESIUM    Collection Time: 08/02/18  4:34 AM   Result Value Ref Range    Magnesium 2.2 1.8 - 2.4 mg/dL   CBC W/O DIFF    Collection Time: 08/02/18  4:34 AM   Result Value Ref Range    WBC 9.0 4.3 - 11.1 K/uL    RBC 4.42 4.05 - 5.25 M/uL    HGB 12.3 11.7 - 15.4 g/dL    HCT 42.6 35.8 - 46.3 %    MCV 96.4 79.6 - 97.8 FL    MCH 27.8 26.1 - 32.9 PG    MCHC 28.9 (L) 31.4 - 35.0 g/dL    RDW 16.9 (H) 11.9 - 14.6 %    PLATELET 877 525 - 232 K/uL    MPV 10.9 10.8 - 73.5 FL   METABOLIC PANEL, BASIC    Collection Time: 08/02/18  4:34 AM   Result Value Ref Range    Sodium 144 136 - 145 mmol/L    Potassium 4.2 3.5 - 5.1 mmol/L    Chloride 99 98 - 107 mmol/L    CO2 39 (H) 21 - 32 mmol/L    Anion gap 6 (L) 7 - 16 mmol/L    Glucose 111 (H) 65 - 100 mg/dL    BUN 18 6 - 23 MG/DL    Creatinine 0.81 0.6 - 1.0 MG/DL    GFR est AA >60 >60 ml/min/1.73m2    GFR est non-AA >60 >60 ml/min/1.73m2    Calcium 9.4 8.3 - 10.4 MG/DL   GLUCOSE, POC    Collection Time: 08/02/18  7:43 AM   Result Value Ref Range    Glucose (POC) 109 (H) 65 - 100 mg/dL   GLUCOSE, POC    Collection Time: 08/02/18 11:13 AM   Result Value Ref Range    Glucose (POC) 264 (H) 65 - 100 mg/dL       Imaging:  XR CHEST Ponce Foots [[id number]] Collected: 08/01/18 0546      Order Status: Completed Updated: 08/01/18 0548     Narrative:       Portable AP upright chest dated 8/1/2018 at 0441 hours    Comparison 7/31/2018    CLINICAL INFORMATION: Respiratory failure    Heart is enlarged, mediastinum unremarkable. Left upper lobe infiltrate  persists, right lung clear. Vascularity normal. No pleural effusion.       Impression:       IMPRESSION: No significant change     XR CHEST SNGL V [[id number]]      Order Status: Canceled      XR CHEST Lam Potter [[id number]] Collected: 07/31/18 0530     Order Status: Completed Updated: 07/31/18 0533     Narrative:       Portable AP semiupright chest dated 7/31/2018    Comparison 7/30/2018    CLINICAL INFORMATION: Respiratory failure    Endotracheal tube is present. Heart is enlarged. Left upper lobe infiltrate  persists but has shown improvement. Right lung clear. No pleural effusion.       Impression:       IMPRESSION: Mild improvement left upper lobe infiltrate     XR CHEST Lam Charu [[id number]] Collected: 07/30/18 1812     Order Status: Completed Updated: 07/30/18 1815     Narrative:       AP PORTABLE CHEST X-RAY    HISTORY: Post bronchoscopy intubation    COMPARISON: July 29, 2018    FINDINGS: An endotracheal tube terminates in the midtrachea. EKG leads are  present. The cardiac silhouette appears enlarged. A mass is present in the left  upper lobe with adjacent atelectasis. Lung volumes are diminished.       Impression:       IMPRESSION: Endotracheal tube terminates in the mid trachea. Left upper lobe  mass with low lung volumes.     NC XR TECHNOLOGIST SERVICE [[id number]]      Order Status: Canceled      XR CHEST Lam Berniceter [[id number]]      Order Status: Canceled      XR CHEST Lam Potter [[id number]]      Order Status: Canceled      CT CHEST WO CONT [[id number]] Collected: 07/29/18 0958     Order Status: Completed Updated: 07/29/18 1003     Narrative:       CT chest without contrast     History: Left suprahilar opacity. Abnormal chest x-ray.     Technique: Helically acquired images were obtained from the lung apices to the  domes of the diaphragms reconstructed at 5 mm thickness. Radiation dose reduction techniques were used for this study:  Our CT scanners  use one or all of the following: Automated exposure control, adjustment of the  mA and/or kVp according to patient's size, iterative reconstruction. Comparison: None. Correlation is made to the chest x-ray performed earlier on  the same day. Findings: There is a trace right pleural effusion. There is no pericardial  effusion. There is a masslike opacity at the left superhilar region measured at  approximately 4.1 x 4.5 cm in AP and transverse dimensions, respectively. The  margins are not well delineated in the absence of intravenous contrast. There is  postobstructive subsegmental atelectasis involving a portion of the left upper  lobe medially. The findings would be most suspicious for neoplasm. The heart is  mildly enlarged. Several mediastinal lymph nodes are present including a right  lower paratracheal lymph node measuring 1.5 cm in short axis. Smaller AP and  prevascular lymph nodes are present. A subcarinal lymph node measures 1.1 cm. Imaging of the upper and reveals normal appearing adrenal glands.       Impression:       IMPRESSION:  1. Masslike opacity at the left superhilar region with postobstructive  atelectasis involving a portion of the left upper lobe medially. The imaging  features would be suspicious for neoplasm until proven otherwise. Consider  bronchoscopy for further evaluation. 2. Several small to mildly prominent mediastinal lymph nodes.     XR CHEST SINGLE VIEW [[id number]] Collected: 07/29/18 0510     Order Status: Completed Updated: 07/29/18 0513     Narrative:       Portable AP upright chest dated 7/29/2018 at 0359 hours    Comparison 7/28/2018    CLINICAL INFORMATION: Infiltrate    Heart is moderately enlarged. Mediastinum is unremarkable. Left suprahilar  density is unchanged. Right lung clear.  No pleural effusion.       Impression:       IMPRESSION: No significant change     XR CHEST Pollo Raymond [[id number]] Collected: 07/28/18 1145     Order Status: Completed Updated: 07/28/18 1150     Narrative:       Portable chest: 07/28/2018    History: shob. Comparison: None. Findings: A single view of the chest was obtained at 11:25 PM.    The cardiac silhouette is enlarged. There are diffuse interstitial opacities. A  left suprahilar focal opacity measures nearly 4.5 cm in size. There are no  pleural effusions.       Impression:       Impression:   1. Left suprahilar opacity could represent focal pneumonia versus a mass. Consider CT imaging if there is further clinical concern. 2. Mild diffuse interstitial prominence of uncertain chronicity without prior  studies. Differential diagnosis would include underlying chronic interstitial  lung disease, interstitial edema or atypical infection. ASSESSMENT:  Problem List  Date Reviewed: 7/28/2018          Codes Class Noted    Mass of left lung ICD-10-CM: R91.8  ICD-9-CM: 786.6  7/30/2018        Mediastinal lymphadenopathy ICD-10-CM: R59.0  ICD-9-CM: 785.6  7/30/2018        Primary cancer of left upper lobe of lung ([email or web address]- squamous cell ICD-10-CM: C34.12  ICD-9-CM: 162.3  7/30/2018        * (Principal)Acute on chronic respiratory failure ([email or web address] ICD-10-CM: J96.20  ICD-9-CM: 518.84  7/28/2018        Fall ICD-10-CM: Z23. Davis Kauffman  ICD-9-CM: E888.9  7/28/2018        Pulmonary infiltrates ICD-10-CM: R91.8  ICD-9-CM: 793.19  7/28/2018        Chronic pain ICD-10-CM: G89.29  ICD-9-CM: 338.29  7/28/2018        COPD exacerbation [facility]) ICD-10-CM: J44.1  ICD-9-CM: 491.21  7/28/2018        Hypertension, essential, benign (Chronic) ICD-10-CM: I10  ICD-9-CM: 401.1  Unknown        Hyperlipidemia (Chronic) ICD-10-CM: E78.5  ICD-9-CM: 272.4  Unknown        COPD with emphysema (HCC) (Chronic) ICD-10-CM: J43.9  ICD-9-CM: 492.8  Unknown        Morbid obesity ([facility] [address] (Chronic) ICD-10-CM: E66.01  ICD-9-CM: 278.01  Unknown        Hypomagnesemia ICD-10-CM: L55.06  ICD-9-CM: 275.2  Unknown        Primary pulmonary hypertension ([facility] [address] ICD-10-CM: I27.0  ICD-9-CM: 416.0  Unknown        Diabetes mellitus without complication (HCC) (Chronic) ICD-10-CM: E11.9  ICD-9-CM: 250.00  Unknown        Orthopnea ICD-10-CM: R06.01  ICD-9-CM: 786.02  Unknown        Abnormality of gait and mobility ICD-10-CM: R26.9  ICD-9-CM: 430. 2  Unknown        Insomnia ICD-10-CM: G47.00  ICD-9-CM: 780.52  12/27/2016        Extreme obesity with respiratory disorder (HCC) ICD-10-CM: E66.01, J98.9  ICD-9-CM: 278.01, 519.9  Unknown        Asthma (Chronic) ICD-10-CM: J45.909  ICD-9-CM: 493.90  Unknown        FLETCHER (obstructive sleep apnea) (Chronic) ICD-10-CM: G47.33  ICD-9-CM: 327.23  Unknown        Chronic hypoxemic respiratory failure (HCC) (Chronic) ICD-10-CM: J96.11  ICD-9-CM: 518.83, 799.02  Unknown        Cor pulmonale (HCC) ICD-10-CM: I27.81  ICD-9-CM: 416.9  Unknown        CHF (congestive heart failure), NYHA class I (HCC) (Chronic) ICD-10-CM: I50.9  ICD-9-CM: 428.0  Unknown    Overview Signed 12/27/2016 11:24 AM by Vishnu Heredia     diastolic             Obesity with alveolar hypoventilation (HCC) (Chronic) ICD-10-CM: L59.9  ICD-9-CM: 278.03  Unknown        Pulmonary edema, noncardiac ICD-10-CM: J81.1  ICD-9-CM: 514  Unknown        Arthritis of knee ICD-10-CM: M17.10  ICD-9-CM: 716.96  Unknown                RECOMMENDATIONS:  New squamous cell lung ca  - At least stage IIIB. Will get CT CAP and CT head to complete staging. States she is unable to tolerate MRI and her size may preclude that as and option. Hx allergy so will use desensitization protocol. Updated patient on findings and plan. She should follow up with Dr. Amirah Em in clinic in 1-2 weeks after discharge    COPD exacerbation/postobstructive pneumonia  - On antibiotics, nebs, steroids per primary team    Hx CAD  - will get baseline echo    Lab studies and imaging studies (CT chest) were personally reviewed.  Thank you for allowing us to participate in the care of Ms. July Mccall. We will follow along    ADDENDUM: Spoke with Dr. Guerita Fox. Concern regarding contrast allergy for CT. Will plan for PET outpatient. Still need contrasted CT head. Julianna Leaks for discharge after scan. [facility]  [address] [address]  Office : [phone]  Fax : [phone]     Attending Addendum:  I personally evaluated the patient with PAUL Markham, and agree with the assessment, findings and plan as documented. Briefly, Ms July Mccall is a 61yo woman with multiple medical issues that is newly diagnosed with t least stage III NSCLC - squamous. Needs complete staging including abdominal and brain imaging. She is unable to have an MRI due to body habitus. Can use desensitization protocol for CT or proceed with PET. She will follow up with me in 1-2 wks after discharge.                  MD [facility]  [address] [place] [address]  Office : [phone]  Fax : [phone]

## 2018-08-02 NOTE — PROGRESS NOTES
END OF SHIFT NOTE: 
 
Intake/Output 08/01 1901 - 08/02 0700 In: 600 [P.O.:600] Out: 1300 [Urine:1300] Voiding: YES Catheter: NO 
Drain:   
 
 
 
 
 
Stool:  0 occurrences. Emesis:  0 occurrences. VITAL SIGNS Patient Vitals for the past 12 hrs: 
 Temp Pulse Resp BP SpO2  
08/02/18 0516 - - - - 96 % 08/02/18 0310 98.3 °F (36.8 °C) 74 18 149/78 95 % 08/02/18 0053 - - - - 93 % 08/01/18 2325 98.2 °F (36.8 °C) 69 18 115/59 94 % 08/01/18 2057 97.6 °F (36.4 °C) 75 18 128/67 95 % 08/01/18 2000 98.4 °F (36.9 °C) 62 14 108/58 90 % 08/01/18 1955 - - - - 98 % 08/01/18 1900 - 80 21 115/55 95 % 08/01/18 1854 - 81 19 117/58 93 % 08/01/18 1830 - 75 30 141/61 93 % Pain Assessment Pain 1 Pain Scale 1: Visual (08/02/18 1875) Pain Intensity 1: 0 (08/02/18 0209) Patient Stated Pain Goal: 0 (08/02/18 0135) Pain Reassessment 1: Patient sleeping (08/02/18 0209) Pain Onset 1: chronic (08/02/18 0135) Pain Location 1: Back (08/02/18 0135) Pain Orientation 1: Lower (08/02/18 0135) Pain Description 1: Constant; Stabbing; Sharp (08/02/18 0135) Pain Intervention(s) 1: Medication (see MAR) (08/02/18 0135) Ambulating Yes Additional Information:  
Pt resting quietly. No visible s/sx of distress. No needs/concerns voiced at this time. Shift report given to oncoming nurse at the bedside.  
 
Juanjose Lang RN

## 2018-08-02 NOTE — PROGRESS NOTES
Transferred via bed on telemetry monitor with personal belongings. 2L HF NC. Oncology consult called. Mouth care completed by self. Transported on airtap system. No s/sx of distress upon transfer to 5th floor. Daughter aware of transfer to 80.

## 2018-08-02 NOTE — PROGRESS NOTES
Oxygen Qualifier Room air: SpO2 with O2 and liter flow Resting SpO2  80%  93%-95% on 2L Ambulating SpO2  80% 92%-91% on 2L Completed by: 
 
Ariane Artis, RT

## 2018-08-02 NOTE — PROGRESS NOTES
0653-Bedside report received from Mimi Salazar RN. Resting in bed. No needs voiced. No s/s of acute distress. 1104-CT of head changed to w/wo due to inability to read CT without the prior without contrast scan.

## 2018-08-02 NOTE — PROGRESS NOTES
Notes, order and face-sheet faxed to [facility] for new Home [facility] orders. Faxed to [phone] to [address] confirmed he did receive referral and order.

## 2018-08-02 NOTE — PROGRESS NOTES
TRANSFER - IN REPORT: 
 
Verbal report received from Hortencia Garcia RN (name) on Rosemary Duarte  being received from CCU [id number] (unit) for routine progression of care Report consisted of patients Situation, Background, Assessment and  
Recommendations(SBAR). Information from the following report(s) SBAR, Kardex, [facility] and Recent Results was reviewed with the receiving nurse. Opportunity for questions and clarification was provided. Assessment to be completed upon patients arrival to unit and care assumed.

## 2018-08-04 ENCOUNTER — HOME CARE VISIT (OUTPATIENT)
Dept: SCHEDULING | Facility: HOME HEALTH | Age: 58
End: 2018-08-04
Payer: MEDICAID

## 2018-08-04 ENCOUNTER — HOME CARE VISIT (OUTPATIENT)
Dept: HOSPICE | Facility: HOSPICE | Age: 58
End: 2018-08-04
Payer: MEDICAID

## 2018-08-04 VITALS
SYSTOLIC BLOOD PRESSURE: 142 MMHG | RESPIRATION RATE: 18 BRPM | DIASTOLIC BLOOD PRESSURE: 76 MMHG | HEART RATE: 76 BPM | TEMPERATURE: 98.2 F

## 2018-08-04 PROCEDURE — 0651 HSPC ROUTINE HOME CARE

## 2018-08-04 PROCEDURE — G0299 HHS/HOSPICE OF RN EA 15 MIN: HCPCS

## 2018-08-04 PROCEDURE — 3336500001 HSPC ELECTION

## 2018-08-04 PROCEDURE — HOSPICE MEDICATION HC HH HOSPICE MEDICATION

## 2018-08-05 ENCOUNTER — HOME CARE VISIT (OUTPATIENT)
Dept: SCHEDULING | Facility: HOME HEALTH | Age: 58
End: 2018-08-05
Payer: MEDICAID

## 2018-08-05 PROCEDURE — 0651 HSPC ROUTINE HOME CARE

## 2018-08-05 PROCEDURE — G0299 HHS/HOSPICE OF RN EA 15 MIN: HCPCS

## 2018-08-06 ENCOUNTER — HOME CARE VISIT (OUTPATIENT)
Dept: SCHEDULING | Facility: HOME HEALTH | Age: 58
End: 2018-08-06
Payer: MEDICAID

## 2018-08-06 ENCOUNTER — HOME CARE VISIT (OUTPATIENT)
Dept: HOSPICE | Facility: HOSPICE | Age: 58
End: 2018-08-06
Payer: MEDICAID

## 2018-08-06 VITALS
HEART RATE: 58 BPM | TEMPERATURE: 98 F | RESPIRATION RATE: 18 BRPM | DIASTOLIC BLOOD PRESSURE: 70 MMHG | SYSTOLIC BLOOD PRESSURE: 128 MMHG

## 2018-08-06 PROCEDURE — A9270 NON-COVERED ITEM OR SERVICE: HCPCS

## 2018-08-06 PROCEDURE — G0155 HHCP-SVS OF CSW,EA 15 MIN: HCPCS

## 2018-08-06 PROCEDURE — A6250 SKIN SEAL PROTECT MOISTURIZR: HCPCS

## 2018-08-06 PROCEDURE — 0651 HSPC ROUTINE HOME CARE

## 2018-08-06 PROCEDURE — T4541 LARGE DISPOSABLE UNDERPAD: HCPCS

## 2018-08-07 ENCOUNTER — HOME CARE VISIT (OUTPATIENT)
Dept: HOSPICE | Facility: HOSPICE | Age: 58
End: 2018-08-07
Payer: MEDICAID

## 2018-08-07 ENCOUNTER — HOME CARE VISIT (OUTPATIENT)
Dept: SCHEDULING | Facility: HOME HEALTH | Age: 58
End: 2018-08-07
Payer: MEDICAID

## 2018-08-07 PROCEDURE — 0651 HSPC ROUTINE HOME CARE

## 2018-08-07 PROCEDURE — G0299 HHS/HOSPICE OF RN EA 15 MIN: HCPCS

## 2018-08-07 PROCEDURE — G0156 HHCP-SVS OF AIDE,EA 15 MIN: HCPCS

## 2018-08-08 ENCOUNTER — HOME CARE VISIT (OUTPATIENT)
Dept: SCHEDULING | Facility: HOME HEALTH | Age: 58
End: 2018-08-08
Payer: MEDICAID

## 2018-08-08 VITALS — SYSTOLIC BLOOD PRESSURE: 114 MMHG | HEART RATE: 54 BPM | RESPIRATION RATE: 19 BRPM | DIASTOLIC BLOOD PRESSURE: 68 MMHG

## 2018-08-08 PROCEDURE — 0651 HSPC ROUTINE HOME CARE

## 2018-08-08 PROCEDURE — G0156 HHCP-SVS OF AIDE,EA 15 MIN: HCPCS

## 2018-08-09 PROCEDURE — 0651 HSPC ROUTINE HOME CARE

## 2018-08-10 ENCOUNTER — HOME CARE VISIT (OUTPATIENT)
Dept: SCHEDULING | Facility: HOME HEALTH | Age: 58
End: 2018-08-10
Payer: MEDICAID

## 2018-08-10 PROCEDURE — 0651 HSPC ROUTINE HOME CARE

## 2018-08-10 PROCEDURE — G0156 HHCP-SVS OF AIDE,EA 15 MIN: HCPCS

## 2018-08-11 PROCEDURE — 0651 HSPC ROUTINE HOME CARE

## 2018-08-12 PROCEDURE — 0651 HSPC ROUTINE HOME CARE

## 2018-08-13 ENCOUNTER — HOME CARE VISIT (OUTPATIENT)
Dept: HOSPICE | Facility: HOSPICE | Age: 58
End: 2018-08-13
Payer: MEDICAID

## 2018-08-13 PROCEDURE — 0651 HSPC ROUTINE HOME CARE

## 2018-08-13 PROCEDURE — G0156 HHCP-SVS OF AIDE,EA 15 MIN: HCPCS

## 2018-08-14 ENCOUNTER — HOME CARE VISIT (OUTPATIENT)
Dept: SCHEDULING | Facility: HOME HEALTH | Age: 58
End: 2018-08-14
Payer: MEDICAID

## 2018-08-14 VITALS — RESPIRATION RATE: 17 BRPM | DIASTOLIC BLOOD PRESSURE: 74 MMHG | SYSTOLIC BLOOD PRESSURE: 126 MMHG | HEART RATE: 73 BPM

## 2018-08-14 PROCEDURE — HOSPICE MEDICATION HC HH HOSPICE MEDICATION

## 2018-08-14 PROCEDURE — 0651 HSPC ROUTINE HOME CARE

## 2018-08-14 PROCEDURE — G0299 HHS/HOSPICE OF RN EA 15 MIN: HCPCS

## 2018-08-15 ENCOUNTER — HOME CARE VISIT (OUTPATIENT)
Dept: SCHEDULING | Facility: HOME HEALTH | Age: 58
End: 2018-08-15
Payer: MEDICAID

## 2018-08-15 PROCEDURE — 0651 HSPC ROUTINE HOME CARE

## 2018-08-16 ENCOUNTER — HOME CARE VISIT (OUTPATIENT)
Dept: SCHEDULING | Facility: HOME HEALTH | Age: 58
End: 2018-08-16
Payer: MEDICAID

## 2018-08-16 PROCEDURE — 0651 HSPC ROUTINE HOME CARE

## 2018-08-16 PROCEDURE — G0299 HHS/HOSPICE OF RN EA 15 MIN: HCPCS

## 2018-08-17 ENCOUNTER — HOME CARE VISIT (OUTPATIENT)
Dept: SCHEDULING | Facility: HOME HEALTH | Age: 58
End: 2018-08-17
Payer: MEDICAID

## 2018-08-17 ENCOUNTER — HOME CARE VISIT (OUTPATIENT)
Dept: HOSPICE | Facility: HOSPICE | Age: 58
End: 2018-08-17
Payer: MEDICAID

## 2018-08-17 VITALS — HEART RATE: 64 BPM | SYSTOLIC BLOOD PRESSURE: 124 MMHG | DIASTOLIC BLOOD PRESSURE: 58 MMHG | RESPIRATION RATE: 17 BRPM

## 2018-08-17 PROCEDURE — G0156 HHCP-SVS OF AIDE,EA 15 MIN: HCPCS

## 2018-08-17 PROCEDURE — G0155 HHCP-SVS OF CSW,EA 15 MIN: HCPCS

## 2018-08-17 PROCEDURE — 0651 HSPC ROUTINE HOME CARE

## 2018-08-18 PROCEDURE — 0651 HSPC ROUTINE HOME CARE

## 2018-08-19 PROCEDURE — 0651 HSPC ROUTINE HOME CARE

## 2018-08-20 ENCOUNTER — HOME CARE VISIT (OUTPATIENT)
Dept: SCHEDULING | Facility: HOME HEALTH | Age: 58
End: 2018-08-20
Payer: MEDICAID

## 2018-08-20 PROCEDURE — HOSPICE MEDICATION HC HH HOSPICE MEDICATION

## 2018-08-20 PROCEDURE — 0651 HSPC ROUTINE HOME CARE

## 2018-08-21 PROCEDURE — 0651 HSPC ROUTINE HOME CARE

## 2018-08-22 ENCOUNTER — HOME CARE VISIT (OUTPATIENT)
Dept: SCHEDULING | Facility: HOME HEALTH | Age: 58
End: 2018-08-22
Payer: MEDICAID

## 2018-08-22 PROCEDURE — 0651 HSPC ROUTINE HOME CARE

## 2018-08-22 PROCEDURE — G0156 HHCP-SVS OF AIDE,EA 15 MIN: HCPCS

## 2018-08-23 ENCOUNTER — HOME CARE VISIT (OUTPATIENT)
Dept: SCHEDULING | Facility: HOME HEALTH | Age: 58
End: 2018-08-23
Payer: MEDICAID

## 2018-08-23 PROCEDURE — G0299 HHS/HOSPICE OF RN EA 15 MIN: HCPCS

## 2018-08-23 PROCEDURE — HOSPICE MEDICATION HC HH HOSPICE MEDICATION

## 2018-08-23 PROCEDURE — 0651 HSPC ROUTINE HOME CARE

## 2018-08-24 ENCOUNTER — HOME CARE VISIT (OUTPATIENT)
Dept: SCHEDULING | Facility: HOME HEALTH | Age: 58
End: 2018-08-24
Payer: MEDICAID

## 2018-08-24 PROCEDURE — G0156 HHCP-SVS OF AIDE,EA 15 MIN: HCPCS

## 2018-08-24 PROCEDURE — 0651 HSPC ROUTINE HOME CARE

## 2018-08-25 PROCEDURE — 0651 HSPC ROUTINE HOME CARE

## 2018-08-26 PROCEDURE — 0651 HSPC ROUTINE HOME CARE

## 2018-08-27 ENCOUNTER — HOME CARE VISIT (OUTPATIENT)
Dept: SCHEDULING | Facility: HOME HEALTH | Age: 58
End: 2018-08-27
Payer: MEDICAID

## 2018-08-27 VITALS — DIASTOLIC BLOOD PRESSURE: 80 MMHG | SYSTOLIC BLOOD PRESSURE: 120 MMHG | RESPIRATION RATE: 17 BRPM | HEART RATE: 80 BPM

## 2018-08-27 PROCEDURE — 0651 HSPC ROUTINE HOME CARE

## 2018-08-27 PROCEDURE — G0156 HHCP-SVS OF AIDE,EA 15 MIN: HCPCS

## 2018-08-28 PROCEDURE — 0651 HSPC ROUTINE HOME CARE

## 2018-08-29 ENCOUNTER — HOME CARE VISIT (OUTPATIENT)
Dept: SCHEDULING | Facility: HOME HEALTH | Age: 58
End: 2018-08-29
Payer: MEDICAID

## 2018-08-29 PROCEDURE — G0156 HHCP-SVS OF AIDE,EA 15 MIN: HCPCS

## 2018-08-29 PROCEDURE — 0651 HSPC ROUTINE HOME CARE

## 2018-08-30 ENCOUNTER — HOME CARE VISIT (OUTPATIENT)
Dept: SCHEDULING | Facility: HOME HEALTH | Age: 58
End: 2018-08-30
Payer: MEDICAID

## 2018-08-30 PROCEDURE — HOSPICE MEDICATION HC HH HOSPICE MEDICATION

## 2018-08-30 PROCEDURE — 0651 HSPC ROUTINE HOME CARE

## 2018-08-30 PROCEDURE — G0299 HHS/HOSPICE OF RN EA 15 MIN: HCPCS

## 2018-08-31 ENCOUNTER — HOME CARE VISIT (OUTPATIENT)
Dept: SCHEDULING | Facility: HOME HEALTH | Age: 58
End: 2018-08-31
Payer: MEDICAID

## 2018-08-31 PROCEDURE — G0156 HHCP-SVS OF AIDE,EA 15 MIN: HCPCS

## 2018-08-31 PROCEDURE — 0651 HSPC ROUTINE HOME CARE

## 2018-09-01 VITALS — HEART RATE: 73 BPM | RESPIRATION RATE: 18 BRPM | SYSTOLIC BLOOD PRESSURE: 130 MMHG | DIASTOLIC BLOOD PRESSURE: 82 MMHG

## 2018-09-01 PROCEDURE — 0651 HSPC ROUTINE HOME CARE

## 2018-09-02 PROCEDURE — 0651 HSPC ROUTINE HOME CARE

## 2018-09-03 PROCEDURE — 0651 HSPC ROUTINE HOME CARE

## 2018-09-04 PROCEDURE — 0651 HSPC ROUTINE HOME CARE

## 2018-09-05 ENCOUNTER — HOME CARE VISIT (OUTPATIENT)
Dept: SCHEDULING | Facility: HOME HEALTH | Age: 58
End: 2018-09-05
Payer: MEDICAID

## 2018-09-05 VITALS
DIASTOLIC BLOOD PRESSURE: 80 MMHG | HEART RATE: 84 BPM | SYSTOLIC BLOOD PRESSURE: 140 MMHG | TEMPERATURE: 98 F | RESPIRATION RATE: 20 BRPM

## 2018-09-05 PROCEDURE — 0651 HSPC ROUTINE HOME CARE

## 2018-09-05 PROCEDURE — G0299 HHS/HOSPICE OF RN EA 15 MIN: HCPCS

## 2018-09-05 PROCEDURE — G0156 HHCP-SVS OF AIDE,EA 15 MIN: HCPCS

## 2018-09-05 PROCEDURE — HOSPICE MEDICATION HC HH HOSPICE MEDICATION

## 2018-09-06 PROCEDURE — 0651 HSPC ROUTINE HOME CARE

## 2018-09-07 ENCOUNTER — HOME CARE VISIT (OUTPATIENT)
Dept: SCHEDULING | Facility: HOME HEALTH | Age: 58
End: 2018-09-07
Payer: MEDICAID

## 2018-09-07 PROCEDURE — 0651 HSPC ROUTINE HOME CARE

## 2018-09-08 PROCEDURE — 0651 HSPC ROUTINE HOME CARE

## 2018-09-09 PROCEDURE — 0651 HSPC ROUTINE HOME CARE

## 2018-09-10 ENCOUNTER — HOME CARE VISIT (OUTPATIENT)
Dept: SCHEDULING | Facility: HOME HEALTH | Age: 58
End: 2018-09-10
Payer: MEDICAID

## 2018-09-10 PROCEDURE — 0651 HSPC ROUTINE HOME CARE

## 2018-09-10 PROCEDURE — G0156 HHCP-SVS OF AIDE,EA 15 MIN: HCPCS

## 2018-09-10 PROCEDURE — G0299 HHS/HOSPICE OF RN EA 15 MIN: HCPCS

## 2018-09-11 VITALS
HEART RATE: 76 BPM | TEMPERATURE: 98.4 F | DIASTOLIC BLOOD PRESSURE: 80 MMHG | SYSTOLIC BLOOD PRESSURE: 120 MMHG | RESPIRATION RATE: 18 BRPM

## 2018-09-11 PROCEDURE — 0651 HSPC ROUTINE HOME CARE

## 2018-09-12 ENCOUNTER — HOME CARE VISIT (OUTPATIENT)
Dept: SCHEDULING | Facility: HOME HEALTH | Age: 58
End: 2018-09-12
Payer: MEDICAID

## 2018-09-12 PROCEDURE — G0156 HHCP-SVS OF AIDE,EA 15 MIN: HCPCS

## 2018-09-12 PROCEDURE — 0651 HSPC ROUTINE HOME CARE

## 2018-09-13 ENCOUNTER — HOME CARE VISIT (OUTPATIENT)
Dept: SCHEDULING | Facility: HOME HEALTH | Age: 58
End: 2018-09-13
Payer: MEDICAID

## 2018-09-13 ENCOUNTER — HOME CARE VISIT (OUTPATIENT)
Dept: HOSPICE | Facility: HOSPICE | Age: 58
End: 2018-09-13
Payer: MEDICAID

## 2018-09-13 PROCEDURE — 0651 HSPC ROUTINE HOME CARE

## 2018-09-13 PROCEDURE — G0155 HHCP-SVS OF CSW,EA 15 MIN: HCPCS

## 2018-09-14 PROCEDURE — 0651 HSPC ROUTINE HOME CARE

## 2018-09-15 ENCOUNTER — HOME CARE VISIT (OUTPATIENT)
Dept: SCHEDULING | Facility: HOME HEALTH | Age: 58
End: 2018-09-15
Payer: MEDICAID

## 2018-09-15 PROCEDURE — 0651 HSPC ROUTINE HOME CARE

## 2018-09-16 PROCEDURE — 0651 HSPC ROUTINE HOME CARE

## 2018-09-17 ENCOUNTER — HOME CARE VISIT (OUTPATIENT)
Dept: SCHEDULING | Facility: HOME HEALTH | Age: 58
End: 2018-09-17
Payer: MEDICAID

## 2018-09-17 PROCEDURE — 0651 HSPC ROUTINE HOME CARE

## 2018-09-17 PROCEDURE — G0156 HHCP-SVS OF AIDE,EA 15 MIN: HCPCS

## 2018-09-18 PROCEDURE — 0651 HSPC ROUTINE HOME CARE

## 2018-09-18 PROCEDURE — HOSPICE MEDICATION HC HH HOSPICE MEDICATION

## 2018-09-19 ENCOUNTER — HOME CARE VISIT (OUTPATIENT)
Dept: SCHEDULING | Facility: HOME HEALTH | Age: 58
End: 2018-09-19
Payer: MEDICAID

## 2018-09-19 PROCEDURE — 0651 HSPC ROUTINE HOME CARE

## 2018-09-19 PROCEDURE — G0156 HHCP-SVS OF AIDE,EA 15 MIN: HCPCS

## 2018-09-20 PROCEDURE — 0651 HSPC ROUTINE HOME CARE

## 2018-09-21 ENCOUNTER — HOME CARE VISIT (OUTPATIENT)
Dept: SCHEDULING | Facility: HOME HEALTH | Age: 58
End: 2018-09-21
Payer: MEDICAID

## 2018-09-21 PROCEDURE — HOSPICE MEDICATION HC HH HOSPICE MEDICATION

## 2018-09-21 PROCEDURE — G0156 HHCP-SVS OF AIDE,EA 15 MIN: HCPCS

## 2018-09-21 PROCEDURE — 0651 HSPC ROUTINE HOME CARE

## 2018-09-21 PROCEDURE — G0299 HHS/HOSPICE OF RN EA 15 MIN: HCPCS

## 2018-09-22 PROCEDURE — 0651 HSPC ROUTINE HOME CARE

## 2018-09-23 VITALS
TEMPERATURE: 97.9 F | HEART RATE: 74 BPM | DIASTOLIC BLOOD PRESSURE: 72 MMHG | SYSTOLIC BLOOD PRESSURE: 131 MMHG | RESPIRATION RATE: 20 BRPM

## 2018-09-23 PROCEDURE — 0651 HSPC ROUTINE HOME CARE

## 2018-09-24 ENCOUNTER — HOME CARE VISIT (OUTPATIENT)
Dept: SCHEDULING | Facility: HOME HEALTH | Age: 58
End: 2018-09-24
Payer: MEDICAID

## 2018-09-24 PROCEDURE — G0156 HHCP-SVS OF AIDE,EA 15 MIN: HCPCS

## 2018-09-24 PROCEDURE — T4535 DISPOSABLE LINER/SHIELD/PAD: HCPCS

## 2018-09-24 PROCEDURE — 0651 HSPC ROUTINE HOME CARE

## 2018-09-24 PROCEDURE — A9270 NON-COVERED ITEM OR SERVICE: HCPCS

## 2018-09-24 PROCEDURE — HOSPICE MEDICATION HC HH HOSPICE MEDICATION

## 2018-09-24 PROCEDURE — G0299 HHS/HOSPICE OF RN EA 15 MIN: HCPCS

## 2018-09-25 VITALS — RESPIRATION RATE: 12 BRPM | DIASTOLIC BLOOD PRESSURE: 80 MMHG | HEART RATE: 80 BPM | SYSTOLIC BLOOD PRESSURE: 130 MMHG

## 2018-09-25 PROCEDURE — 0651 HSPC ROUTINE HOME CARE

## 2018-09-26 ENCOUNTER — HOME CARE VISIT (OUTPATIENT)
Dept: SCHEDULING | Facility: HOME HEALTH | Age: 58
End: 2018-09-26
Payer: MEDICAID

## 2018-09-26 PROCEDURE — 0651 HSPC ROUTINE HOME CARE

## 2018-09-26 PROCEDURE — G0156 HHCP-SVS OF AIDE,EA 15 MIN: HCPCS

## 2018-09-27 PROCEDURE — 0651 HSPC ROUTINE HOME CARE

## 2018-09-28 ENCOUNTER — HOME CARE VISIT (OUTPATIENT)
Dept: SCHEDULING | Facility: HOME HEALTH | Age: 58
End: 2018-09-28
Payer: MEDICAID

## 2018-09-28 PROCEDURE — 0651 HSPC ROUTINE HOME CARE

## 2018-09-29 PROCEDURE — 0651 HSPC ROUTINE HOME CARE

## 2018-09-30 PROCEDURE — 0651 HSPC ROUTINE HOME CARE

## 2018-10-01 ENCOUNTER — HOME CARE VISIT (OUTPATIENT)
Dept: SCHEDULING | Facility: HOME HEALTH | Age: 58
End: 2018-10-01
Payer: MEDICAID

## 2018-10-01 PROCEDURE — 0651 HSPC ROUTINE HOME CARE

## 2018-10-01 PROCEDURE — HOSPICE MEDICATION HC HH HOSPICE MEDICATION

## 2018-10-01 PROCEDURE — G0156 HHCP-SVS OF AIDE,EA 15 MIN: HCPCS

## 2018-10-01 PROCEDURE — G0299 HHS/HOSPICE OF RN EA 15 MIN: HCPCS

## 2018-10-02 VITALS
TEMPERATURE: 98 F | SYSTOLIC BLOOD PRESSURE: 130 MMHG | RESPIRATION RATE: 20 BRPM | DIASTOLIC BLOOD PRESSURE: 73 MMHG | HEART RATE: 71 BPM

## 2018-10-02 PROCEDURE — T5999 SUPPLY, NOS: HCPCS

## 2018-10-02 PROCEDURE — 0651 HSPC ROUTINE HOME CARE

## 2018-10-02 PROCEDURE — A9270 NON-COVERED ITEM OR SERVICE: HCPCS

## 2018-10-03 ENCOUNTER — HOME CARE VISIT (OUTPATIENT)
Dept: SCHEDULING | Facility: HOME HEALTH | Age: 58
End: 2018-10-03
Payer: MEDICAID

## 2018-10-03 ENCOUNTER — HOME CARE VISIT (OUTPATIENT)
Dept: HOSPICE | Facility: HOSPICE | Age: 58
End: 2018-10-03
Payer: MEDICAID

## 2018-10-03 PROCEDURE — 0651 HSPC ROUTINE HOME CARE

## 2018-10-03 PROCEDURE — G0156 HHCP-SVS OF AIDE,EA 15 MIN: HCPCS

## 2018-10-04 PROCEDURE — 0651 HSPC ROUTINE HOME CARE

## 2018-10-05 ENCOUNTER — HOME CARE VISIT (OUTPATIENT)
Dept: SCHEDULING | Facility: HOME HEALTH | Age: 58
End: 2018-10-05
Payer: MEDICAID

## 2018-10-05 PROCEDURE — G0156 HHCP-SVS OF AIDE,EA 15 MIN: HCPCS

## 2018-10-05 PROCEDURE — 0651 HSPC ROUTINE HOME CARE

## 2018-10-06 PROCEDURE — 0651 HSPC ROUTINE HOME CARE

## 2018-10-07 PROCEDURE — 0651 HSPC ROUTINE HOME CARE

## 2018-10-08 ENCOUNTER — HOME CARE VISIT (OUTPATIENT)
Dept: SCHEDULING | Facility: HOME HEALTH | Age: 58
End: 2018-10-08
Payer: MEDICAID

## 2018-10-08 PROCEDURE — G0156 HHCP-SVS OF AIDE,EA 15 MIN: HCPCS

## 2018-10-08 PROCEDURE — G0299 HHS/HOSPICE OF RN EA 15 MIN: HCPCS

## 2018-10-08 PROCEDURE — 0651 HSPC ROUTINE HOME CARE

## 2018-10-08 PROCEDURE — HOSPICE MEDICATION HC HH HOSPICE MEDICATION

## 2018-10-09 VITALS
TEMPERATURE: 97.7 F | RESPIRATION RATE: 20 BRPM | DIASTOLIC BLOOD PRESSURE: 74 MMHG | SYSTOLIC BLOOD PRESSURE: 122 MMHG | HEART RATE: 78 BPM

## 2018-10-09 PROCEDURE — 0651 HSPC ROUTINE HOME CARE

## 2018-10-10 ENCOUNTER — HOME CARE VISIT (OUTPATIENT)
Dept: SCHEDULING | Facility: HOME HEALTH | Age: 58
End: 2018-10-10
Payer: MEDICAID

## 2018-10-10 PROCEDURE — 0651 HSPC ROUTINE HOME CARE

## 2018-10-10 PROCEDURE — G0156 HHCP-SVS OF AIDE,EA 15 MIN: HCPCS

## 2018-10-11 PROCEDURE — 0651 HSPC ROUTINE HOME CARE

## 2018-10-12 ENCOUNTER — HOME CARE VISIT (OUTPATIENT)
Dept: SCHEDULING | Facility: HOME HEALTH | Age: 58
End: 2018-10-12
Payer: MEDICAID

## 2018-10-12 ENCOUNTER — HOME CARE VISIT (OUTPATIENT)
Dept: HOSPICE | Facility: HOSPICE | Age: 58
End: 2018-10-12
Payer: MEDICAID

## 2018-10-12 PROCEDURE — G0155 HHCP-SVS OF CSW,EA 15 MIN: HCPCS

## 2018-10-12 PROCEDURE — HOSPICE MEDICATION HC HH HOSPICE MEDICATION

## 2018-10-12 PROCEDURE — 0651 HSPC ROUTINE HOME CARE

## 2018-10-12 PROCEDURE — G0156 HHCP-SVS OF AIDE,EA 15 MIN: HCPCS

## 2018-10-13 PROCEDURE — 0651 HSPC ROUTINE HOME CARE

## 2018-10-14 PROCEDURE — 0651 HSPC ROUTINE HOME CARE

## 2018-10-15 ENCOUNTER — HOME CARE VISIT (OUTPATIENT)
Dept: SCHEDULING | Facility: HOME HEALTH | Age: 58
End: 2018-10-15
Payer: MEDICAID

## 2018-10-15 PROCEDURE — G0299 HHS/HOSPICE OF RN EA 15 MIN: HCPCS

## 2018-10-15 PROCEDURE — G0156 HHCP-SVS OF AIDE,EA 15 MIN: HCPCS

## 2018-10-15 PROCEDURE — 0651 HSPC ROUTINE HOME CARE

## 2018-10-16 VITALS
SYSTOLIC BLOOD PRESSURE: 112 MMHG | DIASTOLIC BLOOD PRESSURE: 69 MMHG | TEMPERATURE: 97.7 F | RESPIRATION RATE: 20 BRPM | HEART RATE: 80 BPM

## 2018-10-16 PROCEDURE — 0651 HSPC ROUTINE HOME CARE

## 2018-10-17 ENCOUNTER — HOME CARE VISIT (OUTPATIENT)
Dept: SCHEDULING | Facility: HOME HEALTH | Age: 58
End: 2018-10-17
Payer: MEDICAID

## 2018-10-17 PROCEDURE — 0651 HSPC ROUTINE HOME CARE

## 2018-10-17 PROCEDURE — HOSPICE MEDICATION HC HH HOSPICE MEDICATION

## 2018-10-17 PROCEDURE — G0156 HHCP-SVS OF AIDE,EA 15 MIN: HCPCS

## 2018-10-18 ENCOUNTER — HOME CARE VISIT (OUTPATIENT)
Dept: HOSPICE | Facility: HOSPICE | Age: 58
End: 2018-10-18
Payer: MEDICAID

## 2018-10-18 PROCEDURE — 0651 HSPC ROUTINE HOME CARE

## 2018-10-19 PROCEDURE — 0651 HSPC ROUTINE HOME CARE

## 2018-10-20 PROCEDURE — 0651 HSPC ROUTINE HOME CARE

## 2018-10-21 PROCEDURE — 0651 HSPC ROUTINE HOME CARE

## 2018-10-22 ENCOUNTER — HOME CARE VISIT (OUTPATIENT)
Dept: SCHEDULING | Facility: HOME HEALTH | Age: 58
End: 2018-10-22
Payer: MEDICAID

## 2018-10-22 PROCEDURE — G0156 HHCP-SVS OF AIDE,EA 15 MIN: HCPCS

## 2018-10-22 PROCEDURE — 0651 HSPC ROUTINE HOME CARE

## 2018-10-22 PROCEDURE — G0299 HHS/HOSPICE OF RN EA 15 MIN: HCPCS

## 2018-10-23 VITALS
SYSTOLIC BLOOD PRESSURE: 120 MMHG | RESPIRATION RATE: 20 BRPM | HEART RATE: 93 BPM | TEMPERATURE: 97.9 F | DIASTOLIC BLOOD PRESSURE: 65 MMHG

## 2018-10-23 PROCEDURE — 0651 HSPC ROUTINE HOME CARE

## 2018-10-24 ENCOUNTER — HOME CARE VISIT (OUTPATIENT)
Dept: SCHEDULING | Facility: HOME HEALTH | Age: 58
End: 2018-10-24
Payer: MEDICAID

## 2018-10-24 PROCEDURE — 0651 HSPC ROUTINE HOME CARE

## 2018-10-24 PROCEDURE — HOSPICE MEDICATION HC HH HOSPICE MEDICATION

## 2018-10-24 PROCEDURE — G0156 HHCP-SVS OF AIDE,EA 15 MIN: HCPCS

## 2018-10-25 PROCEDURE — 0651 HSPC ROUTINE HOME CARE

## 2018-10-25 PROCEDURE — A9270 NON-COVERED ITEM OR SERVICE: HCPCS

## 2018-10-25 PROCEDURE — T4541 LARGE DISPOSABLE UNDERPAD: HCPCS

## 2018-10-26 ENCOUNTER — HOME CARE VISIT (OUTPATIENT)
Dept: SCHEDULING | Facility: HOME HEALTH | Age: 58
End: 2018-10-26
Payer: MEDICAID

## 2018-10-26 PROCEDURE — 0651 HSPC ROUTINE HOME CARE

## 2018-10-27 PROCEDURE — 0651 HSPC ROUTINE HOME CARE

## 2018-10-28 PROCEDURE — 0651 HSPC ROUTINE HOME CARE

## 2018-10-29 ENCOUNTER — HOME CARE VISIT (OUTPATIENT)
Dept: SCHEDULING | Facility: HOME HEALTH | Age: 58
End: 2018-10-29
Payer: MEDICAID

## 2018-10-29 PROCEDURE — 0651 HSPC ROUTINE HOME CARE

## 2018-10-29 PROCEDURE — G0299 HHS/HOSPICE OF RN EA 15 MIN: HCPCS

## 2018-10-30 ENCOUNTER — HOME CARE VISIT (OUTPATIENT)
Dept: HOSPICE | Facility: HOSPICE | Age: 58
End: 2018-10-30
Payer: MEDICAID

## 2018-10-30 ENCOUNTER — HOME CARE VISIT (OUTPATIENT)
Dept: SCHEDULING | Facility: HOME HEALTH | Age: 58
End: 2018-10-30
Payer: MEDICAID

## 2018-10-30 PROCEDURE — G0299 HHS/HOSPICE OF RN EA 15 MIN: HCPCS

## 2018-10-30 PROCEDURE — A4217 STERILE WATER/SALINE, 500 ML: HCPCS

## 2018-10-30 PROCEDURE — T4541 LARGE DISPOSABLE UNDERPAD: HCPCS

## 2018-10-30 PROCEDURE — A9270 NON-COVERED ITEM OR SERVICE: HCPCS

## 2018-10-30 PROCEDURE — G0156 HHCP-SVS OF AIDE,EA 15 MIN: HCPCS

## 2018-10-30 PROCEDURE — HOSPICE MEDICATION HC HH HOSPICE MEDICATION

## 2018-10-30 PROCEDURE — 0651 HSPC ROUTINE HOME CARE

## 2018-10-30 PROCEDURE — A6266 IMPREG GAUZE NO H20/SAL/YARD: HCPCS

## 2018-10-30 PROCEDURE — A6222 GAUZE <=16 IN NO W/SAL W/O B: HCPCS

## 2018-10-31 ENCOUNTER — HOME CARE VISIT (OUTPATIENT)
Dept: HOSPICE | Facility: HOSPICE | Age: 58
End: 2018-10-31
Payer: MEDICAID

## 2018-10-31 VITALS
SYSTOLIC BLOOD PRESSURE: 122 MMHG | RESPIRATION RATE: 16 BRPM | HEART RATE: 80 BPM | DIASTOLIC BLOOD PRESSURE: 78 MMHG | TEMPERATURE: 97.8 F

## 2018-10-31 PROBLEM — D12.6 BENIGN NEOPLASM OF COLON: Status: ACTIVE | Noted: 2018-10-31

## 2018-10-31 PROCEDURE — G0299 HHS/HOSPICE OF RN EA 15 MIN: HCPCS

## 2018-10-31 PROCEDURE — 0651 HSPC ROUTINE HOME CARE

## 2018-11-01 PROCEDURE — 0651 HSPC ROUTINE HOME CARE

## 2018-11-02 PROCEDURE — 0651 HSPC ROUTINE HOME CARE

## 2018-11-03 ENCOUNTER — HOME CARE VISIT (OUTPATIENT)
Dept: SCHEDULING | Facility: HOME HEALTH | Age: 58
End: 2018-11-03
Payer: MEDICAID

## 2018-11-03 ENCOUNTER — HOME CARE VISIT (OUTPATIENT)
Dept: HOSPICE | Facility: HOSPICE | Age: 58
End: 2018-11-03
Payer: MEDICAID

## 2018-11-03 PROCEDURE — G0299 HHS/HOSPICE OF RN EA 15 MIN: HCPCS

## 2018-11-03 PROCEDURE — 0651 HSPC ROUTINE HOME CARE

## 2018-11-04 ENCOUNTER — HOME CARE VISIT (OUTPATIENT)
Dept: SCHEDULING | Facility: HOME HEALTH | Age: 58
End: 2018-11-04
Payer: MEDICAID

## 2018-11-04 PROCEDURE — 0651 HSPC ROUTINE HOME CARE

## 2018-11-04 PROCEDURE — G0299 HHS/HOSPICE OF RN EA 15 MIN: HCPCS

## 2018-11-05 VITALS
SYSTOLIC BLOOD PRESSURE: 134 MMHG | TEMPERATURE: 98 F | SYSTOLIC BLOOD PRESSURE: 132 MMHG | TEMPERATURE: 98 F | DIASTOLIC BLOOD PRESSURE: 74 MMHG | DIASTOLIC BLOOD PRESSURE: 72 MMHG | HEART RATE: 76 BPM | RESPIRATION RATE: 16 BRPM | HEART RATE: 74 BPM | RESPIRATION RATE: 16 BRPM

## 2018-11-05 PROCEDURE — 0651 HSPC ROUTINE HOME CARE

## 2018-11-05 PROCEDURE — HOSPICE MEDICATION HC HH HOSPICE MEDICATION

## 2018-11-06 PROCEDURE — HOSPICE MEDICATION HC HH HOSPICE MEDICATION

## 2018-11-06 PROCEDURE — 0651 HSPC ROUTINE HOME CARE

## 2018-11-07 ENCOUNTER — HOME CARE VISIT (OUTPATIENT)
Dept: SCHEDULING | Facility: HOME HEALTH | Age: 58
End: 2018-11-07
Payer: MEDICAID

## 2018-11-07 PROCEDURE — 0651 HSPC ROUTINE HOME CARE

## 2018-11-07 PROCEDURE — G0156 HHCP-SVS OF AIDE,EA 15 MIN: HCPCS

## 2018-11-08 PROCEDURE — 0651 HSPC ROUTINE HOME CARE

## 2018-11-09 ENCOUNTER — HOME CARE VISIT (OUTPATIENT)
Dept: SCHEDULING | Facility: HOME HEALTH | Age: 58
End: 2018-11-09
Payer: MEDICAID

## 2018-11-09 ENCOUNTER — HOME CARE VISIT (OUTPATIENT)
Dept: HOSPICE | Facility: HOSPICE | Age: 58
End: 2018-11-09
Payer: MEDICAID

## 2018-11-09 PROCEDURE — 0651 HSPC ROUTINE HOME CARE

## 2018-11-09 PROCEDURE — G0156 HHCP-SVS OF AIDE,EA 15 MIN: HCPCS

## 2018-11-09 PROCEDURE — G0155 HHCP-SVS OF CSW,EA 15 MIN: HCPCS

## 2018-11-10 ENCOUNTER — HOME CARE VISIT (OUTPATIENT)
Dept: SCHEDULING | Facility: HOME HEALTH | Age: 58
End: 2018-11-10
Payer: MEDICAID

## 2018-11-10 PROCEDURE — 0651 HSPC ROUTINE HOME CARE

## 2018-11-10 PROCEDURE — G0299 HHS/HOSPICE OF RN EA 15 MIN: HCPCS

## 2018-11-11 ENCOUNTER — HOME CARE VISIT (OUTPATIENT)
Dept: SCHEDULING | Facility: HOME HEALTH | Age: 58
End: 2018-11-11
Payer: MEDICAID

## 2018-11-11 PROCEDURE — 0651 HSPC ROUTINE HOME CARE

## 2018-11-11 PROCEDURE — G0299 HHS/HOSPICE OF RN EA 15 MIN: HCPCS

## 2018-11-12 ENCOUNTER — HOME CARE VISIT (OUTPATIENT)
Dept: SCHEDULING | Facility: HOME HEALTH | Age: 58
End: 2018-11-12
Payer: MEDICAID

## 2018-11-12 VITALS
TEMPERATURE: 97.8 F | RESPIRATION RATE: 16 BRPM | HEART RATE: 74 BPM | TEMPERATURE: 98 F | SYSTOLIC BLOOD PRESSURE: 128 MMHG | DIASTOLIC BLOOD PRESSURE: 74 MMHG | DIASTOLIC BLOOD PRESSURE: 72 MMHG | SYSTOLIC BLOOD PRESSURE: 130 MMHG | RESPIRATION RATE: 16 BRPM | HEART RATE: 78 BPM

## 2018-11-12 PROCEDURE — G0299 HHS/HOSPICE OF RN EA 15 MIN: HCPCS

## 2018-11-12 PROCEDURE — HOSPICE MEDICATION HC HH HOSPICE MEDICATION

## 2018-11-12 PROCEDURE — 0651 HSPC ROUTINE HOME CARE

## 2018-11-13 VITALS
SYSTOLIC BLOOD PRESSURE: 127 MMHG | TEMPERATURE: 98.5 F | HEART RATE: 73 BPM | DIASTOLIC BLOOD PRESSURE: 76 MMHG | RESPIRATION RATE: 16 BRPM

## 2018-11-13 PROCEDURE — 0651 HSPC ROUTINE HOME CARE

## 2018-11-14 ENCOUNTER — HOME CARE VISIT (OUTPATIENT)
Dept: SCHEDULING | Facility: HOME HEALTH | Age: 58
End: 2018-11-14
Payer: MEDICAID

## 2018-11-14 PROCEDURE — 0651 HSPC ROUTINE HOME CARE

## 2018-11-14 PROCEDURE — G0156 HHCP-SVS OF AIDE,EA 15 MIN: HCPCS

## 2018-11-15 PROCEDURE — 0651 HSPC ROUTINE HOME CARE

## 2018-11-16 ENCOUNTER — HOME CARE VISIT (OUTPATIENT)
Dept: SCHEDULING | Facility: HOME HEALTH | Age: 58
End: 2018-11-16
Payer: MEDICAID

## 2018-11-16 PROCEDURE — G0156 HHCP-SVS OF AIDE,EA 15 MIN: HCPCS

## 2018-11-16 PROCEDURE — 0651 HSPC ROUTINE HOME CARE

## 2018-11-17 PROCEDURE — 0651 HSPC ROUTINE HOME CARE

## 2018-11-18 PROCEDURE — 0651 HSPC ROUTINE HOME CARE

## 2018-11-19 ENCOUNTER — HOME CARE VISIT (OUTPATIENT)
Dept: SCHEDULING | Facility: HOME HEALTH | Age: 58
End: 2018-11-19
Payer: MEDICAID

## 2018-11-19 PROCEDURE — HOSPICE MEDICATION HC HH HOSPICE MEDICATION

## 2018-11-19 PROCEDURE — G0299 HHS/HOSPICE OF RN EA 15 MIN: HCPCS

## 2018-11-19 PROCEDURE — 0651 HSPC ROUTINE HOME CARE

## 2018-11-19 PROCEDURE — G0156 HHCP-SVS OF AIDE,EA 15 MIN: HCPCS

## 2018-11-20 PROCEDURE — 0651 HSPC ROUTINE HOME CARE

## 2018-11-21 ENCOUNTER — HOME CARE VISIT (OUTPATIENT)
Dept: SCHEDULING | Facility: HOME HEALTH | Age: 58
End: 2018-11-21
Payer: MEDICAID

## 2018-11-21 PROCEDURE — G0156 HHCP-SVS OF AIDE,EA 15 MIN: HCPCS

## 2018-11-21 PROCEDURE — 0651 HSPC ROUTINE HOME CARE

## 2018-11-22 PROCEDURE — 0651 HSPC ROUTINE HOME CARE

## 2018-11-23 VITALS
TEMPERATURE: 97.8 F | HEART RATE: 71 BPM | RESPIRATION RATE: 16 BRPM | SYSTOLIC BLOOD PRESSURE: 118 MMHG | DIASTOLIC BLOOD PRESSURE: 69 MMHG

## 2018-11-23 PROCEDURE — 0651 HSPC ROUTINE HOME CARE

## 2018-11-24 PROCEDURE — 0651 HSPC ROUTINE HOME CARE

## 2018-11-25 PROCEDURE — 0651 HSPC ROUTINE HOME CARE

## 2018-11-26 ENCOUNTER — HOME CARE VISIT (OUTPATIENT)
Dept: HOSPICE | Facility: HOSPICE | Age: 58
End: 2018-11-26
Payer: MEDICAID

## 2018-11-26 ENCOUNTER — HOME CARE VISIT (OUTPATIENT)
Dept: SCHEDULING | Facility: HOME HEALTH | Age: 58
End: 2018-11-26
Payer: MEDICAID

## 2018-11-26 PROCEDURE — G0299 HHS/HOSPICE OF RN EA 15 MIN: HCPCS

## 2018-11-26 PROCEDURE — HOSPICE MEDICATION HC HH HOSPICE MEDICATION

## 2018-11-26 PROCEDURE — 0651 HSPC ROUTINE HOME CARE

## 2018-11-26 PROCEDURE — G0156 HHCP-SVS OF AIDE,EA 15 MIN: HCPCS

## 2018-11-27 PROCEDURE — 0651 HSPC ROUTINE HOME CARE

## 2018-11-28 ENCOUNTER — HOME CARE VISIT (OUTPATIENT)
Dept: SCHEDULING | Facility: HOME HEALTH | Age: 58
End: 2018-11-28
Payer: MEDICAID

## 2018-11-28 PROCEDURE — G0156 HHCP-SVS OF AIDE,EA 15 MIN: HCPCS

## 2018-11-28 PROCEDURE — 0651 HSPC ROUTINE HOME CARE

## 2018-11-29 PROCEDURE — 0651 HSPC ROUTINE HOME CARE

## 2018-11-30 ENCOUNTER — HOME CARE VISIT (OUTPATIENT)
Dept: SCHEDULING | Facility: HOME HEALTH | Age: 58
End: 2018-11-30
Payer: MEDICAID

## 2018-11-30 VITALS
HEART RATE: 68 BPM | TEMPERATURE: 98.1 F | DIASTOLIC BLOOD PRESSURE: 83 MMHG | RESPIRATION RATE: 16 BRPM | SYSTOLIC BLOOD PRESSURE: 118 MMHG

## 2018-11-30 PROCEDURE — 0651 HSPC ROUTINE HOME CARE

## 2018-11-30 PROCEDURE — G0156 HHCP-SVS OF AIDE,EA 15 MIN: HCPCS

## 2018-12-01 PROCEDURE — 0651 HSPC ROUTINE HOME CARE

## 2018-12-02 PROCEDURE — 0651 HSPC ROUTINE HOME CARE

## 2018-12-03 ENCOUNTER — HOME CARE VISIT (OUTPATIENT)
Dept: SCHEDULING | Facility: HOME HEALTH | Age: 58
End: 2018-12-03
Payer: MEDICAID

## 2018-12-03 PROCEDURE — 0651 HSPC ROUTINE HOME CARE

## 2018-12-03 PROCEDURE — HOSPICE MEDICATION HC HH HOSPICE MEDICATION

## 2018-12-03 PROCEDURE — G0299 HHS/HOSPICE OF RN EA 15 MIN: HCPCS

## 2018-12-03 PROCEDURE — G0156 HHCP-SVS OF AIDE,EA 15 MIN: HCPCS

## 2018-12-04 PROCEDURE — 0651 HSPC ROUTINE HOME CARE

## 2018-12-05 ENCOUNTER — HOME CARE VISIT (OUTPATIENT)
Dept: SCHEDULING | Facility: HOME HEALTH | Age: 58
End: 2018-12-05
Payer: MEDICAID

## 2018-12-05 PROCEDURE — 0651 HSPC ROUTINE HOME CARE

## 2018-12-05 PROCEDURE — G0156 HHCP-SVS OF AIDE,EA 15 MIN: HCPCS

## 2018-12-06 PROCEDURE — 0651 HSPC ROUTINE HOME CARE

## 2018-12-07 ENCOUNTER — HOME CARE VISIT (OUTPATIENT)
Dept: SCHEDULING | Facility: HOME HEALTH | Age: 58
End: 2018-12-07
Payer: MEDICAID

## 2018-12-07 VITALS
TEMPERATURE: 98 F | RESPIRATION RATE: 16 BRPM | SYSTOLIC BLOOD PRESSURE: 109 MMHG | DIASTOLIC BLOOD PRESSURE: 74 MMHG | HEART RATE: 80 BPM

## 2018-12-07 PROCEDURE — 0651 HSPC ROUTINE HOME CARE

## 2018-12-08 PROCEDURE — 0651 HSPC ROUTINE HOME CARE

## 2018-12-09 PROCEDURE — 0651 HSPC ROUTINE HOME CARE

## 2018-12-10 ENCOUNTER — HOME CARE VISIT (OUTPATIENT)
Dept: SCHEDULING | Facility: HOME HEALTH | Age: 58
End: 2018-12-10
Payer: MEDICAID

## 2018-12-10 PROCEDURE — 0651 HSPC ROUTINE HOME CARE

## 2018-12-10 PROCEDURE — HOSPICE MEDICATION HC HH HOSPICE MEDICATION

## 2018-12-11 PROCEDURE — 0651 HSPC ROUTINE HOME CARE

## 2018-12-12 ENCOUNTER — HOME CARE VISIT (OUTPATIENT)
Dept: SCHEDULING | Facility: HOME HEALTH | Age: 58
End: 2018-12-12
Payer: MEDICAID

## 2018-12-12 ENCOUNTER — HOME CARE VISIT (OUTPATIENT)
Dept: HOSPICE | Facility: HOSPICE | Age: 58
End: 2018-12-12
Payer: MEDICAID

## 2018-12-12 PROCEDURE — G0156 HHCP-SVS OF AIDE,EA 15 MIN: HCPCS

## 2018-12-12 PROCEDURE — 0651 HSPC ROUTINE HOME CARE

## 2018-12-13 PROCEDURE — 0651 HSPC ROUTINE HOME CARE

## 2018-12-14 ENCOUNTER — HOME CARE VISIT (OUTPATIENT)
Dept: SCHEDULING | Facility: HOME HEALTH | Age: 58
End: 2018-12-14
Payer: MEDICAID

## 2018-12-14 PROCEDURE — G0156 HHCP-SVS OF AIDE,EA 15 MIN: HCPCS

## 2018-12-14 PROCEDURE — 0651 HSPC ROUTINE HOME CARE

## 2018-12-15 PROCEDURE — 0651 HSPC ROUTINE HOME CARE

## 2018-12-16 PROCEDURE — 0651 HSPC ROUTINE HOME CARE

## 2018-12-17 ENCOUNTER — HOME CARE VISIT (OUTPATIENT)
Dept: SCHEDULING | Facility: HOME HEALTH | Age: 58
End: 2018-12-17
Payer: MEDICAID

## 2018-12-17 PROCEDURE — G0299 HHS/HOSPICE OF RN EA 15 MIN: HCPCS

## 2018-12-17 PROCEDURE — A9270 NON-COVERED ITEM OR SERVICE: HCPCS

## 2018-12-17 PROCEDURE — 0651 HSPC ROUTINE HOME CARE

## 2018-12-18 ENCOUNTER — HOME CARE VISIT (OUTPATIENT)
Dept: SCHEDULING | Facility: HOME HEALTH | Age: 58
End: 2018-12-18
Payer: MEDICAID

## 2018-12-18 VITALS
DIASTOLIC BLOOD PRESSURE: 74 MMHG | TEMPERATURE: 97.9 F | RESPIRATION RATE: 16 BRPM | SYSTOLIC BLOOD PRESSURE: 115 MMHG | HEART RATE: 73 BPM

## 2018-12-18 PROCEDURE — 0651 HSPC ROUTINE HOME CARE

## 2018-12-19 ENCOUNTER — HOME CARE VISIT (OUTPATIENT)
Dept: SCHEDULING | Facility: HOME HEALTH | Age: 58
End: 2018-12-19
Payer: MEDICAID

## 2018-12-19 ENCOUNTER — HOME CARE VISIT (OUTPATIENT)
Dept: HOSPICE | Facility: HOSPICE | Age: 58
End: 2018-12-19
Payer: MEDICAID

## 2018-12-19 PROCEDURE — 0651 HSPC ROUTINE HOME CARE

## 2018-12-19 PROCEDURE — G0155 HHCP-SVS OF CSW,EA 15 MIN: HCPCS

## 2018-12-20 PROCEDURE — 0651 HSPC ROUTINE HOME CARE

## 2018-12-21 ENCOUNTER — HOME CARE VISIT (OUTPATIENT)
Dept: SCHEDULING | Facility: HOME HEALTH | Age: 58
End: 2018-12-21
Payer: MEDICAID

## 2018-12-21 PROCEDURE — 0651 HSPC ROUTINE HOME CARE

## 2018-12-22 PROCEDURE — 0651 HSPC ROUTINE HOME CARE

## 2018-12-23 ENCOUNTER — HOME CARE VISIT (OUTPATIENT)
Dept: SCHEDULING | Facility: HOME HEALTH | Age: 58
End: 2018-12-23
Payer: MEDICAID

## 2018-12-23 PROCEDURE — G0156 HHCP-SVS OF AIDE,EA 15 MIN: HCPCS

## 2018-12-23 PROCEDURE — 0651 HSPC ROUTINE HOME CARE

## 2018-12-24 ENCOUNTER — HOME CARE VISIT (OUTPATIENT)
Dept: SCHEDULING | Facility: HOME HEALTH | Age: 58
End: 2018-12-24
Payer: MEDICAID

## 2018-12-24 PROCEDURE — G0299 HHS/HOSPICE OF RN EA 15 MIN: HCPCS

## 2018-12-24 PROCEDURE — HOSPICE MEDICATION HC HH HOSPICE MEDICATION

## 2018-12-24 PROCEDURE — 0651 HSPC ROUTINE HOME CARE

## 2018-12-25 PROCEDURE — 0651 HSPC ROUTINE HOME CARE

## 2018-12-26 ENCOUNTER — HOME CARE VISIT (OUTPATIENT)
Dept: SCHEDULING | Facility: HOME HEALTH | Age: 58
End: 2018-12-26
Payer: MEDICAID

## 2018-12-26 PROCEDURE — G0156 HHCP-SVS OF AIDE,EA 15 MIN: HCPCS

## 2018-12-26 PROCEDURE — 0651 HSPC ROUTINE HOME CARE

## 2018-12-27 VITALS
TEMPERATURE: 98.1 F | HEART RATE: 82 BPM | DIASTOLIC BLOOD PRESSURE: 71 MMHG | SYSTOLIC BLOOD PRESSURE: 113 MMHG | RESPIRATION RATE: 16 BRPM

## 2018-12-27 PROCEDURE — 0651 HSPC ROUTINE HOME CARE

## 2018-12-28 ENCOUNTER — HOME CARE VISIT (OUTPATIENT)
Dept: SCHEDULING | Facility: HOME HEALTH | Age: 58
End: 2018-12-28
Payer: MEDICAID

## 2018-12-28 PROCEDURE — 0651 HSPC ROUTINE HOME CARE

## 2018-12-29 PROCEDURE — 0651 HSPC ROUTINE HOME CARE

## 2018-12-30 PROCEDURE — 0651 HSPC ROUTINE HOME CARE

## 2018-12-31 ENCOUNTER — HOME CARE VISIT (OUTPATIENT)
Dept: SCHEDULING | Facility: HOME HEALTH | Age: 58
End: 2018-12-31
Payer: MEDICAID

## 2018-12-31 PROCEDURE — 0651 HSPC ROUTINE HOME CARE

## 2018-12-31 PROCEDURE — G0299 HHS/HOSPICE OF RN EA 15 MIN: HCPCS

## 2019-01-01 PROCEDURE — 0651 HSPC ROUTINE HOME CARE

## 2019-01-02 ENCOUNTER — HOME CARE VISIT (OUTPATIENT)
Dept: HOSPICE | Facility: HOSPICE | Age: 59
End: 2019-01-02
Payer: MEDICAID

## 2019-01-02 PROCEDURE — A9270 NON-COVERED ITEM OR SERVICE: HCPCS

## 2019-01-02 PROCEDURE — A4927 NON-STERILE GLOVES: HCPCS

## 2019-01-02 PROCEDURE — 0651 HSPC ROUTINE HOME CARE

## 2019-01-03 ENCOUNTER — HOME CARE VISIT (OUTPATIENT)
Dept: SCHEDULING | Facility: HOME HEALTH | Age: 59
End: 2019-01-03
Payer: MEDICAID

## 2019-01-03 PROCEDURE — 0651 HSPC ROUTINE HOME CARE

## 2019-01-03 PROCEDURE — G0156 HHCP-SVS OF AIDE,EA 15 MIN: HCPCS

## 2019-01-04 ENCOUNTER — HOME CARE VISIT (OUTPATIENT)
Dept: HOSPICE | Facility: HOSPICE | Age: 59
End: 2019-01-04
Payer: MEDICAID

## 2019-01-04 PROCEDURE — 0651 HSPC ROUTINE HOME CARE

## 2019-01-05 PROCEDURE — 0651 HSPC ROUTINE HOME CARE

## 2019-01-06 PROCEDURE — 0651 HSPC ROUTINE HOME CARE

## 2019-01-07 ENCOUNTER — HOME CARE VISIT (OUTPATIENT)
Dept: SCHEDULING | Facility: HOME HEALTH | Age: 59
End: 2019-01-07
Payer: MEDICAID

## 2019-01-07 VITALS
RESPIRATION RATE: 16 BRPM | DIASTOLIC BLOOD PRESSURE: 68 MMHG | HEART RATE: 89 BPM | SYSTOLIC BLOOD PRESSURE: 106 MMHG | TEMPERATURE: 97.8 F

## 2019-01-07 VITALS
RESPIRATION RATE: 16 BRPM | DIASTOLIC BLOOD PRESSURE: 69 MMHG | TEMPERATURE: 98.1 F | HEART RATE: 75 BPM | SYSTOLIC BLOOD PRESSURE: 111 MMHG

## 2019-01-07 PROCEDURE — HOSPICE MEDICATION HC HH HOSPICE MEDICATION

## 2019-01-07 PROCEDURE — G0156 HHCP-SVS OF AIDE,EA 15 MIN: HCPCS

## 2019-01-07 PROCEDURE — 0651 HSPC ROUTINE HOME CARE

## 2019-01-07 PROCEDURE — G0299 HHS/HOSPICE OF RN EA 15 MIN: HCPCS

## 2019-01-08 PROCEDURE — 0651 HSPC ROUTINE HOME CARE

## 2019-01-09 ENCOUNTER — HOME CARE VISIT (OUTPATIENT)
Dept: SCHEDULING | Facility: HOME HEALTH | Age: 59
End: 2019-01-09
Payer: MEDICAID

## 2019-01-09 PROCEDURE — 0651 HSPC ROUTINE HOME CARE

## 2019-01-09 PROCEDURE — G0156 HHCP-SVS OF AIDE,EA 15 MIN: HCPCS

## 2019-01-10 ENCOUNTER — HOME CARE VISIT (OUTPATIENT)
Dept: HOSPICE | Facility: HOSPICE | Age: 59
End: 2019-01-10
Payer: MEDICAID

## 2019-01-10 PROCEDURE — 0651 HSPC ROUTINE HOME CARE

## 2019-01-10 PROCEDURE — G0155 HHCP-SVS OF CSW,EA 15 MIN: HCPCS

## 2019-01-11 ENCOUNTER — HOME CARE VISIT (OUTPATIENT)
Dept: SCHEDULING | Facility: HOME HEALTH | Age: 59
End: 2019-01-11
Payer: MEDICAID

## 2019-01-11 PROCEDURE — G0156 HHCP-SVS OF AIDE,EA 15 MIN: HCPCS

## 2019-01-11 PROCEDURE — 0651 HSPC ROUTINE HOME CARE

## 2019-01-12 PROCEDURE — 0651 HSPC ROUTINE HOME CARE

## 2019-01-13 PROCEDURE — 0651 HSPC ROUTINE HOME CARE

## 2019-01-14 ENCOUNTER — HOME CARE VISIT (OUTPATIENT)
Dept: SCHEDULING | Facility: HOME HEALTH | Age: 59
End: 2019-01-14
Payer: MEDICAID

## 2019-01-14 VITALS
HEART RATE: 85 BPM | SYSTOLIC BLOOD PRESSURE: 164 MMHG | DIASTOLIC BLOOD PRESSURE: 77 MMHG | RESPIRATION RATE: 18 BRPM | TEMPERATURE: 98.6 F

## 2019-01-14 PROCEDURE — HOSPICE MEDICATION HC HH HOSPICE MEDICATION

## 2019-01-14 PROCEDURE — G0299 HHS/HOSPICE OF RN EA 15 MIN: HCPCS

## 2019-01-14 PROCEDURE — 0651 HSPC ROUTINE HOME CARE

## 2019-01-15 PROCEDURE — 0651 HSPC ROUTINE HOME CARE

## 2019-01-15 PROCEDURE — HOSPICE MEDICATION HC HH HOSPICE MEDICATION

## 2019-01-16 ENCOUNTER — HOME CARE VISIT (OUTPATIENT)
Dept: SCHEDULING | Facility: HOME HEALTH | Age: 59
End: 2019-01-16
Payer: MEDICAID

## 2019-01-16 PROCEDURE — 0651 HSPC ROUTINE HOME CARE

## 2019-01-16 PROCEDURE — G0156 HHCP-SVS OF AIDE,EA 15 MIN: HCPCS

## 2019-01-17 PROCEDURE — 0651 HSPC ROUTINE HOME CARE

## 2019-01-18 ENCOUNTER — HOME CARE VISIT (OUTPATIENT)
Dept: SCHEDULING | Facility: HOME HEALTH | Age: 59
End: 2019-01-18
Payer: MEDICAID

## 2019-01-18 PROCEDURE — 0651 HSPC ROUTINE HOME CARE

## 2019-01-19 PROCEDURE — 0651 HSPC ROUTINE HOME CARE

## 2019-01-20 PROCEDURE — 0651 HSPC ROUTINE HOME CARE

## 2019-01-21 ENCOUNTER — HOME CARE VISIT (OUTPATIENT)
Dept: SCHEDULING | Facility: HOME HEALTH | Age: 59
End: 2019-01-21
Payer: MEDICAID

## 2019-01-21 VITALS
RESPIRATION RATE: 18 BRPM | TEMPERATURE: 98.4 F | OXYGEN SATURATION: 93 % | HEART RATE: 90 BPM | SYSTOLIC BLOOD PRESSURE: 99 MMHG | DIASTOLIC BLOOD PRESSURE: 68 MMHG

## 2019-01-21 PROCEDURE — HOSPICE MEDICATION HC HH HOSPICE MEDICATION

## 2019-01-21 PROCEDURE — 0651 HSPC ROUTINE HOME CARE

## 2019-01-21 PROCEDURE — G0299 HHS/HOSPICE OF RN EA 15 MIN: HCPCS

## 2019-01-21 PROCEDURE — G0156 HHCP-SVS OF AIDE,EA 15 MIN: HCPCS

## 2019-01-22 ENCOUNTER — HOME CARE VISIT (OUTPATIENT)
Dept: SCHEDULING | Facility: HOME HEALTH | Age: 59
End: 2019-01-22
Payer: MEDICAID

## 2019-01-22 ENCOUNTER — HOME CARE VISIT (OUTPATIENT)
Dept: HOSPICE | Facility: HOSPICE | Age: 59
End: 2019-01-22
Payer: MEDICAID

## 2019-01-22 PROCEDURE — G0155 HHCP-SVS OF CSW,EA 15 MIN: HCPCS

## 2019-01-22 PROCEDURE — 0651 HSPC ROUTINE HOME CARE

## 2019-01-23 ENCOUNTER — HOME CARE VISIT (OUTPATIENT)
Dept: SCHEDULING | Facility: HOME HEALTH | Age: 59
End: 2019-01-23
Payer: MEDICAID

## 2019-01-23 PROCEDURE — G0156 HHCP-SVS OF AIDE,EA 15 MIN: HCPCS

## 2019-01-23 PROCEDURE — 0651 HSPC ROUTINE HOME CARE

## 2019-01-24 PROCEDURE — 0651 HSPC ROUTINE HOME CARE

## 2019-01-25 ENCOUNTER — HOME CARE VISIT (OUTPATIENT)
Dept: SCHEDULING | Facility: HOME HEALTH | Age: 59
End: 2019-01-25
Payer: MEDICAID

## 2019-01-25 PROCEDURE — G0156 HHCP-SVS OF AIDE,EA 15 MIN: HCPCS

## 2019-01-25 PROCEDURE — 0651 HSPC ROUTINE HOME CARE

## 2019-01-26 PROCEDURE — 0651 HSPC ROUTINE HOME CARE

## 2019-01-27 PROCEDURE — 0651 HSPC ROUTINE HOME CARE

## 2019-01-28 ENCOUNTER — HOME CARE VISIT (OUTPATIENT)
Dept: SCHEDULING | Facility: HOME HEALTH | Age: 59
End: 2019-01-28
Payer: MEDICAID

## 2019-01-28 PROCEDURE — A4927 NON-STERILE GLOVES: HCPCS

## 2019-01-28 PROCEDURE — A9270 NON-COVERED ITEM OR SERVICE: HCPCS

## 2019-01-28 PROCEDURE — 0651 HSPC ROUTINE HOME CARE

## 2019-01-28 PROCEDURE — G0156 HHCP-SVS OF AIDE,EA 15 MIN: HCPCS

## 2019-01-28 PROCEDURE — G0299 HHS/HOSPICE OF RN EA 15 MIN: HCPCS

## 2019-01-29 VITALS
TEMPERATURE: 97.9 F | OXYGEN SATURATION: 91 % | HEART RATE: 82 BPM | DIASTOLIC BLOOD PRESSURE: 74 MMHG | RESPIRATION RATE: 16 BRPM | SYSTOLIC BLOOD PRESSURE: 111 MMHG

## 2019-01-29 PROCEDURE — 0651 HSPC ROUTINE HOME CARE

## 2019-01-30 ENCOUNTER — HOME CARE VISIT (OUTPATIENT)
Dept: SCHEDULING | Facility: HOME HEALTH | Age: 59
End: 2019-01-30
Payer: MEDICAID

## 2019-01-30 PROCEDURE — 0651 HSPC ROUTINE HOME CARE

## 2019-01-31 PROCEDURE — 0651 HSPC ROUTINE HOME CARE

## 2019-02-01 ENCOUNTER — HOME CARE VISIT (OUTPATIENT)
Dept: SCHEDULING | Facility: HOME HEALTH | Age: 59
End: 2019-02-01
Payer: MEDICAID

## 2019-02-01 PROCEDURE — 0651 HSPC ROUTINE HOME CARE

## 2019-02-01 PROCEDURE — HOSPICE MEDICATION HC HH HOSPICE MEDICATION

## 2019-02-01 PROCEDURE — G0156 HHCP-SVS OF AIDE,EA 15 MIN: HCPCS

## 2019-02-02 PROCEDURE — 0651 HSPC ROUTINE HOME CARE

## 2019-02-03 PROCEDURE — 0651 HSPC ROUTINE HOME CARE

## 2019-02-04 ENCOUNTER — HOME CARE VISIT (OUTPATIENT)
Dept: SCHEDULING | Facility: HOME HEALTH | Age: 59
End: 2019-02-04
Payer: MEDICAID

## 2019-02-04 VITALS
HEART RATE: 82 BPM | SYSTOLIC BLOOD PRESSURE: 133 MMHG | TEMPERATURE: 98.2 F | RESPIRATION RATE: 16 BRPM | DIASTOLIC BLOOD PRESSURE: 87 MMHG | OXYGEN SATURATION: 81 %

## 2019-02-04 PROCEDURE — G0299 HHS/HOSPICE OF RN EA 15 MIN: HCPCS

## 2019-02-04 PROCEDURE — HOSPICE MEDICATION HC HH HOSPICE MEDICATION

## 2019-02-04 PROCEDURE — 0651 HSPC ROUTINE HOME CARE

## 2019-02-05 PROCEDURE — 0651 HSPC ROUTINE HOME CARE

## 2019-02-06 ENCOUNTER — HOME CARE VISIT (OUTPATIENT)
Dept: SCHEDULING | Facility: HOME HEALTH | Age: 59
End: 2019-02-06
Payer: MEDICAID

## 2019-02-06 PROCEDURE — G0156 HHCP-SVS OF AIDE,EA 15 MIN: HCPCS

## 2019-02-06 PROCEDURE — 0651 HSPC ROUTINE HOME CARE

## 2019-02-07 PROCEDURE — 0651 HSPC ROUTINE HOME CARE

## 2019-02-08 ENCOUNTER — HOME CARE VISIT (OUTPATIENT)
Dept: SCHEDULING | Facility: HOME HEALTH | Age: 59
End: 2019-02-08
Payer: MEDICAID

## 2019-02-08 PROCEDURE — 0651 HSPC ROUTINE HOME CARE

## 2019-02-09 PROCEDURE — 0651 HSPC ROUTINE HOME CARE

## 2019-02-10 PROCEDURE — 0651 HSPC ROUTINE HOME CARE

## 2019-02-11 ENCOUNTER — HOME CARE VISIT (OUTPATIENT)
Dept: SCHEDULING | Facility: HOME HEALTH | Age: 59
End: 2019-02-11
Payer: MEDICAID

## 2019-02-11 PROCEDURE — HOSPICE MEDICATION HC HH HOSPICE MEDICATION

## 2019-02-11 PROCEDURE — G0156 HHCP-SVS OF AIDE,EA 15 MIN: HCPCS

## 2019-02-11 PROCEDURE — G0299 HHS/HOSPICE OF RN EA 15 MIN: HCPCS

## 2019-02-11 PROCEDURE — 0651 HSPC ROUTINE HOME CARE

## 2019-02-11 PROCEDURE — A9270 NON-COVERED ITEM OR SERVICE: HCPCS

## 2019-02-12 VITALS
RESPIRATION RATE: 16 BRPM | OXYGEN SATURATION: 88 % | DIASTOLIC BLOOD PRESSURE: 80 MMHG | SYSTOLIC BLOOD PRESSURE: 120 MMHG | HEART RATE: 88 BPM | TEMPERATURE: 98.2 F

## 2019-02-12 PROCEDURE — 0651 HSPC ROUTINE HOME CARE

## 2019-02-13 ENCOUNTER — HOME CARE VISIT (OUTPATIENT)
Dept: HOSPICE | Facility: HOSPICE | Age: 59
End: 2019-02-13
Payer: MEDICAID

## 2019-02-13 ENCOUNTER — HOME CARE VISIT (OUTPATIENT)
Dept: SCHEDULING | Facility: HOME HEALTH | Age: 59
End: 2019-02-13
Payer: MEDICAID

## 2019-02-13 PROCEDURE — G0155 HHCP-SVS OF CSW,EA 15 MIN: HCPCS

## 2019-02-13 PROCEDURE — 0651 HSPC ROUTINE HOME CARE

## 2019-02-14 PROCEDURE — 0651 HSPC ROUTINE HOME CARE

## 2019-02-15 ENCOUNTER — HOME CARE VISIT (OUTPATIENT)
Dept: SCHEDULING | Facility: HOME HEALTH | Age: 59
End: 2019-02-15
Payer: MEDICAID

## 2019-02-15 PROCEDURE — G0156 HHCP-SVS OF AIDE,EA 15 MIN: HCPCS

## 2019-02-15 PROCEDURE — 0651 HSPC ROUTINE HOME CARE

## 2019-02-16 PROCEDURE — 0651 HSPC ROUTINE HOME CARE

## 2019-02-17 PROCEDURE — 0651 HSPC ROUTINE HOME CARE

## 2019-02-18 ENCOUNTER — HOME CARE VISIT (OUTPATIENT)
Dept: HOSPICE | Facility: HOSPICE | Age: 59
End: 2019-02-18
Payer: MEDICAID

## 2019-02-18 ENCOUNTER — HOME CARE VISIT (OUTPATIENT)
Dept: SCHEDULING | Facility: HOME HEALTH | Age: 59
End: 2019-02-18
Payer: MEDICAID

## 2019-02-18 VITALS
SYSTOLIC BLOOD PRESSURE: 108 MMHG | DIASTOLIC BLOOD PRESSURE: 70 MMHG | HEART RATE: 104 BPM | OXYGEN SATURATION: 96 % | TEMPERATURE: 98.2 F | RESPIRATION RATE: 24 BRPM

## 2019-02-18 PROCEDURE — 0651 HSPC ROUTINE HOME CARE

## 2019-02-18 PROCEDURE — HOSPICE MEDICATION HC HH HOSPICE MEDICATION

## 2019-02-18 PROCEDURE — G0156 HHCP-SVS OF AIDE,EA 15 MIN: HCPCS

## 2019-02-18 PROCEDURE — G0299 HHS/HOSPICE OF RN EA 15 MIN: HCPCS

## 2019-02-19 PROCEDURE — 0651 HSPC ROUTINE HOME CARE

## 2019-02-20 ENCOUNTER — HOME CARE VISIT (OUTPATIENT)
Dept: SCHEDULING | Facility: HOME HEALTH | Age: 59
End: 2019-02-20
Payer: MEDICAID

## 2019-02-20 PROCEDURE — 0651 HSPC ROUTINE HOME CARE

## 2019-02-21 PROCEDURE — 0651 HSPC ROUTINE HOME CARE

## 2019-02-22 ENCOUNTER — HOME CARE VISIT (OUTPATIENT)
Dept: SCHEDULING | Facility: HOME HEALTH | Age: 59
End: 2019-02-22
Payer: MEDICAID

## 2019-02-22 PROCEDURE — G0156 HHCP-SVS OF AIDE,EA 15 MIN: HCPCS

## 2019-02-22 PROCEDURE — 0651 HSPC ROUTINE HOME CARE

## 2019-02-23 PROCEDURE — 0651 HSPC ROUTINE HOME CARE

## 2019-02-24 PROCEDURE — 0651 HSPC ROUTINE HOME CARE

## 2019-02-25 ENCOUNTER — HOME CARE VISIT (OUTPATIENT)
Dept: SCHEDULING | Facility: HOME HEALTH | Age: 59
End: 2019-02-25
Payer: MEDICAID

## 2019-02-25 VITALS
HEART RATE: 89 BPM | OXYGEN SATURATION: 93 % | TEMPERATURE: 98.2 F | DIASTOLIC BLOOD PRESSURE: 61 MMHG | RESPIRATION RATE: 18 BRPM | SYSTOLIC BLOOD PRESSURE: 98 MMHG

## 2019-02-25 PROCEDURE — HOSPICE MEDICATION HC HH HOSPICE MEDICATION

## 2019-02-25 PROCEDURE — A9270 NON-COVERED ITEM OR SERVICE: HCPCS

## 2019-02-25 PROCEDURE — 0651 HSPC ROUTINE HOME CARE

## 2019-02-25 PROCEDURE — G0156 HHCP-SVS OF AIDE,EA 15 MIN: HCPCS

## 2019-02-25 PROCEDURE — G0299 HHS/HOSPICE OF RN EA 15 MIN: HCPCS

## 2019-02-26 PROCEDURE — 0651 HSPC ROUTINE HOME CARE

## 2019-02-27 ENCOUNTER — HOME CARE VISIT (OUTPATIENT)
Dept: SCHEDULING | Facility: HOME HEALTH | Age: 59
End: 2019-02-27
Payer: MEDICAID

## 2019-02-27 PROCEDURE — 0651 HSPC ROUTINE HOME CARE

## 2019-02-28 PROCEDURE — 0651 HSPC ROUTINE HOME CARE

## 2019-03-01 ENCOUNTER — HOME CARE VISIT (OUTPATIENT)
Dept: SCHEDULING | Facility: HOME HEALTH | Age: 59
End: 2019-03-01
Payer: MEDICAID

## 2019-03-01 PROCEDURE — 0651 HSPC ROUTINE HOME CARE

## 2019-03-02 PROCEDURE — 0651 HSPC ROUTINE HOME CARE

## 2019-03-03 PROCEDURE — 0651 HSPC ROUTINE HOME CARE

## 2019-03-04 ENCOUNTER — HOME CARE VISIT (OUTPATIENT)
Dept: SCHEDULING | Facility: HOME HEALTH | Age: 59
End: 2019-03-04
Payer: MEDICAID

## 2019-03-04 ENCOUNTER — HOSPITAL ENCOUNTER (INPATIENT)
Age: 59
LOS: 9 days | Discharge: HOSPICE | DRG: 100 | End: 2019-03-14
Admitting: HOSPITALIST
Payer: OTHER MISCELLANEOUS

## 2019-03-04 ENCOUNTER — HOME CARE VISIT (OUTPATIENT)
Dept: HOSPICE | Facility: HOSPICE | Age: 59
End: 2019-03-04
Payer: MEDICAID

## 2019-03-04 ENCOUNTER — APPOINTMENT (OUTPATIENT)
Dept: CT IMAGING | Age: 59
DRG: 100 | End: 2019-03-04
Payer: OTHER MISCELLANEOUS

## 2019-03-04 ENCOUNTER — APPOINTMENT (OUTPATIENT)
Dept: GENERAL RADIOLOGY | Age: 59
DRG: 100 | End: 2019-03-04
Payer: OTHER MISCELLANEOUS

## 2019-03-04 DIAGNOSIS — R26.9 ABNORMALITY OF GAIT AND MOBILITY: ICD-10-CM

## 2019-03-04 DIAGNOSIS — R91.8 MASS OF LEFT LUNG: ICD-10-CM

## 2019-03-04 DIAGNOSIS — J96.21 ACUTE ON CHRONIC RESPIRATORY FAILURE WITH HYPOXIA AND HYPERCAPNIA (HCC): ICD-10-CM

## 2019-03-04 DIAGNOSIS — G47.33 OSA (OBSTRUCTIVE SLEEP APNEA): Chronic | ICD-10-CM

## 2019-03-04 DIAGNOSIS — Z51.5 ENCOUNTER FOR PALLIATIVE CARE: ICD-10-CM

## 2019-03-04 DIAGNOSIS — C79.31 METASTATIC CANCER TO BRAIN (HCC): ICD-10-CM

## 2019-03-04 DIAGNOSIS — R73.9 HYPERGLYCEMIA: ICD-10-CM

## 2019-03-04 DIAGNOSIS — R53.81 DEBILITY: ICD-10-CM

## 2019-03-04 DIAGNOSIS — R52 GENERALIZED PAIN: ICD-10-CM

## 2019-03-04 DIAGNOSIS — E66.01 EXTREME OBESITY WITH RESPIRATORY DISORDER (HCC): Chronic | ICD-10-CM

## 2019-03-04 DIAGNOSIS — E66.01 MORBID OBESITY (HCC): Chronic | ICD-10-CM

## 2019-03-04 DIAGNOSIS — J43.9 PULMONARY EMPHYSEMA, UNSPECIFIED EMPHYSEMA TYPE (HCC): Chronic | ICD-10-CM

## 2019-03-04 DIAGNOSIS — J98.9 EXTREME OBESITY WITH RESPIRATORY DISORDER (HCC): Chronic | ICD-10-CM

## 2019-03-04 DIAGNOSIS — J96.01 ACUTE RESPIRATORY FAILURE WITH HYPOXIA (HCC): ICD-10-CM

## 2019-03-04 DIAGNOSIS — J96.22 ACUTE ON CHRONIC RESPIRATORY FAILURE WITH HYPOXIA AND HYPERCAPNIA (HCC): ICD-10-CM

## 2019-03-04 DIAGNOSIS — I50.9 CONGESTIVE HEART FAILURE, NYHA CLASS 1, UNSPECIFIED CONGESTIVE HEART FAILURE TYPE (HCC): Chronic | ICD-10-CM

## 2019-03-04 DIAGNOSIS — C34.12 PRIMARY CANCER OF LEFT UPPER LOBE OF LUNG (HCC): ICD-10-CM

## 2019-03-04 DIAGNOSIS — R56.9 SEIZURE (HCC): Primary | ICD-10-CM

## 2019-03-04 DIAGNOSIS — J96.11 CHRONIC HYPOXEMIC RESPIRATORY FAILURE (HCC): Chronic | ICD-10-CM

## 2019-03-04 DIAGNOSIS — R41.0 DELIRIUM: ICD-10-CM

## 2019-03-04 LAB
BASOPHILS # BLD: 0.1 K/UL (ref 0–0.2)
BASOPHILS NFR BLD: 1 % (ref 0–2)
DIFFERENTIAL METHOD BLD: ABNORMAL
EOSINOPHIL # BLD: 0 K/UL (ref 0–0.8)
EOSINOPHIL NFR BLD: 0 % (ref 0.5–7.8)
ERYTHROCYTE [DISTWIDTH] IN BLOOD BY AUTOMATED COUNT: 16.1 % (ref 11.9–14.6)
HCT VFR BLD AUTO: 43.7 % (ref 35.8–46.3)
HGB BLD-MCNC: 12.3 G/DL (ref 11.7–15.4)
IMM GRANULOCYTES # BLD AUTO: 0.1 K/UL (ref 0–0.5)
IMM GRANULOCYTES NFR BLD AUTO: 1 % (ref 0–5)
LYMPHOCYTES # BLD: 2.4 K/UL (ref 0.5–4.6)
LYMPHOCYTES NFR BLD: 17 % (ref 13–44)
MCH RBC QN AUTO: 25.3 PG (ref 26.1–32.9)
MCHC RBC AUTO-ENTMCNC: 28.1 G/DL (ref 31.4–35)
MCV RBC AUTO: 89.7 FL (ref 79.6–97.8)
MONOCYTES # BLD: 0.8 K/UL (ref 0.1–1.3)
MONOCYTES NFR BLD: 6 % (ref 4–12)
NEUTS SEG # BLD: 10.7 K/UL (ref 1.7–8.2)
NEUTS SEG NFR BLD: 76 % (ref 43–78)
NRBC # BLD: 0 K/UL (ref 0–0.2)
PLATELET # BLD AUTO: 649 K/UL (ref 150–450)
PMV BLD AUTO: 10.2 FL (ref 9.4–12.3)
RBC # BLD AUTO: 4.87 M/UL (ref 4.05–5.2)
WBC # BLD AUTO: 14.2 K/UL (ref 4.3–11.1)

## 2019-03-04 PROCEDURE — 80053 COMPREHEN METABOLIC PANEL: CPT

## 2019-03-04 PROCEDURE — 70450 CT HEAD/BRAIN W/O DYE: CPT

## 2019-03-04 PROCEDURE — 85025 COMPLETE CBC W/AUTO DIFF WBC: CPT

## 2019-03-04 PROCEDURE — 74011250636 HC RX REV CODE- 250/636

## 2019-03-04 PROCEDURE — 71045 X-RAY EXAM CHEST 1 VIEW: CPT

## 2019-03-04 PROCEDURE — 0651 HSPC ROUTINE HOME CARE

## 2019-03-04 PROCEDURE — 93005 ELECTROCARDIOGRAM TRACING: CPT

## 2019-03-04 PROCEDURE — 74011636637 HC RX REV CODE- 636/637

## 2019-03-04 PROCEDURE — 84145 PROCALCITONIN (PCT): CPT

## 2019-03-04 PROCEDURE — G0156 HHCP-SVS OF AIDE,EA 15 MIN: HCPCS

## 2019-03-04 PROCEDURE — 77030019605

## 2019-03-04 PROCEDURE — 96374 THER/PROPH/DIAG INJ IV PUSH: CPT

## 2019-03-04 PROCEDURE — G0299 HHS/HOSPICE OF RN EA 15 MIN: HCPCS

## 2019-03-04 PROCEDURE — 83735 ASSAY OF MAGNESIUM: CPT

## 2019-03-04 PROCEDURE — 99285 EMERGENCY DEPT VISIT HI MDM: CPT

## 2019-03-04 PROCEDURE — HOSPICE MEDICATION HC HH HOSPICE MEDICATION

## 2019-03-04 RX ORDER — SODIUM CHLORIDE 9 MG/ML
1000 INJECTION, SOLUTION INTRAVENOUS ONCE
Status: COMPLETED | OUTPATIENT
Start: 2019-03-04 | End: 2019-03-05

## 2019-03-04 RX ORDER — LORAZEPAM 2 MG/ML
2 INJECTION INTRAMUSCULAR
Status: COMPLETED | OUTPATIENT
Start: 2019-03-04 | End: 2019-03-04

## 2019-03-04 RX ORDER — LORAZEPAM 2 MG/ML
INJECTION INTRAMUSCULAR
Status: COMPLETED
Start: 2019-03-04 | End: 2019-03-04

## 2019-03-04 RX ADMIN — INSULIN HUMAN 10 UNITS: 100 INJECTION, SOLUTION PARENTERAL at 23:46

## 2019-03-04 RX ADMIN — SODIUM CHLORIDE 1000 ML: 900 INJECTION, SOLUTION INTRAVENOUS at 23:43

## 2019-03-04 RX ADMIN — LORAZEPAM 2 MG: 2 INJECTION INTRAMUSCULAR; INTRAVENOUS at 23:31

## 2019-03-04 RX ADMIN — LORAZEPAM 2 MG: 2 INJECTION INTRAMUSCULAR at 23:31

## 2019-03-05 ENCOUNTER — HOME CARE VISIT (OUTPATIENT)
Dept: HOSPICE | Facility: HOSPICE | Age: 59
End: 2019-03-05
Payer: MEDICAID

## 2019-03-05 ENCOUNTER — HOSPICE ADMISSION (OUTPATIENT)
Dept: HOSPICE | Facility: HOSPICE | Age: 59
End: 2019-03-05

## 2019-03-05 VITALS
RESPIRATION RATE: 16 BRPM | SYSTOLIC BLOOD PRESSURE: 102 MMHG | HEART RATE: 99 BPM | OXYGEN SATURATION: 93 % | DIASTOLIC BLOOD PRESSURE: 72 MMHG | TEMPERATURE: 98.7 F

## 2019-03-05 PROBLEM — G89.29 CHRONIC PAIN: Chronic | Status: ACTIVE | Noted: 2018-07-28

## 2019-03-05 PROBLEM — J44.1 COPD EXACERBATION (HCC): Status: RESOLVED | Noted: 2018-07-28 | Resolved: 2019-03-05

## 2019-03-05 PROBLEM — C79.31 METASTATIC CANCER TO BRAIN (HCC): Status: ACTIVE | Noted: 2019-03-05

## 2019-03-05 PROBLEM — R56.9 SEIZURE (HCC): Status: ACTIVE | Noted: 2019-03-05

## 2019-03-05 PROBLEM — R91.8 PULMONARY INFILTRATES: Status: RESOLVED | Noted: 2018-07-28 | Resolved: 2019-03-05

## 2019-03-05 PROBLEM — D12.6 BENIGN NEOPLASM OF COLON: Chronic | Status: ACTIVE | Noted: 2018-10-31

## 2019-03-05 PROBLEM — J96.20 ACUTE ON CHRONIC RESPIRATORY FAILURE (HCC): Status: ACTIVE | Noted: 2019-03-05

## 2019-03-05 PROBLEM — R73.9 HYPERGLYCEMIA: Status: ACTIVE | Noted: 2019-03-05

## 2019-03-05 LAB
ADMINISTERED INITIALS, ADMINIT: NORMAL
ALBUMIN SERPL-MCNC: 2.3 G/DL (ref 3.2–4.6)
ALBUMIN/GLOB SERPL: 0.3 {RATIO} (ref 1.2–3.5)
ALP SERPL-CCNC: 175 U/L (ref 50–136)
ALT SERPL-CCNC: 17 U/L (ref 12–65)
AMORPH CRY URNS QL MICRO: ABNORMAL
ANION GAP SERPL CALC-SCNC: 11 MMOL/L (ref 7–16)
APPEARANCE UR: ABNORMAL
ARTERIAL PATENCY WRIST A: YES
ARTERIAL PATENCY WRIST A: YES
AST SERPL-CCNC: 30 U/L (ref 15–37)
ATRIAL RATE: 151 BPM
BACTERIA URNS QL MICRO: ABNORMAL /HPF
BASE DEFICIT BLD-SCNC: 2 MMOL/L
BDY SITE: ABNORMAL
BDY SITE: ABNORMAL
BILIRUB SERPL-MCNC: 0.5 MG/DL (ref 0.2–1.1)
BILIRUB UR QL: NEGATIVE
BODY TEMPERATURE: 98.6
BODY TEMPERATURE: 98.6
BUN SERPL-MCNC: 7 MG/DL (ref 8–23)
CALCIUM SERPL-MCNC: 10 MG/DL (ref 8.3–10.4)
CALCULATED P AXIS, ECG09: 55 DEGREES
CALCULATED R AXIS, ECG10: 125 DEGREES
CALCULATED T AXIS, ECG11: 64 DEGREES
CASTS URNS QL MICRO: ABNORMAL /LPF
CHLORIDE SERPL-SCNC: 87 MMOL/L (ref 98–107)
CO2 BLD-SCNC: 33 MMOL/L
CO2 BLD-SCNC: 36 MMOL/L
CO2 SERPL-SCNC: 32 MMOL/L (ref 21–32)
COLLECT TIME,HTIME: 1840
COLLECT TIME,HTIME: 630
COLOR UR: YELLOW
CREAT SERPL-MCNC: 1.05 MG/DL (ref 0.6–1)
D50 ADMINISTERED, D50ADM: 0 ML
D50 ORDER, D50ORD: 0 ML
DIAGNOSIS, 93000: NORMAL
EST. AVERAGE GLUCOSE BLD GHB EST-MCNC: 369 MG/DL
EXHALED MINUTE VOLUME, VE: 13.1 L/MIN
FLOW RATE ISTAT,IFRATE: 15 L/MIN
GAS FLOW.O2 O2 DELIVERY SYS: ABNORMAL L/MIN
GAS FLOW.O2 O2 DELIVERY SYS: ABNORMAL L/MIN
GAS FLOW.O2 SETTING OXYMISER: 15 BPM
GLOBULIN SER CALC-MCNC: 7.4 G/DL (ref 2.3–3.5)
GLSCOM COMMENTS: NORMAL
GLUCOSE BLD STRIP.AUTO-MCNC: 129 MG/DL (ref 65–100)
GLUCOSE BLD STRIP.AUTO-MCNC: 132 MG/DL (ref 65–100)
GLUCOSE BLD STRIP.AUTO-MCNC: 134 MG/DL (ref 65–100)
GLUCOSE BLD STRIP.AUTO-MCNC: 163 MG/DL (ref 65–100)
GLUCOSE BLD STRIP.AUTO-MCNC: 167 MG/DL (ref 65–100)
GLUCOSE BLD STRIP.AUTO-MCNC: 204 MG/DL (ref 65–100)
GLUCOSE BLD STRIP.AUTO-MCNC: 246 MG/DL (ref 65–100)
GLUCOSE BLD STRIP.AUTO-MCNC: 263 MG/DL (ref 65–100)
GLUCOSE BLD STRIP.AUTO-MCNC: 311 MG/DL (ref 65–100)
GLUCOSE BLD STRIP.AUTO-MCNC: 338 MG/DL (ref 65–100)
GLUCOSE BLD STRIP.AUTO-MCNC: 379 MG/DL (ref 65–100)
GLUCOSE BLD STRIP.AUTO-MCNC: 418 MG/DL (ref 65–100)
GLUCOSE BLD STRIP.AUTO-MCNC: 437 MG/DL (ref 65–100)
GLUCOSE BLD STRIP.AUTO-MCNC: 459 MG/DL (ref 65–100)
GLUCOSE BLD STRIP.AUTO-MCNC: 524 MG/DL (ref 65–100)
GLUCOSE SERPL-MCNC: 558 MG/DL (ref 65–100)
GLUCOSE UR STRIP.AUTO-MCNC: >1000 MG/DL
GLUCOSE, GLC: 129 MG/DL
GLUCOSE, GLC: 132 MG/DL
GLUCOSE, GLC: 134 MG/DL
GLUCOSE, GLC: 163 MG/DL
GLUCOSE, GLC: 204 MG/DL
GLUCOSE, GLC: 246 MG/DL
GLUCOSE, GLC: 311 MG/DL
GLUCOSE, GLC: 338 MG/DL
GLUCOSE, GLC: 379 MG/DL
GLUCOSE, GLC: 418 MG/DL
GLUCOSE, GLC: 437 MG/DL
GLUCOSE, GLC: 459 MG/DL
HBA1C MFR BLD: 14.5 % (ref 4.8–6)
HCO3 BLD-SCNC: 30.1 MMOL/L (ref 22–26)
HCO3 BLDV-SCNC: 34.2 MMOL/L (ref 23–28)
HGB UR QL STRIP: ABNORMAL
HIGH TARGET, HITG: 180 MG/DL
INSPIRATION.DURATION SETTING TIME VENT: 0.9 SEC
INSULIN ADMINSTERED, INSADM: 12 UNITS/HOUR
INSULIN ADMINSTERED, INSADM: 13 UNITS/HOUR
INSULIN ADMINSTERED, INSADM: 14.3 UNITS/HOUR
INSULIN ADMINSTERED, INSADM: 14.9 UNITS/HOUR
INSULIN ADMINSTERED, INSADM: 16 UNITS/HOUR
INSULIN ADMINSTERED, INSADM: 16.7 UNITS/HOUR
INSULIN ADMINSTERED, INSADM: 17.6 UNITS/HOUR
INSULIN ADMINSTERED, INSADM: 3.4 UNITS/HOUR
INSULIN ADMINSTERED, INSADM: 4.2 UNITS/HOUR
INSULIN ADMINSTERED, INSADM: 5 UNITS/HOUR
INSULIN ADMINSTERED, INSADM: 7.5 UNITS/HOUR
INSULIN ADMINSTERED, INSADM: 9.3 UNITS/HOUR
INSULIN ORDER, INSORD: 12 UNITS/HOUR
INSULIN ORDER, INSORD: 13 UNITS/HOUR
INSULIN ORDER, INSORD: 14.3 UNITS/HOUR
INSULIN ORDER, INSORD: 14.9 UNITS/HOUR
INSULIN ORDER, INSORD: 16 UNITS/HOUR
INSULIN ORDER, INSORD: 16.7 UNITS/HOUR
INSULIN ORDER, INSORD: 17.6 UNITS/HOUR
INSULIN ORDER, INSORD: 3.4 UNITS/HOUR
INSULIN ORDER, INSORD: 4.2 UNITS/HOUR
INSULIN ORDER, INSORD: 5 UNITS/HOUR
INSULIN ORDER, INSORD: 7.5 UNITS/HOUR
INSULIN ORDER, INSORD: 9.3 UNITS/HOUR
KETONES UR QL STRIP.AUTO: NEGATIVE MG/DL
LACTATE SERPL-SCNC: 1.8 MMOL/L (ref 0.4–2)
LEUKOCYTE ESTERASE UR QL STRIP.AUTO: NEGATIVE
LOW TARGET, LOT: 140 MG/DL
MAGNESIUM SERPL-MCNC: 1.8 MG/DL (ref 1.8–2.4)
MINUTES UNTIL NEXT BG, NBG: 60 MIN
MULTIPLIER, MUL: 0.02
MULTIPLIER, MUL: 0.03
MULTIPLIER, MUL: 0.04
MULTIPLIER, MUL: 0.05
MULTIPLIER, MUL: 0.05
MULTIPLIER, MUL: 0.06
MULTIPLIER, MUL: 0.06
MULTIPLIER, MUL: 0.07
MULTIPLIER, MUL: 0.07
MULTIPLIER, MUL: 0.08
MULTIPLIER, MUL: 0.09
MULTIPLIER, MUL: 0.09
NITRITE UR QL STRIP.AUTO: NEGATIVE
O2/TOTAL GAS SETTING VFR VENT: 40 %
ORDER INITIALS, ORDINIT: NORMAL
OTHER OBSERVATIONS,UCOM: ABNORMAL
P-R INTERVAL, ECG05: 142 MS
PCO2 BLD: 94.5 MMHG (ref 35–45)
PCO2 BLDV: 69.2 MMHG (ref 41–51)
PEEP RESPIRATORY: 10 CMH2O
PH BLD: 7.11 [PH] (ref 7.35–7.45)
PH BLDV: 7.3 [PH] (ref 7.32–7.42)
PH UR STRIP: 5.5 [PH] (ref 5–9)
PIP ISTAT,IPIP: 17
PO2 BLD: 88 MMHG (ref 75–100)
PO2 BLDV: 30 MMHG
POTASSIUM SERPL-SCNC: 3.7 MMOL/L (ref 3.5–5.1)
PROCALCITONIN SERPL-MCNC: 0.2 NG/ML
PROT SERPL-MCNC: 9.7 G/DL (ref 6.3–8.2)
PROT UR STRIP-MCNC: 100 MG/DL
Q-T INTERVAL, ECG07: 262 MS
QRS DURATION, ECG06: 64 MS
QTC CALCULATION (BEZET), ECG08: 415 MS
RBC #/AREA URNS HPF: ABNORMAL /HPF
SAO2 % BLD: 92 % (ref 95–98)
SAO2 % BLDV: 49 % (ref 65–88)
SERVICE CMNT-IMP: ABNORMAL
SODIUM SERPL-SCNC: 130 MMOL/L (ref 136–145)
SP GR UR REFRACTOMETRY: 1.03 (ref 1–1.02)
SPECIMEN TYPE: ABNORMAL
SPECIMEN TYPE: ABNORMAL
UROBILINOGEN UR QL STRIP.AUTO: 1 EU/DL (ref 0.2–1)
VENTRICULAR RATE, ECG03: 151 BPM
VT SETTING VENT: 358 ML
WBC URNS QL MICRO: ABNORMAL /HPF

## 2019-03-05 PROCEDURE — 36600 WITHDRAWAL OF ARTERIAL BLOOD: CPT

## 2019-03-05 PROCEDURE — 74011250636 HC RX REV CODE- 250/636

## 2019-03-05 PROCEDURE — 74011000258 HC RX REV CODE- 258: Performed by: INTERNAL MEDICINE

## 2019-03-05 PROCEDURE — 87040 BLOOD CULTURE FOR BACTERIA: CPT

## 2019-03-05 PROCEDURE — 95816 EEG AWAKE AND DROWSY: CPT | Performed by: PSYCHIATRY & NEUROLOGY

## 2019-03-05 PROCEDURE — 74011000258 HC RX REV CODE- 258: Performed by: HOSPITALIST

## 2019-03-05 PROCEDURE — 77030019605

## 2019-03-05 PROCEDURE — 74011250636 HC RX REV CODE- 250/636: Performed by: NURSE PRACTITIONER

## 2019-03-05 PROCEDURE — 77030020263 HC SOL INJ SOD CL0.9% LFCR 1000ML

## 2019-03-05 PROCEDURE — 94640 AIRWAY INHALATION TREATMENT: CPT

## 2019-03-05 PROCEDURE — 77030032490 HC SLV COMPR SCD KNE COVD -B

## 2019-03-05 PROCEDURE — 74011000250 HC RX REV CODE- 250: Performed by: HOSPITALIST

## 2019-03-05 PROCEDURE — 74011000250 HC RX REV CODE- 250: Performed by: NURSE PRACTITIONER

## 2019-03-05 PROCEDURE — 81001 URINALYSIS AUTO W/SCOPE: CPT

## 2019-03-05 PROCEDURE — 99223 1ST HOSP IP/OBS HIGH 75: CPT | Performed by: NURSE PRACTITIONER

## 2019-03-05 PROCEDURE — 99223 1ST HOSP IP/OBS HIGH 75: CPT | Performed by: INTERNAL MEDICINE

## 2019-03-05 PROCEDURE — 74011636637 HC RX REV CODE- 636/637: Performed by: HOSPITALIST

## 2019-03-05 PROCEDURE — 77030034850

## 2019-03-05 PROCEDURE — 95816 EEG AWAKE AND DROWSY: CPT | Performed by: HOSPITALIST

## 2019-03-05 PROCEDURE — 82962 GLUCOSE BLOOD TEST: CPT

## 2019-03-05 PROCEDURE — 74011000250 HC RX REV CODE- 250: Performed by: INTERNAL MEDICINE

## 2019-03-05 PROCEDURE — 83605 ASSAY OF LACTIC ACID: CPT

## 2019-03-05 PROCEDURE — 94660 CPAP INITIATION&MGMT: CPT

## 2019-03-05 PROCEDURE — 65610000001 HC ROOM ICU GENERAL

## 2019-03-05 PROCEDURE — 76450000000

## 2019-03-05 PROCEDURE — 82803 BLOOD GASES ANY COMBINATION: CPT

## 2019-03-05 PROCEDURE — 0T9B70Z DRAINAGE OF BLADDER WITH DRAINAGE DEVICE, VIA NATURAL OR ARTIFICIAL OPENING: ICD-10-PCS | Performed by: HOSPITALIST

## 2019-03-05 PROCEDURE — 74011250636 HC RX REV CODE- 250/636: Performed by: INTERNAL MEDICINE

## 2019-03-05 PROCEDURE — 4A00X4Z MEASUREMENT OF CENTRAL NERVOUS ELECTRICAL ACTIVITY, EXTERNAL APPROACH: ICD-10-PCS | Performed by: PSYCHIATRY & NEUROLOGY

## 2019-03-05 PROCEDURE — 87086 URINE CULTURE/COLONY COUNT: CPT

## 2019-03-05 PROCEDURE — 74011000258 HC RX REV CODE- 258

## 2019-03-05 PROCEDURE — 0651 HSPC ROUTINE HOME CARE

## 2019-03-05 PROCEDURE — 83036 HEMOGLOBIN GLYCOSYLATED A1C: CPT

## 2019-03-05 PROCEDURE — 74011250636 HC RX REV CODE- 250/636: Performed by: HOSPITALIST

## 2019-03-05 PROCEDURE — G0299 HHS/HOSPICE OF RN EA 15 MIN: HCPCS

## 2019-03-05 PROCEDURE — 36415 COLL VENOUS BLD VENIPUNCTURE: CPT

## 2019-03-05 RX ORDER — METOPROLOL TARTRATE 5 MG/5ML
1.25 INJECTION INTRAVENOUS
Status: DISCONTINUED | OUTPATIENT
Start: 2019-03-05 | End: 2019-03-09

## 2019-03-05 RX ORDER — SODIUM CHLORIDE 0.9 % (FLUSH) 0.9 %
5-40 SYRINGE (ML) INJECTION EVERY 8 HOURS
Status: DISCONTINUED | OUTPATIENT
Start: 2019-03-05 | End: 2019-03-14 | Stop reason: HOSPADM

## 2019-03-05 RX ORDER — ACETAMINOPHEN 325 MG/1
650 TABLET ORAL
Status: DISCONTINUED | OUTPATIENT
Start: 2019-03-05 | End: 2019-03-14 | Stop reason: HOSPADM

## 2019-03-05 RX ORDER — NALOXONE HYDROCHLORIDE 0.4 MG/ML
0.4 INJECTION, SOLUTION INTRAMUSCULAR; INTRAVENOUS; SUBCUTANEOUS AS NEEDED
Status: DISCONTINUED | OUTPATIENT
Start: 2019-03-05 | End: 2019-03-14 | Stop reason: HOSPADM

## 2019-03-05 RX ORDER — DEXAMETHASONE SODIUM PHOSPHATE 100 MG/10ML
6 INJECTION INTRAMUSCULAR; INTRAVENOUS EVERY 6 HOURS
Status: DISCONTINUED | OUTPATIENT
Start: 2019-03-05 | End: 2019-03-14 | Stop reason: HOSPADM

## 2019-03-05 RX ORDER — BUDESONIDE 0.5 MG/2ML
500 INHALANT ORAL
Status: DISCONTINUED | OUTPATIENT
Start: 2019-03-05 | End: 2019-03-14 | Stop reason: HOSPADM

## 2019-03-05 RX ORDER — DEXTROSE 50 % IN WATER (D50W) INTRAVENOUS SYRINGE
25-50 AS NEEDED
Status: DISCONTINUED | OUTPATIENT
Start: 2019-03-05 | End: 2019-03-05

## 2019-03-05 RX ORDER — MORPHINE SULFATE 2 MG/ML
2 INJECTION, SOLUTION INTRAMUSCULAR; INTRAVENOUS
Status: DISCONTINUED | OUTPATIENT
Start: 2019-03-05 | End: 2019-03-10

## 2019-03-05 RX ORDER — ATENOLOL 25 MG/1
25 TABLET ORAL DAILY
Status: DISCONTINUED | OUTPATIENT
Start: 2019-03-05 | End: 2019-03-05

## 2019-03-05 RX ORDER — SODIUM CHLORIDE 0.9 % (FLUSH) 0.9 %
5-40 SYRINGE (ML) INJECTION AS NEEDED
Status: DISCONTINUED | OUTPATIENT
Start: 2019-03-05 | End: 2019-03-14 | Stop reason: HOSPADM

## 2019-03-05 RX ORDER — HYDROCODONE BITARTRATE AND ACETAMINOPHEN 5; 325 MG/1; MG/1
1 TABLET ORAL
Status: DISCONTINUED | OUTPATIENT
Start: 2019-03-05 | End: 2019-03-10

## 2019-03-05 RX ORDER — ONDANSETRON 2 MG/ML
4 INJECTION INTRAMUSCULAR; INTRAVENOUS
Status: DISCONTINUED | OUTPATIENT
Start: 2019-03-05 | End: 2019-03-14 | Stop reason: HOSPADM

## 2019-03-05 RX ORDER — IPRATROPIUM BROMIDE AND ALBUTEROL SULFATE 2.5; .5 MG/3ML; MG/3ML
3 SOLUTION RESPIRATORY (INHALATION)
Status: DISCONTINUED | OUTPATIENT
Start: 2019-03-05 | End: 2019-03-14 | Stop reason: HOSPADM

## 2019-03-05 RX ORDER — VANCOMYCIN 2 GRAM/500 ML IN 0.9 % SODIUM CHLORIDE INTRAVENOUS
2 ONCE
Status: COMPLETED | OUTPATIENT
Start: 2019-03-05 | End: 2019-03-05

## 2019-03-05 RX ORDER — ALBUTEROL SULFATE 0.83 MG/ML
2.5 SOLUTION RESPIRATORY (INHALATION)
Status: DISCONTINUED | OUTPATIENT
Start: 2019-03-05 | End: 2019-03-14 | Stop reason: HOSPADM

## 2019-03-05 RX ORDER — SODIUM CHLORIDE 9 MG/ML
100 INJECTION, SOLUTION INTRAVENOUS CONTINUOUS
Status: DISCONTINUED | OUTPATIENT
Start: 2019-03-05 | End: 2019-03-05

## 2019-03-05 RX ORDER — DEXAMETHASONE SODIUM PHOSPHATE 100 MG/10ML
10 INJECTION INTRAMUSCULAR; INTRAVENOUS
Status: COMPLETED | OUTPATIENT
Start: 2019-03-05 | End: 2019-03-05

## 2019-03-05 RX ORDER — LORAZEPAM 2 MG/ML
1 INJECTION INTRAMUSCULAR
Status: DISCONTINUED | OUTPATIENT
Start: 2019-03-05 | End: 2019-03-10

## 2019-03-05 RX ORDER — SODIUM CHLORIDE 0.9 % (FLUSH) 0.9 %
5-10 SYRINGE (ML) INJECTION AS NEEDED
Status: DISCONTINUED | OUTPATIENT
Start: 2019-03-05 | End: 2019-03-14 | Stop reason: HOSPADM

## 2019-03-05 RX ORDER — IPRATROPIUM BROMIDE AND ALBUTEROL SULFATE 2.5; .5 MG/3ML; MG/3ML
3 SOLUTION RESPIRATORY (INHALATION)
Status: DISCONTINUED | OUTPATIENT
Start: 2019-03-05 | End: 2019-03-05 | Stop reason: SDUPTHER

## 2019-03-05 RX ORDER — DEXTROSE 40 %
15 GEL (GRAM) ORAL AS NEEDED
Status: DISCONTINUED | OUTPATIENT
Start: 2019-03-05 | End: 2019-03-05

## 2019-03-05 RX ORDER — VANCOMYCIN/0.9 % SOD CHLORIDE 1.5G/250ML
1500 PLASTIC BAG, INJECTION (ML) INTRAVENOUS EVERY 12 HOURS
Status: DISCONTINUED | OUTPATIENT
Start: 2019-03-05 | End: 2019-03-06

## 2019-03-05 RX ADMIN — HUMAN INSULIN 6 UNITS: 100 INJECTION, SOLUTION SUBCUTANEOUS at 23:40

## 2019-03-05 RX ADMIN — ALBUTEROL SULFATE 2.5 MG: 2.5 SOLUTION RESPIRATORY (INHALATION) at 19:30

## 2019-03-05 RX ADMIN — ALBUTEROL SULFATE 2.5 MG: 2.5 SOLUTION RESPIRATORY (INHALATION) at 07:27

## 2019-03-05 RX ADMIN — SODIUM CHLORIDE 100 ML/HR: 900 INJECTION, SOLUTION INTRAVENOUS at 04:28

## 2019-03-05 RX ADMIN — PIPERACILLIN SODIUM,TAZOBACTAM SODIUM 3.38 G: 3; .375 INJECTION, POWDER, FOR SOLUTION INTRAVENOUS at 03:04

## 2019-03-05 RX ADMIN — BUDESONIDE 500 MCG: 0.5 INHALANT RESPIRATORY (INHALATION) at 19:30

## 2019-03-05 RX ADMIN — SODIUM CHLORIDE 500 MG: 900 INJECTION, SOLUTION INTRAVENOUS at 16:54

## 2019-03-05 RX ADMIN — HUMAN INSULIN 2 UNITS: 100 INJECTION, SOLUTION SUBCUTANEOUS at 20:05

## 2019-03-05 RX ADMIN — SODIUM CHLORIDE 7.5 UNITS/HR: 900 INJECTION, SOLUTION INTRAVENOUS at 05:41

## 2019-03-05 RX ADMIN — PIPERACILLIN AND TAZOBACTAM 3.38 G: 3; .375 INJECTION, POWDER, FOR SOLUTION INTRAVENOUS at 22:02

## 2019-03-05 RX ADMIN — VANCOMYCIN HYDROCHLORIDE 2000 MG: 10 INJECTION, POWDER, LYOPHILIZED, FOR SOLUTION INTRAVENOUS at 03:16

## 2019-03-05 RX ADMIN — SODIUM CHLORIDE 1000 ML: 900 INJECTION, SOLUTION INTRAVENOUS at 03:00

## 2019-03-05 RX ADMIN — DEXAMETHASONE SODIUM PHOSPHATE 6 MG: 10 INJECTION INTRAMUSCULAR; INTRAVENOUS at 13:53

## 2019-03-05 RX ADMIN — SODIUM CHLORIDE 14.3 UNITS/HR: 900 INJECTION, SOLUTION INTRAVENOUS at 08:00

## 2019-03-05 RX ADMIN — METOPROLOL TARTRATE 1.25 MG: 5 INJECTION INTRAVENOUS at 10:04

## 2019-03-05 RX ADMIN — Medication 10 ML: at 13:54

## 2019-03-05 RX ADMIN — VANCOMYCIN HYDROCHLORIDE 1500 MG: 10 INJECTION, POWDER, LYOPHILIZED, FOR SOLUTION INTRAVENOUS at 16:54

## 2019-03-05 RX ADMIN — DEXAMETHASONE SODIUM PHOSPHATE 6 MG: 10 INJECTION INTRAMUSCULAR; INTRAVENOUS at 05:11

## 2019-03-05 RX ADMIN — BUDESONIDE 500 MCG: 0.5 INHALANT RESPIRATORY (INHALATION) at 07:26

## 2019-03-05 RX ADMIN — SODIUM CHLORIDE 100 ML/HR: 900 INJECTION, SOLUTION INTRAVENOUS at 08:41

## 2019-03-05 RX ADMIN — SODIUM CHLORIDE 500 MG: 900 INJECTION, SOLUTION INTRAVENOUS at 05:11

## 2019-03-05 RX ADMIN — DEXAMETHASONE SODIUM PHOSPHATE 6 MG: 10 INJECTION INTRAMUSCULAR; INTRAVENOUS at 19:25

## 2019-03-05 RX ADMIN — INSULIN HUMAN 10 UNITS: 100 INJECTION, SOLUTION PARENTERAL at 04:35

## 2019-03-05 RX ADMIN — SODIUM CHLORIDE 1000 MG: 900 INJECTION, SOLUTION INTRAVENOUS at 00:08

## 2019-03-05 RX ADMIN — SODIUM CHLORIDE 14.9 UNITS/HR: 900 INJECTION, SOLUTION INTRAVENOUS at 12:37

## 2019-03-05 RX ADMIN — PIPERACILLIN AND TAZOBACTAM 3.38 G: 3; .375 INJECTION, POWDER, FOR SOLUTION INTRAVENOUS at 10:05

## 2019-03-05 RX ADMIN — FAMOTIDINE 40 MG: 10 INJECTION, SOLUTION INTRAVENOUS at 20:27

## 2019-03-05 RX ADMIN — DEXMEDETOMIDINE HYDROCHLORIDE 0.4 MCG/KG/HR: 100 INJECTION, SOLUTION INTRAVENOUS at 20:14

## 2019-03-05 RX ADMIN — DEXAMETHASONE SODIUM PHOSPHATE 6 MG: 10 INJECTION INTRAMUSCULAR; INTRAVENOUS at 23:40

## 2019-03-05 RX ADMIN — Medication 10 ML: at 21:14

## 2019-03-05 RX ADMIN — DEXAMETHASONE SODIUM PHOSPHATE 10 MG: 10 INJECTION INTRAMUSCULAR; INTRAVENOUS at 03:12

## 2019-03-05 RX ADMIN — ALBUTEROL SULFATE 2.5 MG: 2.5 SOLUTION RESPIRATORY (INHALATION) at 13:41

## 2019-03-05 RX ADMIN — Medication 10 ML: at 05:15

## 2019-03-05 NOTE — PROGRESS NOTES
Initial visit made to patient and a prayer was provided for the patient and her family. Her family was in the ICU waiting room while a procedure was being done for the patient. A large group of family members were present.  provided support and prayer for them as well.         [employer]

## 2019-03-05 NOTE — PROGRESS NOTES
Dr. Katerin Etienne notified of AM ABG result. Pt placed on BiPAP by RT. Pt throwing RUE; order received to restrain RUE using soft wrist restraint. Will continue to monitor.

## 2019-03-05 NOTE — PROGRESS NOTES
TRANSFER - IN REPORT:    Verbal report received from [facility] on Lacy Morgan  being received from ED for routine progression of care      Report consisted of patients Situation, Background, Assessment and   Recommendations(SBAR). Information from the following report(s) SBAR, Kardex, ED Summary, MAR, Recent Results and Cardiac Rhythm ST was reviewed with the receiving nurse. Opportunity for questions and clarification was provided. Assessment completed upon patients arrival to unit and care assumed. Pt does not open eyes to voice or follow commands. Pt placed on 15L HFNC. ST on monitor. BP stable. Dual skin assessment completed with [facility], RN. Pt has scattered abrasions, excoriation under pannus, scar on abdomen, and skin tear to buttock. Pt bathed and Allevyn placed. Will continue to monitor.

## 2019-03-05 NOTE — INTERDISCIPLINARY ROUNDS
Interdisciplinary team rounds were held 3/5/2019 with the following team members:Care Management, Nursing, Nurse Practitioner, Nutrition, Palliative Care, Pastoral Care, Pharmacy, Physical Therapy, Physician, Respiratory Therapy, Speech Therapy and Clinical Coordinator and the patient. Plan of care discussed. See clinical pathway and/or care plan for interventions and desired outcomes.

## 2019-03-05 NOTE — PROGRESS NOTES
To see pt, no family at bedside. Pt currently on BIPAP. Hospice consult for Hospice house vs home hospice. Candis Landa, RN, liaison with [facility] to see pt and family. Call to pt's daughter, Vanessa Miguel, and Dr. Denver Ceballos, with Neuro-surgery, to speak with daughter regarding consult for surgery. Some confusion regarding goals of care. Pt DNR currently and per MD notes from , pt's daughter would want everything done. Comfort care vs treatment needs to be clarified by MD prior to hospice continuing with consult. Hayden Back, primary RN, called MD to notify. Palliative care following as well and will meet with family.

## 2019-03-05 NOTE — PROGRESS NOTES
According to Catracho Riddle RN, liaison with [facility], family does not want hospice at this time and would like to pursue tx. CM will follow for d/c needs.

## 2019-03-05 NOTE — HOSPICE
Talked with neurosurgeon after listening to the conversation he had with the patient's daughter, Jose Helton, on my cell phone. After Ashlyn Castañeda, her brother and the family arrived and met with the patient I met with the family in the [facility] Conference Room in the ICU. Jose Helton stated that she and her brother had talked with their mother a few minutes before our meeting and asked her if she was aware that they had been told that there was a brain surgery that could be performed to remove the tumor from her brain. They said that the patient said \"Yes\". They said that they also asked their mother if she wanted the surgery and they said that she also said \"Yes\". I stated that she had been a patient of [facility] for the last 6 months. Jose Helton stated that she didn't feel like her mother knew what hospice was for, that the only reason her mother wanted hospice before was for the additional help that would be provided. I reminded Jose Helton that they would have to intubate her mother to do the surgery. She stated that she was aware. Bethanie Corona the patient's son and Jose Helton both stated that they wanted everything done for her and that they did not want hospice nor comfort care at this time. I let them know that I would inform the bedside nurse and that [facility] would be available if they changed their minds.     Thank you for this referral,    Freida Lee RN, BSN  Community Nurse Liaison  [facility]  [phone]

## 2019-03-05 NOTE — ED PROVIDER NOTES
70-year-old female with end-stage squamous cell lung cancer on hospice brought in by EMS for altered mental status. Patient was being to by her sons she became unresponsive and called the ambulance. Patient arrives in seizure activity unresponsive. EMS reports that DO NOT RESUSCITATE do no medical intervention. Daughter arrives at bedside shortly thereafter is medical power of  and states she would like everything done other than CPR intubation or feeding tubes. The history is provided by the EMS personnel. Altered mental status    This is a new problem. The current episode started less than 1 hour ago. The problem has not changed since onset. Associated symptoms include unresponsiveness. Mental status baseline is normal.  Risk factors include a recent illness. Past Medical History:   Diagnosis Date    Abnormality of gait and mobility     Ambulatory dysfunction     Arthritis of knee     Asthma     CHF (congestive heart failure), NYHA class I (HCC)     diastolic    Chronic hypoxemic respiratory failure (HCC)     COPD (chronic obstructive pulmonary disease) with emphysema (HCC)     Cor pulmonale (HCC)     Diabetes mellitus without complication (HCC)     Dyslipidemia     Extreme obesity with respiratory disorder (HCC)     Hyperlipidemia     Hypertension, essential, benign     Hypomagnesemia     Insomnia 12/27/2016    Morbid obesity ([facility] [address]     Nocturnal hypoxia     Obesity with alveolar hypoventilation (HCC)     Orthopnea     FLETCHER (obstructive sleep apnea)     Primary pulmonary hypertension (HCC)     Pulmonary edema, noncardiac     Sleeps in sitting position due to orthopnea        Past Surgical History:   Procedure Laterality Date    HX CHOLECYSTECTOMY  1991    HX TONSILLECTOMY      HX TUBAL LIGATION  1993         No family history on file.     Social History     Socioeconomic History    Marital status:      Spouse name: Not on file    Number of children: Not on file    Years of education: Not on file    Highest education level: Not on file   Social Needs    Financial resource strain: Not on file    Food insecurity - worry: Not on file    Food insecurity - inability: Not on file    Transportation needs - medical: Not on file   ezeep needs - non-medical: Not on file   Occupational History    Not on file   Tobacco Use    Smoking status: Former Smoker     Last attempt to quit: 2014     Years since quittin.3   Substance and Sexual Activity    Alcohol use: Not on file    Drug use: Not on file    Sexual activity: Not on file   Other Topics Concern    Not on file   Social History Narrative    Not on file         ALLERGIES: Iodides tincture [iodine]    Review of Systems   Constitutional: Negative. Negative for activity change. HENT: Negative. Eyes: Negative. Respiratory: Negative. Cardiovascular: Negative. Gastrointestinal: Negative. Genitourinary: Negative. Musculoskeletal: Negative. Skin: Negative. Neurological: Negative. All other systems reviewed and are negative. Vitals:    19 2307   BP: 192/90   Pulse: (!) 150   Resp: 26   Temp: 98.4 °F (36.9 °C)   SpO2: 90%   Weight: (!) 169.6 kg (374 lb)   Height: 5' 6\" (1.676 m)            Physical Exam   Constitutional: She is oriented to person, place, and time. She appears well-developed and well-nourished. She appears toxic. She has a sickly appearance. She appears ill. She appears distressed. HENT:   Head: Normocephalic and atraumatic. Right Ear: External ear normal.   Left Ear: External ear normal.   Nose: Nose normal.   Mouth/Throat: Oropharynx is clear and moist. No oropharyngeal exudate. Eyes: Conjunctivae and EOM are normal. Pupils are equal, round, and reactive to light. Right eye exhibits no discharge. Left eye exhibits no discharge. No scleral icterus. Neck: Normal range of motion. Neck supple. No JVD present. No tracheal deviation present. Cardiovascular: Normal rate, regular rhythm and intact distal pulses. Pulmonary/Chest: Effort normal and breath sounds normal. No stridor. No respiratory distress. She has no wheezes. She exhibits no tenderness. Abdominal: Soft. Bowel sounds are normal. She exhibits no distension and no mass. There is no tenderness. Musculoskeletal: Normal range of motion. She exhibits no edema or tenderness. Neurological: She is oriented to person, place, and time. She is unresponsive. A cranial nerve deficit and sensory deficit is present. She displays seizure activity. GCS eye subscore is 1. GCS verbal subscore is 1. GCS motor subscore is 1. Skin: Skin is warm and dry. No rash noted. She is not diaphoretic. No erythema. No pallor. Psychiatric: She has a normal mood and affect. Her behavior is normal. Thought content normal.   Nursing note and vitals reviewed. MDM  Number of Diagnoses or Management Options  Diagnosis management comments: atient arrived in seizure activity unresponsive with family at bedside medical power of  wants everything done and intubation CPR feeding tubes.        Amount and/or Complexity of Data Reviewed  Clinical lab tests: ordered and reviewed  Tests in the radiology section of CPT®: ordered and reviewed  Tests in the medicine section of CPT®: ordered and reviewed           Procedures

## 2019-03-05 NOTE — ROUTINE PROCESS
TRANSFER - OUT REPORT:    Verbal report given to Jermaine Marino RN on Elda Rebollar  being transferred to [facility] for routine progression of care       Report consisted of patients Situation, Background, Assessment and   Recommendations(SBAR). Information from the following report(s) SBAR, ED Summary, [facility] and Recent Results was reviewed with the receiving nurse. Lines:   Peripheral IV 03/05/19 Right Hand (Active)   Site Assessment Clean, dry, & intact 3/5/2019  3:07 AM   Phlebitis Assessment 0 3/5/2019  3:07 AM   Infiltration Assessment 0 3/5/2019  3:07 AM   Dressing Status Clean, dry, & intact 3/5/2019  3:07 AM   Hub Color/Line Status Pink 3/5/2019  3:07 AM       Peripheral IV 03/05/19 Left Hand (Active)   Site Assessment Clean, dry, & intact 3/5/2019  3:07 AM   Phlebitis Assessment 0 3/5/2019  3:07 AM   Infiltration Assessment 0 3/5/2019  3:07 AM   Dressing Status Clean, dry, & intact 3/5/2019  3:07 AM   Hub Color/Line Status Pink 3/5/2019  3:07 AM       Peripheral IV 03/05/19 Left Forearm (Active)   Site Assessment Clean, dry, & intact 3/5/2019  3:07 AM   Phlebitis Assessment 0 3/5/2019  3:07 AM   Infiltration Assessment 0 3/5/2019  3:07 AM   Dressing Status Clean, dry, & intact 3/5/2019  3:07 AM   Hub Color/Line Status Pink 3/5/2019  3:07 AM        Opportunity for questions and clarification was provided.       Patient transported with:   Monitor  O2 @ 15 liters  Registered Nurse

## 2019-03-05 NOTE — CDMP QUERY
Pt admitted with seizure activity. Pt noted to have mets to brain. If possible, please document in the progress notes and d/c summary if you are evaluating and / or treating any of the following:     Cerebral Edema   Other, please specify   Clinically unable to determine    The medical record reflects the following:     Risk Factors: Brain mass noted on CT scan, Hyperglycemia     Clinical Indicators: per Head CT \"Brain mass in the right parieto-occipital lobe with surrounding vasogenic  Edema\"     Treatment: ICU admission. Neurology consult. Decadron 6 mg IV Q6H.       Thank Danii Hankins RN CDS  Compliant Documentation Management Program

## 2019-03-05 NOTE — CONSULTS
Palliative Care    Patient: Evan Davis MRN: [id number]  SSN: [id number]    YOB: 1960  Age: 59 y.o. Sex: female       Date of Request: 3/5/19  Date of Consult:  3/5/2019  Reason for Consult:  advanced care plan planning/end of life  Requesting Physician: Dr. Herman Lambert     Assessment/Plan:     Principal Diagnosis:    Debility, Unspecified  R53.81    Additional Diagnoses:   · Seizures, Convulsions  R56.9  · Counseling, Encounter for Medical Advice  Z71.9  · Encounter for Palliative Care  Z51.5    Palliative Performance Scale (PPS):  PPS: 10    Medical Decision Making:   Reviewed and summarized notes from admission to present. Discussed case with appropriate providers: Dr. Radha Ayers, Kim Serna, [facility] liaison Chase Song, Dr. Rashad Marmolejo  Reviewed laboratory and x-ray data from admission to present. Pt on BiPAP, on Keppra. Poorly responsive. Dtr/HCPOA Hazel was able to fill in the story. During a hospitalization for acute on chronic respiratory failure from 7/28/18 - 8/2/18, it was found that the pt had lung cancer. Ms. Yolanda Pires opted to forego treatment and went home on home hospice, starting with [facility] on 8/2/18. Yesterday evening the pt rapidly declined, losing her balance and speech. The family called 911 rather than hospice, the pt was brought to the ICU, and the family said they wanted to have \"everything done\". In our discussion this morning, Hazel said that she had reconsidered, and wanted her mother to go back on hospice, preferably at [facility]. A consult order to hospice was placed. 1220 - Reviewed case with Chase Song. He will meet with Chele Reyes, and other family members, and review the options with them, for continued home hospice, [facility], or comfort measures in the hospital.      [id number] - [place] from [facility] that the family has decided that they want to revoke hospice and have the surgery to remove the brain tumor.     7060 - Dr. Heriberto Gandhi said that the pt is not a good surgical candidate due to high blood glucose level, leukocytosis, lack of current MRI, and multiple comorbidities. He said that he would stop back by to speak to dtr/NOA Oliva in person if possible. Code status will need to be discussed, as pt will need to be intubated for surgery. Will discuss findings with members of the interdisciplinary team.      Thank you for this referral.          .    Subjective:     History obtained from:  Family, Care Provider and Chart    Chief Complaint: Seizures    History of Present Illness:  Ms. Prakash Rothman is a 70 yo F with a PMH of squamous cell carcinoma of the lung, COPD, extreme obesity, diastolic CHF, on [facility] home hospice,and other conditions listed below who was brought from home to the ER by EMS on 3/5/19 with c/o of AMS, inability to walk or talk. On arrival she was unresponsive with seizure activity. Was found to have a brain mass. She was admitted to the ICU, made a partial code (all except intubation), placed on BiPAP, and started on Keppra and Ativan. Advance Directive: Yes       Code Status:  DNR            Health Care Power of : Yes - Daughter Syd Mckeon states the pt has a [facility], and note from 8/1/18 from [facility] indicates that one was completed, however it is not on the EMR.     Past Medical History:   Diagnosis Date    Abnormality of gait and mobility     Ambulatory dysfunction     Arthritis of knee     Asthma     CHF (congestive heart failure), NYHA class I (HCC)     diastolic    Chronic hypoxemic respiratory failure (HCC)     COPD (chronic obstructive pulmonary disease) with emphysema (HCC)     Cor pulmonale (HCC)     Diabetes mellitus without complication (HCC)     Dyslipidemia     Extreme obesity with respiratory disorder ([facility] [address]     Hyperlipidemia     Hypertension, essential, benign     Hypomagnesemia     Insomnia 12/27/2016    Morbid obesity ([address]     Nocturnal hypoxia     Obesity with alveolar hypoventilation (HCC)     Orthopnea     FLETCHER (obstructive sleep apnea)     Primary pulmonary hypertension (HCC)     Pulmonary edema, noncardiac     Sleeps in sitting position due to orthopnea       Past Surgical History:   Procedure Laterality Date    HX CHOLECYSTECTOMY      HX TONSILLECTOMY      HX TUBAL LIGATION       No family history on file. Social History     Tobacco Use    Smoking status: Former Smoker     Last attempt to quit: 2014     Years since quittin.3   Substance Use Topics    Alcohol use: Not on file     Prior to Admission medications    Medication Sig Start Date End Date Taking? Authorizing Provider   ketoprofen 10 % crpk Apply 1 mL to affected area two (2) times a day. apply to right shoulder twice daily 3/4/19   Anna Jauregui NP   furosemide (LASIX) 20 mg tablet Take 20 mg by mouth daily. Pt has 20mg tablets now    Provider, Historical   traZODone (DESYREL) 50 mg tablet Take 25 mg by mouth two (2) times a day. take 1/2 tab at breakfast and 6pm 19   Lazaro LAGUNAS NP   morphine CR (MS CONTIN) 15 mg CR tablet Take 15 mg by mouth every twelve (12) hours. 19   Anna Jauregui NP   zolpidem (AMBIEN) 5 mg tablet Take 5 mg by mouth nightly. 19   Anna Jauregui NP   HYDROcodone-acetaminophen [facility])  mg tablet Take 1 Tab by mouth every four (4) hours as needed for Pain. takes 4 times a day routinely, is in med box 10/22/18   Provider, Historical   senna (SENNA) 8.6 mg tablet Take 1 Tab by mouth two (2) times a day. not using    Provider, Historical   atenolol (TENORMIN) 50 mg tablet Take 25 mg by mouth daily. Provider, Historical   baclofen (LIORESAL) 20 mg tablet Take 20 mg by mouth as needed (muscle spasms). 4 times daily as needed for muscle spasms. Takes 4 times a day routinely    Provider, Historical   predniSONE (DELTASONE) 10 mg tablet Take 20 mg by mouth daily.     Provider, Historical   OXYGEN-AIR DELIVERY SYSTEMS 2 L/min by IntraNASal route continuous. Provider, Historical   albuterol-ipratropium (DUO-NEB) 2.5 mg-0.5 mg/3 ml nebu 3 mL by Nebulization route every three (3) hours as needed (sob or wheezing). Provider, Historical   polyethylene glycol (MIRALAX) 17 gram packet Take 17 g by mouth daily as needed (constipation). not using    Provider, Historical   prochlorperazine (COMPAZINE) 10 mg tablet Take 10 mg by mouth every six (6) hours as needed (nausea or vomiting). Provider, Historical   Omeprazole delayed release (PRILOSEC D/R) 20 mg tablet Take 20 mg by mouth daily. Provider, Historical   diphenhydrAMINE (BENADRYL) 25 mg tablet Take 50 mg by mouth nightly. Provider, Historical   ibuprofen (MOTRIN) 800 mg tablet Take 800 mg by mouth every eight (8) hours as needed (mild pain). Provider, Historical   melatonin 5 mg cap capsule Take 5 mg by mouth nightly. currently has 3mg tabs, takes 2 tabs nightly    Other, MD Lance   gabapentin (NEURONTIN) 800 mg tablet Take 800 mg by mouth four (4) times daily. Provider, Historical   fluticasone-salmeterol (ADVAIR HFA) 115-21 mcg/actuation inhaler Take 2 Puffs by inhalation two (2) times a day. Provider, Historical   metFORMIN (GLUMETZA ER) 500 mg TG24 24 hour tablet Take 2 Tabs by mouth daily. Provider, Historical   traZODone (DESYREL) 100 mg tablet Take 100 mg by mouth nightly. Provider, Historical       Allergies   Allergen Reactions    Iodides Tincture [Iodine] Hives        Review of Systems:  Review of systems not obtained due to patient factors: pt poorly responsive. Objective:     Visit Vitals  /74   Pulse (!) 115   Temp 97.3 °F (36.3 °C)   Resp 20   Ht 5' 6\" (1.676 m)   Wt 305 lb 1.9 oz (138.4 kg)   SpO2 96%   BMI 49.25 kg/m²        Physical Exam:    General:  Poorly responsive. No acute distress. Eyes:  Conjunctivae/corneas clear    Nose: Nares normal. Septum midline.    Neck: Supple, symmetrical, trachea midline, no JVD   Lungs:   Clear to auscultation bilaterally, unlabored   Heart:  Regular rate and rhythm, no murmur    Abdomen:   Soft, non-tender, non-distended   Extremities: Normal, atraumatic, no cyanosis or edema   Skin: Skin color, texture, turgor normal. No rash or lesions. Neurologic: Unable to determine.    Psych: Poorly responsive      Assessment:     Hospital Problems  Date Reviewed: 7/28/2018          Codes Class Noted POA    * (Principal) Seizure ([facility] ICD-10-CM: R56.9  ICD-9-CM: 780.39  3/5/2019 Yes        Metastatic cancer to brain [facility]) ICD-10-CM: C79.31  ICD-9-CM: 198.3  3/5/2019 Yes        Hyperglycemia ICD-10-CM: R73.9  ICD-9-CM: 790.29  3/5/2019 Yes        Mass of left lung ICD-10-CM: R91.8  ICD-9-CM: 786.6  7/30/2018 Yes        Primary cancer of left upper lobe of lung ([facility]- squamous cell ICD-10-CM: C34.12  ICD-9-CM: 162.3  7/30/2018 Yes        Hypertension, essential, benign (Chronic) ICD-10-CM: I10  ICD-9-CM: 401.1  Unknown Yes        Hyperlipidemia (Chronic) ICD-10-CM: E78.5  ICD-9-CM: 272.4  Unknown Yes        COPD with emphysema ([facility] (Chronic) ICD-10-CM: J43.9  ICD-9-CM: 492.8  Unknown Yes        Morbid obesity ([facility] (Chronic) ICD-10-CM: E66.01  ICD-9-CM: 278.01  Unknown Yes        Primary pulmonary hypertension ([facility] ICD-10-CM: I27.0  ICD-9-CM: 416.0  Unknown Yes        Diabetes mellitus without complication (HCC) (Chronic) ICD-10-CM: E11.9  ICD-9-CM: 250.00  Unknown Yes        FLETCHER (obstructive sleep apnea) (Chronic) ICD-10-CM: G47.33  ICD-9-CM: 327.23  Unknown Yes        Chronic hypoxemic respiratory failure (HCC) (Chronic) ICD-10-CM: J96.11  ICD-9-CM: 518.83, 799.02  Unknown Yes        CHF (congestive heart failure), NYHA class I ([facility] (Chronic) ICD-10-CM: I50.9  ICD-9-CM: 428.0  Unknown Yes    Overview Signed 12/27/2016 11:24 AM by Melvin Ware     diastolic                   Signed By: Yumiko Galdamez NP     March 5, 2019

## 2019-03-05 NOTE — PROCEDURES
Routine EEG Report    Reason for EEG: altered mental status. Brain metastasis.       Current Facility-Administered Medications:     sodium chloride (NS) flush 5-10 mL, 5-10 mL, IntraVENous, PRN, Shima Sexton MD    insulin regular (NOVOLIN R, HUMULIN R) 100 Units in 0.9% sodium chloride 100 mL infusion, 0-50 Units/hr, IntraVENous, TITRATE, Thien Wolfe MD, Last Rate: 4.2 mL/hr at 03/05/19 1653, 4.2 Units/hr at 03/05/19 1653    dextrose 40% (GLUTOSE) oral gel 1 Tube, 15 g, Oral, PRN, Thien Wolfe MD    glucagon [facility]) injection 1 mg, 1 mg, IntraMUSCular, PRN, Thien Wolfe MD    dextrose (D50W) injection syrg 12.5-25 g, 25-50 mL, IntraVENous, PRN, Thien Wolfe MD    0.9% sodium chloride infusion, 100 mL/hr, IntraVENous, CONTINUOUS, Thien Wolfe MD, Last Rate: 100 mL/hr at 03/05/19 0841, 100 mL/hr at 03/05/19 0841    sodium chloride (NS) flush 5-40 mL, 5-40 mL, IntraVENous, Q8H, Thien Wolfe MD, 10 mL at 03/05/19 1354    sodium chloride (NS) flush 5-40 mL, 5-40 mL, IntraVENous, PRN, Thien Wolfe MD    NUTRITIONAL SUPPORT ELECTROLYTE PRN ORDERS, , Does Not Apply, PRN, Thien Wolfe MD    acetaminophen (TYLENOL) tablet 650 mg, 650 mg, Oral, Q4H PRN, Thien Wolfe MD    HYDROcodone-acetaminophen [facility]) 5-325 mg per tablet 1 Tab, 1 Tab, Oral, Q4H PRN, Thien Wolfe MD    morphine injection 2 mg, 2 mg, IntraVENous, Q4H PRN, Thien Wolfe MD    ondansetron [facility]) injection 4 mg, 4 mg, IntraVENous, Q4H PRN, Thien Wolfe MD    naloxone [facility]) injection 0.4 mg, 0.4 mg, IntraVENous, PRN, Thien Wolfe MD    LORazepam (ATIVAN) injection 1 mg, 1 mg, IntraVENous, Q4H PRN, Thien Wolfe MD    albuterol-ipratropium (DUO-NEB) 2.5 MG-0.5 MG/3 ML, 3 mL, Nebulization, Q4H PRN, Thien Wolfe MD    levETIRAcetam (KEPPRA) 500 mg in 0.9% sodium chloride 100 mL IVPB, 500 mg, IntraVENous, Q12H, Thien Wolfe MD, 500 mg at 03/05/19 1654    budesonide (PULMICORT) 500 mcg/2 ml nebulizer suspension, 500 mcg, Nebulization, BID RT, 500 mcg at 03/05/19 0726 **AND** albuterol (PROVENTIL VENTOLIN) nebulizer solution 2.5 mg, 2.5 mg, Nebulization, Q6HWA RT, Tej Aponte MD, 2.5 mg at 03/05/19 1341    dexamethasone (DECADRON) injection 6 mg, 6 mg, IntraVENous, Q6H, Tej Aponte MD, 6 mg at 03/05/19 1353    piperacillin-tazobactam (ZOSYN) 3.375 g in 0.9% sodium chloride (MBP/ADV) 100 mL, 3.375 g, IntraVENous, Q8H, Tej Aponte MD, Last Rate: 25 mL/hr at 03/05/19 1005, 3.375 g at 03/05/19 1005    metoprolol (LOPRESSOR) injection 1.25 mg, 1.25 mg, IntraVENous, Q6H PRN, Laurel Grossman MD, 1.25 mg at 03/05/19 1004    vancomycin (VANCOCIN) 1500 mg in  ml infusion, 1,500 mg, IntraVENous, Q12H, Lauren Henning MD, Last Rate: 250 mL/hr at 03/05/19 1654, 1,500 mg at 03/05/19 1654    famotidine (PF) (PEPCID) 40 mg in sodium chloride 0.9% 10 mL injection, 40 mg, IntraVENous, Q12H, Frida Mendoza NP      Technical Summary: This EEG was performed with a 32-channel digital EEG machine with electrodes placed according to the international 10-20 system of placement. Background:   During the maximal awake state no posterior dominant rhythm was seen. Anterior background consisted of medium amplitude activity in the primarily in the theta range with occasional intermixed delta frequencies. The background is reactive. At times, there is slight attenuation over the right hemisphere compared to the left. Hyperventilation: Hyperventilation was not performed due to medical condition    Photic Stimulation: Photic stimulation was not performed due to medical condition    Sleep: None    Interictal abnormalities: Epileptiform discharges are present with phase reversal over the central parietal region on the right.   Sharply contoured waveforms appear throughout this recording over the right hemisphere and occasionally left hemisphere. Ictal/events: None    Impression: The results of this EEG are abnormal.      #1: Epileptiform discharges over the right hemisphere with occasional phase reversal in the central parietal region. This places the patient at risk for focal and focal to bilateral tonic-clonic seizures    #2: Frontal intermittent rhythmic delta activity: An indicator of encephalopathy, nonspecific. #3: Occasional attenuation over the right hemisphere which may suggest a underlying structural abnormality    #4: Generalized slowing of the background activity, moderate in severity. Consistent with a moderate degree of encephalopathy. No seizures were recorded.

## 2019-03-05 NOTE — H&P
Hospitalist H&P/Consult Note     Admit Date:  3/4/2019 11:10 PM   Name:  Harry Huff   Age:  59 y.o.  :  1960   MRN:  [id number]   PCP:  Kiersten Ramesh, Not On File  Treatment Team: Attending Provider: Essence Sheffield MD    HPI:   Patient is a 58 y/o female with squamous cell carcinoma of the lung, COPD, extreme obesity, diastolic CHF, HTN, HLD, FLETCHER, pulmonary HTN, HLD who presents from home with new altered mental status. EMS reports hyperglycemia. On arrival to ED she was unresponsive with seizure activity. Glucose is 558. She was loaded with IV keppra and also given IV ativan. A head CT shows brain mass in the right parieto-occipital lobe with surrounding vasogenic edema. She then received IV decadron. She was tachycardic with WBC ct of 14.2 so sepsis protocol initiated with fluids and antibiotics. Initially family had said she was DNR but later stated to do everything Except intubation. Will admit to ICU. 10 systems reviewed and negative except as noted in HPI.  Currently poorly responsive  Past Medical History:   Diagnosis Date    Abnormality of gait and mobility     Ambulatory dysfunction     Arthritis of knee     Asthma     CHF (congestive heart failure), NYHA class I (HCC)     diastolic    Chronic hypoxemic respiratory failure (HCC)     COPD (chronic obstructive pulmonary disease) with emphysema (HCC)     Cor pulmonale (HCC)     Diabetes mellitus without complication (HCC)     Dyslipidemia     Extreme obesity with respiratory disorder (HCC)     Hyperlipidemia     Hypertension, essential, benign     Hypomagnesemia     Insomnia 2016    Morbid obesity ([facility] [address]     Nocturnal hypoxia     Obesity with alveolar hypoventilation (HCC)     Orthopnea     FLETCHER (obstructive sleep apnea)     Primary pulmonary hypertension (HCC)     Pulmonary edema, noncardiac     Sleeps in sitting position due to orthopnea       Past Surgical History:   Procedure Laterality Date    HX CHOLECYSTECTOMY     HX TONSILLECTOMY      HX TUBAL LIGATION        Prior to Admission Medications   Prescriptions Last Dose Informant Patient Reported? Taking? HYDROcodone-acetaminophen (NORCO)  mg tablet   Yes No   Sig: Take 1 Tab by mouth every four (4) hours as needed for Pain. takes 4 times a day routinely, is in med box   OXYGEN-AIR DELIVERY SYSTEMS   Yes No   Si L/min by IntraNASal route continuous. Omeprazole delayed release (PRILOSEC D/R) 20 mg tablet   Yes No   Sig: Take 20 mg by mouth daily. albuterol-ipratropium (DUO-NEB) 2.5 mg-0.5 mg/3 ml nebu   Yes No   Sig: 3 mL by Nebulization route every three (3) hours as needed (sob or wheezing). atenolol (TENORMIN) 50 mg tablet   Yes No   Sig: Take 25 mg by mouth daily. baclofen (LIORESAL) 20 mg tablet   Yes No   Sig: Take 20 mg by mouth as needed (muscle spasms). 4 times daily as needed for muscle spasms. Takes 4 times a day routinely   diphenhydrAMINE (BENADRYL) 25 mg tablet   Yes No   Sig: Take 50 mg by mouth nightly. fluticasone-salmeterol (ADVAIR HFA) 115-21 mcg/actuation inhaler   Yes No   Sig: Take 2 Puffs by inhalation two (2) times a day. furosemide (LASIX) 20 mg tablet   Yes No   Sig: Take 20 mg by mouth daily. Pt has 20mg tablets now   gabapentin (NEURONTIN) 800 mg tablet   Yes No   Sig: Take 800 mg by mouth four (4) times daily. ibuprofen (MOTRIN) 800 mg tablet   Yes No   Sig: Take 800 mg by mouth every eight (8) hours as needed (mild pain). melatonin 5 mg cap capsule   Yes No   Sig: Take 5 mg by mouth nightly. currently has 3mg tabs, takes 2 tabs nightly   metFORMIN (GLUMETZA ER) 500 mg TG24 24 hour tablet   Yes No   Sig: Take 2 Tabs by mouth daily. morphine CR (MS CONTIN) 15 mg CR tablet   Yes No   Sig: Take 15 mg by mouth every twelve (12) hours. polyethylene glycol (MIRALAX) 17 gram packet   Yes No   Sig: Take 17 g by mouth daily as needed (constipation).  not using   predniSONE (DELTASONE) 10 mg tablet   Yes No   Sig: Take 10 mg by mouth daily. prochlorperazine (COMPAZINE) 10 mg tablet   Yes No   Sig: Take 10 mg by mouth every six (6) hours as needed (nausea or vomiting). senna (SENNA) 8.6 mg tablet   Yes No   Sig: Take 1 Tab by mouth two (2) times a day. not using   traZODone (DESYREL) 100 mg tablet   Yes No   Sig: Take 100 mg by mouth nightly. traZODone (DESYREL) 50 mg tablet   Yes No   Sig: Take 25 mg by mouth two (2) times a day. take 1/2 tab at breakfast and 6pm   zolpidem (AMBIEN) 5 mg tablet   Yes No   Sig: Take 5 mg by mouth nightly. Facility-Administered Medications: None     Allergies   Allergen Reactions    Iodides Tincture [Iodine] Hives      Social History     Tobacco Use    Smoking status: Former Smoker     Last attempt to quit: 2014     Years since quittin.3   Substance Use Topics    Alcohol use: Not on file      No family history on file. There is no immunization history on file for this patient.     Objective:     Patient Vitals for the past 24 hrs:   Temp Pulse Resp BP SpO2   19 0413 97.4 °F (36.3 °C) (!) 126 13 122/58 95 %   19 0315 98.6 °F (37 °C) (!) 132 15 133/54 93 %   19 0300  (!) 134 23 142/65 91 %   19 0245  (!) 133 20 124/55 93 %   19 0230  (!) 134  146/65 93 %   19 0215  (!) 134 12 148/65 93 %   19 0200  (!) 134 16 152/67 93 %   19 0145  (!) 136 16 158/67 93 %   19 0130  (!) 134 18 162/69 93 %   19 0115  (!) 138 10 161/71 92 %   19 0100  (!) 134 8 159/67 92 %   19 0045  (!) 141 15 165/78 94 %   19 0034  (!) 143 26 161/83 91 %   19 2349  (!) 152 30 192/86 97 %   19 2345  (!) 152 30 (!) 203/91 95 %   19 2316  (!) 152 (!) 34 179/87 90 %   19 2313  (!) 153 16 (!) 165/95 91 %   19 2307 98.4 °F (36.9 °C) (!) 150 26 192/90 90 %     Oxygen Therapy  O2 Sat (%): 95 % (19 9774)  Pulse via Oximetry: 133 beats per minute (19 0311)  O2 Device: Hi flow nasal cannula (03/05/19 0315)  O2 Flow Rate (L/min): 15 l/min (03/05/19 0315)    Intake/Output Summary (Last 24 hours) at 3/5/2019 0501  Last data filed at 3/5/2019 0307  Gross per 24 hour   Intake 1000 ml   Output    Net 1000 ml       Physical Exam:  General:    Obese, ill-kempt, poorly responsive   Eyes:   Normal sclera. Extraocular movements intact. ENT:  Normocephalic, atraumatic. dry mucous membranes  CV:   tachycardic. No murmur, rub, or gallop. Lungs:  Diminished BS bases. Bilateral crackles  Abdomen: Soft, nontender, nondistended. Bowel sounds normal.   Extremities: Warm and dry. No cyanosis or edema. Neurologic: CN II-XII grossly intact. Sensation intact. Skin:     Multiple excoriated areas of arms, body  Psych:  somnolent    I reviewed the labs, imaging, EKGs, telemetry, and other studies done this admission. Data Review:   Recent Results (from the past 24 hour(s))   CBC WITH AUTOMATED DIFF    Collection Time: 03/04/19 11:41 PM   Result Value Ref Range    WBC 14.2 (H) 4.3 - 11.1 K/uL    RBC 4.87 4.05 - 5.2 M/uL    HGB 12.3 11.7 - 15.4 g/dL    HCT 43.7 35.8 - 46.3 %    MCV 89.7 79.6 - 97.8 FL    MCH 25.3 (L) 26.1 - 32.9 PG    MCHC 28.1 (L) 31.4 - 35.0 g/dL    RDW 16.1 (H) 11.9 - 14.6 %    PLATELET 312 (H) 206 - 450 K/uL    MPV 10.2 9.4 - 12.3 FL    ABSOLUTE NRBC 0.00 0.0 - 0.2 K/uL    DF AUTOMATED      NEUTROPHILS 76 43 - 78 %    LYMPHOCYTES 17 13 - 44 %    MONOCYTES 6 4.0 - 12.0 %    EOSINOPHILS 0 (L) 0.5 - 7.8 %    BASOPHILS 1 0.0 - 2.0 %    IMMATURE GRANULOCYTES 1 0.0 - 5.0 %    ABS. NEUTROPHILS 10.7 (H) 1.7 - 8.2 K/UL    ABS. LYMPHOCYTES 2.4 0.5 - 4.6 K/UL    ABS. MONOCYTES 0.8 0.1 - 1.3 K/UL    ABS. EOSINOPHILS 0.0 0.0 - 0.8 K/UL    ABS. BASOPHILS 0.1 0.0 - 0.2 K/UL    ABS. IMM.  GRANS. 0.1 0.0 - 0.5 K/UL   METABOLIC PANEL, COMPREHENSIVE    Collection Time: 03/04/19 11:41 PM   Result Value Ref Range    Sodium 130 (L) 136 - 145 mmol/L    Potassium 3.7 3.5 - 5.1 mmol/L    Chloride 87 (L) 98 - 107 mmol/L    CO2 32 21 - 32 mmol/L    Anion gap 11 7 - 16 mmol/L    Glucose 558 (HH) 65 - 100 mg/dL    BUN 7 (L) 8 - 23 MG/DL    Creatinine 1.05 (H) 0.6 - 1.0 MG/DL    GFR est AA >60 >60 ml/min/1.73m2    GFR est non-AA 57 (L) >60 ml/min/1.73m2    Calcium 10.0 8.3 - 10.4 MG/DL    Bilirubin, total 0.5 0.2 - 1.1 MG/DL    ALT (SGPT) 17 12 - 65 U/L    AST (SGOT) 30 15 - 37 U/L    Alk. phosphatase 175 (H) 50 - 136 U/L    Protein, total 9.7 (H) 6.3 - 8.2 g/dL    Albumin 2.3 (L) 3.2 - 4.6 g/dL    Globulin 7.4 (H) 2.3 - 3.5 g/dL    A-G Ratio 0.3 (L) 1.2 - 3.5     MAGNESIUM    Collection Time: 03/04/19 11:41 PM   Result Value Ref Range    Magnesium 1.8 1.8 - 2.4 mg/dL   PROCALCITONIN    Collection Time: 03/04/19 11:41 PM   Result Value Ref Range    Procalcitonin 0.2 ng/mL   GLUCOSE, POC    Collection Time: 03/05/19  4:25 AM   Result Value Ref Range    Glucose (POC) 524 (HH) 65 - 100 mg/dL       Imaging Studies:  CXR Results  (Last 48 hours)               03/05/19 0134  XR CHEST PORT Final result    Impression:  IMPRESSION:       Masslike opacity in the left perihilar region, significantly more prominent   compared to July 29, 2018 CT. Findings suspicious for lung tumor. Contrast-enhanced CT chest should be considered to better characterize. DC4                       Narrative:  Portable chest xray         COMPARISON: August 1, 2018       CLINICAL HISTORY: Altered mental status. FINDINGS:       There are bilateral diffuse groundglass opacities, likely vascular congestion. There is confluent masslike opacity in the left perihilar region. This is more   prominent compared to prior examination. Heart appears enlarged. No   pneumothorax. CT Results  (Last 48 hours)               03/05/19 0032  CT HEAD WO CONT Final result    Impression:  IMPRESSION:       1. Brain mass in the right parieto-occipital lobe with surrounding vasogenic   edema.  Contrast enhanced MRI of the brain should be considered to better   evaluate/characterize. Renny Kessler DC4                       Narrative:  Noncontrast CT of the brain. COMPARISON: none       INDICATION: Altered mental status. History of lung cancer. TECHNIQUE: Contiguous axial images were obtained from the skull base through the   vertex without IV contrast. Radiation dose reduction techniques were used for   this study:  Our CT scanners use one or all of the following: Automated exposure   control, adjustment of the mA and/or kVp according to patient's size, iterative   reconstruction. FINDINGS:       There is an approximately 2 cm cystic mass in the right parieto-occipital lobe. There is surrounding vasogenic edema. There is mass effect on the occipital horn   of the right lateral ventricle. There is no midline shift or hydrocephalus. Included globes appear intact. Paranasal sinuses and the mastoid air cells are   aerated. Surrounding bones are intact.                      Assessment and Plan:     Hospital Problems as of 3/5/2019 Date Reviewed: 7/28/2018          Codes Class Noted - Resolved POA    * (Principal) Seizure ([facility] [address] ICD-10-CM: R56.9  ICD-9-CM: 780.39  3/5/2019 - Present Yes        Hyperglycemia ICD-10-CM: R73.9  ICD-9-CM: 790.29  3/5/2019 - Present Yes        Metastatic cancer to brain [facility]) ICD-10-CM: C79.31  ICD-9-CM: 198.3  3/5/2019 - Present Yes        Mass of left lung ICD-10-CM: R91.8  ICD-9-CM: 786.6  7/30/2018 - Present Yes        Primary cancer of left upper lobe of lung ([facility] [address]- squamous cell ICD-10-CM: C34.12  ICD-9-CM: 162.3  7/30/2018 - Present Yes        Hypertension, essential, benign (Chronic) ICD-10-CM: I10  ICD-9-CM: 401.1  Unknown - Present Yes        Hyperlipidemia (Chronic) ICD-10-CM: E78.5  ICD-9-CM: 272.4  Unknown - Present Yes        COPD with emphysema (HCC) (Chronic) ICD-10-CM: J43.9  ICD-9-CM: 492.8  Unknown - Present Yes        Morbid obesity ([facility] [address] (Chronic) ICD-10-CM: E66.01  ICD-9-CM: 278.01  Unknown - Present Yes        Primary pulmonary hypertension ([facility] [address] ICD-10-CM: I27.0  ICD-9-CM: 416.0  Unknown - Present Yes        Diabetes mellitus without complication (HCC) (Chronic) ICD-10-CM: E11.9  ICD-9-CM: 250.00  Unknown - Present Yes        FLETCHER (obstructive sleep apnea) (Chronic) ICD-10-CM: G47.33  ICD-9-CM: 327.23  Unknown - Present Yes        Chronic hypoxemic respiratory failure (HCC) (Chronic) ICD-10-CM: J96.11  ICD-9-CM: 518.83, 799.02  Unknown - Present Yes        CHF (congestive heart failure), NYHA class I (HCC) (Chronic) ICD-10-CM: I50.9  ICD-9-CM: 428.0  Unknown - Present Yes    Overview Signed 12/27/2016 11:24 AM by Ashwin hidalgo                   PLAN:  · Admit patient to ICU in serious condition  · She has received loading dose of keppra in ED and will continue and use prn IV ativan for breakthrough. Seizure precautions  · Check ABG for possible CO2 retention with persistent lethargy  · Obtain EEG in am   · Continue IV decadron for edema surrounding brain tumor (metastatic from lung CA)  · MRI with contrast in am for better definition  · Consult Neurosurgery in am. If no surgical option perhaps palliative radiation  · Place on glucostabilizer for hyperglycemia  · Respiratory treatments for COPD  · Check lactic acid. Continue IV antibiotics until can r/o sepsis. Need UA  · NPO until more alert.  Place small catheter  · Palliative care consult as prognosis poor  · SCDs for DVT prophylaxis   · Originally family stated patient DNR then later changed and said do everything except intubate so will be partial code with DNI  · Total critical care time spent 40 minutes      Estimated LOS:  Greater than 2 midnights    Signed:  Gloria More MD

## 2019-03-05 NOTE — PROGRESS NOTES
Progress Note    Patient: Savannah Plascencia MRN: [id number]  SSN: [id number]    YOB: 1960  Age: 59 y.o. Sex: female      Admit Date: 3/4/2019    LOS: 0 days     Subjective:     From admission note :     \" Patient is a 58 y/o female with squamous cell carcinoma of the lung, COPD, extreme obesity, diastolic CHF, HTN, HLD, FLETCHER, pulmonary HTN, HLD who presents from home with new altered mental status. EMS reports hyperglycemia. On arrival to ED she was unresponsive with seizure activity. Glucose is 558. She was loaded with IV keppra and also given IV ativan. A head CT shows brain mass in the right parieto-occipital lobe with surrounding vasogenic edema. She then received IV decadron. She was tachycardic with WBC ct of 14.2 so sepsis protocol initiated with fluids and antibiotics. Initially family had said she was DNR but later stated to do everything Except intubation. Will admit to ICU. \"    Patient has been in bed. Initially palliative consultant saw the patient and at that time her family wanted her to be a hospice candidate. However, later she changed her mind and now she is a full code. Family would like aggressive treatment now. Objective:     Vitals:    03/05/19 1516 03/05/19 1535 03/05/19 1652 03/05/19 1701   BP: 130/74 146/66 113/59 113/60   Pulse: (!) 117 (!) 115 100 98   Resp: (!) 34 19 18 26   Temp:   99 °F (37.2 °C)    SpO2: 97% 98% 96% 96%   Weight:       Height:            Intake and Output:  Current Shift: No intake/output data recorded. Last three shifts: 03/03 1901 - 03/05 0700  In: 1466.4 [I.V.:1466.4]  Out: 600 [Urine:600]    Physical Exam:     General:                    The patient is a female in no acute distress. She is lying flat in bed. On BiPAP. Head:                                   Normocephalic/atraumatic. Eyes:                                   No palpebral pallor or scleral icterus.   ENT:                                    External auricular and nasal exam within normal limits. Mucous membranes are moist.  Neck:                                   Supple, non-tender, no JVD. Lungs:                       Clear to auscultation bilaterally without wheezes or crackles. No respiratory distress or accessory muscle use. Heart:                                  Regular rate and rhythm, without murmurs, rubs, or gallops. Abdomen:                  Soft, non-tender, mildly distended with normoactive bowel sounds. Genitourinary:           No tenderness over the bladder or bilateral CVAs. Extremities:               Without clubbing, cyanosis, or edema. Skin:                                    Normal color, texture, and turgor. No rashes, lesions, or jaundice. Pulses:                      Radial and dorsalis pedis pulses present 2+ bilaterally. Capillary refill <2s. Neurologic:            Moving all four extremities well with no focal deficits. Lab/Data Review:    Recent Results (from the past 24 hour(s))   CBC WITH AUTOMATED DIFF    Collection Time: 03/04/19 11:41 PM   Result Value Ref Range    WBC 14.2 (H) 4.3 - 11.1 K/uL    RBC 4.87 4.05 - 5.2 M/uL    HGB 12.3 11.7 - 15.4 g/dL    HCT 43.7 35.8 - 46.3 %    MCV 89.7 79.6 - 97.8 FL    MCH 25.3 (L) 26.1 - 32.9 PG    MCHC 28.1 (L) 31.4 - 35.0 g/dL    RDW 16.1 (H) 11.9 - 14.6 %    PLATELET 177 (H) 410 - 450 K/uL    MPV 10.2 9.4 - 12.3 FL    ABSOLUTE NRBC 0.00 0.0 - 0.2 K/uL    DF AUTOMATED      NEUTROPHILS 76 43 - 78 %    LYMPHOCYTES 17 13 - 44 %    MONOCYTES 6 4.0 - 12.0 %    EOSINOPHILS 0 (L) 0.5 - 7.8 %    BASOPHILS 1 0.0 - 2.0 %    IMMATURE GRANULOCYTES 1 0.0 - 5.0 %    ABS. NEUTROPHILS 10.7 (H) 1.7 - 8.2 K/UL    ABS. LYMPHOCYTES 2.4 0.5 - 4.6 K/UL    ABS. MONOCYTES 0.8 0.1 - 1.3 K/UL    ABS. EOSINOPHILS 0.0 0.0 - 0.8 K/UL    ABS. BASOPHILS 0.1 0.0 - 0.2 K/UL    ABS. IMM. GRANS. 0.1 0.0 - 0.5 K/UL   METABOLIC PANEL, COMPREHENSIVE    Collection Time: 03/04/19 11:41 PM   Result Value Ref Range    Sodium 130 (L) 136 - 145 mmol/L    Potassium 3.7 3.5 - 5.1 mmol/L    Chloride 87 (L) 98 - 107 mmol/L    CO2 32 21 - 32 mmol/L    Anion gap 11 7 - 16 mmol/L    Glucose 558 (HH) 65 - 100 mg/dL    BUN 7 (L) 8 - 23 MG/DL    Creatinine 1.05 (H) 0.6 - 1.0 MG/DL    GFR est AA >60 >60 ml/min/1.73m2    GFR est non-AA 57 (L) >60 ml/min/1.73m2    Calcium 10.0 8.3 - 10.4 MG/DL    Bilirubin, total 0.5 0.2 - 1.1 MG/DL    ALT (SGPT) 17 12 - 65 U/L    AST (SGOT) 30 15 - 37 U/L    Alk. phosphatase 175 (H) 50 - 136 U/L    Protein, total 9.7 (H) 6.3 - 8.2 g/dL    Albumin 2.3 (L) 3.2 - 4.6 g/dL    Globulin 7.4 (H) 2.3 - 3.5 g/dL    A-G Ratio 0.3 (L) 1.2 - 3.5     MAGNESIUM    Collection Time: 03/04/19 11:41 PM   Result Value Ref Range    Magnesium 1.8 1.8 - 2.4 mg/dL   PROCALCITONIN    Collection Time: 03/04/19 11:41 PM   Result Value Ref Range    Procalcitonin 0.2 ng/mL   EKG, 12 LEAD, INITIAL    Collection Time: 03/04/19 11:43 PM   Result Value Ref Range    Ventricular Rate 151 BPM    Atrial Rate 151 BPM    P-R Interval 142 ms    QRS Duration 64 ms    Q-T Interval 262 ms    QTC Calculation (Bezet) 415 ms    Calculated P Axis 55 degrees    Calculated R Axis 125 degrees    Calculated T Axis 64 degrees    Diagnosis       !! AGE AND GENDER SPECIFIC ECG ANALYSIS !!   Sinus tachycardia  Low voltage QRS  Left posterior fascicular block  Possible Inferior infarct , age undetermined  Cannot rule out Anterior infarct , age undetermined  Abnormal ECG  No previous ECGs available  Confirmed by Jovanny Mejia MD (), Antoine Briscoe ([id number]) on 3/5/2019 12:07:06 PM     GLUCOSE, POC    Collection Time: 03/05/19  4:25 AM   Result Value Ref Range    Glucose (POC) 524 (HH) 65 - 100 mg/dL   URINALYSIS W/ RFLX MICROSCOPIC    Collection Time: 03/05/19  5:00 AM   Result Value Ref Range    Color YELLOW      Appearance CLOUDY      Specific gravity 1.029 (H) 1.001 - 1.023      pH (UA) 5.5 5.0 - 9.0      Protein 100 (A) NEG mg/dL    Glucose >1,000 mg/dL    Ketone NEGATIVE  NEG mg/dL    Bilirubin NEGATIVE  NEG      Blood TRACE (A) NEG      Urobilinogen 1.0 0.2 - 1.0 EU/dL    Nitrites NEGATIVE  NEG      Leukocyte Esterase NEGATIVE  NEG      WBC 10-20 0 /hpf    RBC 0-3 0 /hpf    Bacteria 3+ (H) 0 /hpf    Casts 3-5 0 /lpf    Amorphous Crystals 1+ (H) 0    Other observations OCCL WBC CLUMP PRESENT    HEMOGLOBIN A1C WITH EAG    Collection Time: 03/05/19  5:33 AM   Result Value Ref Range    Hemoglobin A1c 14.5 (H) 4.8 - 6.0 %    Est. average glucose 369 mg/dL   LACTIC ACID    Collection Time: 03/05/19  5:33 AM   Result Value Ref Range    Lactic acid 1.8 0.4 - 2.0 MMOL/L   GLUCOSE, POC    Collection Time: 03/05/19  5:36 AM   Result Value Ref Range    Glucose (POC) 437 (H) 65 - 100 mg/dL   GLUCOSTABILIZER    Collection Time: 03/05/19  5:41 AM   Result Value Ref Range    Glucose 437 mg/dL    Insulin order 7.5 units/hour    Insulin adminstered 7.5 units/hour    Multiplier 0.020     Low target 140 mg/dL    High target 180 mg/dL    D50 order 0.0 ml    D50 administered 0.00 ml    Minutes until next BG 60 min    Order initials ajb     Administered initials ajb     GLSCOM Comments     POC G3    Collection Time: 03/05/19  6:33 AM   Result Value Ref Range    Device: High Flow Nasal Cannula      pH (POC) 7.111 (LL) 7.35 - 7.45      pCO2 (POC) 94.5 (HH) 35 - 45 MMHG    pO2 (POC) 88 75 - 100 MMHG    HCO3 (POC) 30.1 (H) 22 - 26 MMOL/L    sO2 (POC) 92 (L) 95 - 98 %    Base deficit (POC) 2 mmol/L    Allens test (POC) YES      Site LEFT RADIAL      Patient temp.  98.6      Specimen type (POC) ARTERIAL      Performed by Ellen     CO2, POC 33 MMOL/L    Flow rate (POC) 15.000 L/min    Critical value read back 06:33     Respiratory comment: NurseNotified     COLLECT TIME 630     GLUCOSE, POC    Collection Time: 03/05/19  6:46 AM   Result Value Ref Range    Glucose (POC) 459 (HH) 65 - 100 mg/dL   GLUCOSTABILIZER    Collection Time: 03/05/19  6:47 AM   Result Value Ref Range    Glucose 459 mg/dL    Insulin order 12.0 units/hour    Insulin adminstered 12.0 units/hour    Multiplier 0.030     Low target 140 mg/dL    High target 180 mg/dL    D50 order 0.0 ml    D50 administered 0.00 ml    Minutes until next BG 60 min    Order initials CG     Administered initials CG     GLSCOM Comments     GLUCOSE, POC    Collection Time: 03/05/19  7:53 AM   Result Value Ref Range    Glucose (POC) 418 (H) 65 - 100 mg/dL   GLUCOSTABILIZER    Collection Time: 03/05/19  7:54 AM   Result Value Ref Range    Glucose 418 mg/dL    Insulin order 14.3 units/hour    Insulin adminstered 14.3 units/hour    Multiplier 0.040     Low target 140 mg/dL    High target 180 mg/dL    D50 order 0.0 ml    D50 administered 0.00 ml    Minutes until next BG 60 min    Order initials tq     Administered initials tq     GLSCOM Comments     GLUCOSE, POC    Collection Time: 03/05/19  8:56 AM   Result Value Ref Range    Glucose (POC) 379 (H) 65 - 100 mg/dL   GLUCOSTABILIZER    Collection Time: 03/05/19  8:57 AM   Result Value Ref Range    Glucose 379 mg/dL    Insulin order 16.0 units/hour    Insulin adminstered 16.0 units/hour    Multiplier 0.050     Low target 140 mg/dL    High target 180 mg/dL    D50 order 0.0 ml    D50 administered 0.00 ml    Minutes until next BG 60 min    Order initials tq     Administered initials tq     GLSCOM Comments     GLUCOSE, POC    Collection Time: 03/05/19 10:00 AM   Result Value Ref Range    Glucose (POC) 338 (H) 65 - 100 mg/dL   GLUCOSTABILIZER    Collection Time: 03/05/19 10:01 AM   Result Value Ref Range    Glucose 338 mg/dL    Insulin order 16.7 units/hour    Insulin adminstered 16.7 units/hour    Multiplier 0.060     Low target 140 mg/dL    High target 180 mg/dL    D50 order 0.0 ml    D50 administered 0.00 ml    Minutes until next BG 60 min    Order initials tq     Administered initials tq     GLSCOM Comments GLUCOSE, POC    Collection Time: 03/05/19 11:02 AM   Result Value Ref Range    Glucose (POC) 311 (H) 65 - 100 mg/dL   GLUCOSTABILIZER    Collection Time: 03/05/19 11:03 AM   Result Value Ref Range    Glucose 311 mg/dL    Insulin order 17.6 units/hour    Insulin adminstered 17.6 units/hour    Multiplier 0.070     Low target 140 mg/dL    High target 180 mg/dL    D50 order 0.0 ml    D50 administered 0.00 ml    Minutes until next BG 60 min    Order initials tq     Administered initials tq     GLSCOM Comments     GLUCOSE, POC    Collection Time: 03/05/19 12:11 PM   Result Value Ref Range    Glucose (POC) 246 (H) 65 - 100 mg/dL   GLUCOSTABILIZER    Collection Time: 03/05/19 12:15 PM   Result Value Ref Range    Glucose 246 mg/dL    Insulin order 14.9 units/hour    Insulin adminstered 14.9 units/hour    Multiplier 0.080     Low target 140 mg/dL    High target 180 mg/dL    D50 order 0.0 ml    D50 administered 0.00 ml    Minutes until next BG 60 min    Order initials ADH     Administered initials ADH     GLSCOM Comments     GLUCOSE, POC    Collection Time: 03/05/19  1:24 PM   Result Value Ref Range    Glucose (POC) 204 (H) 65 - 100 mg/dL   GLUCOSTABILIZER    Collection Time: 03/05/19  1:27 PM   Result Value Ref Range    Glucose 204 mg/dL    Insulin order 13.0 units/hour    Insulin adminstered 13.0 units/hour    Multiplier 0.090     Low target 140 mg/dL    High target 180 mg/dL    D50 order 0.0 ml    D50 administered 0.00 ml    Minutes until next BG 60 min    Order initials HS     Administered initials HS     GLSCOM Comments     GLUCOSE, POC    Collection Time: 03/05/19  2:31 PM   Result Value Ref Range    Glucose (POC) 163 (H) 65 - 100 mg/dL   GLUCOSTABILIZER    Collection Time: 03/05/19  2:32 PM   Result Value Ref Range    Glucose 163 mg/dL    Insulin order 9.3 units/hour    Insulin adminstered 9.3 units/hour    Multiplier 0.090     Low target 140 mg/dL    High target 180 mg/dL    D50 order 0.0 ml    D50 administered 0.00 ml    Minutes until next BG 60 min    Order initials NG     Administered initials NG     GLSCOM Comments     GLUCOSE, POC    Collection Time: 03/05/19  3:36 PM   Result Value Ref Range    Glucose (POC) 129 (H) 65 - 100 mg/dL   GLUCOSTABILIZER    Collection Time: 03/05/19  3:38 PM   Result Value Ref Range    Glucose 129 mg/dL    Insulin order 5.0 units/hour    Insulin adminstered 5.0 units/hour    Multiplier 0.072     Low target 140 mg/dL    High target 180 mg/dL    D50 order 0.0 ml    D50 administered 0.00 ml    Minutes until next BG 60 min    Order initials jh     Administered initials hs     GLSCOM Comments     GLUCOSE, POC    Collection Time: 03/05/19  4:51 PM   Result Value Ref Range    Glucose (POC) 132 (H) 65 - 100 mg/dL   GLUCOSTABILIZER    Collection Time: 03/05/19  4:52 PM   Result Value Ref Range    Glucose 132 mg/dL    Insulin order 4.2 units/hour    Insulin adminstered 4.2 units/hour    Multiplier 0.058     Low target 140 mg/dL    High target 180 mg/dL    D50 order 0.0 ml    D50 administered 0.00 ml    Minutes until next BG 60 min    Order initials ADH     Administered initials ADH     GLSCOM Comments       CT head  3-5-2019  IMPRESSION:     1. Brain mass in the right parieto-occipital lobe with surrounding vasogenic  edema. Contrast enhanced MRI of the brain should be considered to better  Evaluate/characterize. .    XR chest   3-5-2019  IMPRESSION:     Masslike opacity in the left perihilar region, significantly more prominent  compared to July 29, 2018 CT. Findings suspicious for lung tumor. Contrast-enhanced CT chest should be considered to better characterize. EKG   3-4-2019  !! AGE AND GENDER SPECIFIC ECG ANALYSIS !!    Sinus tachycardia   Low voltage QRS   Left posterior fascicular block   Possible Inferior infarct , age undetermined   Cannot rule out Anterior infarct , age undetermined   Abnormal ECG   No previous ECGs available       Current Facility-Administered Medications:     sodium chloride (NS) flush 5-10 mL, 5-10 mL, IntraVENous, PRN, Heather Crowder MD    insulin regular (NOVOLIN R, HUMULIN R) 100 Units in 0.9% sodium chloride 100 mL infusion, 0-50 Units/hr, IntraVENous, TITRATE, Blayne Victoria MD, Last Rate: 4.2 mL/hr at 03/05/19 1653, 4.2 Units/hr at 03/05/19 1653    dextrose 40% (GLUTOSE) oral gel 1 Tube, 15 g, Oral, PRN, Blayne Victoria MD    glucagon [facility]) injection 1 mg, 1 mg, IntraMUSCular, PRN, Blayne Victoria MD    dextrose (D50W) injection syrg 12.5-25 g, 25-50 mL, IntraVENous, PRN, Blayne Victoria MD    0.9% sodium chloride infusion, 100 mL/hr, IntraVENous, CONTINUOUS, Blayne Victoria MD, Last Rate: 100 mL/hr at 03/05/19 0841, 100 mL/hr at 03/05/19 0841    sodium chloride (NS) flush 5-40 mL, 5-40 mL, IntraVENous, Q8H, Blayne Victoria MD, 10 mL at 03/05/19 1354    sodium chloride (NS) flush 5-40 mL, 5-40 mL, IntraVENous, PRN, Blayne Victoria MD    NUTRITIONAL SUPPORT ELECTROLYTE PRN ORDERS, , Does Not Apply, PRN, Blayne Victoria MD    acetaminophen (TYLENOL) tablet 650 mg, 650 mg, Oral, Q4H PRN, Blayne Victoria MD    HYDROcodone-acetaminophen [facility]) 5-325 mg per tablet 1 Tab, 1 Tab, Oral, Q4H PRN, Blayne Victoria MD    morphine injection 2 mg, 2 mg, IntraVENous, Q4H PRN, Blayne Victoria MD    ondansetron [facility]) injection 4 mg, 4 mg, IntraVENous, Q4H PRN, Blayne Victoria MD    naloxone [facility]) injection 0.4 mg, 0.4 mg, IntraVENous, PRN, Blayne Victoria MD    LORazepam (ATIVAN) injection 1 mg, 1 mg, IntraVENous, Q4H PRN, Blayne Victoria MD    albuterol-ipratropium (DUO-NEB) 2.5 MG-0.5 MG/3 ML, 3 mL, Nebulization, Q4H PRN, Blayne Victoria MD    levETIRAcetam (KEPPRA) 500 mg in 0.9% sodium chloride 100 mL IVPB, 500 mg, IntraVENous, Q12H, Blayne Victoria MD, 500 mg at 03/05/19 1654    budesonide (PULMICORT) 500 mcg/2 ml nebulizer suspension, 500 mcg, Nebulization, BID RT, 500 mcg at 03/05/19 0726 **AND** albuterol (PROVENTIL VENTOLIN) nebulizer solution 2.5 mg, 2.5 mg, Nebulization, Q6HWA RT, Sudha Davila MD, 2.5 mg at 03/05/19 1341    dexamethasone (DECADRON) injection 6 mg, 6 mg, IntraVENous, Q6H, Sudha Davila MD, 6 mg at 03/05/19 1353    piperacillin-tazobactam (ZOSYN) 3.375 g in 0.9% sodium chloride (MBP/ADV) 100 mL, 3.375 g, IntraVENous, Q8H, Sudha Davila MD, Last Rate: 25 mL/hr at 03/05/19 1005, 3.375 g at 03/05/19 1005    metoprolol (LOPRESSOR) injection 1.25 mg, 1.25 mg, IntraVENous, Q6H PRN, Laurel Grossman MD, 1.25 mg at 03/05/19 1004    vancomycin (VANCOCIN) 1500 mg in  ml infusion, 1,500 mg, IntraVENous, Q12H, Ender Salazar MD, Last Rate: 250 mL/hr at 03/05/19 1654, 1,500 mg at 03/05/19 1654    famotidine (PF) (PEPCID) 40 mg in sodium chloride 0.9% 10 mL injection, 40 mg, IntraVENous, Q12H, Shiva Terry NP      Assessment:     Principal Problem:    Seizure ([facility] [address] (3/5/2019)    Active Problems:    Hypertension, essential, benign ()      COPD with emphysema (HCC) ()      Morbid obesity ([address] ()      Diabetes mellitus without complication (HCC) ()      FLETCHER (obstructive sleep apnea) ()      Chronic hypoxemic respiratory failure (HCC) ()      Primary cancer of left upper lobe of lung ([address]- squamous cell (7/30/2018)      Metastatic cancer to brain ([address] (3/5/2019)      Hyperglycemia (3/5/2019)      Acute on chronic respiratory failure ([address] (3/5/2019)        Plan:     Hyperglycemia  Continue glucose stabilizer    Acute on chronic respiratory failure with hypoxia and hypercapnea. History of FLETCHER  On BiPAP  Pulmonary is helping. Lung cancer with brain metastasis  Oncology to help with goal of treatment. Neurosurgery is helping as well. I have discussed the plan of care with patient and care team.     DVT prophylaxis : SCD    Prognosis is guarded.      Signed By: Brunilda Carrel, MD     March 5, 2019

## 2019-03-05 NOTE — ED TRIAGE NOTES
Patient arrives via EMS from home with elevated blood glucose, EMS states reading HI on monitor. Patient is lung cancer patient. EMS states family notified them that she is DNR and that they did not want anything done. EMS states patient is usually able to communicate, transfer to bed side commode. EMS states patient inially was responsive.

## 2019-03-05 NOTE — CONSULTS
CONSULT NOTE    Milan Gurrola    3/5/2019    Date of Admission:  3/4/2019    The patient's chart is reviewed and the patient is discussed with the staff. Subjective:     Patient is a 59 y.o.  female seen and evaluated at the request of Dr. Marita Ventura. She was brought to the ER last night after her son found her BS > 500. She was also noted to be shaky and weak. EMS was summoned and upon arrival to the ER she was found to be unresponsive and seizuring. Her head CT shows a 2 cm cystic, parietooccipital mass with edema/mass effect. She has been seen by neurosurgery who recommended a brain MRI. She has been started on Decadron and Keppra. She was diagnosed with squamous cell lung cancer last year after undergoing a left upper lobe biopsy. She was seen by oncology and an outpatient PET with follow up appointment was recommended but patient ended up under the care of hospice instead. She is morbidly obese and has a history of COPD with chronic dyspnea. She quit smoking 4 to 5 years ago. She is maintained on 2 liters of oxygen and has a home nebulizer. She has FLETCHER/OHS with chronic hypercapnea but she has refused CPAP/Bipap in the past. On arrival here, her abg showed a PH of 7.1/94/88/30 with a baseline CO2 in the low 70s. She lives with her son and he reports that she can only ambulate a step of 2 at a time.      Review of Systems  A comprehensive review of systems was negative except for: Constitutional: positive for none  Eyes: positive for none  Ears, nose, mouth, throat, and face: positive for snoring  Respiratory: positive for dyspnea on exertion  Cardiovascular: positive for lower extremity edema  Gastrointestinal: positive for none  Genitourinary: positive for none  Integument/breast: positive for none  Hematologic/lymphatic: positive for none  Musculoskeletal: positive for arthralgias, back pain and muscle weakness  Neurological: positive for none  Behvioral/Psych: positive for depression  Endocrine: positive for diabetes  Allergic/Immunologic: positive for none    Patient Active Problem List   Diagnosis Code    Hypertension, essential, benign I10    Hyperlipidemia E78.5    COPD with emphysema ([facility] J43.9    Morbid obesity [facility] E66.01    Hypomagnesemia E83.42    Primary pulmonary hypertension ([facility] I27.0    Diabetes mellitus without complication ([facility] D13.0    Orthopnea R06.01    Abnormality of gait and mobility R26.9    Insomnia G47.00    Extreme obesity with respiratory disorder (HCC) E66.01, J98.9    Asthma J45.909    FLETCHER (obstructive sleep apnea) G47.33    Chronic hypoxemic respiratory failure (HCC) J96.11    Cor pulmonale ([facility]) I27.81    CHF (congestive heart failure), NYHA class I (HCC) I50.9    Obesity with alveolar hypoventilation ([facility]) E66.2    Pulmonary edema, noncardiac J81.1    Arthritis of knee M17.10    Acute on chronic respiratory failure (HCC) J96.20    Fall W19. [place] Pulmonary infiltrates R91.8    Chronic pain G89.29    COPD exacerbation ([facility]) J44.1    Mass of left lung R91.8    Mediastinal lymphadenopathy R59.0    Primary cancer of left upper lobe of lung (HCC)- squamous cell C34.12    Benign neoplasm of colon D12.6    History of colonic polyps Z86.010    Seizure ([facility] R56.9    Metastatic cancer to brain ([facility] C79.31    Hyperglycemia R73.9         Prior to Admission Medications   Prescriptions Last Dose Informant Patient Reported? Taking? HYDROcodone-acetaminophen (NORCO)  mg tablet   Yes No   Sig: Take 1 Tab by mouth every four (4) hours as needed for Pain. takes 4 times a day routinely, is in med box   OXYGEN-AIR DELIVERY SYSTEMS   Yes No   Si L/min by IntraNASal route continuous. Omeprazole delayed release (PRILOSEC D/R) 20 mg tablet   Yes No   Sig: Take 20 mg by mouth daily.    albuterol-ipratropium (DUO-NEB) 2.5 mg-0.5 mg/3 ml nebu   Yes No   Sig: 3 mL by Nebulization route every three (3) hours as needed (sob or wheezing). atenolol (TENORMIN) 50 mg tablet   Yes No   Sig: Take 25 mg by mouth daily. baclofen (LIORESAL) 20 mg tablet   Yes No   Sig: Take 20 mg by mouth as needed (muscle spasms). 4 times daily as needed for muscle spasms. Takes 4 times a day routinely   diphenhydrAMINE (BENADRYL) 25 mg tablet   Yes No   Sig: Take 50 mg by mouth nightly. fluticasone-salmeterol (ADVAIR HFA) 115-21 mcg/actuation inhaler   Yes No   Sig: Take 2 Puffs by inhalation two (2) times a day. furosemide (LASIX) 20 mg tablet   Yes No   Sig: Take 20 mg by mouth daily. Pt has 20mg tablets now   gabapentin (NEURONTIN) 800 mg tablet   Yes No   Sig: Take 800 mg by mouth four (4) times daily. glyBURIDE (DIABETA) 5 mg tablet   Yes No   Sig: Take 5 mg by mouth two (2) times daily (with meals). ibuprofen (MOTRIN) 800 mg tablet   Yes No   Sig: Take 800 mg by mouth every eight (8) hours as needed (mild pain). ketoprofen 10 % crpk   Yes No   Sig: Apply 1 mL to affected area two (2) times a day. apply to right shoulder twice daily   melatonin 5 mg cap capsule   Yes No   Sig: Take 5 mg by mouth nightly. currently has 3mg tabs, takes 2 tabs nightly   metFORMIN (GLUMETZA ER) 500 mg TG24 24 hour tablet   Yes No   Sig: Take 2 Tabs by mouth daily. morphine CR (MS CONTIN) 15 mg CR tablet   Yes No   Sig: Take 15 mg by mouth every twelve (12) hours. polyethylene glycol (MIRALAX) 17 gram packet   Yes No   Sig: Take 17 g by mouth daily as needed (constipation). not using   predniSONE (DELTASONE) 10 mg tablet   Yes No   Sig: Take 20 mg by mouth daily. prochlorperazine (COMPAZINE) 10 mg tablet   Yes No   Sig: Take 10 mg by mouth every six (6) hours as needed (nausea or vomiting). senna (SENNA) 8.6 mg tablet   Yes No   Sig: Take 1 Tab by mouth two (2) times a day. not using   traZODone (DESYREL) 100 mg tablet   Yes No   Sig: Take 100 mg by mouth nightly.    traZODone (DESYREL) 50 mg tablet   Yes No   Sig: Take 25 mg by mouth two (2) times a day. take 1/2 tab at breakfast and 6pm   zolpidem (AMBIEN) 5 mg tablet   Yes No   Sig: Take 5 mg by mouth nightly.       Facility-Administered Medications: None       Past Medical History:   Diagnosis Date    Abnormality of gait and mobility     Ambulatory dysfunction     Arthritis of knee     Asthma     CHF (congestive heart failure), NYHA class I (HCC)     diastolic    Chronic hypoxemic respiratory failure (HCC)     COPD (chronic obstructive pulmonary disease) with emphysema (HCC)     Cor pulmonale (HCC)     Diabetes mellitus without complication (HCC)     Dyslipidemia     Extreme obesity with respiratory disorder (HCC)     Hyperlipidemia     Hypertension, essential, benign     Hypomagnesemia     Insomnia 12/27/2016    Morbid obesity ([address]     Nocturnal hypoxia     Obesity with alveolar hypoventilation (HCC)     Orthopnea     FLETCHER (obstructive sleep apnea)     Primary pulmonary hypertension (HCC)     Pulmonary edema, noncardiac     Sleeps in sitting position due to orthopnea      Active Ambulatory Problems     Diagnosis Date Noted    Hypertension, essential, benign     Hyperlipidemia     COPD with emphysema ([address]     Morbid obesity (HCC)     Hypomagnesemia     Primary pulmonary hypertension ([address]     Diabetes mellitus without complication ([address]     Orthopnea     Abnormality of gait and mobility     Insomnia 12/27/2016    Extreme obesity with respiratory disorder (HCC)     Asthma     FLETCHER (obstructive sleep apnea)     Chronic hypoxemic respiratory failure (HCC)     Cor pulmonale (HCC)     CHF (congestive heart failure), NYHA class I (HCC)     Obesity with alveolar hypoventilation (HCC)     Pulmonary edema, noncardiac     Arthritis of knee     Acute on chronic respiratory failure ([address] 07/28/2018    Fall 07/28/2018    Pulmonary infiltrates 07/28/2018    Chronic pain 07/28/2018    COPD exacerbation ([address] 07/28/2018    Mass of left lung 07/30/2018    Mediastinal lymphadenopathy 2018    Primary cancer of left upper lobe of lung ([facility]- squamous cell 2018    Benign neoplasm of colon 10/31/2018    History of colonic polyps 08/10/2015     Resolved Ambulatory Problems     Diagnosis Date Noted    Acute respiratory failure ([facility] 2018     Past Medical History:   Diagnosis Date    Abnormality of gait and mobility     Ambulatory dysfunction     Arthritis of knee     Asthma     CHF (congestive heart failure), NYHA class I (HCC)     Chronic hypoxemic respiratory failure (HCC)     COPD (chronic obstructive pulmonary disease) with emphysema (HCC)     Cor pulmonale (HCC)     Diabetes mellitus without complication ([facility]     Dyslipidemia     [facility]     Hyperlipidemia     Hypertension, essential, benign     Hypomagnesemia     Insomnia 2016    [facility]bid obesity [facility]     Nocturnal hypoxia     Obesity with alveolar hypoventilation (HCC)     Orthopnea     FLETCHER (obstructive sleep apnea)     Primary pulmonary hypertension (HCC)     Pulmonary edema, noncardiac     Sleeps in sitting position due to orthopnea      Past Surgical History:   Procedure Laterality Date    HX CHOLECYSTECTOMY      HX TONSILLECTOMY      HX TUBAL LIGATION       Social History     Socioeconomic History    Marital status:      Spouse name: Not on file    Number of children: Not on file    Years of education: Not on file    Highest education level: Not on file   Social Needs    Financial resource strain: Not on file    Food insecurity - worry: Not on file    Food insecurity - inability: Not on file   [place] needs - medical: Not on file   [place] needs - non-medical: Not on file   Occupational History    Not on file   Tobacco Use    Smoking status: Former Smoker     Last attempt to quit: 2014     Years since quittin.3   Substance and Sexual Activity    Alcohol use: Not on file    Drug use: Not on file    Sexual activity: Not on file   Other Topics Concern    Not on file   Social History Narrative    Not on file     No family history on file.   Allergies   Allergen Reactions    Iodides Tincture [Iodine] Hives       Current Facility-Administered Medications   Medication Dose Route Frequency    sodium chloride (NS) flush 5-10 mL  5-10 mL IntraVENous PRN    sodium chloride 0.9 % bolus infusion 88 mL  88 mL IntraVENous ONCE    insulin regular (NOVOLIN R, HUMULIN R) 100 Units in 0.9% sodium chloride 100 mL infusion  0-50 Units/hr IntraVENous TITRATE    dextrose 40% (GLUTOSE) oral gel 1 Tube  15 g Oral PRN    glucagon (GLUCAGEN) injection 1 mg  1 mg IntraMUSCular PRN    dextrose (D50W) injection syrg 12.5-25 g  25-50 mL IntraVENous PRN    0.9% sodium chloride infusion  100 mL/hr IntraVENous CONTINUOUS    sodium chloride (NS) flush 5-40 mL  5-40 mL IntraVENous Q8H    sodium chloride (NS) flush 5-40 mL  5-40 mL IntraVENous PRN    NUTRITIONAL SUPPORT ELECTROLYTE PRN ORDERS   Does Not Apply PRN    acetaminophen (TYLENOL) tablet 650 mg  650 mg Oral Q4H PRN    HYDROcodone-acetaminophen (NORCO) 5-325 mg per tablet 1 Tab  1 Tab Oral Q4H PRN    morphine injection 2 mg  2 mg IntraVENous Q4H PRN    ondansetron (ZOFRAN) injection 4 mg  4 mg IntraVENous Q4H PRN    naloxone (NARCAN) injection 0.4 mg  0.4 mg IntraVENous PRN    LORazepam (ATIVAN) injection 1 mg  1 mg IntraVENous Q4H PRN    albuterol-ipratropium (DUO-NEB) 2.5 MG-0.5 MG/3 ML  3 mL Nebulization Q4H PRN    levETIRAcetam (KEPPRA) 500 mg in 0.9% sodium chloride 100 mL IVPB  500 mg IntraVENous Q12H    budesonide (PULMICORT) 500 mcg/2 ml nebulizer suspension  500 mcg Nebulization BID RT    And    albuterol (PROVENTIL VENTOLIN) nebulizer solution 2.5 mg  2.5 mg Nebulization Q6HWA RT    dexamethasone (DECADRON) injection 6 mg  6 mg IntraVENous Q6H    piperacillin-tazobactam (ZOSYN) 3.375 g in 0.9% sodium chloride (MBP/ADV) 100 mL  3.375 g IntraVENous Q8H    metoprolol (LOPRESSOR) injection 1.25 mg  1.25 mg IntraVENous Q6H PRN    vancomycin (VANCOCIN) 1500 mg in  ml infusion  1,500 mg IntraVENous Q12H         Objective:     Vitals:    03/05/19 1015 03/05/19 1030 03/05/19 1341 03/05/19 1345   BP: 120/72 125/74     Pulse: (!) 111 (!) 115     Resp: 21 20     Temp:       SpO2: 96%  97% 97%   Weight:       Height:           PHYSICAL EXAM     Constitutional:  the patient is obese, well developed and in no acute distress  HEENT:  Sclera clear, pupils equal, oral mucosa moist  Lungs: clear bilaterally. On Bipap - 16/8, f 12, peep 8, TV around 620 mls. Respirations even and not labored  Cardiovascular:  RRR without M,G,R  Abd/GI: soft and non-tender; with positive bowel sounds. Ext: warm without cyanosis. There is no lower leg edema. Skin:  no jaundice or rashes, no wounds   Neuro: she is awake and talkative but confused. She is oriented to place but otherwise confused. She can follow commands  Musculoskeletal: moves all four extremities. No deformities. Restrained for safety  Psychiatric: calm. Does not appear anxious or depressed    Chest X-ray:          Head CT:  Brain  mass  in  the  right  parieto-occipital  lobe  with  surrounding  Vasogenic edema. Contrast  enhanced  MRI  of  the  brain  should  be  considered  to better evaluate/characterize.     Recent Labs     03/04/19  2341   WBC 14.2*   HGB 12.3   HCT 43.7   *     Recent Labs     03/04/19  2341   *   K 3.7   CL 87*   *   CO2 32   BUN 7*   CREA 1.05*   MG 1.8   CA 10.0   ALB 2.3*   SGOT 30       Assessment:  (Medical Decision Making)     Hospital Problems  Date Reviewed: 7/28/2018          Codes Class Noted POA    * (Principal) Seizure ([facility] ICD-10-CM: R56.9  ICD-9-CM: 780.39  3/5/2019 Yes        Metastatic cancer to brain [facility]) ICD-10-CM: C79.31  ICD-9-CM: 198.3  3/5/2019 Yes        Hyperglycemia ICD-10-CM: R73.9  ICD-9-CM: 790.29  3/5/2019 Yes        Mass of left lung ICD-10-CM: R91.8  ICD-9-CM: 786.6  7/30/2018 Yes        Primary cancer of left upper lobe of lung ([facility]- squamous cell ICD-10-CM: C34.12  ICD-9-CM: 162.3  7/30/2018 Yes        Hypertension, essential, benign (Chronic) ICD-10-CM: I10  ICD-9-CM: 401.1  Unknown Yes        Hyperlipidemia (Chronic) ICD-10-CM: E78.5  ICD-9-CM: 272.4  Unknown Yes        COPD with emphysema (HCC) (Chronic) ICD-10-CM: J43.9  ICD-9-CM: 492.8  Unknown Yes        Morbid obesity ([facility] (Chronic) ICD-10-CM: E66.01  ICD-9-CM: 278.01  Unknown Yes        Primary pulmonary hypertension ([facility] ICD-10-CM: I27.0  ICD-9-CM: 416.0  Unknown Yes        Diabetes mellitus without complication (HCC) (Chronic) ICD-10-CM: E11.9  ICD-9-CM: 250.00  Unknown Yes        FLETCHER (obstructive sleep apnea) (Chronic) ICD-10-CM: G47.33  ICD-9-CM: 327.23  Unknown Yes        Chronic hypoxemic respiratory failure (HCC) (Chronic) ICD-10-CM: J96.11  ICD-9-CM: 518.83, 799.02  Unknown Yes        CHF (congestive heart failure), NYHA class I (HCC) (Chronic) ICD-10-CM: I50.9  ICD-9-CM: 428.0  Unknown Yes    Overview Signed 12/27/2016 11:24 AM by Naila Yoder     diastolic                   Plan:  (Medical Decision Making)   1. Patient with acute on chronic respiratory failure. She has chronic hypercapnea with a baseline CO2 in the low 70s. Co2 94 on admission. She is currently on bipap with good volumes and she is awake and talking though confused. Will check follow up ABG and consider removing bipap during the day when improved. She ultimately needs long term nocturnal support but her son states that she has not tolerated in the past  2. Have consulted oncology to rediscuss with patient and family her stage and treatment options. Neurosurgery has seen and is considering a craniotomy. MRI of the brain has been ordered and a chest/abdominal/pelvic CT recommended  3. She is now a category one after discussions with palliative care. The family has revoked hospice for now  4.  Insulin drip per hospitalist  5. Add GI prophylaxis with Pepcid and DVT prophylaxis with SCD  6. Check urine culture - urine purulent looking    Dana Castaneda NP      More than 50% of time documented was spent face-to-face contact with the patient and in the care of the patient on the floor/unit where the patient is located    Lungs: decreased BS in the bases  Heart:  RRR with no Murmur/Rubs/Gallops  Ext : 1+ upper ext. edema    Additional Comments: will adjust BIPAP and follow ABG. She has stage 4 NSCLC given the new brain met and edema ( see below ),and the treatment likely would palliative at this point. Will ask Oncology to see and cont. Decadron, BD, Zosyn and check urine culture. Discussed with PC, Nursing and her family            I have spoken with and examined the patient. I agree with the above assessment and plan as documented.     Rey Dallas MD

## 2019-03-05 NOTE — CONSULTS
NEUROSURGERY CONSULT NOTE:     CC: Altered mental status     HPI:   Alea Case 59 y.o. female with a PMH of Lung cancer, CHF, Cor pulmonale, Obesity, HLD, HTN, and sepsis who has been under the care of hospice prior to admission. She has a long standing history with hospice and has a DNR and has refused treatments for her lung cancer such as chemotherapy. She presents to [address] [place] [address] with altered mental status and has seizure activity and was unresponsive on arrival to the ED. She was found has glucose of 558 and a WBC of 14.2 consistent with sepsis. She underwent a CT Head WO Contrast that demonstrated a 2 cm cystic parietooccipital mass with surround vasogenic cerebral edema with local mass effect and compression of the occipital horn of the lateral ventricle. Family did not want her intubated. Palliative Care and hospice are currently in discussion with the family who feels that in order to respect the patient's wishes she is a good candidate for transfer back to hospice. She is currently on BiPAP in the ICU and able to follow simple commands intermittent but appears labored. She does not currently have an MRI Brain WWO. She is tachypnic and tachycardic.       Past Medical History:   Diagnosis Date    Abnormality of gait and mobility     Ambulatory dysfunction     Arthritis of knee     Asthma     CHF (congestive heart failure), NYHA class I (HCC)     diastolic    Chronic hypoxemic respiratory failure (HCC)     COPD (chronic obstructive pulmonary disease) with emphysema (HCC)     Cor pulmonale (HCC)     Diabetes mellitus without complication (HCC)     Dyslipidemia     Extreme [facility] with respiratory disorder [facility]     Hyperlipidemia     Hypertension, essential, benign     Hypomagnesemia     Insomnia 12/27/2016    Morbid obesity ([facility]     Nocturnal hypoxia     Obesity with alveolar hypoventilation (HCC)     Orthopnea     FLETCHER (obstructive sleep apnea)     Primary pulmonary hypertension (HCC)     Pulmonary edema, noncardiac     Sleeps in sitting position due to orthopnea      Past Surgical History:   Procedure Laterality Date    HX CHOLECYSTECTOMY      HX TONSILLECTOMY      HX TUBAL LIGATION       Allergies   Allergen Reactions    Iodides Tincture [Iodine] Hives     Social History     Socioeconomic History    Marital status:      Spouse name: Not on file    Number of children: Not on file    Years of education: Not on file    Highest education level: Not on file   Social Needs    Financial resource strain: Not on file    Food insecurity - worry: Not on file    Food insecurity - inability: Not on file   [employer] needs - medical: Not on file   [employer] needs - non-medical: Not on file   Occupational History    Not on file   Tobacco Use    Smoking status: Former Smoker     Last attempt to quit: 2014     Years since quittin.3   Substance and Sexual Activity    Alcohol use: Not on file    Drug use: Not on file    Sexual activity: Not on file   Other Topics Concern    Not on file   Social History Narrative    Not on file     Review of Systems - Could not be obtained secondary to patient's current altered mental status     Physical Exam:   Visit Vitals  /74   Pulse (!) 115   Temp 97.3 °F (36.3 °C)   Resp 20   Ht 5' 6\" (1.676 m)   Wt 305 lb 1.9 oz (138.4 kg)   SpO2 96%   BMI 49.25 kg/m²   General: On BiPAP with a labored breathing and recruitment of accessory muscles   Eyes intermittently open to voice but will not maintain open  Right pupil 6 mm to 4 mm and left pupil 5 mm reactive to 3 mm  BiPAP masks in place   Does not cooperate with assessment of pronation or drift   Patient oriented to name and location but thought it was 2018 with labored hypophonic voice   Intermittently follows simple commands, will  to command and wiggle toes, does not cooperate with a formal motor assessment   Moves extremities spontaneously with anit-gravity strength at times   Gait Deferred     Assessment and Plan:   Harry Lipoma 59 y.o. female with a PMH of Hospice for Lung cancer, CHF, Cor pulmonale, Obesity, HLD, HTN, and sepsis who presented with altered mental status and seizure activity and was found have a new right parieto-occipital metastatic lesion. I have independently reviewed and interpreted CT Head WO contrast from 00:32 at [place] that demonstrates an approximate 2.6 x 2.8 x 2.5 mixed cystic and nodular temporal-occipital lesion with a central hypointense core and surrounding vasogenic edema with local mass effect and absence or mid-line shift. This lesion is most likely consistent a new intracranial metastatic lesion. At this time would recommend finalizing and clarifying goals of care as the patient has a long standing history with palliative care and hospice, and as such would likely not be interested in undergoing a craniotomy for resection of a metastatic brain tumor. She has been working with Hospice since the middle of 2018. Please call with questions or concerns. The patient is also a poor surgical candidate at this time given her multiple medical ongoing conditions. Evelia Roy. Thomas Zapien MD  [address] [place] [address] and Neurosurgical Group    Addendum:   Since the patient and family would not elect to withdraw from Hospice and proceed with a full scope of treatment the patient needs to be medically optimized before proceeding with a craniotomy for resection of the tumor. She also needs proper staging imaging specifically MRI Brain WWO Contrast, CT Chest, Abdomen, and Pelvis W contrast to better characterize the patient's disease burden and overall prognosis prior to determining if she is a good candidate for surgical resection. Recommend decadron 4 mg q6H for cerebral edema. Recommend Keppra 500 mg BID. The family will also have to temporarily revoke her DNR and DNI status. Please call with questions or concerns. Vincent Resendiz.  [facility], [id number] [facility]  and Neurosurgical Group

## 2019-03-06 PROBLEM — J96.21 ACUTE ON CHRONIC RESPIRATORY FAILURE WITH HYPOXIA AND HYPERCAPNIA (HCC): Status: ACTIVE | Noted: 2019-03-05

## 2019-03-06 PROBLEM — J96.22 ACUTE ON CHRONIC RESPIRATORY FAILURE WITH HYPOXIA AND HYPERCAPNIA (HCC): Status: ACTIVE | Noted: 2019-03-05

## 2019-03-06 PROBLEM — R41.0 DELIRIUM: Status: ACTIVE | Noted: 2019-03-06

## 2019-03-06 LAB
ANION GAP SERPL CALC-SCNC: 8 MMOL/L (ref 7–16)
ARTERIAL PATENCY WRIST A: YES
ARTERIAL PATENCY WRIST A: YES
BASE EXCESS BLD CALC-SCNC: 3 MMOL/L
BASE EXCESS BLD CALC-SCNC: 5 MMOL/L
BDY SITE: ABNORMAL
BDY SITE: ABNORMAL
BODY TEMPERATURE: 98.6
BODY TEMPERATURE: 98.6
BUN SERPL-MCNC: 14 MG/DL (ref 8–23)
CALCIUM SERPL-MCNC: 9.4 MG/DL (ref 8.3–10.4)
CHLORIDE SERPL-SCNC: 102 MMOL/L (ref 98–107)
CO2 BLD-SCNC: 32 MMOL/L
CO2 BLD-SCNC: 33 MMOL/L
CO2 SERPL-SCNC: 30 MMOL/L (ref 21–32)
COLLECT TIME,HTIME: 1158
COLLECT TIME,HTIME: 300
CREAT SERPL-MCNC: 0.65 MG/DL (ref 0.6–1)
ERYTHROCYTE [DISTWIDTH] IN BLOOD BY AUTOMATED COUNT: 15.9 % (ref 11.9–14.6)
EXHALED MINUTE VOLUME, VE: 14.7 L/MIN
EXHALED MINUTE VOLUME, VE: 9.7 L/MIN
GAS FLOW.O2 O2 DELIVERY SYS: ABNORMAL L/MIN
GAS FLOW.O2 O2 DELIVERY SYS: ABNORMAL L/MIN
GAS FLOW.O2 SETTING OXYMISER: 15 BPM
GAS FLOW.O2 SETTING OXYMISER: 18 BPM
GLUCOSE BLD STRIP.AUTO-MCNC: 124 MG/DL (ref 65–100)
GLUCOSE BLD STRIP.AUTO-MCNC: 257 MG/DL (ref 65–100)
GLUCOSE BLD STRIP.AUTO-MCNC: 286 MG/DL (ref 65–100)
GLUCOSE BLD STRIP.AUTO-MCNC: 319 MG/DL (ref 65–100)
GLUCOSE BLD STRIP.AUTO-MCNC: 322 MG/DL (ref 65–100)
GLUCOSE BLD STRIP.AUTO-MCNC: 325 MG/DL (ref 65–100)
GLUCOSE BLD STRIP.AUTO-MCNC: 336 MG/DL (ref 65–100)
GLUCOSE SERPL-MCNC: 331 MG/DL (ref 65–100)
HCO3 BLD-SCNC: 30.9 MMOL/L (ref 22–26)
HCO3 BLD-SCNC: 31.2 MMOL/L (ref 22–26)
HCT VFR BLD AUTO: 37 % (ref 35.8–46.3)
HGB BLD-MCNC: 10.4 G/DL (ref 11.7–15.4)
INSPIRATION.DURATION SETTING TIME VENT: 0.9 SEC
MCH RBC QN AUTO: 25.5 PG (ref 26.1–32.9)
MCHC RBC AUTO-ENTMCNC: 28.1 G/DL (ref 31.4–35)
MCV RBC AUTO: 90.7 FL (ref 79.6–97.8)
NRBC # BLD: 0 K/UL (ref 0–0.2)
O2/TOTAL GAS SETTING VFR VENT: 40 %
O2/TOTAL GAS SETTING VFR VENT: 40 %
PCO2 BLD: 52.3 MMHG (ref 35–45)
PCO2 BLD: 63.5 MMHG (ref 35–45)
PEEP RESPIRATORY: 10 CMH2O
PEEP RESPIRATORY: 8 CMH2O
PH BLD: 7.3 [PH] (ref 7.35–7.45)
PH BLD: 7.38 [PH] (ref 7.35–7.45)
PIP ISTAT,IPIP: 17
PLATELET # BLD AUTO: 376 K/UL (ref 150–450)
PMV BLD AUTO: 10.2 FL (ref 9.4–12.3)
PO2 BLD: 102 MMHG (ref 75–100)
PO2 BLD: 115 MMHG (ref 75–100)
POTASSIUM SERPL-SCNC: 4.4 MMOL/L (ref 3.5–5.1)
PRESSURE CONTROL, IPC: 18
RBC # BLD AUTO: 4.08 M/UL (ref 4.05–5.2)
SAO2 % BLD: 97 % (ref 95–98)
SAO2 % BLD: 98 % (ref 95–98)
SERVICE CMNT-IMP: ABNORMAL
SODIUM SERPL-SCNC: 140 MMOL/L (ref 136–145)
SPECIMEN TYPE: ABNORMAL
SPECIMEN TYPE: ABNORMAL
VANCOMYCIN TROUGH SERPL-MCNC: 30.7 UG/ML (ref 5–20)
VT SETTING VENT: 503 ML
WBC # BLD AUTO: 10.8 K/UL (ref 4.3–11.1)

## 2019-03-06 PROCEDURE — 99231 SBSQ HOSP IP/OBS SF/LOW 25: CPT | Performed by: NURSE PRACTITIONER

## 2019-03-06 PROCEDURE — 74011636637 HC RX REV CODE- 636/637: Performed by: NURSE PRACTITIONER

## 2019-03-06 PROCEDURE — 99232 SBSQ HOSP IP/OBS MODERATE 35: CPT | Performed by: INTERNAL MEDICINE

## 2019-03-06 PROCEDURE — 99497 ADVNCD CARE PLAN 30 MIN: CPT | Performed by: NURSE PRACTITIONER

## 2019-03-06 PROCEDURE — 74011250636 HC RX REV CODE- 250/636: Performed by: NURSE PRACTITIONER

## 2019-03-06 PROCEDURE — 74011250636 HC RX REV CODE- 250/636: Performed by: INTERNAL MEDICINE

## 2019-03-06 PROCEDURE — 80202 ASSAY OF VANCOMYCIN: CPT

## 2019-03-06 PROCEDURE — 74011250637 HC RX REV CODE- 250/637: Performed by: INTERNAL MEDICINE

## 2019-03-06 PROCEDURE — 74011000250 HC RX REV CODE- 250: Performed by: NURSE PRACTITIONER

## 2019-03-06 PROCEDURE — 82803 BLOOD GASES ANY COMBINATION: CPT

## 2019-03-06 PROCEDURE — 74011636637 HC RX REV CODE- 636/637: Performed by: HOSPITALIST

## 2019-03-06 PROCEDURE — 74011000250 HC RX REV CODE- 250: Performed by: HOSPITALIST

## 2019-03-06 PROCEDURE — 74011000250 HC RX REV CODE- 250: Performed by: INTERNAL MEDICINE

## 2019-03-06 PROCEDURE — 74011250636 HC RX REV CODE- 250/636: Performed by: HOSPITALIST

## 2019-03-06 PROCEDURE — 85027 COMPLETE CBC AUTOMATED: CPT

## 2019-03-06 PROCEDURE — 36415 COLL VENOUS BLD VENIPUNCTURE: CPT

## 2019-03-06 PROCEDURE — 94660 CPAP INITIATION&MGMT: CPT

## 2019-03-06 PROCEDURE — 74011000258 HC RX REV CODE- 258: Performed by: INTERNAL MEDICINE

## 2019-03-06 PROCEDURE — 74011000258 HC RX REV CODE- 258: Performed by: HOSPITALIST

## 2019-03-06 PROCEDURE — 36600 WITHDRAWAL OF ARTERIAL BLOOD: CPT

## 2019-03-06 PROCEDURE — 82962 GLUCOSE BLOOD TEST: CPT

## 2019-03-06 PROCEDURE — 65610000001 HC ROOM ICU GENERAL

## 2019-03-06 PROCEDURE — 94640 AIRWAY INHALATION TREATMENT: CPT

## 2019-03-06 PROCEDURE — 80048 BASIC METABOLIC PNL TOTAL CA: CPT

## 2019-03-06 PROCEDURE — 99254 IP/OBS CNSLTJ NEW/EST MOD 60: CPT | Performed by: INTERNAL MEDICINE

## 2019-03-06 RX ORDER — HALOPERIDOL 5 MG/ML
2 INJECTION INTRAMUSCULAR EVERY 6 HOURS
Status: DISCONTINUED | OUTPATIENT
Start: 2019-03-06 | End: 2019-03-06

## 2019-03-06 RX ORDER — INSULIN GLARGINE 100 [IU]/ML
10 INJECTION, SOLUTION SUBCUTANEOUS DAILY
Status: DISCONTINUED | OUTPATIENT
Start: 2019-03-06 | End: 2019-03-07

## 2019-03-06 RX ORDER — OLANZAPINE 5 MG/1
10 TABLET, ORALLY DISINTEGRATING ORAL
Status: COMPLETED | OUTPATIENT
Start: 2019-03-06 | End: 2019-03-06

## 2019-03-06 RX ADMIN — DEXAMETHASONE SODIUM PHOSPHATE 6 MG: 10 INJECTION INTRAMUSCULAR; INTRAVENOUS at 23:35

## 2019-03-06 RX ADMIN — SODIUM CHLORIDE 500 MG: 900 INJECTION, SOLUTION INTRAVENOUS at 05:31

## 2019-03-06 RX ADMIN — Medication 10 ML: at 21:04

## 2019-03-06 RX ADMIN — DEXMEDETOMIDINE HYDROCHLORIDE 0.6 MCG/KG/HR: 100 INJECTION, SOLUTION INTRAVENOUS at 00:25

## 2019-03-06 RX ADMIN — HUMAN INSULIN 8 UNITS: 100 INJECTION, SOLUTION SUBCUTANEOUS at 11:43

## 2019-03-06 RX ADMIN — VANCOMYCIN HYDROCHLORIDE 1500 MG: 10 INJECTION, POWDER, LYOPHILIZED, FOR SOLUTION INTRAVENOUS at 14:16

## 2019-03-06 RX ADMIN — DEXMEDETOMIDINE HYDROCHLORIDE 0.7 MCG/KG/HR: 100 INJECTION, SOLUTION INTRAVENOUS at 05:31

## 2019-03-06 RX ADMIN — HUMAN INSULIN 8 UNITS: 100 INJECTION, SOLUTION SUBCUTANEOUS at 15:59

## 2019-03-06 RX ADMIN — LORAZEPAM 1 MG: 2 INJECTION INTRAMUSCULAR; INTRAVENOUS at 23:35

## 2019-03-06 RX ADMIN — FAMOTIDINE 40 MG: 10 INJECTION, SOLUTION INTRAVENOUS at 08:13

## 2019-03-06 RX ADMIN — SODIUM CHLORIDE 500 MG: 900 INJECTION, SOLUTION INTRAVENOUS at 16:45

## 2019-03-06 RX ADMIN — PIPERACILLIN AND TAZOBACTAM 3.38 G: 3; .375 INJECTION, POWDER, FOR SOLUTION INTRAVENOUS at 06:08

## 2019-03-06 RX ADMIN — ALBUTEROL SULFATE 2.5 MG: 2.5 SOLUTION RESPIRATORY (INHALATION) at 07:10

## 2019-03-06 RX ADMIN — Medication 10 ML: at 14:24

## 2019-03-06 RX ADMIN — HALOPERIDOL LACTATE 2 MG: 5 INJECTION INTRAMUSCULAR at 11:27

## 2019-03-06 RX ADMIN — DEXAMETHASONE SODIUM PHOSPHATE 6 MG: 10 INJECTION INTRAMUSCULAR; INTRAVENOUS at 11:27

## 2019-03-06 RX ADMIN — HUMAN INSULIN 8 UNITS: 100 INJECTION, SOLUTION SUBCUTANEOUS at 07:27

## 2019-03-06 RX ADMIN — HUMAN INSULIN 8 UNITS: 100 INJECTION, SOLUTION SUBCUTANEOUS at 04:18

## 2019-03-06 RX ADMIN — DEXAMETHASONE SODIUM PHOSPHATE 6 MG: 10 INJECTION INTRAMUSCULAR; INTRAVENOUS at 17:45

## 2019-03-06 RX ADMIN — HALOPERIDOL LACTATE 2 MG: 5 INJECTION INTRAMUSCULAR at 17:45

## 2019-03-06 RX ADMIN — HUMAN INSULIN 6 UNITS: 100 INJECTION, SOLUTION SUBCUTANEOUS at 20:13

## 2019-03-06 RX ADMIN — DEXAMETHASONE SODIUM PHOSPHATE 6 MG: 10 INJECTION INTRAMUSCULAR; INTRAVENOUS at 06:08

## 2019-03-06 RX ADMIN — OLANZAPINE 10 MG: 5 TABLET, ORALLY DISINTEGRATING ORAL at 18:43

## 2019-03-06 RX ADMIN — BUDESONIDE 500 MCG: 0.5 INHALANT RESPIRATORY (INHALATION) at 07:10

## 2019-03-06 RX ADMIN — LORAZEPAM 1 MG: 2 INJECTION INTRAMUSCULAR; INTRAVENOUS at 08:43

## 2019-03-06 RX ADMIN — Medication 10 ML: at 05:35

## 2019-03-06 RX ADMIN — FAMOTIDINE 40 MG: 10 INJECTION, SOLUTION INTRAVENOUS at 20:06

## 2019-03-06 RX ADMIN — ALBUTEROL SULFATE 2.5 MG: 2.5 SOLUTION RESPIRATORY (INHALATION) at 19:25

## 2019-03-06 RX ADMIN — HUMAN INSULIN 6 UNITS: 100 INJECTION, SOLUTION SUBCUTANEOUS at 23:40

## 2019-03-06 RX ADMIN — ALBUTEROL SULFATE 2.5 MG: 2.5 SOLUTION RESPIRATORY (INHALATION) at 14:03

## 2019-03-06 RX ADMIN — VANCOMYCIN HYDROCHLORIDE 1500 MG: 10 INJECTION, POWDER, LYOPHILIZED, FOR SOLUTION INTRAVENOUS at 02:36

## 2019-03-06 RX ADMIN — INSULIN GLARGINE 10 UNITS: 100 INJECTION, SOLUTION SUBCUTANEOUS at 08:24

## 2019-03-06 RX ADMIN — PIPERACILLIN AND TAZOBACTAM 3.38 G: 3; .375 INJECTION, POWDER, FOR SOLUTION INTRAVENOUS at 22:25

## 2019-03-06 RX ADMIN — PIPERACILLIN AND TAZOBACTAM 3.38 G: 3; .375 INJECTION, POWDER, FOR SOLUTION INTRAVENOUS at 13:19

## 2019-03-06 RX ADMIN — BUDESONIDE 500 MCG: 0.5 INHALANT RESPIRATORY (INHALATION) at 19:25

## 2019-03-06 RX ADMIN — LORAZEPAM 1 MG: 2 INJECTION INTRAMUSCULAR; INTRAVENOUS at 15:51

## 2019-03-06 NOTE — PROGRESS NOTES
Bedside shift change report given to Rox Bustamante (oncoming nurse) by Dustin Haque (offgoing nurse). Report included the following information SBAR, Kardex, ED Summary, Intake/Output, MAR, Cardiac Rhythm NSR and Alarm Parameters .

## 2019-03-06 NOTE — PROGRESS NOTES
Bedside shift change report given to Naila Mao (oncoming nurse) by Angie Artis RN (offgoing nurse). Report included the following information SBAR, Kardex, ED Summary, Procedure Summary, Intake/Output, MAR, Recent Results and Cardiac Rhythm NSR.

## 2019-03-06 NOTE — PROGRESS NOTES
Progress Note    Patient: Author Woodard MRN: [id number]  SSN: [id number]    YOB: 1960  Age: 59 y.o. Sex: female      Admit Date: 3/4/2019    LOS: 1 day     Subjective:     From admission note :     \" Patient is a 60 y/o female with squamous cell carcinoma of the lung, COPD, extreme obesity, diastolic CHF, HTN, HLD, FLETCHER, pulmonary HTN, HLD who presents from home with new altered mental status. EMS reports hyperglycemia. On arrival to ED she was unresponsive with seizure activity. Glucose is 558. She was loaded with IV keppra and also given IV ativan. A head CT shows brain mass in the right parieto-occipital lobe with surrounding vasogenic edema. She then received IV decadron. She was tachycardic with WBC ct of 14.2 so sepsis protocol initiated with fluids and antibiotics. Initially family had said she was DNR but later stated to do everything Except intubation. Will admit to ICU. \"    3-5-2019  Patient has been in bed. Initially palliative consultant saw the patient and at that time her family wanted her to be a hospice candidate. However, later she changed her mind and now she is a full code. Family would like aggressive treatment now.     3-6-2019  Patient is still on BiPAP. She is more responsive. She was seen by neurosurgery today. EEG was done on 3-5-2019 which showed epileptiform activity. Objective:     Vitals:    03/06/19 0830 03/06/19 0916 03/06/19 1000 03/06/19 1016   BP: 143/88 125/71 132/75 135/79   Pulse: 94 82 82 83   Resp: 27 26 21 (!) 33   Temp:       SpO2: 100% 98% 99% 99%   Weight:       Height:            Intake and Output:  Current Shift: No intake/output data recorded. Last three shifts: 03/04 1901 - 03/06 0700  In: 3515.4 [P.O.:360; I.V.:3155.4]  Out: 1530 [Urine:1530]    Physical Exam:     General:                    The patient is a female in no acute distress. She is lying flat in bed. On BiPAP. On Precedex and sedated.    Head: Normocephalic/atraumatic. Eyes:                                   No palpebral pallor or scleral icterus. ENT:                                    External auricular and nasal exam within normal limits. Mucous membranes are moist.  Neck:                                   Supple, non-tender, no JVD. Lungs:                       Clear to auscultation bilaterally without wheezes or crackles. No respiratory distress or accessory muscle use. Heart:                                  Regular rate and rhythm, without murmurs, rubs, or gallops. Abdomen:                  Soft, non-tender, mildly distended with normoactive bowel sounds. Genitourinary:           No tenderness over the bladder or bilateral CVAs. Extremities:               Without clubbing, cyanosis, or edema. Skin:                                    Normal color, texture, and turgor. No rashes, lesions, or jaundice. Pulses:                      Radial and dorsalis pedis pulses present 2+ bilaterally. Capillary refill <2s. Neurologic:            Moving all four extremities well with no focal deficits.     Lab/Data Review:    Recent Results (from the past 24 hour(s))   GLUCOSE, POC    Collection Time: 03/05/19 11:02 AM   Result Value Ref Range    Glucose (POC) 311 (H) 65 - 100 mg/dL   GLUCOSTABILIZER    Collection Time: 03/05/19 11:03 AM   Result Value Ref Range    Glucose 311 mg/dL    Insulin order 17.6 units/hour    Insulin adminstered 17.6 units/hour    Multiplier 0.070     Low target 140 mg/dL    High target 180 mg/dL    D50 order 0.0 ml    D50 administered 0.00 ml    Minutes until next BG 60 min    Order initials tq     Administered initials tq     GLSCOM Comments     GLUCOSE, POC    Collection Time: 03/05/19 12:11 PM   Result Value Ref Range    Glucose (POC) 246 (H) 65 - 100 mg/dL   GLUCOSTABILIZER Collection Time: 03/05/19 12:15 PM   Result Value Ref Range    Glucose 246 mg/dL    Insulin order 14.9 units/hour    Insulin adminstered 14.9 units/hour    Multiplier 0.080     Low target 140 mg/dL    High target 180 mg/dL    D50 order 0.0 ml    D50 administered 0.00 ml    Minutes until next BG 60 min    Order initials ADH     Administered initials ADH     GLSCOM Comments     GLUCOSE, POC    Collection Time: 03/05/19  1:24 PM   Result Value Ref Range    Glucose (POC) 204 (H) 65 - 100 mg/dL   GLUCOSTABILIZER    Collection Time: 03/05/19  1:27 PM   Result Value Ref Range    Glucose 204 mg/dL    Insulin order 13.0 units/hour    Insulin adminstered 13.0 units/hour    Multiplier 0.090     Low target 140 mg/dL    High target 180 mg/dL    D50 order 0.0 ml    D50 administered 0.00 ml    Minutes until next BG 60 min    Order initials HS     Administered initials HS     GLSCOM Comments     GLUCOSE, POC    Collection Time: 03/05/19  2:31 PM   Result Value Ref Range    Glucose (POC) 163 (H) 65 - 100 mg/dL   GLUCOSTABILIZER    Collection Time: 03/05/19  2:32 PM   Result Value Ref Range    Glucose 163 mg/dL    Insulin order 9.3 units/hour    Insulin adminstered 9.3 units/hour    Multiplier 0.090     Low target 140 mg/dL    High target 180 mg/dL    D50 order 0.0 ml    D50 administered 0.00 ml    Minutes until next BG 60 min    Order initials NG     Administered initials NG     GLSCOM Comments     GLUCOSE, POC    Collection Time: 03/05/19  3:36 PM   Result Value Ref Range    Glucose (POC) 129 (H) 65 - 100 mg/dL   GLUCOSTABILIZER    Collection Time: 03/05/19  3:38 PM   Result Value Ref Range    Glucose 129 mg/dL    Insulin order 5.0 units/hour    Insulin adminstered 5.0 units/hour    Multiplier 0.072     Low target 140 mg/dL    High target 180 mg/dL    D50 order 0.0 ml    D50 administered 0.00 ml    Minutes until next BG 60 min    Order initials jh     Administered initials hs     GLSCOM Comments     GLUCOSE, POC    Collection Time: 03/05/19  4:51 PM   Result Value Ref Range    Glucose (POC) 132 (H) 65 - 100 mg/dL   GLUCOSTABILIZER    Collection Time: 03/05/19  4:52 PM   Result Value Ref Range    Glucose 132 mg/dL    Insulin order 4.2 units/hour    Insulin adminstered 4.2 units/hour    Multiplier 0.058     Low target 140 mg/dL    High target 180 mg/dL    D50 order 0.0 ml    D50 administered 0.00 ml    Minutes until next BG 60 min    Order initials ADH     Administered initials ADH     GLSCOM Comments     GLUCOSE, POC    Collection Time: 03/05/19  6:02 PM   Result Value Ref Range    Glucose (POC) 134 (H) 65 - 100 mg/dL   GLUCOSTABILIZER    Collection Time: 03/05/19  6:03 PM   Result Value Ref Range    Glucose 134 mg/dL    Insulin order 3.4 units/hour    Insulin adminstered 3.4 units/hour    Multiplier 0.046     Low target 140 mg/dL    High target 180 mg/dL    D50 order 0.0 ml    D50 administered 0.00 ml    Minutes until next BG 60 min    Order initials HS     Administered initials HS     GLSCOM Comments     POC VENOUS BLOOD GAS    Collection Time: 03/05/19  6:43 PM   Result Value Ref Range    Device: BIPAP      FIO2 (POC) 40 %    pH, venous (POC) 7.302 (L) 7.32 - 7.42      pCO2, venous (POC) 69.2 (H) 41 - 51 MMHG    pO2, venous (POC) 30 mmHg    HCO3, venous (POC) 34.2 (H) 23 - 28 MMOL/L    sO2, venous (POC) 49 (L) 65 - 88 %    Tidal volume 358 ml    Set Rate 15 bpm    PEEP/CPAP (POC) 10 cmH2O    PIP (POC) 17      Allens test (POC) YES      Inspiratory Time 0.9 sec    Site LEFT BRACHIAL      Patient temp.  98.6      Specimen type (POC) VENOUS BLOOD      Performed by Sol     CO2, POC 36 MMOL/L    Critical value read back 18:50     Exhaled minute volume 13.10 L/min    COLLECT TIME 1,840     GLUCOSE, POC    Collection Time: 03/05/19  7:14 PM   Result Value Ref Range    Glucose (POC) 167 (H) 65 - 100 mg/dL   GLUCOSE, POC    Collection Time: 03/05/19 11:34 PM   Result Value Ref Range    Glucose (POC) 263 (H) 65 - 100 mg/dL   POC G3 Collection Time: 03/06/19  3:01 AM   Result Value Ref Range    Device: BIPAP      FIO2 (POC) 40 %    pH (POC) 7.300 (L) 7.35 - 7.45      pCO2 (POC) 63.5 (HH) 35 - 45 MMHG    pO2 (POC) 102 (H) 75 - 100 MMHG    HCO3 (POC) 31.2 (H) 22 - 26 MMOL/L    sO2 (POC) 97 95 - 98 %    Base excess (POC) 3 mmol/L    Tidal volume 503 ml    Set Rate 18 bpm    PEEP/CPAP (POC) 10 cmH2O    PIP (POC) 17      Allens test (POC) YES      Inspiratory Time 0.9 sec    Site RIGHT RADIAL      Patient temp. 98.6      Specimen type (POC) ARTERIAL      Performed by [employer]     CO2, POC 33 MMOL/L    Respiratory comment: NurseNotified     Exhaled minute volume 9.70 L/min    COLLECT TIME 032     METABOLIC PANEL, BASIC    Collection Time: 03/06/19  3:40 AM   Result Value Ref Range    Sodium 140 136 - 145 mmol/L    Potassium 4.4 3.5 - 5.1 mmol/L    Chloride 102 98 - 107 mmol/L    CO2 30 21 - 32 mmol/L    Anion gap 8 7 - 16 mmol/L    Glucose 331 (H) 65 - 100 mg/dL    BUN 14 8 - 23 MG/DL    Creatinine 0.65 0.6 - 1.0 MG/DL    GFR est AA >60 >60 ml/min/1.73m2    GFR est non-AA >60 >60 ml/min/1.73m2    Calcium 9.4 8.3 - 10.4 MG/DL   CBC W/O DIFF    Collection Time: 03/06/19  3:40 AM   Result Value Ref Range    WBC 10.8 4.3 - 11.1 K/uL    RBC 4.08 4.05 - 5.2 M/uL    HGB 10.4 (L) 11.7 - 15.4 g/dL    HCT 37.0 35.8 - 46.3 %    MCV 90.7 79.6 - 97.8 FL    MCH 25.5 (L) 26.1 - 32.9 PG    MCHC 28.1 (L) 31.4 - 35.0 g/dL    RDW 15.9 (H) 11.9 - 14.6 %    PLATELET 522 533 - 258 K/uL    MPV 10.2 9.4 - 12.3 FL    ABSOLUTE NRBC 0.00 0.0 - 0.2 K/uL   GLUCOSE, POC    Collection Time: 03/06/19  4:06 AM   Result Value Ref Range    Glucose (POC) 319 (H) 65 - 100 mg/dL   GLUCOSE, POC    Collection Time: 03/06/19  7:25 AM   Result Value Ref Range    Glucose (POC) 322 (H) 65 - 100 mg/dL     CT head  3-5-2019  IMPRESSION:     1. Brain mass in the right parieto-occipital lobe with surrounding vasogenic edema.  Contrast enhanced MRI of the brain should be considered to better Evaluate/characterize. .    XR chest   3-5-2019  IMPRESSION:     Masslike opacity in the left perihilar region, significantly more prominent  compared to July 29, 2018 CT. Findings suspicious for lung tumor. Contrast-enhanced CT chest should be considered to better characterize. EKG   3-4-2019  !! AGE AND GENDER SPECIFIC ECG ANALYSIS !!    Sinus tachycardia   Low voltage QRS   Left posterior fascicular block   Possible Inferior infarct , age undetermined   Cannot rule out Anterior infarct , age undetermined   Abnormal ECG   No previous ECGs available       Current Facility-Administered Medications:     insulin glargine (LANTUS) injection 10 Units, 10 Units, SubCUTAneous, DAILY, Anabelle Eaton, NP, 10 Units at 03/06/19 0824    vanc trough reminder, , Other, Roseanna Valladares MD    haloperidol lactate (HALDOL) injection 2 mg, 2 mg, IntraVENous, Q6H, Joesph Pearl MD    sodium chloride (NS) flush 5-10 mL, 5-10 mL, IntraVENous, PRN, Nathalie Cai MD    sodium chloride (NS) flush 5-40 mL, 5-40 mL, IntraVENous, Q8H, Krystyna Steele MD, 10 mL at 03/06/19 0535    sodium chloride (NS) flush 5-40 mL, 5-40 mL, IntraVENous, PRN, Krystyna Steele MD    NUTRITIONAL SUPPORT ELECTROLYTE PRN ORDERS, , Does Not Apply, PRN, Krystyna Steele MD    acetaminophen (TYLENOL) tablet 650 mg, 650 mg, Oral, Q4H PRN, Krystyna Steele MD    HYDROcodone-acetaminophen [facility]) 5-325 mg per tablet 1 Tab, 1 Tab, Oral, Q4H PRN, Krystyna Steele MD    morphine injection 2 mg, 2 mg, IntraVENous, Q4H PRN, Krystyna Steele MD    ondansetron [facility]) injection 4 mg, 4 mg, IntraVENous, Q4H PRN, Krystyna Steele MD    [facility]) injection 0.4 mg, 0.4 mg, IntraVENous, PRN, Krystyna Steele MD    LORazepam (ATIVAN) injection 1 mg, 1 mg, IntraVENous, Q4H PRN, Krystyna Steele MD, 1 mg at 03/06/19 0843    albuterol-ipratropium (DUO-NEB) 2.5 MG-0.5 MG/3 ML, 3 mL, Nebulization, Q4H PRN, Odalys Ridge MD SANDRA    levETIRAcetam (KEPPRA) 500 mg in 0.9% sodium chloride 100 mL IVPB, 500 mg, IntraVENous, Q12H, Myles Shipman MD, 500 mg at 03/06/19 0531    budesonide (PULMICORT) 500 mcg/2 ml nebulizer suspension, 500 mcg, Nebulization, BID RT, 500 mcg at 03/06/19 0710 **AND** albuterol (PROVENTIL VENTOLIN) nebulizer solution 2.5 mg, 2.5 mg, Nebulization, Q6HWA RT, Myles Shipman MD, 2.5 mg at 03/06/19 0710    dexamethasone (DECADRON) injection 6 mg, 6 mg, IntraVENous, Q6H, Myles Shipman MD, 6 mg at 03/06/19 0608    piperacillin-tazobactam (ZOSYN) 3.375 g in 0.9% sodium chloride (MBP/ADV) 100 mL, 3.375 g, IntraVENous, Q8H, Myles Shipman MD, Last Rate: 25 mL/hr at 03/06/19 0608, 3.375 g at 03/06/19 0608    metoprolol (LOPRESSOR) injection 1.25 mg, 1.25 mg, IntraVENous, Q6H PRN, Laurel Grossman MD, 1.25 mg at 03/05/19 1004    vancomycin (VANCOCIN) 1500 mg in  ml infusion, 1,500 mg, IntraVENous, Q12H, Basilio Santana MD, Last Rate: 250 mL/hr at 03/06/19 0236, 1,500 mg at 03/06/19 0236    famotidine (PF) (PEPCID) 40 mg in sodium chloride 0.9% 10 mL injection, 40 mg, IntraVENous, Q12H, Elliott Dang NP, 40 mg at 03/06/19 0813    insulin regular (NOVOLIN R, HUMULIN R) injection, , SubCUTAneous, Q4H, Myles Shipman MD, 8 Units at 03/06/19 6530    dexmedeTOMidine (PRECEDEX) 400 mcg in 0.9% sodium chloride 100 mL infusion, 0.2-0.7 mcg/kg/hr, IntraVENous, TITRATE, Sine, Darroll Cockayne, MD, Stopped at 03/06/19 0631      Assessment:     Principal Problem:    Seizure ([facility] (3/5/2019)    Active Problems:    Hypertension, essential, benign ()      COPD with emphysema (HCC) ()      Mor[facility] ([facility] ()      Diabetes mellitus without complication (HCC) ()      FLETCHER (obstructive sleep apnea) ()      Chronic hypoxemic respiratory failure (HCC) ()      Primary cancer of left upper lobe of lung ([facility]- squamous cell (7/30/2018)      Metastatic cancer to brain ([facility] (3/5/2019)      Hyperglycemia (3/5/2019)      Acute on chronic respiratory failure with hypoxia and hypercapnia ([facility] (3/5/2019)      Delirium (3/6/2019)        Plan:     Hyperglycemia  Improving. Monitor. Acute on chronic respiratory failure with hypoxia and hypercapnea. History of FLETCHER  On BiPAP  Pulmonary is helping. Lung cancer with brain metastasis likely  Will check MRI brain and when able CT neck, chest, abdomen, pelvis. Continue neuro check. Continue Keppra, Dexamethasone.      I have discussed the plan of care with patient and care team.     DVT prophylaxis : SCD      Signed By: Sheryl Arriaga MD     March 6, 2019

## 2019-03-06 NOTE — PROGRESS NOTES
A follow up visit was made to the patient. Emotional support, spiritual presence and   prayer were provided. Patient was calm and resting.       [employer]

## 2019-03-06 NOTE — INTERDISCIPLINARY ROUNDS
Interdisciplinary team rounds were held 3/6/2019 with the following team members:Care Management, Nursing, Nurse Practitioner, Nutrition, Palliative Care, Pastoral Care, Pharmacy, Physical Therapy, Physician, Respiratory Therapy and Clinical Coordinator. Plan of care discussed. See clinical pathway and/or care plan for interventions and desired outcomes.

## 2019-03-06 NOTE — CONSULTS
[employer] [facility]        Inpatient Hematology / Oncology Consult Note    Reason for Consult:  Seizure [facility]) [R56.9]  Metastatic cancer to brain [facility]) [C79.31]  Hyperglycemia [R73.9]  Referring Physician:  Siomara Rolon MD    History of Present Illness:  Ms. Grady Barber is a 59 y.o. female admitted on 3/4/2019. Her PMH includes DM, HTN, CHF, and COPD. She was admitted in 8/2018 and found to have Stage IIIb NSCLC. She was seen by Dr. An Walsh in consult and elected to go home with home Hospice. She had new onset AMS and family called 911 instead of Hospice. On arrival, she was unresponsive with seizure activity. She was given Keppra and Ativan. . CT head reveal brain mass in the R parieto-occipital lobe with surrounding vasogenic edema. MRI brain pending. Family has revoked Hospice and changed patient to a full code. NS considering a craniotomy. On Dex/Keppra. CXR shows mass in L perihilar region, significantly more prominent since July 2018. We were consulted to discuss prognosis and goals of care as well as for recommendations.       Review of Systems:  Unable to assess    Allergies   Allergen Reactions    Iodides Tincture [Iodine] Hives     Past Medical History:   Diagnosis Date    Abnormality of gait and mobility     Acute on chronic respiratory failure ([address] 7/28/2018    Ambulatory dysfunction     Arthritis of knee     Asthma     CHF (congestive heart failure), NYHA class I (HCC)     diastolic    Chronic hypoxemic respiratory failure (HCC)     COPD (chronic obstructive pulmonary disease) with emphysema (HCC)     Cor pulmonale (HCC)     Diabetes mellitus without complication ([address]     Dyslipidemia     Extreme [facility] with [facility] disorder [facility]     Fall 7/28/2018    Hyperlipidemia     Hypertension, essential, benign     Hypomagnesemia     Insomnia 12/27/2016    Nocturnal hypoxia     Obesity with alveolar hypoventilation (HCC)     Orthopnea     FLETCHER (obstructive sleep apnea)     Primary pulmonary hypertension (HCC)     Pulmonary edema, noncardiac     Sleeps in sitting position due to orthopnea      Past Surgical History:   Procedure Laterality Date    HX CHOLECYSTECTOMY      HX TONSILLECTOMY      HX TUBAL LIGATION       Family History   Problem Relation Age of Onset    No Known Problems Mother     Diabetes Father     Diabetes Sister     Liver Disease Brother         etoh    Diabetes Brother      Social History     Socioeconomic History    Marital status:      Spouse name: Not on file    Number of children: Not on file    Years of education: Not on file    Highest education level: Not on file   Social Needs    Financial resource strain: Not on file    Food insecurity - worry: Not on file    Food insecurity - inability: Not on file   [employer] needs - medical: Not on file   [employer] needs - non-medical: Not on file   Occupational History    Occupation: disabled   Tobacco Use    Smoking status: Former Smoker     Packs/day: 1.00     Years: 35.00     Pack years: 35.00     Last attempt to quit: 2014     Years since quittin.3   Substance and Sexual Activity    Alcohol use: No     Frequency: Never    Drug use: No    Sexual activity: No   Other Topics Concern    Not on file   Social History Narrative    Lives with her son     Current Facility-Administered Medications   Medication Dose Route Frequency Provider Last Rate Last Dose    insulin glargine (LANTUS) injection 10 Units  10 Units SubCUTAneous DAILY Ariane FOLEY NP   10 Units at 19 0824    vanc trough reminder   Other Pastor Glynn MD        sodium chloride (NS) flush 5-10 mL  5-10 mL IntraVENous PRN Jefferson Almonte MD        sodium chloride (NS) flush 5-40 mL  5-40 mL IntraVENous Q8H Aydee Churchill MD   10 mL at 19 0535    sodium chloride (NS) flush 5-40 mL  5-40 mL IntraVENous PRN MD Vargas Liu NUTRITIONAL SUPPORT ELECTROLYTE PRN ORDERS   Does Not Apply PRN Essence Sheffield MD        acetaminophen (TYLENOL) tablet 650 mg  650 mg Oral Q4H PRN Essence Sheffield MD        HYDROcodone-acetaminophen [facility]) 5-325 mg per tablet 1 Tab  1 Tab Oral Q4H PRN Essence Sheffield MD        morphine injection 2 mg  2 mg IntraVENous Q4H PRN Essence Sheffield MD        ondansetron [facility]) injection 4 mg  4 mg IntraVENous Q4H PRN Essence Sheffield MD        naloxone [facility]) injection 0.4 mg  0.4 mg IntraVENous PRN Essence Sheffield MD        LORazepam (ATIVAN) injection 1 mg  1 mg IntraVENous Q4H PRN Essence Sheffield MD   1 mg at 03/06/19 0843    albuterol-ipratropium (DUO-NEB) 2.5 MG-0.5 MG/3 ML  3 mL Nebulization Q4H PRN Essence Sheffield MD        levETIRAcetam (KEPPRA) 500 mg in 0.9% sodium chloride 100 mL IVPB  500 mg IntraVENous Q12H Essence Sheffield MD   500 mg at 03/06/19 0531    budesonide (PULMICORT) 500 mcg/2 ml nebulizer suspension  500 mcg Nebulization BID RT Essence Sheffield MD   500 mcg at 03/06/19 0710    And    albuterol (PROVENTIL VENTOLIN) nebulizer solution 2.5 mg  2.5 mg Nebulization Q6HWA RT Essence Sheffield MD   2.5 mg at 03/06/19 0710    dexamethasone (DECADRON) injection 6 mg  6 mg IntraVENous Q6H Essence Sheffield MD   6 mg at 03/06/19 0608    piperacillin-tazobactam (ZOSYN) 3.375 g in 0.9% sodium chloride (MBP/ADV) 100 mL  3.375 g IntraVENous Q8H Essence Sheffield MD 25 mL/hr at 03/06/19 0608 3.375 g at 03/06/19 0608    metoprolol (LOPRESSOR) injection 1.25 mg  1.25 mg IntraVENous Q6H PRN Zhen Elkins MD   1.25 mg at 03/05/19 1004    vancomycin (VANCOCIN) 1500 mg in  ml infusion  1,500 mg IntraVENous Q12H Gian Bennett  mL/hr at 03/06/19 0236 1,500 mg at 03/06/19 0236    famotidine (PF) (PEPCID) 40 mg in sodium chloride 0.9% 10 mL injection  40 mg IntraVENous Q12H Rosalba Mitchell NP   40 mg at 03/06/19 0813    insulin regular ([place], HUMULIN R) injection   SubCUTAneous Q4H Darya Barajas MD   8 Units at 19 0727    dexmedeTOMidine (PRECEDEX) 400 mcg in 0.9% sodium chloride 100 mL infusion  0.2-0.7 mcg/kg/hr IntraVENous TITRATE Sonal Grover MD   Stopped at 19 0631       OBJECTIVE:  Patient Vitals for the past 8 hrs:   BP Temp Pulse Resp SpO2 Weight   19 0730 145/80 97.7 °F (36.5 °C) 80 30 100 %    19 0717     100 %    19 0715     100 %    19 0645 136/79  79 25 98 %    19 0630 126/76  80 17 96 %    19 0615 132/67  83 25 98 %    19 0601 122/70  80 18 96 %    19 0600     97 %    19 0545 131/74  79 17 97 %    19 0530 135/72  84 21 98 %    19 0515 146/73  78 16 97 %    19 0501 114/56  (!) 109  95 %    19 0446 125/67  (!) 101 29 96 %    19 0430 132/76  82 23 96 %    19 0415 136/73  82 19 97 %    19 0401 131/71 98.1 °F (36.7 °C) 82 21 97 % 308 lb 3.3 oz (139.8 kg)   19 0346 126/70  83 23 97 %    19 0330 125/61  84 20 97 %    19 0315 127/62  84 20 96 %    19 0301 138/69  84 20 96 %    19 0257     95 %    19 0246 145/79  86 27 98 %    19 0230 121/62  87 15 95 %    19 0200 124/72  88 25 98 %    19 0146 119/67  91 29 99 %    19 0131 121/67  85 21 98 %    19 0115 127/72  86 21 97 %    19 0100 133/76  88 28 99 %      Temp (24hrs), Av.9 °F (37.2 °C), Min:97.7 °F (36.5 °C), Max:99.8 °F (37.7 °C)    No intake/output data recorded. Physical Exam:  Constitutional: Ill-appearing obese female in no acute distress, lying in the hospital bed. HEENT: Normocephalic and atraumatic. Oropharynx is clear, mucous membranes are moist.  Neck supple without JVD. No thyromegaly present. Lymph node   Deferred   Skin Warm and dry. No bruising and no rash noted. No erythema. No pallor.     Respiratory Lungs are clear to auscultation bilaterally without wheezes, rales or rhonchi, normal air exchange without accessory muscle use. On BiPAP. CVS Normal rate, regular rhythm and normal S1 and S2. No murmurs, gallops, or rubs. Abdomen Obese, Soft, nontender and nondistended, normoactive bowel sounds. No palpable mass. No hepatosplenomegaly.    Neuro Rouses with stimulation   MSK Normal range of motion in general.    Psych Calm       Labs:    Recent Results (from the past 24 hour(s))   GLUCOSTABILIZER    Collection Time: 03/05/19  8:57 AM   Result Value Ref Range    Glucose 379 mg/dL    Insulin order 16.0 units/hour    Insulin adminstered 16.0 units/hour    Multiplier 0.050     Low target 140 mg/dL    High target 180 mg/dL    D50 order 0.0 ml    D50 administered 0.00 ml    Minutes until next BG 60 min    Order initials tq     Administered initials tq     GLSCOM Comments     GLUCOSE, POC    Collection Time: 03/05/19 10:00 AM   Result Value Ref Range    Glucose (POC) 338 (H) 65 - 100 mg/dL   GLUCOSTABILIZER    Collection Time: 03/05/19 10:01 AM   Result Value Ref Range    Glucose 338 mg/dL    Insulin order 16.7 units/hour    Insulin adminstered 16.7 units/hour    Multiplier 0.060     Low target 140 mg/dL    High target 180 mg/dL    D50 order 0.0 ml    D50 administered 0.00 ml    Minutes until next BG 60 min    Order initials tq     Administered initials tq     GLSCOM Comments     GLUCOSE, POC    Collection Time: 03/05/19 11:02 AM   Result Value Ref Range    Glucose (POC) 311 (H) 65 - 100 mg/dL   GLUCOSTABILIZER    Collection Time: 03/05/19 11:03 AM   Result Value Ref Range    Glucose 311 mg/dL    Insulin order 17.6 units/hour    Insulin adminstered 17.6 units/hour    Multiplier 0.070     Low target 140 mg/dL    High target 180 mg/dL    D50 order 0.0 ml    D50 administered 0.00 ml    Minutes until next BG 60 min    Order initials tq     Administered initials tq     GLSCOM Comments     GLUCOSE, POC    Collection Time: 03/05/19 12:11 PM Result Value Ref Range    Glucose (POC) 246 (H) 65 - 100 mg/dL   GLUCOSTABILIZER    Collection Time: 03/05/19 12:15 PM   Result Value Ref Range    Glucose 246 mg/dL    Insulin order 14.9 units/hour    Insulin adminstered 14.9 units/hour    Multiplier 0.080     Low target 140 mg/dL    High target 180 mg/dL    D50 order 0.0 ml    D50 administered 0.00 ml    Minutes until next BG 60 min    Order initials ADH     Administered initials ADH     GLSCOM Comments     GLUCOSE, POC    Collection Time: 03/05/19  1:24 PM   Result Value Ref Range    Glucose (POC) 204 (H) 65 - 100 mg/dL   GLUCOSTABILIZER    Collection Time: 03/05/19  1:27 PM   Result Value Ref Range    Glucose 204 mg/dL    Insulin order 13.0 units/hour    Insulin adminstered 13.0 units/hour    Multiplier 0.090     Low target 140 mg/dL    High target 180 mg/dL    D50 order 0.0 ml    D50 administered 0.00 ml    Minutes until next BG 60 min    Order initials HS     Administered initials HS     GLSCOM Comments     GLUCOSE, POC    Collection Time: 03/05/19  2:31 PM   Result Value Ref Range    Glucose (POC) 163 (H) 65 - 100 mg/dL   GLUCOSTABILIZER    Collection Time: 03/05/19  2:32 PM   Result Value Ref Range    Glucose 163 mg/dL    Insulin order 9.3 units/hour    Insulin adminstered 9.3 units/hour    Multiplier 0.090     Low target 140 mg/dL    High target 180 mg/dL    D50 order 0.0 ml    D50 administered 0.00 ml    Minutes until next BG 60 min    Order initials NG     Administered initials NG     GLSCOM Comments     GLUCOSE, POC    Collection Time: 03/05/19  3:36 PM   Result Value Ref Range    Glucose (POC) 129 (H) 65 - 100 mg/dL   GLUCOSTABILIZER    Collection Time: 03/05/19  3:38 PM   Result Value Ref Range    Glucose 129 mg/dL    Insulin order 5.0 units/hour    Insulin adminstered 5.0 units/hour    Multiplier 0.072     Low target 140 mg/dL    High target 180 mg/dL    D50 order 0.0 ml    D50 administered 0.00 ml    Minutes until next BG 60 min    Order initials Ramonita Gallardo Administered initials hs     GLSCOM Comments     GLUCOSE, POC    Collection Time: 03/05/19  4:51 PM   Result Value Ref Range    Glucose (POC) 132 (H) 65 - 100 mg/dL   GLUCOSTABILIZER    Collection Time: 03/05/19  4:52 PM   Result Value Ref Range    Glucose 132 mg/dL    Insulin order 4.2 units/hour    Insulin adminstered 4.2 units/hour    Multiplier 0.058     Low target 140 mg/dL    High target 180 mg/dL    D50 order 0.0 ml    D50 administered 0.00 ml    Minutes until next BG 60 min    Order initials ADH     Administered initials ADH     GLSCOM Comments     GLUCOSE, POC    Collection Time: 03/05/19  6:02 PM   Result Value Ref Range    Glucose (POC) 134 (H) 65 - 100 mg/dL   GLUCOSTABILIZER    Collection Time: 03/05/19  6:03 PM   Result Value Ref Range    Glucose 134 mg/dL    Insulin order 3.4 units/hour    Insulin adminstered 3.4 units/hour    Multiplier 0.046     Low target 140 mg/dL    High target 180 mg/dL    D50 order 0.0 ml    D50 administered 0.00 ml    Minutes until next BG 60 min    Order initials HS     Administered initials HS     GLSCOM Comments     POC VENOUS BLOOD GAS    Collection Time: 03/05/19  6:43 PM   Result Value Ref Range    Device: BIPAP      FIO2 (POC) 40 %    pH, venous (POC) 7.302 (L) 7.32 - 7.42      pCO2, venous (POC) 69.2 (H) 41 - 51 MMHG    pO2, venous (POC) 30 mmHg    HCO3, venous (POC) 34.2 (H) 23 - 28 MMOL/L    sO2, venous (POC) 49 (L) 65 - 88 %    Tidal volume 358 ml    Set Rate 15 bpm    PEEP/CPAP (POC) 10 cmH2O    PIP (POC) 17      Allens test (POC) YES      Inspiratory Time 0.9 sec    Site LEFT BRACHIAL      Patient temp.  98.6      Specimen type (POC) VENOUS BLOOD      Performed by Sol     CO2, POC 36 MMOL/L    Critical value read back 18:50     Exhaled minute volume 13.10 L/min    COLLECT TIME 1,840     GLUCOSE, POC    Collection Time: 03/05/19  7:14 PM   Result Value Ref Range    Glucose (POC) 167 (H) 65 - 100 mg/dL   GLUCOSE, POC    Collection Time: 03/05/19 11:34 PM   Result Value Ref Range    Glucose (POC) 263 (H) 65 - 100 mg/dL   POC G3    Collection Time: 03/06/19  3:01 AM   Result Value Ref Range    Device: BIPAP      FIO2 (POC) 40 %    pH (POC) 7.300 (L) 7.35 - 7.45      pCO2 (POC) 63.5 (HH) 35 - 45 MMHG    pO2 (POC) 102 (H) 75 - 100 MMHG    HCO3 (POC) 31.2 (H) 22 - 26 MMOL/L    sO2 (POC) 97 95 - 98 %    Base excess (POC) 3 mmol/L    Tidal volume 503 ml    Set Rate 18 bpm    PEEP/CPAP (POC) 10 cmH2O    PIP (POC) 17      Allens test (POC) YES      Inspiratory Time 0.9 sec    Site RIGHT RADIAL      Patient temp.  98.6      Specimen type (POC) ARTERIAL      Performed by [employer]kys     CO2, POC 33 MMOL/L    Respiratory comment: NurseNotified     Exhaled minute volume 9.70 L/min    COLLECT TIME 034     METABOLIC PANEL, BASIC    Collection Time: 03/06/19  3:40 AM   Result Value Ref Range    Sodium 140 136 - 145 mmol/L    Potassium 4.4 3.5 - 5.1 mmol/L    Chloride 102 98 - 107 mmol/L    CO2 30 21 - 32 mmol/L    Anion gap 8 7 - 16 mmol/L    Glucose 331 (H) 65 - 100 mg/dL    BUN 14 8 - 23 MG/DL    Creatinine 0.65 0.6 - 1.0 MG/DL    GFR est AA >60 >60 ml/min/1.73m2    GFR est non-AA >60 >60 ml/min/1.73m2    Calcium 9.4 8.3 - 10.4 MG/DL   CBC W/O DIFF    Collection Time: 03/06/19  3:40 AM   Result Value Ref Range    WBC 10.8 4.3 - 11.1 K/uL    RBC 4.08 4.05 - 5.2 M/uL    HGB 10.4 (L) 11.7 - 15.4 g/dL    HCT 37.0 35.8 - 46.3 %    MCV 90.7 79.6 - 97.8 FL    MCH 25.5 (L) 26.1 - 32.9 PG    MCHC 28.1 (L) 31.4 - 35.0 g/dL    RDW 15.9 (H) 11.9 - 14.6 %    PLATELET 602 830 - 942 K/uL    MPV 10.2 9.4 - 12.3 FL    ABSOLUTE NRBC 0.00 0.0 - 0.2 K/uL   GLUCOSE, POC    Collection Time: 03/06/19  4:06 AM   Result Value Ref Range    Glucose (POC) 319 (H) 65 - 100 mg/dL   GLUCOSE, POC    Collection Time: 03/06/19  7:25 AM   Result Value Ref Range    Glucose (POC) 322 (H) 65 - 100 mg/dL       Imaging:  XR CHEST PORT [[id number]] Collected: 03/05/19 0239   Order Status: Completed Updated: 03/05/19 0244   Narrative:     Portable chest xray      COMPARISON: August 1, 2018    CLINICAL HISTORY: Altered mental status. FINDINGS:    There are bilateral diffuse groundglass opacities, likely vascular congestion. There is confluent masslike opacity in the left perihilar region. This is more  prominent compared to prior examination. Heart appears enlarged. No  pneumothorax. Impression:     IMPRESSION:    Masslike opacity in the left perihilar region, significantly more prominent  compared to July 29, 2018 CT. Findings suspicious for lung tumor. Contrast-enhanced CT chest should be considered to better characterize. DC4         CT HEAD WO CONT [[id number]] Collected: 03/05/19 0047   Order Status: Completed Updated: 03/05/19 0129   Narrative:     Noncontrast CT of the brain. COMPARISON: none    INDICATION: Altered mental status. History of lung cancer. TECHNIQUE: Contiguous axial images were obtained from the skull base through the  vertex without IV contrast. Radiation dose reduction techniques were used for  this study:  Our CT scanners use one or all of the following: Automated exposure  control, adjustment of the mA and/or kVp according to patient's size, iterative  reconstruction. FINDINGS:    There is an approximately 2 cm cystic mass in the right parieto-occipital lobe. There is surrounding vasogenic edema. There is mass effect on the occipital horn  of the right lateral ventricle. There is no midline shift or hydrocephalus. Included globes appear intact. Paranasal sinuses and the mastoid air cells are  aerated. Surrounding bones are intact. Impression:     IMPRESSION:    1. Brain mass in the right parieto-occipital lobe with surrounding vasogenic  edema. Contrast enhanced MRI of the brain should be considered to better  evaluate/characterize. .         ASSESSMENT:  Problem List  Date Reviewed: 7/28/2018          Codes Class Noted    * (Principal) Seizure ([facility] [address] ICD-10-CM: R56.9  ICD-9-CM: 780.39  3/5/2019        Metastatic cancer to brain [facility]) ICD-10-CM: C79.31  ICD-9-CM: 198.3  3/5/2019        Hyperglycemia ICD-10-CM: R73.9  ICD-9-CM: 790.29  3/5/2019        Acute on chronic respiratory failure ([facility] ICD-10-CM: J96.20  ICD-9-CM: 518.84  3/5/2019        Benign neoplasm of colon (Chronic) ICD-10-CM: D12.6  ICD-9-CM: 211.3  10/31/2018        Mediastinal lymphadenopathy ICD-10-CM: R59.0  ICD-9-CM: 785.6  7/30/2018        Primary cancer of left upper lobe of lung ([facility]- squamous cell ICD-10-CM: C34.12  ICD-9-CM: 162.3  7/30/2018        Chronic pain (Chronic) ICD-10-CM: W50.79  ICD-9-CM: 338.29  7/28/2018        Acute respiratory failure [facility]) ICD-10-CM: J96.00  ICD-9-CM: 518.81  7/28/2018        Hypertension, essential, benign (Chronic) ICD-10-CM: I10  ICD-9-CM: 401.1  Unknown        Hyperlipidemia (Chronic) ICD-10-CM: E78.5  ICD-9-CM: 272.4  Unknown        COPD with emphysema (HCC) (Chronic) ICD-10-CM: J43.9  ICD-9-CM: 492.8  Unknown        Morbid obesity (HCC) (Chronic) ICD-10-CM: E66.01  ICD-9-CM: 278.01  Unknown        Primary pulmonary hypertension ([facility] (Chronic) ICD-10-CM: I27.0  ICD-9-CM: 416.0  Unknown        Diabetes mellitus without complication (HCC) (Chronic) ICD-10-CM: E11.9  ICD-9-CM: 250.00  Unknown        Orthopnea (Chronic) ICD-10-CM: R06.01  ICD-9-CM: 786.02  Unknown        Abnormality of gait and mobility ICD-10-CM: R26.9  ICD-9-CM: 578. 2  Unknown        Insomnia (Chronic) ICD-10-CM: G47.00  ICD-9-CM: 780.52  12/27/2016        Extreme obesity with respiratory disorder (HCC) (Chronic) ICD-10-CM: E66.01, J98.9  ICD-9-CM: 278.01, 519.9  Unknown        FLETCHER (obstructive sleep apnea) (Chronic) ICD-10-CM: G47.33  ICD-9-CM: 327.23  Unknown        Chronic hypoxemic respiratory failure (HCC) (Chronic) ICD-10-CM: J96.11  ICD-9-CM: 518.83, 799.02  Unknown        Cor pulmonale (HCC) (Chronic) ICD-10-CM: I27.81  ICD-9-CM: 416.9  Unknown        CHF (congestive heart failure), NYHA class I (HCC) (Chronic) ICD-10-CM: I50.9  ICD-9-CM: 428.0  Unknown    Overview Signed 12/27/2016 11:24 AM by Memo Damon     diastolic             Obesity with alveolar hypoventilation (HCC) (Chronic) ICD-10-CM: G44.6  ICD-9-CM: 278.03  Unknown        Arthritis of knee (Chronic) ICD-10-CM: M17.10  ICD-9-CM: 716.96  Unknown        History of colonic polyps (Chronic) ICD-10-CM: Z86.010  ICD-9-CM: V12.72  8/10/2015    Overview Signed 10/31/2018 11:30 AM by Dariusz Gresham NP     Last Assessment & Plan:   She has several adenomas on her initial colonoscopy. I believe there was some doubt about whether the cecal polyp on that exam was completely removed. Follow-up colonoscopy did reveal a persistent cecal polyp. This polyp was read as tubulovillous adenoma. It has been 3 years male of this month. Because of the villous pathology we will proceed with a follow-up colonoscopy. She will hold Motrin as well as aspirin for 3 days prior to the procedure. We discussed the current colorectal cancer screening guidelines as well as the risks and benefits of colonoscopy in detail. We also discussed the prep in detail and she wishes to proceed. Cause of her oxygen use we will need to do her on the inpatient side of the hospital. We spent at least 15-20 minutes procuring records and bringing them forward from the hospital electronic medical record system into today's office visit. We then spent an additional 15-20 minutes in counseling in addition to the time spent during her history and physical exam.                     RECOMMENDATIONS:  Stage IV Squamous cell lung cancer with likely brain mets / Seizure  - never rec'd treatment, has been on home Hospice since 8/2018.   Family has revoked Hospice and changed pt to full code  - CT head with brain mass in the R parieto-occipital lobe with surrounding vasogenic edema  - MRI brain pending  - CT CAP for staging when able  - On Dex/Keppra  - NS considering craniotomy    Resp Failure  - on BiPAP  - on Zosyn/Vanc  - UCx-NGTD. BCx-pending    DM  - Primary team managing    Lab studies and imaging studies were personally reviewed. Thank you for allowing us to participate in the care of Ms. Hector Sheppard. No family at bedside to discuss prognosis or goals of care. We will follow up tomorrow. Beryl Peck NP   [facility]  [address] [place] [address]  Office : [phone]  Fax : [phone]         Attending Addendum:  I have personally performed a face to face diagnostic evaluation on this patient. I have reviewed and agree with the care plan as documented by Beryl Peck N.P. My findings are as follows: She has lung cancer with brain mets, appears non-communicative, heart rate regular without murmurs, abdomen is non-tender, bowel sounds are positive, we will follow this patient, continue Keppra/Dex, she needs an MRI of her brain.               Bard Binh MD      [facility]  [address] [place] [address]  Office : [phone]  Fax : [phone]

## 2019-03-06 NOTE — PROGRESS NOTES
Palliative Care Progress Note    Patient: Ranjeet Moore MRN: [id number]  SSN: [id number]    YOB: 1960  Age: 59 y.o. Sex: female       Assessment/Plan:     Chief Complaint/Interval History: Uncomfortable/restless on BiPAP     Principal Diagnosis:    · Pain, general  R52    Additional Diagnoses:   · Acute Respiratory Failure, Unspecified  J96.00  · Debility, Unspecified  R53.81  · Respiratory Failure, Acute on Chronic  J96.20  · Counseling, Encounter for Medical Advice  Z71.9  · Encounter for Palliative Care  Z51.5    Palliative Performance Scale (PPS)  PPS: 10    Medical Decision Making:   Reviewed and summarized notes over previous 24 hours. Discussed case with appropriate providers: ICU IDT; Kin Chamberlain, primary RN  Reviewed laboratory and x-ray data: ABG    Patient remains on BiPAP. She says she hurts all over and restless in the bed. She denies dyspnea currently. Patient able to tell me we are at [address] [place] [address], that she is here because of her cancer which has spread to her brain. She says she would rather be anywhere but the hospital, but says she is ok with current interventions. Thoughts wander and unsure of her ability to fully understand her medical condition. She is asking where [facility] is. She says she is her HCPOA, and wants Hazel to make healthcare decisions for patient. More than 50% of this 15 minute visit was spent counseling and coordination of care as outlined above. Additionally, time spent discussing ACP with family face to face from [id number]. Met with three of patient's four children. Present were Kennedy Maldonado, and Harris Rosa; son June Platt is at work. Discussion held about patient's current condition, goals, and plan of care. They have good understanding that patient's lung cancer has spread to her brain. Informed that this means she has stage IV cancer, which means it is not curable.   They verbalize understanding in incurable nature and think her prognosis is probably not more than a year. They are aware that further scans are pending: MRI brain and CT CAP desired before treatment options are presented by Dr. Morales July regarding the brain metastasis as well as oncology team.  They verbalize agreement with current plan of care. Son, Verito Rios, states the they want longevity as well as comfort for patient; Hazel and Silva nod in agreement. They are hopeful that she will improve and can get treatment for her cancer. Verbalized concern about her overall debility, and that first, we need to make sure she can survive this hospitalization, but her tolerance of any treatment is concerning. They verbalize understanding. We discussed code status, and they confirm full code status at this time. Advised that goals and plan of care be readdressed and further information about patient's condition is revealed. Subjective:     Review of Systems:  A comprehensive review of systems was negative except for:   Constitutional: Positive for pain \"all over\" and thirst.     Objective:     Visit Vitals  /87   Pulse (!) 102   Temp 98.2 °F (36.8 °C)   Resp 17   Ht 5' 6\" (1.676 m)   Wt 308 lb 3.3 oz (139.8 kg)   SpO2 100%   BMI 49.75 kg/m²       Physical Exam:    General:  Cooperative. No acute distress. Eyes:  Conjunctivae/corneas clear. Nose: Nares normal. Septum midline. BiPAP in place. Neck: Supple, symmetrical, trachea midline. Lungs:   Unlabored on BiPAP. Heart:  Regular rate and rhythm. Abdomen:   Obese. Soft, non-tender, non-distended. Hyperactive bowel sounds. Extremities: Normal, atraumatic, no cyanosis. Skin: Skin color, texture, turgor normal. No rash. Neurologic: Nonfocal.   Psych: Alert and oriented to person, place, time, and somewhat situation.       Signed By: Daniel Garza NP     March 6, 2019

## 2019-03-06 NOTE — PROGRESS NOTES
NEUROSURGERY PROGRESS NOTE:   Admit Date: 3/4/2019  Subjective:   Patient blood gas improving slowly. She still requires BiPAP today given her elevated CO2. She underwent and EEG that showed epileptiform activity. She is currently on Keppra 500 mg BID. Her blood glucose is in the 300s this am which is improved since admission and she is more alert and cooperative neurologically. She has not undergone her MRI Brain or CT Chest/Abdomen/Pelvis. Objective:  Visit Vitals  /80   Pulse 80   Temp 97.7 °F (36.5 °C)   Resp 30   Ht 5' 6\" (1.676 m)   Wt 308 lb 3.3 oz (139.8 kg)   SpO2 100%   BMI 49.75 kg/m²   General: On BiPAP and appears less labored this am   Awake, alert, and oriented to person, place, year and situation, stated she did not care who the president was at this time  Eyes open spontaneously   PERRL, EOMI  Patient with 5/5 strength in the bilateral upper and bilateral lower extremities      Assessment and Plan:   Jordan Peterson 59 y.o. female multiple medical co-morbidities who was admitted with altered mental status, seizure activity, Acute respiratory failure, severe hyperglycemia which blood glucose levels in the 500s, and a leukocytosis. She was found have a new right 2.6 x 2.8 x 2.5 mixed cystic and nodular temporal-occipital lesion with a central hypointense core and surrounding vasogenic edema with local mass effect and absence or mid-line shift. This lesion is most likely consistent a new intracranial metastatic lesion given her history of lung cancer. Her neurological exam is improving with correction of her underlying metabolic and respiratory dysfunction.   - Recommend q2H neuro checks   - Recommend Keppra 500 mg BID   - Please obtain an MRI Brain WWO Contrast and CT Chest/Abdomen/Pelvis W contrast when able for staging to facilitate determining the best course of treatment.    - Continue decadron 4 mg q6H for cerebral edema   - Recommend GI Prophylaxis in the form of a PPI or H2 blocker while in the acute care setting and on high dose steroids   - Please call with questions or concerns    Leticia Lovett.  Becki Bella, [phone]

## 2019-03-06 NOTE — ACP (ADVANCE CARE PLANNING)
Met with three of patient's four children. Present were Phoebe Guadalupe, and Alessandro Dodd; son Anders Cowden is at work. Discussion held about patient's current condition, goals, and plan of care. They have good understanding that patient's lung cancer has spread to her brain. Informed that this means she has stage IV cancer, which means it is not curable. They verbalize understanding in incurable nature and think her prognosis is probably not more than a year. They are aware that further scans are pending: MRI brain and CT CAP desired before treatment options are presented by Dr. Cari Burch regarding the brain metastasis as well as oncology team.  They verbalize agreement with current plan of care. Son, Alessandro Dodd, states the they want longevity as well as comfort for patient; Hazel and Thedionicio Almaraz nod in agreement. They are hopeful that she will improve and can get treatment for her cancer. Verbalized concern about her overall debility, and that first, we need to make sure she can survive this hospitalization, but her tolerance of any treatment is concerning. They verbalize understanding. We discussed code status, and they confirm full code status at this time. Advised that goals and plan of care be readdressed and further information about patient's condition is revealed.

## 2019-03-06 NOTE — PROGRESS NOTES
Patricia Gifford  Admission Date: 3/4/2019             ICU Daily Progress Note: 3/6/2019     Patient is a 59 y.o.  female seen and evaluated at the request of Dr. Lyubov Cuenca. She was brought to the ER last night after her son found her BS > 500. She was also noted to be shaky and weak. EMS was summoned and upon arrival to the ER she was found to be unresponsive and seizuring. Her head CT shows a 2 cm cystic, parietooccipital mass with edema/mass effect. She has been seen by neurosurgery who recommended a brain MRI. She has been started on Decadron and Keppra. She was diagnosed with squamous cell lung cancer last year after undergoing a left upper lobe biopsy. She was seen by oncology and an outpatient PET with follow up appointment was recommended but patient ended up under the care of hospice instead. She is morbidly obese and has a history of COPD with chronic dyspnea. She quit smoking 4 to 5 years ago. She is maintained on 2 liters of oxygen and has a home nebulizer. She has FLETCHER/OHS with chronic hypercapnea but she has refused CPAP/Bipap in the past. On arrival here, her abg showed a PH of 7.1/94/88/30 with a baseline CO2 in the low 70s. She lives with her son and he reports that she can only ambulate a step of 2 at a time. Subjective:     Over the past 24 hours: Moved to the ICU for BiPAP and Stage 4 NSCLC with new brain met and edema on head CT. New seizure and neurology saw Oncology consulted     She has been at home in hospice care since August 2018. Discussing Hospice house and revoked wanted to have tumor removed. Deteriorated since then. Neurosurgery has seen and is considering a craniotomy.  MRI of the brain has been ordered and a chest/abdominal/pelvic CT recommended      Current Facility-Administered Medications   Medication Dose Route Frequency    sodium chloride (NS) flush 5-10 mL  5-10 mL IntraVENous PRN    sodium chloride (NS) flush 5-40 mL  5-40 mL IntraVENous Q8H    sodium chloride (NS) flush 5-40 mL  5-40 mL IntraVENous PRN    NUTRITIONAL SUPPORT ELECTROLYTE PRN ORDERS   Does Not Apply PRN    acetaminophen (TYLENOL) tablet 650 mg  650 mg Oral Q4H PRN    HYDROcodone-acetaminophen (NORCO) 5-325 mg per tablet 1 Tab  1 Tab Oral Q4H PRN    morphine injection 2 mg  2 mg IntraVENous Q4H PRN    ondansetron (ZOFRAN) injection 4 mg  4 mg IntraVENous Q4H PRN    naloxone (NARCAN) injection 0.4 mg  0.4 mg IntraVENous PRN    LORazepam (ATIVAN) injection 1 mg  1 mg IntraVENous Q4H PRN    albuterol-ipratropium (DUO-NEB) 2.5 MG-0.5 MG/3 ML  3 mL Nebulization Q4H PRN    levETIRAcetam (KEPPRA) 500 mg in 0.9% sodium chloride 100 mL IVPB  500 mg IntraVENous Q12H    budesonide (PULMICORT) 500 mcg/2 ml nebulizer suspension  500 mcg Nebulization BID RT    And    albuterol (PROVENTIL VENTOLIN) nebulizer solution 2.5 mg  2.5 mg Nebulization Q6HWA RT    dexamethasone (DECADRON) injection 6 mg  6 mg IntraVENous Q6H    piperacillin-tazobactam (ZOSYN) 3.375 g in 0.9% sodium chloride (MBP/ADV) 100 mL  3.375 g IntraVENous Q8H    metoprolol (LOPRESSOR) injection 1.25 mg  1.25 mg IntraVENous Q6H PRN    vancomycin (VANCOCIN) 1500 mg in  ml infusion  1,500 mg IntraVENous Q12H    famotidine (PF) (PEPCID) 40 mg in sodium chloride 0.9% 10 mL injection  40 mg IntraVENous Q12H    insulin regular (NOVOLIN R, HUMULIN R) injection   SubCUTAneous Q4H    dexmedeTOMidine (PRECEDEX) 400 mcg in 0.9% sodium chloride 100 mL infusion  0.2-0.7 mcg/kg/hr IntraVENous TITRATE         Objective:     Vitals:    03/06/19 0630 03/06/19 0645 03/06/19 0715 03/06/19 0717   BP: 126/76 136/79     Pulse: 80 79     Resp: 17 25     Temp:       SpO2: 96% 98% 100% 100%   Weight:       Height:         Intake and Output:   03/04 1901 - 03/06 0700  In: 3515.4 [P.O.:360; I.V.:3155.4]  Out: 1530 [Urine:1530]  No intake/output data recorded.     Physical Exam:          GEN: acutely ill, obese on BiPAP  HEENT:  on BiPAP, mouth is dry  NECK:  no JVD, no retractions, no thyromegaly or masses,   LUNGS:  Decreased bases on BiPAP  HEART:  RRR with no M,G,R;  ABDOMEN: rounded, no pain + BM; positive bowel sounds present  EXTREMITIES:  warm with no cyanosis, no lower leg edema  SKIN:  no jaundice or ecchymosis   NEURO: lethargic, responsive on BiPAP    LINES PIV, small  DRIPS: precedex  NUTRITION: ADA    Ventilator Settings  Mode FIO2 Rate Tidal Volume Pressure PEEP      40 %                 Peak airway pressure:     Minute ventilation: 14.8 l/min       CHEST XRAY:     LAB  Recent Labs     03/06/19  0340 03/04/19  2341   WBC 10.8 14.2*   HGB 10.4* 12.3   HCT 37.0 43.7    649*     Recent Labs     03/06/19 0340 03/04/19  2341    130*   K 4.4 3.7    87*   CO2 30 32   * 558*   BUN 14 7*   CREA 0.65 1.05*   MG  --  1.8     ABG:    Lab Results   Component Value Date/Time    PHI 7.300 (L) 03/06/2019 03:01 AM    PCO2I 63.5 (HH) 03/06/2019 03:01 AM    PO2I 102 (H) 03/06/2019 03:01 AM    HCO3I 31.2 (H) 03/06/2019 03:01 AM    FIO2I 40 03/06/2019 03:01 AM       Cultures: Blood and urine ordered     Assessment:     Patient Active Problem List   Diagnosis Code    Hypertension, essential, benign I10    Hyperlipidemia E78.5    COPD with emphysema ([facility] [address] J43.9    Morbid obesity ([facility] [address] E66.01    Primary pulmonary hypertension (HCC) I27.0    Diabetes mellitus without complication ([facility]) Q74.3    Orthopnea R06.01    Abnormality of gait and mobility R26.9    Insomnia G47.00    Extreme obesity with respiratory disorder (HCC) E66.01, J98.9    FLETCHER (obstructive sleep apnea) G47.33    Chronic hypoxemic respiratory failure (HCC) J96.11    Cor pulmonale (HCC) I27.81    CHF (congestive heart failure), NYHA class I ([facility]) I50.9    Obesity with alveolar hypoventilation (HCC) E66.2    Arthritis of knee M17.10    Chronic pain G89.29    Acute respiratory failure (HCC) J96.00    Mediastinal lymphadenopathy R59.0    Primary cancer of left upper lobe of lung ([address]- squamous cell C34.12    Benign neoplasm of colon D12.6    History of colonic polyps Z86.010    Seizure (HCC) R56.9    Metastatic cancer to brain (HCC) C79.31    Hyperglycemia R73.9    Acute on chronic respiratory failure (HCC) J96.20       Plan   Principal Problem:    Seizure ([address] (3/5/2019)  On Keppra, neurology is following      COPD with emphysema ([address] ()  Now with hypercapnic respiratory failure      Morbid obesity ([address] ()      Diabetes mellitus without complication (HCC) ()  Now with hyperglycemia  Not eating    FLETCHER (obstructive sleep apnea) ()  On BiPAP with acute respiratory failure      Chronic hypoxemic respiratory failure (HCC) ()  Worse requiring BiPAP      Primary cancer of left upper lobe of lung ([facility]- squamous cell (7/30/2018)      Metastatic cancer to brain [facility]) (3/5/2019)  Neurology and neurosurgery following  Previously in home hospice  Now on BiPAP with hypercapnic respiratory failure  MRI ordered      Hyperglycemia (3/5/2019)  Ongoing, limited oral intake      Acute on chronic respiratory failure ([address] (3/5/2019)  On BiPAP, PCO2 remains elevated    Plan:  -- PC, neurology, neurosurgery following   -- on BiPAP with hypercapnic respiratory failure, continue for now  -- pepcid  -- MRI ordered. Keppra and decadron 6 mg Q 6 hours ordered  -- zosyn, vancomycin   -- watch blood sugars, add lantus 10 units and monitor  -- PC following and patient has STAGE IV NSCLC, likely mets to brain, and acute on chronic respiratory failure on BiPAP. Now FULL code and previously in hospice care. IAIN Springer    More than 50% of time documented was spent in face-to-face contact with the patient and in the care of the patient on the floor/unit where the patient is located. Lungs:  Decreased but clear. Heart:  RRR with no Murmur/Rubs/Gallops    Additional Comments:    Patient's hypercarbia coming down on bipap but not normalized. Cont bipap for now.  Was on precedex for delirium but had to stop due to cardiac pauses. Very agitated this morning and required IV ativan. Will start low dose scheduled IV haldol to treat delirium. Hospitalists managing hyperglycemia. I have spoken with and examined the patient. I agree with the above assessment and plan as documented.     Ruddy Grady MD

## 2019-03-07 ENCOUNTER — APPOINTMENT (OUTPATIENT)
Dept: CT IMAGING | Age: 59
DRG: 100 | End: 2019-03-07
Attending: INTERNAL MEDICINE
Payer: OTHER MISCELLANEOUS

## 2019-03-07 ENCOUNTER — APPOINTMENT (OUTPATIENT)
Dept: MRI IMAGING | Age: 59
DRG: 100 | End: 2019-03-07
Attending: HOSPITALIST
Payer: OTHER MISCELLANEOUS

## 2019-03-07 LAB
ARTERIAL PATENCY WRIST A: YES
BACTERIA SPEC CULT: NORMAL
BASE EXCESS BLD CALC-SCNC: 7 MMOL/L
BDY SITE: ABNORMAL
BODY TEMPERATURE: 98.6
CO2 BLD-SCNC: 35 MMOL/L
COLLECT TIME,HTIME: 900
FLOW RATE ISTAT,IFRATE: 50 L/MIN
GAS FLOW.O2 O2 DELIVERY SYS: ABNORMAL L/MIN
GLUCOSE BLD STRIP.AUTO-MCNC: 259 MG/DL (ref 65–100)
GLUCOSE BLD STRIP.AUTO-MCNC: 268 MG/DL (ref 65–100)
GLUCOSE BLD STRIP.AUTO-MCNC: 271 MG/DL (ref 65–100)
GLUCOSE BLD STRIP.AUTO-MCNC: 280 MG/DL (ref 65–100)
GLUCOSE BLD STRIP.AUTO-MCNC: 284 MG/DL (ref 65–100)
HCO3 BLD-SCNC: 33.6 MMOL/L (ref 22–26)
O2/TOTAL GAS SETTING VFR VENT: 40 %
PCO2 BLD: 53 MMHG (ref 35–45)
PH BLD: 7.41 [PH] (ref 7.35–7.45)
PO2 BLD: 76 MMHG (ref 75–100)
SAO2 % BLD: 95 % (ref 95–98)
SERVICE CMNT-IMP: ABNORMAL
SERVICE CMNT-IMP: ABNORMAL
SERVICE CMNT-IMP: NORMAL
SPECIMEN TYPE: ABNORMAL
VANCOMYCIN SERPL-MCNC: 5.5 UG/ML

## 2019-03-07 PROCEDURE — 99232 SBSQ HOSP IP/OBS MODERATE 35: CPT | Performed by: INTERNAL MEDICINE

## 2019-03-07 PROCEDURE — 74011636637 HC RX REV CODE- 636/637: Performed by: INTERNAL MEDICINE

## 2019-03-07 PROCEDURE — 94660 CPAP INITIATION&MGMT: CPT

## 2019-03-07 PROCEDURE — 82962 GLUCOSE BLOOD TEST: CPT

## 2019-03-07 PROCEDURE — 36415 COLL VENOUS BLD VENIPUNCTURE: CPT

## 2019-03-07 PROCEDURE — 65610000001 HC ROOM ICU GENERAL

## 2019-03-07 PROCEDURE — 74011636637 HC RX REV CODE- 636/637: Performed by: HOSPITALIST

## 2019-03-07 PROCEDURE — 74011636320 HC RX REV CODE- 636/320: Performed by: HOSPITALIST

## 2019-03-07 PROCEDURE — 36600 WITHDRAWAL OF ARTERIAL BLOOD: CPT

## 2019-03-07 PROCEDURE — 70553 MRI BRAIN STEM W/O & W/DYE: CPT

## 2019-03-07 PROCEDURE — 82803 BLOOD GASES ANY COMBINATION: CPT

## 2019-03-07 PROCEDURE — 74011000258 HC RX REV CODE- 258: Performed by: HOSPITALIST

## 2019-03-07 PROCEDURE — 74011000250 HC RX REV CODE- 250: Performed by: INTERNAL MEDICINE

## 2019-03-07 PROCEDURE — 74011250636 HC RX REV CODE- 250/636: Performed by: INTERNAL MEDICINE

## 2019-03-07 PROCEDURE — 99233 SBSQ HOSP IP/OBS HIGH 50: CPT | Performed by: INTERNAL MEDICINE

## 2019-03-07 PROCEDURE — 99232 SBSQ HOSP IP/OBS MODERATE 35: CPT | Performed by: NURSE PRACTITIONER

## 2019-03-07 PROCEDURE — 80202 ASSAY OF VANCOMYCIN: CPT

## 2019-03-07 PROCEDURE — 74011000250 HC RX REV CODE- 250: Performed by: HOSPITALIST

## 2019-03-07 PROCEDURE — 94640 AIRWAY INHALATION TREATMENT: CPT

## 2019-03-07 PROCEDURE — A9575 INJ GADOTERATE MEGLUMI 0.1ML: HCPCS | Performed by: HOSPITALIST

## 2019-03-07 PROCEDURE — 77010033711 HC HIGH FLOW OXYGEN

## 2019-03-07 PROCEDURE — 74011250636 HC RX REV CODE- 250/636: Performed by: HOSPITALIST

## 2019-03-07 PROCEDURE — 71250 CT THORAX DX C-: CPT

## 2019-03-07 RX ORDER — INSULIN GLARGINE 100 [IU]/ML
20 INJECTION, SOLUTION SUBCUTANEOUS DAILY
Status: DISCONTINUED | OUTPATIENT
Start: 2019-03-07 | End: 2019-03-10

## 2019-03-07 RX ORDER — SODIUM CHLORIDE 0.9 % (FLUSH) 0.9 %
10 SYRINGE (ML) INJECTION
Status: COMPLETED | OUTPATIENT
Start: 2019-03-07 | End: 2019-03-07

## 2019-03-07 RX ORDER — GADOTERATE MEGLUMINE 376.9 MG/ML
28 INJECTION INTRAVENOUS
Status: COMPLETED | OUTPATIENT
Start: 2019-03-07 | End: 2019-03-07

## 2019-03-07 RX ORDER — VANCOMYCIN/0.9 % SOD CHLORIDE 1.5G/250ML
1500 PLASTIC BAG, INJECTION (ML) INTRAVENOUS EVERY 12 HOURS
Status: DISCONTINUED | OUTPATIENT
Start: 2019-03-08 | End: 2019-03-08

## 2019-03-07 RX ORDER — VANCOMYCIN 2 GRAM/500 ML IN 0.9 % SODIUM CHLORIDE INTRAVENOUS
2000 ONCE
Status: COMPLETED | OUTPATIENT
Start: 2019-03-07 | End: 2019-03-07

## 2019-03-07 RX ADMIN — SODIUM CHLORIDE 500 MG: 900 INJECTION, SOLUTION INTRAVENOUS at 17:24

## 2019-03-07 RX ADMIN — HUMAN INSULIN 6 UNITS: 100 INJECTION, SOLUTION SUBCUTANEOUS at 07:34

## 2019-03-07 RX ADMIN — SODIUM CHLORIDE 500 MG: 900 INJECTION, SOLUTION INTRAVENOUS at 05:31

## 2019-03-07 RX ADMIN — HUMAN INSULIN 6 UNITS: 100 INJECTION, SOLUTION SUBCUTANEOUS at 12:00

## 2019-03-07 RX ADMIN — INSULIN GLARGINE 20 UNITS: 100 INJECTION, SOLUTION SUBCUTANEOUS at 08:22

## 2019-03-07 RX ADMIN — LORAZEPAM 1 MG: 2 INJECTION INTRAMUSCULAR; INTRAVENOUS at 11:30

## 2019-03-07 RX ADMIN — ALBUTEROL SULFATE 2.5 MG: 2.5 SOLUTION RESPIRATORY (INHALATION) at 19:32

## 2019-03-07 RX ADMIN — ALBUTEROL SULFATE 2.5 MG: 2.5 SOLUTION RESPIRATORY (INHALATION) at 13:36

## 2019-03-07 RX ADMIN — Medication 10 ML: at 22:24

## 2019-03-07 RX ADMIN — GADOTERATE MEGLUMINE 28 ML: 376.9 INJECTION INTRAVENOUS at 11:36

## 2019-03-07 RX ADMIN — Medication 10 ML: at 06:08

## 2019-03-07 RX ADMIN — HUMAN INSULIN 6 UNITS: 100 INJECTION, SOLUTION SUBCUTANEOUS at 03:42

## 2019-03-07 RX ADMIN — DEXAMETHASONE SODIUM PHOSPHATE 6 MG: 10 INJECTION INTRAMUSCULAR; INTRAVENOUS at 17:25

## 2019-03-07 RX ADMIN — PIPERACILLIN AND TAZOBACTAM 3.38 G: 3; .375 INJECTION, POWDER, FOR SOLUTION INTRAVENOUS at 22:23

## 2019-03-07 RX ADMIN — PIPERACILLIN AND TAZOBACTAM 3.38 G: 3; .375 INJECTION, POWDER, FOR SOLUTION INTRAVENOUS at 13:28

## 2019-03-07 RX ADMIN — Medication 10 ML: at 13:23

## 2019-03-07 RX ADMIN — FAMOTIDINE 20 MG: 10 INJECTION, SOLUTION INTRAVENOUS at 20:29

## 2019-03-07 RX ADMIN — LORAZEPAM 1 MG: 2 INJECTION INTRAMUSCULAR; INTRAVENOUS at 22:43

## 2019-03-07 RX ADMIN — DEXAMETHASONE SODIUM PHOSPHATE 6 MG: 10 INJECTION INTRAMUSCULAR; INTRAVENOUS at 06:08

## 2019-03-07 RX ADMIN — HUMAN INSULIN 6 UNITS: 100 INJECTION, SOLUTION SUBCUTANEOUS at 20:29

## 2019-03-07 RX ADMIN — FAMOTIDINE 20 MG: 10 INJECTION, SOLUTION INTRAVENOUS at 08:22

## 2019-03-07 RX ADMIN — Medication 10 ML: at 11:36

## 2019-03-07 RX ADMIN — VANCOMYCIN HYDROCHLORIDE 2000 MG: 10 INJECTION, POWDER, LYOPHILIZED, FOR SOLUTION INTRAVENOUS at 16:40

## 2019-03-07 RX ADMIN — HUMAN INSULIN 6 UNITS: 100 INJECTION, SOLUTION SUBCUTANEOUS at 16:40

## 2019-03-07 RX ADMIN — PIPERACILLIN AND TAZOBACTAM 3.38 G: 3; .375 INJECTION, POWDER, FOR SOLUTION INTRAVENOUS at 06:08

## 2019-03-07 RX ADMIN — BUDESONIDE 500 MCG: 0.5 INHALANT RESPIRATORY (INHALATION) at 07:55

## 2019-03-07 RX ADMIN — DIATRIZOATE MEGLUMINE AND DIATRIZOATE SODIUM 15 ML: 660; 100 LIQUID ORAL; RECTAL at 16:52

## 2019-03-07 RX ADMIN — DEXAMETHASONE SODIUM PHOSPHATE 6 MG: 10 INJECTION INTRAMUSCULAR; INTRAVENOUS at 13:21

## 2019-03-07 RX ADMIN — ALBUTEROL SULFATE 2.5 MG: 2.5 SOLUTION RESPIRATORY (INHALATION) at 07:55

## 2019-03-07 RX ADMIN — BUDESONIDE 500 MCG: 0.5 INHALANT RESPIRATORY (INHALATION) at 19:32

## 2019-03-07 NOTE — PROGRESS NOTES
Progress Note    Patient: Seth Mosley MRN: [id number]  SSN: [id number]    YOB: 1960  Age: 59 y.o. Sex: female      Admit Date: 3/4/2019    LOS: 2 days     Subjective:     From admission note :     \" Patient is a 58 y/o female with squamous cell carcinoma of the lung, COPD, extreme obesity, diastolic CHF, HTN, HLD, FLETCHER, pulmonary HTN, HLD who presents from home with new altered mental status. EMS reports hyperglycemia. On arrival to ED she was unresponsive with seizure activity. Glucose is 558. She was loaded with IV keppra and also given IV ativan. A head CT shows brain mass in the right parieto-occipital lobe with surrounding vasogenic edema. She then received IV decadron. She was tachycardic with WBC ct of 14.2 so sepsis protocol initiated with fluids and antibiotics. Initially family had said she was DNR but later stated to do everything Except intubation. Will admit to ICU. \"    3-5-2019  Patient has been in bed. Initially palliative consultant saw the patient and at that time her family wanted her to be a hospice candidate. However, later she changed her mind and now she is a full code. Family would like aggressive treatment now.     3-6-2019  Patient is still on BiPAP. She is more responsive. She was seen by neurosurgery today. EEG was done on 3-5-2019 which showed epileptiform activity. 3-7-2019  Patient in on high flow oxygen now. She is alert and talking. Asking and answering appropriately. She states that she is very nervous and panics with MRI study. Objective:     Vitals:    03/07/19 0800 03/07/19 0814 03/07/19 0831 03/07/19 0900   BP: 161/74  178/79 173/77   Pulse: 91 94 100 (!) 102   Resp: (!) 32 25 21 15   Temp:       SpO2: 100% 94% 97% 98%   Weight:       Height:            Intake and Output:  Current Shift: No intake/output data recorded. Last three shifts: 03/05 1901 - 03/07 0700  In: 3029 [P.O.:840;  I.V.:2189]  Out: 3605 [Urine:3605]    Physical Exam: General:                    The patient is a female in no acute distress. She is lying flat in bed. On high-flow oxygen. alert and oriented to place, time and person  Head:                                   Normocephalic/atraumatic. Eyes:                                   No palpebral pallor or scleral icterus. ENT:                                    External auricular and nasal exam within normal limits. Mucous membranes are moist.  Neck:                                   Supple, non-tender, no JVD. Lungs:                       Clear to auscultation bilaterally without wheezes or crackles. No respiratory distress or accessory muscle use. Heart:                                  Regular rate and rhythm, without murmurs, rubs, or gallops. Abdomen:                  Soft, non-tender, mildly distended with normoactive bowel sounds. Genitourinary:           No tenderness over the bladder or bilateral CVAs. Extremities:               Without clubbing, cyanosis, or edema. Skin:                                    Normal color, texture, and turgor. No rashes, lesions, or jaundice. Pulses:                      Radial and dorsalis pedis pulses present 2+ bilaterally. Capillary refill <2s. Neurologic:            Moving all four extremities well with no focal deficits.     Lab/Data Review:    Recent Results (from the past 24 hour(s))   GLUCOSE, POC    Collection Time: 03/06/19 11:25 AM   Result Value Ref Range    Glucose (POC) 124 (H) 65 - 100 mg/dL   GLUCOSE, POC    Collection Time: 03/06/19 11:26 AM   Result Value Ref Range    Glucose (POC) 336 (H) 65 - 100 mg/dL   POC G3    Collection Time: 03/06/19 12:01 PM   Result Value Ref Range    Device: BIPAP      FIO2 (POC) 40 %    pH (POC) 7.379 7.35 - 7.45      pCO2 (POC) 52.3 (H) 35 - 45 MMHG    pO2 (POC) 115 (H) 75 - 100 MMHG HCO3 (POC) 30.9 (H) 22 - 26 MMOL/L    sO2 (POC) 98 95 - 98 %    Base excess (POC) 5 mmol/L    Set Rate 15 bpm    PEEP/CPAP (POC) 8 cmH2O    Allens test (POC) YES      Site RIGHT RADIAL      Patient temp. 98.6      Specimen type (POC) ARTERIAL      Performed by [email or web address]     CO2, POC 32 MMOL/L    Pressure control 18      Critical value read back 00:02     Exhaled minute volume 14.70 L/min    COLLECT TIME 1,158     VANCOMYCIN, TROUGH    Collection Time: 03/06/19  2:08 PM   Result Value Ref Range    Vancomycin,trough 30.7 (HH) 5 - 20 ug/mL   GLUCOSE, POC    Collection Time: 03/06/19  3:50 PM   Result Value Ref Range    Glucose (POC) 325 (H) 65 - 100 mg/dL   GLUCOSE, POC    Collection Time: 03/06/19  8:04 PM   Result Value Ref Range    Glucose (POC) 286 (H) 65 - 100 mg/dL   GLUCOSE, POC    Collection Time: 03/06/19 11:31 PM   Result Value Ref Range    Glucose (POC) 257 (H) 65 - 100 mg/dL   GLUCOSE, POC    Collection Time: 03/07/19  3:35 AM   Result Value Ref Range    Glucose (POC) 259 (H) 65 - 100 mg/dL   GLUCOSE, POC    Collection Time: 03/07/19  7:29 AM   Result Value Ref Range    Glucose (POC) 280 (H) 65 - 100 mg/dL   POC G3    Collection Time: 03/07/19  9:03 AM   Result Value Ref Range    Device: High Flow Nasal Cannula      FIO2 (POC) 40 %    pH (POC) 7.411 7.35 - 7.45      pCO2 (POC) 53.0 (H) 35 - 45 MMHG    pO2 (POC) 76 75 - 100 MMHG    HCO3 (POC) 33.6 (H) 22 - 26 MMOL/L    sO2 (POC) 95 95 - 98 %    Base excess (POC) 7 mmol/L    Allens test (POC) YES      Site RIGHT RADIAL      Patient temp. 98.6      Specimen type (POC) ARTERIAL      Performed by [email or web address]     CO2, POC 35 MMOL/L    Flow rate (POC) 50.000 L/min    Critical value read back 00:01     COLLECT TIME 900       CT head  3-5-2019  IMPRESSION:     1. Brain mass in the right parieto-occipital lobe with surrounding vasogenic edema. Contrast enhanced MRI of the brain should be considered to better Evaluate/characterize. .    XR chest 3-5-2019  IMPRESSION:     Masslike opacity in the left perihilar region, significantly more prominent  compared to July 29, 2018 CT. Findings suspicious for lung tumor. Contrast-enhanced CT chest should be considered to better characterize. EKG   3-4-2019  !! AGE AND GENDER SPECIFIC ECG ANALYSIS !!    Sinus tachycardia   Low voltage QRS   Left posterior fascicular block   Possible Inferior infarct , age undetermined   Cannot rule out Anterior infarct , age undetermined   Abnormal ECG   No previous ECGs available       Current Facility-Administered Medications:     famotidine (PF) (PEPCID) 20 mg in sodium chloride 0.9% 10 mL injection, 20 mg, IntraVENous, Q12H, Laurel Grossman MD, 20 mg at 03/07/19 0822    insulin glargine (LANTUS) injection 20 Units, 20 Units, SubCUTAneous, DAILY, Laurel Grossman MD, 20 Units at 03/07/19 7556    Vancomycin intermittent dosing placeholder, , Other, Rx Dosing/Monitoring, Sara Henderson MD    Vancomycin random level reminder, , Other, Silverio Frias MD    sodium chloride (NS) flush 5-10 mL, 5-10 mL, IntraVENous, PRN, Essence Russell MD    sodium chloride (NS) flush 5-40 mL, 5-40 mL, IntraVENous, Q8H, Sara Henderson MD, 10 mL at 03/07/19 0608    sodium chloride (NS) flush 5-40 mL, 5-40 mL, IntraVENous, PRN, Sara Henderson MD    NUTRITIONAL SUPPORT ELECTROLYTE PRN ORDERS, , Does Not Apply, PRN, Sara Henderson MD    acetaminophen (TYLENOL) tablet 650 mg, 650 mg, Oral, Q4H PRN, Sara Henderson MD    HYDROcodone-acetaminophen [facility]) 5-325 mg per tablet 1 Tab, 1 Tab, Oral, Q4H PRN, Sara Henderson MD    morphine injection 2 mg, 2 mg, IntraVENous, Q4H PRN, Sara Henderson MD    ondansetron [facility]) injection 4 mg, 4 mg, IntraVENous, Q4H PRN, Sara Henderson MD    naloxone [facility]) injection 0.4 mg, 0.4 mg, IntraVENous, PRN, Sara Henderson MD    LORazepam (ATIVAN) injection 1 mg, 1 mg, IntraVENous, Q4H PRN, Felecia Number, Albaro Huizar MD, 1 mg at 03/06/19 2335    albuterol-ipratropium (DUO-NEB) 2.5 MG-0.5 MG/3 ML, 3 mL, Nebulization, Q4H PRN, Elvira Davis MD    levETIRAcetam (KEPPRA) 500 mg in 0.9% sodium chloride 100 mL IVPB, 500 mg, IntraVENous, Q12H, Elvira Davis MD, 500 mg at 03/07/19 0531    budesonide (PULMICORT) 500 mcg/2 ml nebulizer suspension, 500 mcg, Nebulization, BID RT, 500 mcg at 03/07/19 0755 **AND** albuterol (PROVENTIL VENTOLIN) nebulizer solution 2.5 mg, 2.5 mg, Nebulization, Q6HWA RT, Elvira Davis MD, 2.5 mg at 03/07/19 0755    dexamethasone (DECADRON) injection 6 mg, 6 mg, IntraVENous, Q6H, Elvira Davis MD, 6 mg at 03/07/19 0608    piperacillin-tazobactam (ZOSYN) 3.375 g in 0.9% sodium chloride (MBP/ADV) 100 mL, 3.375 g, IntraVENous, Q8H, Elvira Davis MD, Last Rate: 25 mL/hr at 03/07/19 0608, 3.375 g at 03/07/19 0608    metoprolol (LOPRESSOR) injection 1.25 mg, 1.25 mg, IntraVENous, Q6H PRN, Zaki Reyes MD, 1.25 mg at 03/05/19 1004    insulin regular (NOVOLIN R, HUMULIN R) injection, , SubCUTAneous, Q4H, Elvira Davis MD, 6 Units at 03/07/19 0734    dexmedeTOMidine (PRECEDEX) 400 mcg in 0.9% sodium chloride 100 mL infusion, 0.2-0.7 mcg/kg/hr, IntraVENous, TITRATE, Sine, Luis Eduardo Castro MD, Stopped at 03/06/19 0631      Assessment:     Principal Problem:    Seizure ([address] (3/5/2019)    Active Problems:    Hypertension, essential, benign ()      COPD with emphysema (HCC) ()      Morbid obesity (HCC) ()      Diabetes mellitus without complication (HCC) ()      FLETCHER (obstructive sleep apnea) ()      Chronic hypoxemic respiratory failure (HCC) ()      Primary cancer of left upper lobe of lung ([facility]- squamous cell (7/30/2018)      Metastatic cancer to brain ([address] (3/5/2019)      Hyperglycemia (3/5/2019)      Acute on chronic respiratory failure with hypoxia and hypercapnia ([address] (3/5/2019)      Delirium (3/6/2019)        Plan:     Hyperglycemia  Improved. On current SC regimen. Monitor. Acute on chronic respiratory failure with hypoxia and hypercapnea. History of FLETCHER  Due to brain edema, hyperglycemia, brain tumor. On high flow oxygen now. Pulmonary is helping. Wean oxygen. Lung cancer with brain metastasis likely  Will check MRI brain and CT neck, chest, abdomen, pelvis. Continue neuro check. Continue Keppra, Dexamethasone.      I have discussed the plan of care with patient and care team.     DVT prophylaxis : SCD      Signed By: Austin Robledo MD     March 7, 2019

## 2019-03-07 NOTE — PROGRESS NOTES
Bedside shift change report given to Jamee Gaucher, RN (oncoming nurse) by Kenia Lorenzo RN (offgoing nurse). Report included the following information SBAR, Kardex, ED Summary, Procedure Summary, Intake/Output, MAR, Recent Results and Cardiac Rhythm SR/ST.

## 2019-03-07 NOTE — PROGRESS NOTES
Critical Care Daily Progress Note: 3/7/2019    Alea Case   Admission Date: 3/4/2019         The patient's chart is reviewed and the patient is discussed with the staff. Patient is [phone] y.o.  female seen and evaluated at the request of Dr. Kristofer Duranbeena was brought to the ER last night after her son found her BS > 500. She was also noted to be shaky and weak. EMS was summoned and upon arrival to the ER she was found to be unresponsive and seizuring. Her head CT shows a 2 cm cystic, parietooccipital mass with edema/mass effect. She has been seen by neurosurgery who recommended a brain MRI. She has been started on Decadron and Keppra.    She was diagnosed with squamous cell lung cancer last year after undergoing a left upper lobe biopsy. She was seen by oncology and an outpatient PET with follow up appointment was recommended but patient ended up under the care of hospice instead.       She is morbidly obese and has a history of COPD with chronic dyspnea. She quit smoking 4 to 5 years ago. She is maintained on 2 liters of oxygen and has a home nebulizer. She has FLETCHER/OHS with chronic hypercapnea but she has refused CPAP/Bipap in the past. On arrival here, her abg showed a PH of 7.1/94/88/30 with a baseline CO2 in the low 70s.  She lives with her son and he reports that she can only ambulate a step of 2 at a time. Moved to the ICU for BiPAP and Stage 4 NSCLC with new brain met and edema on head CT. New seizure and neurology saw and Oncology consulted.     She has been at home in hospice care since August 2018. Discussing Hospice house and revoked wanted to have tumor removed. Deteriorated since then. Neurosurgery has seen and is considering a craniotomy. MRI of the brain has been ordered and a chest/abdominal/pelvic CT recommended          Subjective:     Patient seen resting quietly on BIPAP, drowsy but rouses to voice. Wore BIPAP overnight, awaiting MRI.     Current Facility-Administered Medications   Medication Dose Route Frequency    insulin glargine (LANTUS) injection 10 Units  10 Units SubCUTAneous DAILY    Vancomycin intermittent dosing placeholder   Other Rx Dosing/Monitoring    Vancomycin random level reminder   Other ONCE    sodium chloride (NS) flush 5-10 mL  5-10 mL IntraVENous PRN    sodium chloride (NS) flush 5-40 mL  5-40 mL IntraVENous Q8H    sodium chloride (NS) flush 5-40 mL  5-40 mL IntraVENous PRN    NUTRITIONAL SUPPORT ELECTROLYTE PRN ORDERS   Does Not Apply PRN    acetaminophen (TYLENOL) tablet 650 mg  650 mg Oral Q4H PRN    HYDROcodone-acetaminophen (NORCO) 5-325 mg per tablet 1 Tab  1 Tab Oral Q4H PRN    morphine injection 2 mg  2 mg IntraVENous Q4H PRN    ondansetron (ZOFRAN) injection 4 mg  4 mg IntraVENous Q4H PRN    naloxone (NARCAN) injection 0.4 mg  0.4 mg IntraVENous PRN    LORazepam (ATIVAN) injection 1 mg  1 mg IntraVENous Q4H PRN    albuterol-ipratropium (DUO-NEB) 2.5 MG-0.5 MG/3 ML  3 mL Nebulization Q4H PRN    levETIRAcetam (KEPPRA) 500 mg in 0.9% sodium chloride 100 mL IVPB  500 mg IntraVENous Q12H    budesonide (PULMICORT) 500 mcg/2 ml nebulizer suspension  500 mcg Nebulization BID RT    And    albuterol (PROVENTIL VENTOLIN) nebulizer solution 2.5 mg  2.5 mg Nebulization Q6HWA RT    dexamethasone (DECADRON) injection 6 mg  6 mg IntraVENous Q6H    piperacillin-tazobactam (ZOSYN) 3.375 g in 0.9% sodium chloride (MBP/ADV) 100 mL  3.375 g IntraVENous Q8H    metoprolol (LOPRESSOR) injection 1.25 mg  1.25 mg IntraVENous Q6H PRN    famotidine (PF) (PEPCID) 40 mg in sodium chloride 0.9% 10 mL injection  40 mg IntraVENous Q12H    insulin regular (NOVOLIN R, HUMULIN R) injection   SubCUTAneous Q4H    dexmedeTOMidine (PRECEDEX) 400 mcg in 0.9% sodium chloride 100 mL infusion  0.2-0.7 mcg/kg/hr IntraVENous TITRATE       Review of Systems    Constitutional:  negative for fever, chills, sweats  Cardiovascular:  negative for chest pain, palpitations, syncope, edema  Gastrointestinal:  negative for dysphagia, reflux, vomiting, diarrhea, abdominal pain, or melena  Neurologic:  negative for focal weakness, numbness, headache      Objective:     Vitals:    03/07/19 0531 03/07/19 0600 03/07/19 0607 03/07/19 0631   BP: 164/78 (!) 165/101  156/82   Pulse: (!) 101 94  98   Resp: 25 24  23   Temp:       SpO2: 99% 98%  100%   Weight:   310 lb 6.5 oz (140.8 kg)    Height:           Intake and Output:   03/05 1901 - 03/07 0700  In: 5414 [P.O.:840; I.V.:2189]  Out: 3605 [Urine:3605]  No intake/output data recorded. Physical Exam:          Constitutional:  the patient is well developed and in no acute distress  EENMT:  Sclera clear, pupils equal, oral mucosa moist  Respiratory: On BIPAP 14/8, 40%, diminished in bases  Cardiovascular:  RRR without M,G,R  Gastrointestinal: soft, obese, and non-tender; with positive bowel sounds. Musculoskeletal: warm without cyanosis. There is generalized lower leg edema. Skin:  no jaundice or rashes, no wounds   Neurologic: no gross neuro deficits     Psychiatric:  Lethargic, rouses to voice    LINES:  Peripheral IVs, small catheter      DRIPS:  None      CXR:     3/5/2019    IMPRESSION:  Masslike opacity in the left perihilar region, significantly more prominent compared to July 29, 2018 CT. Findings suspicious for lung tumor. Contrast-enhanced CT chest should be considered to better characterize.     LAB  Recent Labs     03/07/19  0335 03/06/19  2331 03/06/19  2004 03/06/19  1550 03/06/19  1126   GLUCPOC 259* 257* 286* 325* 336*      Recent Labs     03/06/19  0340 03/04/19  2341   WBC 10.8 14.2*   HGB 10.4* 12.3   HCT 37.0 43.7    649*     Recent Labs     03/06/19  0340 03/04/19  2341    130*   K 4.4 3.7    87*   CO2 30 32   * 558*   BUN 14 7*   CREA 0.65 1.05*   MG  --  1.8   CA 9.4 10.0   ALB  --  2.3*   TBILI  --  0.5   ALT  --  17   SGOT  --  30     Recent Labs     03/06/19  1201 03/06/19  3838 03/05/19  0633   PHI 7.379 7.300* 7.111*   PCO2I 52.3* 63.5* 94.5*   PO2I 115* 102* 88   HCO3I 30.9* 31.2* 30.1*     Recent Labs     03/05/19  0533   LAC 1.8     No results for input(s): PH, PCO2, PO2, HCO3 in the last 72 hours. Assessment:  (Medical Decision Making)     Hospital Problems  Date Reviewed: 3/7/2019          Codes Class Noted POA    Delirium ICD-10-CM: R41.0  ICD-9-CM: 780.09  3/6/2019 Unknown    Patient lethargic    * (Principal) Seizure ([address] ICD-10-CM: R56.9  ICD-9-CM: 780.39  3/5/2019 Yes    On Keppra    Metastatic cancer to brain [facility]) ICD-10-CM: C79.31  ICD-9-CM: 198.3  3/5/2019 Yes    Previously on home hospice. Presumed from lung.     Hyperglycemia ICD-10-CM: R73.9  ICD-9-CM: 790.29  3/5/2019 Yes    Persistent    Acute on chronic respiratory failure with hypoxia and hypercapnia [facility]) ICD-10-CM: J96.21, J96.22  ICD-9-CM: 518.84, 786.09, 799.02  3/5/2019 Yes    Remains on BIPAP    Primary cancer of left upper lobe of lung ([address]- squamous cell ICD-10-CM: C34.12  ICD-9-CM: 162.3  7/30/2018 Yes        Hypertension, essential, benign (Chronic) ICD-10-CM: I10  ICD-9-CM: 401.1  Unknown Yes        COPD with emphysema (HCC) (Chronic) ICD-10-CM: J43.9  ICD-9-CM: 492.8  Unknown Yes    On nebulizers    Morbid obesity ([address] (Chronic) ICD-10-CM: E66.01  ICD-9-CM: 278.01  Unknown Yes        Diabetes mellitus without complication ([address] (Chronic) ICD-10-CM: E11.9  ICD-9-CM: 250.00  Unknown Yes    Hyperglycemia     FLETCHER (obstructive sleep apnea) (Chronic) ICD-10-CM: G47.33  ICD-9-CM: 327.23  Unknown Yes    On BIPAP    Chronic hypoxemic respiratory failure (HCC) (Chronic) ICD-10-CM: J96.11  ICD-9-CM: 518.83, 799.02  Unknown Yes    On BIPAP          Plan:  (Medical Decision Making)     --Switch to OptiFlow and get ABG  --Continue Pulmicort and albuterol nebulizers  --Awaiting MRI  --Palliative Care following      More than 50% of the time documented was spent in face-to-face contact with the patient and in the care of the patient on the floor/unit where the patient is located. Halima Zavala NP     Lungs:  Mildly decreased BS with a few trace rhonchi  Heart:  RRR with no Murmur/Rubs/Gallops    Additional Comments:  Awakens and appropriate. No distress on Optiflow at present. Check ABG and, if acceptable, get MRI today. I have spoken with and examined the patient. I agree with the above assessment and plan as documented.     Adama Hill MD

## 2019-03-07 NOTE — PROGRESS NOTES
[facility] Hematology & Oncology        Inpatient Hematology / Oncology Progress Note      Admission Date: 3/4/2019 11:10 PM  Reason for Admission/Hospital Course: Seizure [facility]) [R56.9]  Metastatic cancer to brain ([facility] [address] [C79.31]  Hyperglycemia [R73.9]      24 Hour Events:  Going down for brain MRI today  Afebrile, VSS       ROS: unable to assess fully, denies pain      10 point review of systems is otherwise negative with the exception of the elements mentioned above in the HPI. Allergies   Allergen Reactions    Iodides Tincture [Iodine] Hives       OBJECTIVE:  Patient Vitals for the past 8 hrs:   BP Temp Pulse Resp SpO2   19 1341     95 %   19 1330 168/76  88 20 91 %   19 1301 163/81  90 22 95 %   19 1237  99.3 °F (37.4 °C)      19 1235   92 17 99 %   19 1230 146/71  92 16    19 1210   93 21 100 %   19 1209   (!) 102 23 100 %   19 1200 149/75  92 20    19 1159   95 19    19 1158 164/77  100 22    19 1044   (!) 110 22 99 %   19 1043   (!) 110 (!) 32 98 %   19 1030 170/78  99 17 98 %   19 1000 155/74  86 14 98 %   19 0930 135/75  (!) 105 21 97 %   19 0900 173/77  (!) 102 15 98 %   19 0831 178/79  100 21 97 %   19 0814   94 25 94 %   19 0800 161/74  91 (!) 32 100 %   19 0758     99 %   19 0731 (!) 157/92  87 19 98 %   19 0700 159/89 98.7 °F (37.1 °C) 98 24 98 %     Temp (24hrs), Av.7 °F (37.1 °C), Min:98 °F (36.7 °C), Max:99.3 °F (37.4 °C)    701 -  1900  In: -   Out: 500 [Urine:500]    Physical Exam:  Constitutional: Ill appearing female in no acute distress, sitting comfortably in the hospital bed. HEENT: Normocephalic and atraumatic. Oropharynx is clear, mucous membranes are moist.  Neck supple     Lymph node   Deferred   Skin Warm and dry. No bruising and no rash noted. No erythema. No pallor. Respiratory Lungs are clear to auscultation bilaterally without wheezes, rales or rhonchi, normal air exchange without accessory muscle use. CVS Normal rate, regular rhythm and normal S1 and S2. No murmurs, gallops, or rubs. Abdomen Soft, nontender and nondistended, normoactive bowel sounds. No palpable mass. No hepatosplenomegaly. Neuro Grossly nonfocal with no obvious sensory or motor deficits. MSK Normal range of motion in general.  No edema and no tenderness. Psych Appropriate mood and affect.         Labs:      Recent Labs     03/06/19  0340 03/04/19  2341   WBC 10.8 14.2*   RBC 4.08 4.87   HGB 10.4* 12.3   HCT 37.0 43.7   MCV 90.7 89.7   MCH 25.5* 25.3*   MCHC 28.1* 28.1*   RDW 15.9* 16.1*    649*   GRANS  --  76   LYMPH  --  17   MONOS  --  6   EOS  --  0*   BASOS  --  1   IG  --  1   DF  --  AUTOMATED   ANEU  --  10.7*   ABL  --  2.4   ABM  --  0.8   TUSHAR  --  0.0   ABB  --  0.1   AIG  --  0.1        Recent Labs     03/06/19  0340 03/04/19  2341    130*   K 4.4 3.7    87*   CO2 30 32   AGAP 8 11   * 558*   BUN 14 7*   CREA 0.65 1.05*   GFRAA >60 >60   GFRNA >60 57*   CA 9.4 10.0   SGOT  --  30   AP  --  175*   TP  --  9.7*   ALB  --  2.3*   GLOB  --  7.4*   AGRAT  --  0.3*   MG  --  1.8         Imaging:    Medications:  Current Facility-Administered Medications   Medication Dose Route Frequency    famotidine (PF) (PEPCID) 20 mg in sodium chloride 0.9% 10 mL injection  20 mg IntraVENous Q12H    insulin glargine (LANTUS) injection 20 Units  20 Units SubCUTAneous DAILY    diatrizoate janet-diatrizoat sod (MD-GASTROVIEW,GASTROGRAFIN) 66-10 % contrast solution 15 mL  15 mL Oral RAD ONCE    iopamidol (ISOVUE-370) 76 % injection 100 mL  100 mL IntraVENous RAD ONCE    Vancomycin intermittent dosing placeholder   Other Rx Dosing/Monitoring    Vancomycin random level reminder   Other ONCE    sodium chloride (NS) flush 5-10 mL  5-10 mL IntraVENous PRN    sodium chloride (NS) flush 5-40 mL  5-40 mL IntraVENous Q8H    sodium chloride (NS) flush 5-40 mL  5-40 mL IntraVENous PRN    NUTRITIONAL SUPPORT ELECTROLYTE PRN ORDERS   Does Not Apply PRN    acetaminophen (TYLENOL) tablet 650 mg  650 mg Oral Q4H PRN    HYDROcodone-acetaminophen (NORCO) 5-325 mg per tablet 1 Tab  1 Tab Oral Q4H PRN    morphine injection 2 mg  2 mg IntraVENous Q4H PRN    ondansetron (ZOFRAN) injection 4 mg  4 mg IntraVENous Q4H PRN    naloxone (NARCAN) injection 0.4 mg  0.4 mg IntraVENous PRN    LORazepam (ATIVAN) injection 1 mg  1 mg IntraVENous Q4H PRN    albuterol-ipratropium (DUO-NEB) 2.5 MG-0.5 MG/3 ML  3 mL Nebulization Q4H PRN    levETIRAcetam (KEPPRA) 500 mg in 0.9% sodium chloride 100 mL IVPB  500 mg IntraVENous Q12H    budesonide (PULMICORT) 500 mcg/2 ml nebulizer suspension  500 mcg Nebulization BID RT    And    albuterol (PROVENTIL VENTOLIN) nebulizer solution 2.5 mg  2.5 mg Nebulization Q6HWA RT    dexamethasone (DECADRON) injection 6 mg  6 mg IntraVENous Q6H    piperacillin-tazobactam (ZOSYN) 3.375 g in 0.9% sodium chloride (MBP/ADV) 100 mL  3.375 g IntraVENous Q8H    metoprolol (LOPRESSOR) injection 1.25 mg  1.25 mg IntraVENous Q6H PRN    insulin regular (NOVOLIN R, HUMULIN R) injection   SubCUTAneous Q4H    dexmedeTOMidine (PRECEDEX) 400 mcg in 0.9% sodium chloride 100 mL infusion  0.2-0.7 mcg/kg/hr IntraVENous TITRATE         ASSESSMENT:    Problem List  Date Reviewed: 3/7/2019          Codes Class Noted    Delirium ICD-10-CM: R41.0  ICD-9-CM: 780.09  3/6/2019        * (Principal) Seizure ([facility] [address] ICD-10-CM: R56.9  ICD-9-CM: 780.39  3/5/2019        Metastatic cancer to brain [facility]) ICD-10-CM: C79.31  ICD-9-CM: 198.3  3/5/2019        Hyperglycemia ICD-10-CM: R73.9  ICD-9-CM: 790.29  3/5/2019        Acute on chronic respiratory failure with hypoxia and hypercapnia (HCC) ICD-10-CM: J96.21, J96.22  ICD-9-CM: 518.84, 786.09, 799.02  3/5/2019        Benign neoplasm of colon (Chronic) ICD-10-CM: D12.6  ICD-9-CM: 211.3  10/31/2018        Mediastinal lymphadenopathy ICD-10-CM: R59.0  ICD-9-CM: 785.6  7/30/2018        Primary cancer of left upper lobe of lung ([facility] [address]- squamous cell ICD-10-CM: C34.12  ICD-9-CM: 162.3  7/30/2018        Chronic pain (Chronic) ICD-10-CM: O58.47  ICD-9-CM: 338.29  7/28/2018        Acute respiratory failure [facility]) ICD-10-CM: J96.00  ICD-9-CM: 518.81  7/28/2018        Hypertension, essential, benign (Chronic) ICD-10-CM: I10  ICD-9-CM: 401.1  Unknown        Hyperlipidemia (Chronic) ICD-10-CM: E78.5  ICD-9-CM: 272.4  Unknown        COPD with emphysema (HCC) (Chronic) ICD-10-CM: J43.9  ICD-9-CM: 492.8  Unknown        Morbid obesity (HCC) (Chronic) ICD-10-CM: E66.01  ICD-9-CM: 278.01  Unknown        Primary pulmonary hypertension ([facility] (Chronic) ICD-10-CM: I27.0  ICD-9-CM: 416.0  Unknown        Diabetes mellitus without complication (HCC) (Chronic) ICD-10-CM: E11.9  ICD-9-CM: 250.00  Unknown        Orthopnea (Chronic) ICD-10-CM: R06.01  ICD-9-CM: 786.02  Unknown        Abnormality of gait and mobility ICD-10-CM: R26.9  ICD-9-CM: 461. 2  Unknown        Insomnia (Chronic) ICD-10-CM: G47.00  ICD-9-CM: 780.52  12/27/2016        Extreme obesity with respiratory disorder (HCC) (Chronic) ICD-10-CM: E66.01, J98.9  ICD-9-CM: 278.01, 519.9  Unknown        FLETCHER (obstructive sleep apnea) (Chronic) ICD-10-CM: G47.33  ICD-9-CM: 327.23  Unknown        Chronic hypoxemic respiratory failure (HCC) (Chronic) ICD-10-CM: J96.11  ICD-9-CM: 518.83, 799.02  Unknown        Cor pulmonale (HCC) (Chronic) ICD-10-CM: I27.81  ICD-9-CM: 416.9  Unknown        CHF (congestive heart failure), NYHA class I (HCC) (Chronic) ICD-10-CM: I50.9  ICD-9-CM: 428.0  Unknown    Overview Signed 12/27/2016 11:24 AM by Melvin Ware     diastolic             Obesity with alveolar hypoventilation (HCC) (Chronic) ICD-10-CM: D91.4  ICD-9-CM: 278.03  Unknown        Arthritis of knee (Chronic) ICD-10-CM: M17.10  ICD-9-CM: 716.96 Unknown        History of colonic polyps (Chronic) ICD-10-CM: Z86.010  ICD-9-CM: V12.72  8/10/2015    Overview Signed 10/31/2018 11:30 AM by Bethanie Murrieta NP     Last Assessment & Plan:   She has several adenomas on her initial colonoscopy. I believe there was some doubt about whether the cecal polyp on that exam was completely removed. Follow-up colonoscopy did reveal a persistent cecal polyp. This polyp was read as tubulovillous adenoma. It has been 3 years male of this month. Because of the villous pathology we will proceed with a follow-up colonoscopy. She will hold Motrin as well as aspirin for 3 days prior to the procedure. We discussed the current colorectal cancer screening guidelines as well as the risks and benefits of colonoscopy in detail. We also discussed the prep in detail and she wishes to proceed. Cause of her oxygen use we will need to do her on the inpatient side of the hospital. We spent at least 15-20 minutes procuring records and bringing them forward from the hospital electronic medical record system into today's office visit. We then spent an additional 15-20 minutes in counseling in addition to the time spent during her history and physical exam.                     PLAN:  Stage IV Squamous cell lung cancer with likely brain mets / Seizure  - never rec'd treatment, has been on home Hospice since 8/2018. Family has revoked Hospice and changed pt to full code  - CT head with brain mass in the R parieto-occipital lobe with surrounding vasogenic edema  - MRI brain pending  - CT CAP for staging when able  - On Dex/Keppra  - NS considering craniotomy  3/7  MRI completed this AM and showed right parietal cystic mass with surrounding vasogenic edema concerning for metastasis, but primary glial neoplasm could not be excluded. Await NS recommendations re: craniotomy. Full staging with CT CAP.  Ongoing discussion with family regarding plans once diagnosis confirmed    Resp Failure  - on BiPAP  - on Zosyn/Vanc  - UCx-NGTD. BCx-pending  3/7/19 Continue vanc/zosyn. DM  - Primary team managing      Goals and plan of care reviewed with the patient. All questions answered to the best of our ability. Brenna Beverly NP   [address] [place] [address] Hematology & Oncology  [address] [place] [address]  Office : [phone]  Fax : [phone]           Attending Addendum:  I have personally performed a face to face diagnostic evaluation on this patient. I have reviewed and agree with the care plan as documented by Pablo Snyder N.P.  My findings are as follows: She has metastatic lung cancer, appears drowsy, heart rate regular without murmurs, abdomen is non-tender, bowel sounds are positive, we will follow this patient she needs staging CT scans.               Sally Serra MD      [address] [place] [address] Hematology/Oncology  [address] [place] [address]  Office : [phone]  Fax : [phone]

## 2019-03-07 NOTE — PROGRESS NOTES
Pt placed on Venti mask 40% trial with Pt lying flat to access MRI readiness. On Venti HR-106/ SAT-99%. Pt flat for 10 Min with no distress. Now Pt placed back on HFNC.

## 2019-03-07 NOTE — INTERDISCIPLINARY ROUNDS
Interdisciplinary team rounds were held 3/7/2019 with the following team members:Care Management, Nursing, Nurse Practitioner, Nutrition, Palliative Care, Pastoral Care, Pharmacy, Physical Therapy, Physician, Respiratory Therapy, Speech Therapy and Clinical Coordinator and the patient. Plan of care discussed. See clinical pathway and/or care plan for interventions and desired outcomes.

## 2019-03-07 NOTE — PROGRESS NOTES
Spoke with CT tech, Fransisco Hernandez, regarding iodine allergy and pending CT.  Reports that order and allergy were discussed with radiologist and that orders were received to continue with administration of oral contrast.

## 2019-03-07 NOTE — PROGRESS NOTES
Bedside shift change report given to Merlin Murdoch (oncoming nurse) by Anup Castillo RN (offgoing nurse). Report included the following information SBAR, Kardex, ED Summary, Procedure Summary, Intake/Output, MAR, Recent Results and Cardiac Rhythm SR/ST.

## 2019-03-07 NOTE — PROGRESS NOTES
Palliative Care Progress Note    Patient: Seth Mosley MRN: [id number]  SSN: [id number]    YOB: 1960  Age: 59 y.o. Sex: female       Assessment/Plan:      Chief Complaint/Interval History: Sedated, on Optiflow     Principal Diagnosis:    · Debility, Unspecified  R53.81    Additional Diagnoses:   · Acute Respiratory Failure, Unspecified  J96.00  · Fatigue, Lethargy  R53.83  · Respiratory Failure, Acute on Chronic  J96.20  · Counseling, Encounter for Medical Advice  Z71.9  · Encounter for Palliative Care  Z51.5    Palliative Performance Scale (PPS)  PPS: 10    Medical Decision Making:   Reviewed and summarized notes over previous 24 hours. Discussed case with appropriate providers: ICU IDT  Reviewed laboratory and x-ray data. Patient resting in bed, on Optiflow. No family at bedside. She is lethargic; noted she received Ativan due to agitation during MRI. She is arousable, but falls asleep quickly. She denies pain and dyspnea. I asked patient about her diagnosis in the fall, and she said \"I had stage II cancer\". Clarified with patient that her cancer has spread. I asked how she came to the decision to forgo treatment at that time. She replied \"I don't know\", and fell back asleep. Warm blanket supplied at patient request.  CT CAP pending; will continue to follow for goals of care pending these results. Will discuss with interdisciplinary team.     More than 50% of this 25 minute visit was spent counseling and coordination of care as outlined above. Subjective:     Review of Systems:  A comprehensive review of systems was unobtainable due to patient factors:  lethargic     Objective:     Visit Vitals  /76   Pulse 88   Temp 99.3 °F (37.4 °C)   Resp 20   Ht 5' 6\" (1.676 m)   Wt 310 lb 6.5 oz (140.8 kg)   SpO2 95%   Breastfeeding? No   BMI 50.10 kg/m²       Physical Exam:    General:  Lethargic. Cooperative. No acute distress. Eyes:  Conjunctivae/corneas clear.     Nose: Nares normal. Septum midline. Optiflow in place. Neck: Supple, symmetrical, trachea midline. Lungs:   Unlabored on Optiflow. Heart:  Regular rate and rhythm. Abdomen:   Obese. Soft, non-tender, non-distended. Extremities: Normal, atraumatic, no cyanosis. Skin: Skin color, texture, turgor normal. No rash. Neurologic: Nonfocal.   Psych: Alert and oriented to person, place, time, and somewhat situation.       Signed By: Joss Venegas NP     March 7, 2019

## 2019-03-07 NOTE — PROGRESS NOTES
A follow up visit was made to the patient. Emotional support, spiritual presence and   prayer were provided. She was awake and oriented. No family members were present during the visit.  also looked in the waiting room for family members.       [employer]

## 2019-03-08 LAB
ANION GAP SERPL CALC-SCNC: 9 MMOL/L (ref 7–16)
BASOPHILS # BLD: 0 K/UL (ref 0–0.2)
BASOPHILS NFR BLD: 0 % (ref 0–2)
BUN SERPL-MCNC: 15 MG/DL (ref 8–23)
CALCIUM SERPL-MCNC: 9.9 MG/DL (ref 8.3–10.4)
CHLORIDE SERPL-SCNC: 96 MMOL/L (ref 98–107)
CO2 SERPL-SCNC: 30 MMOL/L (ref 21–32)
CREAT SERPL-MCNC: 0.61 MG/DL (ref 0.6–1)
DIFFERENTIAL METHOD BLD: ABNORMAL
EOSINOPHIL # BLD: 0 K/UL (ref 0–0.8)
EOSINOPHIL NFR BLD: 0 % (ref 0.5–7.8)
ERYTHROCYTE [DISTWIDTH] IN BLOOD BY AUTOMATED COUNT: 16.1 % (ref 11.9–14.6)
GLUCOSE BLD STRIP.AUTO-MCNC: 230 MG/DL (ref 65–100)
GLUCOSE BLD STRIP.AUTO-MCNC: 250 MG/DL (ref 65–100)
GLUCOSE BLD STRIP.AUTO-MCNC: 255 MG/DL (ref 65–100)
GLUCOSE BLD STRIP.AUTO-MCNC: 275 MG/DL (ref 65–100)
GLUCOSE BLD STRIP.AUTO-MCNC: 283 MG/DL (ref 65–100)
GLUCOSE BLD STRIP.AUTO-MCNC: 315 MG/DL (ref 65–100)
GLUCOSE SERPL-MCNC: 283 MG/DL (ref 65–100)
HCT VFR BLD AUTO: 39.2 % (ref 35.8–46.3)
HGB BLD-MCNC: 11.3 G/DL (ref 11.7–15.4)
IMM GRANULOCYTES # BLD AUTO: 0 K/UL (ref 0–0.5)
IMM GRANULOCYTES NFR BLD AUTO: 1 % (ref 0–5)
LYMPHOCYTES # BLD: 0.8 K/UL (ref 0.5–4.6)
LYMPHOCYTES NFR BLD: 9 % (ref 13–44)
MCH RBC QN AUTO: 25.1 PG (ref 26.1–32.9)
MCHC RBC AUTO-ENTMCNC: 28.8 G/DL (ref 31.4–35)
MCV RBC AUTO: 86.9 FL (ref 79.6–97.8)
MONOCYTES # BLD: 0.5 K/UL (ref 0.1–1.3)
MONOCYTES NFR BLD: 6 % (ref 4–12)
NEUTS SEG # BLD: 7.1 K/UL (ref 1.7–8.2)
NEUTS SEG NFR BLD: 84 % (ref 43–78)
NRBC # BLD: 0 K/UL (ref 0–0.2)
PLATELET # BLD AUTO: 214 K/UL (ref 150–450)
PMV BLD AUTO: 11.1 FL (ref 9.4–12.3)
POTASSIUM SERPL-SCNC: 4.1 MMOL/L (ref 3.5–5.1)
RBC # BLD AUTO: 4.51 M/UL (ref 4.05–5.2)
SODIUM SERPL-SCNC: 135 MMOL/L (ref 136–145)
WBC # BLD AUTO: 8.5 K/UL (ref 4.3–11.1)

## 2019-03-08 PROCEDURE — 74011636637 HC RX REV CODE- 636/637: Performed by: INTERNAL MEDICINE

## 2019-03-08 PROCEDURE — 36415 COLL VENOUS BLD VENIPUNCTURE: CPT

## 2019-03-08 PROCEDURE — 99233 SBSQ HOSP IP/OBS HIGH 50: CPT | Performed by: INTERNAL MEDICINE

## 2019-03-08 PROCEDURE — 74011250636 HC RX REV CODE- 250/636: Performed by: HOSPITALIST

## 2019-03-08 PROCEDURE — 77030021668 HC NEB PREFIL KT VYRM -A

## 2019-03-08 PROCEDURE — 74011636637 HC RX REV CODE- 636/637: Performed by: HOSPITALIST

## 2019-03-08 PROCEDURE — 65610000001 HC ROOM ICU GENERAL

## 2019-03-08 PROCEDURE — 74011250636 HC RX REV CODE- 250/636: Performed by: INTERNAL MEDICINE

## 2019-03-08 PROCEDURE — 74011000250 HC RX REV CODE- 250: Performed by: INTERNAL MEDICINE

## 2019-03-08 PROCEDURE — 82962 GLUCOSE BLOOD TEST: CPT

## 2019-03-08 PROCEDURE — 94640 AIRWAY INHALATION TREATMENT: CPT

## 2019-03-08 PROCEDURE — 97162 PT EVAL MOD COMPLEX 30 MIN: CPT

## 2019-03-08 PROCEDURE — 97166 OT EVAL MOD COMPLEX 45 MIN: CPT

## 2019-03-08 PROCEDURE — 74011000250 HC RX REV CODE- 250: Performed by: HOSPITALIST

## 2019-03-08 PROCEDURE — 80048 BASIC METABOLIC PNL TOTAL CA: CPT

## 2019-03-08 PROCEDURE — 74011250637 HC RX REV CODE- 250/637: Performed by: HOSPITALIST

## 2019-03-08 PROCEDURE — 94660 CPAP INITIATION&MGMT: CPT

## 2019-03-08 PROCEDURE — 92610 EVALUATE SWALLOWING FUNCTION: CPT

## 2019-03-08 PROCEDURE — 74011000258 HC RX REV CODE- 258: Performed by: HOSPITALIST

## 2019-03-08 PROCEDURE — 85025 COMPLETE CBC W/AUTO DIFF WBC: CPT

## 2019-03-08 PROCEDURE — 74011000258 HC RX REV CODE- 258: Performed by: INTERNAL MEDICINE

## 2019-03-08 PROCEDURE — 99233 SBSQ HOSP IP/OBS HIGH 50: CPT | Performed by: NURSE PRACTITIONER

## 2019-03-08 PROCEDURE — 77030019605

## 2019-03-08 PROCEDURE — 97161 PT EVAL LOW COMPLEX 20 MIN: CPT

## 2019-03-08 PROCEDURE — 99232 SBSQ HOSP IP/OBS MODERATE 35: CPT | Performed by: INTERNAL MEDICINE

## 2019-03-08 PROCEDURE — 77010033711 HC HIGH FLOW OXYGEN

## 2019-03-08 RX ADMIN — DEXAMETHASONE SODIUM PHOSPHATE 6 MG: 10 INJECTION INTRAMUSCULAR; INTRAVENOUS at 17:58

## 2019-03-08 RX ADMIN — HUMAN INSULIN 9 UNITS: 100 INJECTION, SOLUTION SUBCUTANEOUS at 11:52

## 2019-03-08 RX ADMIN — Medication 10 ML: at 21:23

## 2019-03-08 RX ADMIN — SODIUM CHLORIDE 500 MG: 900 INJECTION, SOLUTION INTRAVENOUS at 16:01

## 2019-03-08 RX ADMIN — BUDESONIDE 500 MCG: 0.5 INHALANT RESPIRATORY (INHALATION) at 19:43

## 2019-03-08 RX ADMIN — HUMAN INSULIN 6 UNITS: 100 INJECTION, SOLUTION SUBCUTANEOUS at 03:59

## 2019-03-08 RX ADMIN — LORAZEPAM 1 MG: 2 INJECTION INTRAMUSCULAR; INTRAVENOUS at 20:19

## 2019-03-08 RX ADMIN — PIPERACILLIN AND TAZOBACTAM 3.38 G: 3; .375 INJECTION, POWDER, FOR SOLUTION INTRAVENOUS at 06:03

## 2019-03-08 RX ADMIN — DEXAMETHASONE SODIUM PHOSPHATE 6 MG: 10 INJECTION INTRAMUSCULAR; INTRAVENOUS at 00:23

## 2019-03-08 RX ADMIN — ALBUTEROL SULFATE 2.5 MG: 2.5 SOLUTION RESPIRATORY (INHALATION) at 07:56

## 2019-03-08 RX ADMIN — MORPHINE SULFATE 2 MG: 2 INJECTION, SOLUTION INTRAMUSCULAR; INTRAVENOUS at 21:13

## 2019-03-08 RX ADMIN — HUMAN INSULIN 4 UNITS: 100 INJECTION, SOLUTION SUBCUTANEOUS at 00:23

## 2019-03-08 RX ADMIN — HUMAN INSULIN 12 UNITS: 100 INJECTION, SOLUTION SUBCUTANEOUS at 20:21

## 2019-03-08 RX ADMIN — HUMAN INSULIN 9 UNITS: 100 INJECTION, SOLUTION SUBCUTANEOUS at 15:58

## 2019-03-08 RX ADMIN — PIPERACILLIN AND TAZOBACTAM 3.38 G: 3; .375 INJECTION, POWDER, FOR SOLUTION INTRAVENOUS at 13:13

## 2019-03-08 RX ADMIN — DEXAMETHASONE SODIUM PHOSPHATE 6 MG: 10 INJECTION INTRAMUSCULAR; INTRAVENOUS at 11:35

## 2019-03-08 RX ADMIN — Medication 10 ML: at 06:03

## 2019-03-08 RX ADMIN — ALBUTEROL SULFATE 2.5 MG: 2.5 SOLUTION RESPIRATORY (INHALATION) at 13:25

## 2019-03-08 RX ADMIN — FAMOTIDINE 20 MG: 10 INJECTION, SOLUTION INTRAVENOUS at 08:41

## 2019-03-08 RX ADMIN — HYDROCODONE BITARTRATE AND ACETAMINOPHEN 1 TABLET: 5; 325 TABLET ORAL at 17:53

## 2019-03-08 RX ADMIN — PIPERACILLIN AND TAZOBACTAM 3.38 G: 3; .375 INJECTION, POWDER, FOR SOLUTION INTRAVENOUS at 21:13

## 2019-03-08 RX ADMIN — HUMAN INSULIN 9 UNITS: 100 INJECTION, SOLUTION SUBCUTANEOUS at 08:53

## 2019-03-08 RX ADMIN — FAMOTIDINE 20 MG: 10 INJECTION, SOLUTION INTRAVENOUS at 20:18

## 2019-03-08 RX ADMIN — Medication 10 ML: at 13:14

## 2019-03-08 RX ADMIN — SODIUM CHLORIDE 500 MG: 900 INJECTION, SOLUTION INTRAVENOUS at 04:57

## 2019-03-08 RX ADMIN — ALBUTEROL SULFATE 2.5 MG: 2.5 SOLUTION RESPIRATORY (INHALATION) at 19:43

## 2019-03-08 RX ADMIN — DEXAMETHASONE SODIUM PHOSPHATE 6 MG: 10 INJECTION INTRAMUSCULAR; INTRAVENOUS at 06:03

## 2019-03-08 RX ADMIN — INSULIN GLARGINE 20 UNITS: 100 INJECTION, SOLUTION SUBCUTANEOUS at 08:53

## 2019-03-08 RX ADMIN — BUDESONIDE 500 MCG: 0.5 INHALANT RESPIRATORY (INHALATION) at 07:56

## 2019-03-08 RX ADMIN — HYDROCODONE BITARTRATE AND ACETAMINOPHEN 1 TABLET: 5; 325 TABLET ORAL at 13:49

## 2019-03-08 RX ADMIN — VANCOMYCIN HYDROCHLORIDE 1500 MG: 10 INJECTION, POWDER, LYOPHILIZED, FOR SOLUTION INTRAVENOUS at 03:34

## 2019-03-08 NOTE — PROGRESS NOTES
[employer] [facility]        Inpatient Hematology / Oncology Progress Note      Admission Date: 3/4/2019 11:10 PM  Reason for Admission/Hospital Course: Seizure [facility]) [R56.9]  Metastatic cancer to brain ([facility] [C79.31]  Hyperglycemia [R73.9]      24 Hour Events:  Afebrile, VSS   Awaiting decision regarding neurosurgery recs  Discussed with pulmonary - very concerned about being able to wean off vent following surgery  Son/daughter at bedside    ROS: Restless in bed, no particular complaints      10 point review of systems is otherwise negative with the exception of the elements mentioned above in the HPI. Allergies   Allergen Reactions    Iodides Tincture [Iodine] Hives       OBJECTIVE:  Patient Vitals for the past 8 hrs:   BP Temp Pulse Resp SpO2   19 1600 129/67  75 19 97 %   19 1530   73 18 95 %   19 1500 134/63 98.9 °F (37.2 °C) 77 19 96 %   19 1430   80 18 94 %   19 1401 137/73  84 23 98 %   19 1330   87 30 97 %   19 1325     98 %   19 1301 139/75  82 (!) 36 98 %   19 1230   88 25 98 %   19 1201 127/57  76 19 94 %   19 1130   83 19 94 %   19 1101 154/65 98.9 °F (37.2 °C) 83 20 96 %   19 1030   86 18 98 %   19 1001 (!) 145/96  94 29 93 %   19 0930   69 18 99 %     Temp (24hrs), Av.6 °F (37 °C), Min:98 °F (36.7 °C), Max:98.9 °F (37.2 °C)     07 -  1900  In: 600 [P.O.:600]  Out: 3728 [Urine:1425]    Physical Exam:  Constitutional: Ill appearing female in no acute distress, sitting comfortably in the hospital bed. HEENT: Normocephalic and atraumatic. Oropharynx is clear, mucous membranes are moist.  Neck supple     Lymph node   Deferred   Skin Warm and dry. No bruising and no rash noted. No erythema. No pallor. Respiratory Lungs are clear to auscultation bilaterally without wheezes, rales or rhonchi, normal air exchange without accessory muscle use.     CVS Normal rate, regular rhythm and normal S1 and S2. No murmurs, gallops, or rubs. Abdomen Soft, nontender and nondistended, normoactive bowel sounds. No palpable mass. No hepatosplenomegaly. Neuro Grossly nonfocal with no obvious sensory or motor deficits. MSK Normal range of motion in general.  No edema and no tenderness. Psych Appropriate mood and affect.         Labs:      Recent Labs     03/08/19 0359 03/06/19  0340   WBC 8.5 10.8   RBC 4.51 4.08   HGB 11.3* 10.4*   HCT 39.2 37.0   MCV 86.9 90.7   MCH 25.1* 25.5*   MCHC 28.8* 28.1*   RDW 16.1* 15.9*    376   GRANS 84*  --    LYMPH 9*  --    MONOS 6  --    EOS 0*  --    BASOS 0  --    IG 1  --    DF AUTOMATED  --    ANEU 7.1  --    ABL 0.8  --    ABM 0.5  --    TUSHAR 0.0  --    ABB 0.0  --    AIG 0.0  --         Recent Labs     03/08/19 0359 03/06/19  0340   * 140   K 4.1 4.4   CL 96* 102   CO2 30 30   AGAP 9 8   * 331*   BUN 15 14   CREA 0.61 0.65   GFRAA >60 >60   GFRNA >60 >60   CA 9.9 9.4         Imaging:    Medications:  Current Facility-Administered Medications   Medication Dose Route Frequency    famotidine (PF) (PEPCID) 20 mg in sodium chloride 0.9% 10 mL injection  20 mg IntraVENous Q12H    insulin glargine (LANTUS) injection 20 Units  20 Units SubCUTAneous DAILY    sodium chloride (NS) flush 5-10 mL  5-10 mL IntraVENous PRN    sodium chloride (NS) flush 5-40 mL  5-40 mL IntraVENous Q8H    sodium chloride (NS) flush 5-40 mL  5-40 mL IntraVENous PRN    NUTRITIONAL SUPPORT ELECTROLYTE PRN ORDERS   Does Not Apply PRN    acetaminophen (TYLENOL) tablet 650 mg  650 mg Oral Q4H PRN    HYDROcodone-acetaminophen (NORCO) 5-325 mg per tablet 1 Tab  1 Tab Oral Q4H PRN    morphine injection 2 mg  2 mg IntraVENous Q4H PRN    ondansetron (ZOFRAN) injection 4 mg  4 mg IntraVENous Q4H PRN    naloxone (NARCAN) injection 0.4 mg  0.4 mg IntraVENous PRN    LORazepam (ATIVAN) injection 1 mg  1 mg IntraVENous Q4H PRN    albuterol-ipratropium (DUO-NEB) 2.5 MG-0.5 MG/3 ML  3 mL Nebulization Q4H PRN    levETIRAcetam (KEPPRA) 500 mg in 0.9% sodium chloride 100 mL IVPB  500 mg IntraVENous Q12H    budesonide (PULMICORT) 500 mcg/2 ml nebulizer suspension  500 mcg Nebulization BID RT    And    albuterol (PROVENTIL VENTOLIN) nebulizer solution 2.5 mg  2.5 mg Nebulization Q6HWA RT    dexamethasone (DECADRON) injection 6 mg  6 mg IntraVENous Q6H    piperacillin-tazobactam (ZOSYN) 3.375 g in 0.9% sodium chloride (MBP/ADV) 100 mL  3.375 g IntraVENous Q8H    metoprolol (LOPRESSOR) injection 1.25 mg  1.25 mg IntraVENous Q6H PRN    insulin regular (NOVOLIN R, HUMULIN R) injection   SubCUTAneous Q4H    dexmedeTOMidine (PRECEDEX) 400 mcg in 0.9% sodium chloride 100 mL infusion  0.2-0.7 mcg/kg/hr IntraVENous TITRATE         ASSESSMENT:    Problem List  Date Reviewed: 3/7/2019          Codes Class Noted    Delirium ICD-10-CM: R41.0  ICD-9-CM: 780.09  3/6/2019        * (Principal) Seizure ([facility] ICD-10-CM: R56.9  ICD-9-CM: 780.39  3/5/2019        Metastatic cancer to brain [facility]) ICD-10-CM: C79.31  ICD-9-CM: 198.3  3/5/2019        Hyperglycemia ICD-10-CM: R73.9  ICD-9-CM: 790.29  3/5/2019        Acute on chronic respiratory failure with hypoxia and hypercapnia (HCC) ICD-10-CM: J96.21, J96.22  ICD-9-CM: 518.84, 786.09, 799.02  3/5/2019        Benign neoplasm of colon (Chronic) ICD-10-CM: D12.6  ICD-9-CM: 211.3  10/31/2018        Mediastinal lymphadenopathy ICD-10-CM: R59.0  ICD-9-CM: 785.6  7/30/2018        Primary cancer of left upper lobe of lung ([facility]- squamous cell ICD-10-CM: C34.12  ICD-9-CM: 162.3  7/30/2018        Chronic pain (Chronic) ICD-10-CM: F13.79  ICD-9-CM: 338.29  7/28/2018        Acute respiratory failure (HCC) ICD-10-CM: J96.00  ICD-9-CM: 518.81  7/28/2018        Hypertension, essential, benign (Chronic) ICD-10-CM: I10  ICD-9-CM: 401.1  Unknown        Hyperlipidemia (Chronic) ICD-10-CM: E78.5  ICD-9-CM: 272.4  Unknown        COPD with emphysema ([facility] (Chronic) ICD-10-CM: J43.9  ICD-9-CM: 492.8  Unknown        Morbid obesity (HCC) (Chronic) ICD-10-CM: E66.01  ICD-9-CM: 278.01  Unknown        Primary pulmonary hypertension (HCC) (Chronic) ICD-10-CM: I27.0  ICD-9-CM: 416.0  Unknown        Diabetes mellitus without complication (HCC) (Chronic) ICD-10-CM: E11.9  ICD-9-CM: 250.00  Unknown        Orthopnea (Chronic) ICD-10-CM: R06.01  ICD-9-CM: 786.02  Unknown        Abnormality of gait and mobility ICD-10-CM: R26.9  ICD-9-CM: 783. 2  Unknown        Insomnia (Chronic) ICD-10-CM: G47.00  ICD-9-CM: 780.52  12/27/2016        Extreme obesity with respiratory disorder (HCC) (Chronic) ICD-10-CM: E66.01, J98.9  ICD-9-CM: 278.01, 519.9  Unknown        FLETCHER (obstructive sleep apnea) (Chronic) ICD-10-CM: G47.33  ICD-9-CM: 327.23  Unknown        Chronic hypoxemic respiratory failure (HCC) (Chronic) ICD-10-CM: J96.11  ICD-9-CM: 518.83, 799.02  Unknown        Cor pulmonale (HCC) (Chronic) ICD-10-CM: I27.81  ICD-9-CM: 416.9  Unknown        CHF (congestive heart failure), NYHA class I (HCC) (Chronic) ICD-10-CM: I50.9  ICD-9-CM: 428.0  Unknown    Overview Signed 12/27/2016 11:24 AM by Jeremy Salvador     diastolic             Obesity with alveolar hypoventilation (HCC) (Chronic) ICD-10-CM: V87.3  ICD-9-CM: 278.03  Unknown        Arthritis of knee (Chronic) ICD-10-CM: M17.10  ICD-9-CM: 716.96  Unknown        History of colonic polyps (Chronic) ICD-10-CM: Z86.010  ICD-9-CM: V12.72  8/10/2015    Overview Signed 10/31/2018 11:30 AM by Rex Camilo NP     Last Assessment & Plan:   She has several adenomas on her initial colonoscopy. I believe there was some doubt about whether the cecal polyp on that exam was completely removed. Follow-up colonoscopy did reveal a persistent cecal polyp. This polyp was read as tubulovillous adenoma. It has been 3 years male of this month. Because of the villous pathology we will proceed with a follow-up colonoscopy.  She will hold Motrin as well as aspirin for 3 days prior to the procedure. We discussed the current colorectal cancer screening guidelines as well as the risks and benefits of colonoscopy in detail. We also discussed the prep in detail and she wishes to proceed. Cause of her oxygen use we will need to do her on the inpatient side of the hospital. We spent at least 15-20 minutes procuring records and bringing them forward from the hospital electronic medical record system into today's office visit. We then spent an additional 15-20 minutes in counseling in addition to the time spent during her history and physical exam.                     PLAN:  Stage IV Squamous cell lung cancer with likely brain mets / Seizure  - never rec'd treatment, has been on home Hospice since 8/2018. Family has revoked Hospice and changed pt to full code  - CT head with brain mass in the R parieto-occipital lobe with surrounding vasogenic edema  - MRI brain pending  - CT CAP for staging when able  - On Dex/Keppra  - NS considering craniotomy  3/7  MRI completed this AM and showed right parietal cystic mass with surrounding vasogenic edema concerning for metastasis, but primary glial neoplasm could not be excluded. Await NS recommendations re: craniotomy. Full staging with CT CAP. Ongoing discussion with family regarding plans once diagnosis confirmed  3/8 Difficult situation. Discussed with pulmonary who is very concerned about being able to extubate following craniotomy given COPD and lung mass. CT CAP without evidence of other metastatic disease but does show enlarging lung mass. Patient is clearly not a candidate for any cancer directed therapy at this time given weakness/debility. Also, would not be able to travel back and forth to radiation with current O2 needs. Of note, patient was essentially bedbound at home from other chronic conditions. Son stated that they did want treatment for her lung cancer back in August but felt \"pushed' into hospice. We will await final decisions from the rest of the team regarding plan and will follow up final biopsy results     Resp Failure  - on BiPAP  - on Zosyn/Vanc  - UCx-NGTD. BCx-pending  3/7/19 Continue vanc/zosyn. DM  - Primary team managing      Goals and plan of care reviewed with the patient. All questions answered to the best of our ability. Hermann Lancaster NP   [facility]  [address] [place] [address]  Office : [phone]  Fax : [phone]         Attending Addendum:  I have personally performed a face to face diagnostic evaluation on this patient. I have reviewed and agree with the care plan as documented by Zuri Bingham N.P.  My findings are as follows: She has Lung Cancer and a brain mass, appears awake and fatigued, heart rate regular without murmurs, abdomen is non-tender, bowel sounds are positive, we will follow this patient, a biopsy of her brain mass would be helpful in formulating a treatment plan.               Luis Angel Velásquez MD      [facility]ematology/Oncology  [id number] [place] [address]  Office : [phone]  Fax : [phone]

## 2019-03-08 NOTE — PROGRESS NOTES
Bedside, Verbal and Written shift change report given to Ariana Royal RN (oncoming nurse) by Sonia Acuna RN (offgoing nurse).  Report included the following information SBAR, Kardex, ED Summary, Intake/Output, Recent Results and Cardiac Rhythm SR.

## 2019-03-08 NOTE — PROGRESS NOTES
Problem: Self Care Deficits Care Plan (Adult)  Goal: *Acute Goals and Plan of Care (Insert Text)  1. Pt will toilet with CGA   2. Pt will complete functional mobility for ADLs with CGA  3. Pt will complete lower body dressing with CGA using AE as needed  4. Pt will complete grooming and hygiene at sink with CGA  5. Pt will demonstrate independence with HEP to promote increased BUE strength and functional use for ADLs  6. Pt will tolerate 23 minutes functional activity with min or fewer rest breaks to promote increased endurance for ADLs  7. Pt will complete bed mobility with SBA in prep for ADLs    Timeframe: 7 days      OCCUPATIONAL THERAPY: Initial Assessment 3/8/2019  INPATIENT:    Payor: Bret De La Garza / Plan[place] / Product Type: Medicaid /      NAME/AGE/GENDER: Jordan Peterson is a 59 y.o. female   PRIMARY DIAGNOSIS:  Seizure ([address] [R56.9]  Metastatic cancer to brain ([address] [C79.31]  Hyperglycemia [R73.9] Seizure ([address] Seizure ([address]  Procedure(s) (LRB):  RIGHT CRANIOTOMY  FOR RESECTION OF TUMOR WITH STEALTH/ MRI @  0900    /3101 (N/A)     ICD-10: Treatment Diagnosis:    · Generalized Muscle Weakness (M62.81)  · Dizziness and Giddiness (R42)   Precautions/Allergies:    falls Iodides tincture [iodine]      ASSESSMENT:     Ms. Prakash Rothman was admitted with AMS and seizures, has a history of lung cancer and workup was + R brain mass d/t metastatic cancer. Pt is scheduled for a craniotomy next week. Pt lives with her son and daughter and uses a rollator or WC for mobility, is independent with ADLs at baseline. This session, pt presented with deficits in cognition, mobility, balance, endurance, and strength impacting ADLs. Pt greeted in ICU bed, on optiflow. Pt transferred to the edge of the bed with min assistance, SBA- CGA for sitting balance. Pt is able to hold cup and bring to mouth to drink water and complete simple hygiene with set up, max assistance for LB dressing.  BUE strength is generally decreased, especially RUE with < ROM in R shoulder (~90* flexion/ abduction, RUE strength grossly 4- to 4/5). R side coordination is slightly decreased. Pt fatigued quickly, limited tolerance for ADLs and functional activity. Pt is below her functional baseline and would benefit from skilled OT services to address deficits, will likely require rehab upon d/c. This section established at most recent assessment   PROBLEM LIST (Impairments causing functional limitations):  1. Decreased Strength  2. Decreased ADL/Functional Activities  3. Decreased Transfer Abilities  4. Decreased Balance  5. Decreased Activity Tolerance  6. Increased Fatigue  7. Decreased Cognition   INTERVENTIONS PLANNED: (Benefits and precautions of occupational therapy have been discussed with the patient.)  1. Activities of daily living training  2. Adaptive equipment training  3. Balance training  4. Therapeutic activity  5. Therapeutic exercise     TREATMENT PLAN: Frequency/Duration: Follow patient 3 times/ week to address above goals. Rehabilitation Potential For Stated Goals: Good     RECOMMENDED REHABILITATION/EQUIPMENT: (at time of discharge pending progress): Due to the probability of continued deficits (see above) this patient will likely need continued skilled occupational therapy after discharge.   Equipment:    None at this time              OCCUPATIONAL PROFILE AND HISTORY:   History of Present Injury/Illness (Reason for Referral):  See H&P  Past Medical History/Comorbidities:   Ms. Sunitha Wallis  has a past medical history of Abnormality of gait and mobility, Acute on chronic respiratory failure ([address] (7/28/2018), Ambulatory dysfunction, Arthritis of knee, Asthma, CHF (congestive heart failure), NYHA class I ([address] Chronic hypoxemic respiratory failure ([address] COPD (chronic obstructive pulmonary disease) with emphysema ([address] Cor pulmonale ([address] Diabetes mellitus without complication ([address] Dyslipidemia, Extreme obesity with respiratory disorder ([facility] Fall (7/28/2018), Hyperlipidemia, Hypertension, essential, benign, Hypomagnesemia, Insomnia (12/27/2016), Nocturnal hypoxia, Obesity with alveolar hypoventilation (HCC), Orthopnea, FLETCHER (obstructive sleep apnea), Primary pulmonary hypertension ([facility] Pulmonary edema, noncardiac, and Sleeps in sitting position due to orthopnea. Ms. Dre Scott  has a past surgical history that includes hx cholecystectomy (1991); hx tonsillectomy; and hx tubal ligation (1993). Social History/Living Environment:   Home Environment: Private residence  # Steps to Enter: 5  Rails to Enter: Yes  One/Two Story Residence: One story  Living Alone: No  Support Systems: Child(kira)  Patient Expects to be Discharged to[de-identified] Unknown  Current DME Used/Available at Home: Wheelchair, Walker, rollator, Tub transfer bench  Tub or Shower Type: Tub/Shower combination  Prior Level of Function/Work/Activity:  Independent with ADLs, lives with son and daughter     Number of Personal Factors/Comorbidities that affect the Plan of Care: Expanded review of therapy/medical records (1-2):  MODERATE COMPLEXITY   ASSESSMENT OF OCCUPATIONAL PERFORMANCE[de-identified]   Activities of Daily Living:   Basic ADLs (From Assessment) Complex ADLs (From Assessment)   Feeding: Setup  Oral Facial Hygiene/Grooming: Minimum assistance  Bathing: Moderate assistance  Upper Body Dressing: Minimum assistance  Lower Body Dressing: Moderate assistance  Toileting: Moderate assistance Instrumental ADL  Meal Preparation: Maximum assistance  Homemaking: Maximum assistance  Medication Management: Maximum assistance  Financial Management: Maximum assistance   Grooming/Bathing/Dressing Activities of Daily Living     Cognitive Retraining  Safety/Judgement: Fall prevention                       Bed/Mat Mobility  Supine to Sit: Minimum assistance  Sit to Supine: Minimum assistance; Moderate assistance       Most Recent Physical Functioning:   Gross Assessment:  AROM: Generally decreased, functional  Strength: Generally decreased, functional  Coordination: Generally decreased, functional               Posture:     Balance:  Sitting: Intact Bed Mobility:  Supine to Sit: Minimum assistance  Sit to Supine: Minimum assistance; Moderate assistance  Wheelchair Mobility:     Transfers:               Patient Vitals for the past 6 hrs:   BP BP Patient Position SpO2 O2 Flow Rate (L/min) Pulse   03/08/19 1001 (!) 145/96  93 %  94   03/08/19 1030   98 %  86   03/08/19 1101 154/65 At rest 96 % 50 l/min 83   03/08/19 1130   94 %  83   03/08/19 1201 127/57  94 %  76   03/08/19 1230   98 %  88   03/08/19 1325   98 % 50 l/min        Mental Status  Neurologic State: Alert  Orientation Level: Oriented to person, Oriented to place, Oriented to situation, Disoriented to time  Cognition: Follows commands  Perception: Appears intact  Perseveration: No perseveration noted  Safety/Judgement: Fall prevention                          Physical Skills Involved:  1. Balance  2. Strength  3. Activity Tolerance  4. Fine Motor Control Cognitive Skills Affected (resulting in the inability to perform in a timely and safe manner):  1. Executive Function  2. Sustained Attention  3. Divided Attention Psychosocial Skills Affected:  1. Habits/Routines  2. Environmental Adaptation   Number of elements that affect the Plan of Care: 3-5:  MODERATE COMPLEXITY   CLINICAL DECISION MAKING:   [facility] [employer] AM-PAC 6 Clicks   Daily Activity Inpatient Short Form  How much help from another person does the patient currently need. .. Total A Lot A Little None   1. Putting on and taking off regular lower body clothing? [] 1   [x] 2   [] 3   [] 4   2. Bathing (including washing, rinsing, drying)? [] 1   [x] 2   [] 3   [] 4   3. Toileting, which includes using toilet, bedpan or urinal?   [] 1   [x] 2   [] 3   [] 4   4. Putting on and taking off regular upper body clothing? [] 1   [] 2   [x] 3   [] 4   5.   Taking care of personal grooming such as brushing teeth? [] 1   [] 2   [x] 3   [] 4   6. Eating meals? [] 1   [] 2   [] 3   [x] 4   © 2007, Trustees of [facility] [employer], under license to [employer]. All rights reserved      Score:  Initial: 16 Most Recent: X (Date: -- )    Interpretation of Tool:  Represents activities that are increasingly more difficult (i.e. Bed mobility, Transfers, Gait). Medical Necessity:     · Patient demonstrates good rehab potential due to higher previous functional level. Reason for Services/Other Comments:  · Patient continues to require present interventions due to patient's inability to independently complete ADLs. Use of outcome tool(s) and clinical judgement create a POC that gives a: MODERATE COMPLEXITY         TREATMENT:   (In addition to Assessment/Re-Assessment sessions the following treatments were rendered)     Pre-treatment Symptoms/Complaints:    Pain: Initial:   Pain Intensity 1: 6  Pain Location 1: Leg, Back  Pain Intervention(s) 1: (pre-medicated)  Post Session:  4     Assessment/Reassessment only, no treatment provided today    Braces/Orthotics/Lines/Etc:   · small catheter  · O2 Device: Heated, Hi flow nasal cannula  Treatment/Session Assessment:    · Response to Treatment:  fatigue  · Interdisciplinary Collaboration:   o Physical Therapist  o Occupational Therapist  o Registered Nurse  · After treatment position/precautions:   o remained w/ PT for session   · Compliance with Program/Exercises: Compliant most of the time. · Recommendations/Intent for next treatment session: \"Next visit will focus on advancements to more challenging activities and reduction in assistance provided\".   Total Treatment Duration:  OT Patient Time In/Time Out  Time In: 1358  Time Out: 1202 [address] [place] [address]

## 2019-03-08 NOTE — PROGRESS NOTES
Problem: Mobility Impaired (Adult and Pediatric)  Goal: *Acute Goals and Plan of Care (Insert Text)  STG:  (1.)Ms. Yolanda Pires will move from supine to sit and sit to supine  with CONTACT GUARD ASSIST within 3 treatment day(s). (2.)Ms. Yolanda Pires will transfer from bed to chair and chair to bed with MINIMAL ASSIST using the least restrictive device within 3 treatment day(s). (3.)Ms. Yolanda Pires will ambulate with MINIMAL ASSIST for 30 feet with the least restrictive device within 3 treatment day(s). LTG:  (1.)Ms. Yolanda Pires will move from supine to sit and sit to supine  in bed with STAND BY ASSIST within 7 treatment day(s). (2.)Ms. Yolanda Pires will transfer from bed to chair and chair to bed with STAND BY ASSIST using the least restrictive device within 7 treatment day(s). (3.)Ms. Yolanda Pires will ambulate with CONTACT GUARD ASSIST for 75 feet with the least restrictive device within 7 treatment day(s). ________________________________________________________________________________________________      PHYSICAL THERAPY: Initial Assessment and AM 3/8/2019  INPATIENT:    Payor: Nikki Hicks / Plan: [place] / Product Type: Medicaid /       NAME/AGE/GENDER: Evan Davis is a 59 y.o. female   PRIMARY DIAGNOSIS: Seizure ([address] [R56.9]  Metastatic cancer to brain ([address] [C79.31]  Hyperglycemia [R73.9] Seizure ([address] Seizure ([address]  Procedure(s) (LRB):  RIGHT CRANIOTOMY  FOR RESECTION OF TUMOR WITH STEALTH/ MRI @  0900    /3101 (N/A)     ICD-10: Treatment Diagnosis:    · Generalized Muscle Weakness (M62.81)  · Difficulty in walking, Not elsewhere classified (R26.2)  · History of falling (Z91.81)   Precaution/Allergies: Iodides tincture [iodine]      ASSESSMENT:     Ms. Yolanda Pires is sitting EOB upon contact having just finished work with OT upon contact. Pt agreeable to PT evaluation. Pt is A&O X 3 but disoriented to time and with complaints of 7/10 back and LE pain.  Pt lives with her son and daughter in 3 story home with 5 steps to enter with railing available. Pt reports gait with use of rollator or wheelchair dependent on how she is feeling that day. Pt reports independence with ADLs and requires use of 1.5 L supplemental O2 at baseline. Pt reports 2 falls. Pt demonstrates good static sitting balance EOB but with increased dizziness in sitting after only a few minutes. Pt requesting to return to supine. Pt assisted into supine with min-modA. Pt requiring maxA X 2 for scooting up in bed but pt able to assist. Pt left supine with all needs met and within reach with all safety measures in place. Elda Rebollar will benefit from skilled PT (medically necessary) to address decreased strength, decreased balance, decreased functional tolerance, decreased cardiopulmonary endurance affecting participation in basic ADLs and functional tasks. Pt scheduled for possible craniotomy next week. This section established at most recent assessment   PROBLEM LIST (Impairments causing functional limitations):  1. Decreased Strength  2. Decreased ADL/Functional Activities  3. Decreased Transfer Abilities  4. Decreased Ambulation Ability/Technique  5. Decreased Balance  6. Increased Pain  7. Decreased Activity Tolerance  8. Decreased Pacing Skills  9. Increased Fatigue  10. Increased Shortness of Breath  11. Decreased [place] with Home Exercise Program   INTERVENTIONS PLANNED: (Benefits and precautions of physical therapy have been discussed with the patient.)  1. Balance Exercise  2. Bed Mobility  3. Family Education  4. Gait Training  5. Home Exercise Program (HEP)  6. Neuromuscular Re-education/Strengthening  7. Range of Motion (ROM)  8. Therapeutic Activites  9. Therapeutic Exercise/Strengthening  10.  Transfer Training     TREATMENT PLAN: Frequency/Duration: 3 times a week for duration of hospital stay  Rehabilitation Potential For Stated Goals: Good     RECOMMENDED REHABILITATION/EQUIPMENT: (at time of discharge pending progress): Due to the probability of continued deficits (see above) this patient will likely need continued skilled physical therapy after discharge. Equipment:    None at this time              HISTORY:   History of Present Injury/Illness (Reason for Referral):  See H&P below  Patient is a 58 y/o female with squamous cell carcinoma of the lung, COPD, extreme obesity, diastolic CHF, HTN, HLD, FLETCHER, pulmonary HTN, HLD who presents from home with new altered mental status. EMS reports hyperglycemia. On arrival to ED she was unresponsive with seizure activity. Glucose is 558. She was loaded with IV keppra and also given IV ativan. A head CT shows brain mass in the right parieto-occipital lobe with surrounding vasogenic edema. She then received IV decadron. She was tachycardic with WBC ct of 14.2 so sepsis protocol initiated with fluids and antibiotics. Initially family had said she was DNR but later stated to do everything Except intubation. Will admit to ICU.         Past Medical History/Comorbidities:   Ms. Baron Chandler  has a past medical history of Abnormality of gait and mobility, Acute on chronic respiratory failure ([address] (7/28/2018), Ambulatory dysfunction, Arthritis of knee, Asthma, CHF (congestive heart failure), NYHA class I ([address] Chronic hypoxemic respiratory failure ([address] COPD (chronic obstructive pulmonary disease) with emphysema ([address] Cor pulmonale ([address] Diabetes mellitus without complication ([address] Dyslipidemia, Extreme obesity with respiratory disorder ([address] Fall (7/28/2018), Hyperlipidemia, Hypertension, essential, benign, Hypomagnesemia, Insomnia (12/27/2016), Nocturnal hypoxia, Obesity with alveolar hypoventilation (HCC), Orthopnea, FLETCHER (obstructive sleep apnea), Primary pulmonary hypertension ([address] Pulmonary edema, noncardiac, and Sleeps in sitting position due to orthopnea.   Ms. Baron Chandler  has a past surgical history that includes hx cholecystectomy (1991); hx tonsillectomy; and hx tubal ligation (). Social History/Living Environment:   Home Environment: Private residence  # Steps to Enter: 5  Rails to Enter: Yes  One/Two Story Residence: One story  Living Alone: No  Support Systems: Child(kira)  Patient Expects to be Discharged to[de-identified] Unknown  Current DME Used/Available at Home: Wheelchair, Walker, rollator  Prior Level of Function/Work/Activity:  Use of rollator or wheelchair for mobility, 2 falls   Number of Personal Factors/Comorbidities that affect the Plan of Care: 3+: HIGH COMPLEXITY   EXAMINATION:   Most Recent Physical Functioning:   Gross Assessment:  AROM: Generally decreased, functional  Strength: Generally decreased, functional  Coordination: Generally decreased, functional               Posture:     Balance:  Sitting: Intact; Without support Bed Mobility:  Sit to Supine: Minimum assistance; Moderate assistance  Wheelchair Mobility:     Transfers:     Gait:            Body Structures Involved:  1. Nerves  2. Lungs  3. Bones  4. Joints  5. Muscles  6. Ligaments Body Functions Affected:  1. Mental  2. Sensory/Pain  3. Cardio  4. Respiratory  5. Neuromusculoskeletal  6. Movement Related Activities and Participation Affected:  1. General Tasks and Demands  2. Mobility  3. Self Care  4. Domestic Life  5. Interpersonal Interactions and Relationships  6. Community, Social and [place]   Number of elements that affect the Plan of Care: 4+: HIGH COMPLEXITY   CLINICAL PRESENTATION:   Presentation: Evolving clinical presentation with changing clinical characteristics: MODERATE COMPLEXITY   CLINICAL DECISION MAKIN [facility] Mobility Inpatient Short Form  How much difficulty does the patient currently have. .. Unable A Lot A Little None   1. Turning over in bed (including adjusting bedclothes, sheets and blankets)? [] 1   [] 2   [x] 3   [] 4   2.   Sitting down on and standing up from a chair with arms ( e.g., wheelchair, bedside commode, etc.)   [x] 1   [] 2   [] 3 [] 4   3. Moving from lying on back to sitting on the side of the bed? [] 1   [] 2   [x] 3   [] 4   How much help from another person does the patient currently need. .. Total A Lot A Little None   4. Moving to and from a bed to a chair (including a wheelchair)? [x] 1   [] 2   [] 3   [] 4   5. Need to walk in hospital room? [x] 1   [] 2   [] 3   [] 4   6. Climbing 3-5 steps with a railing? [x] 1   [] 2   [] 3   [] 4   © 2007, Trustees of [address] [place] [address] [address] [id number], under license to [employer]. All rights reserved      Score:  Initial: 10 Most Recent: X (Date: -- )    Interpretation of Tool:  Represents activities that are increasingly more difficult (i.e. Bed mobility, Transfers, Gait). Medical Necessity:     · Patient is expected to demonstrate progress in strength, balance, coordination and functional technique to decrease assistance required with gait, transfers, and functional mobility. .  Reason for Services/Other Comments:  · Patient continues to require skilled intervention due to  decreased strength, decreased balance, decreased functional tolerance, decreased cardiopulmonary endurance affecting participation in basic ADLs and functional tasks. Use of outcome tool(s) and clinical judgement create a POC that gives a: Questionable prediction of patient's progress: MODERATE COMPLEXITY            TREATMENT:   (In addition to Assessment/Re-Assessment sessions the following treatments were rendered)   Pre-treatment Symptoms/Complaints:  none  Pain: Initial:   Pain Intensity 1: 7  Post Session:  Unchanged with mobility 7/10     Assessment/Reassessment only, no treatment provided today    Braces/Orthotics/Lines/Etc:   · IV  · small catheter  · ICU monitors  Treatment/Session Assessment:    · Response to Treatment:  Min-modA for bed mobility.   · Interdisciplinary Collaboration:   o Physical Therapist  o Occupational Therapist  o Registered Nurse  · After treatment position/precautions:   o Supine in bed  o Bed/Chair-wheels locked  o Bed in low position  o Call light within reach  o RN notified   · Compliance with Program/Exercises: Will assess as treatment progresses  · Recommendations/Intent for next treatment session: \"Next visit will focus on advancements to more challenging activities and reduction in assistance provided\".   Total Treatment Duration:  PT Patient Time In/Time Out  Time In: 1407  Time Out: Brandychdim[place]

## 2019-03-08 NOTE — PROGRESS NOTES
Patient placed on BiPAP for QHS and HHFNC removed. Patient tolerating mask and remains stable at this time. 03/07/19 2312   Oxygen Therapy   O2 Sat (%) 98 %   Pulse via Oximetry 73 beats per minute   O2 Device BIPAP   FIO2 (%) 40 %   Respiratory   Respiratory (WDL) X   Respiratory Pattern Regular   Chest/Tracheal Assessment Chest expansion, symmetrical   Breath Sounds Bilateral Diminished   CPAP/BIPAP   CPAP/BIPAP Start/Stop On   Device Mode BIPAP;S/T   Mask Type and Size Full face; Medium   Skin Condition intact; no redness   PIP Observed 16 cm H20   IPAP (cm H2O) 16 cm H2O   EPAP (cm H2O) 8 cm H2O   Inspiratory Time (sec) 0.9 seconds   Vt Spont (ml) 571 ml   Ve Observed (l/min) 12 l/min   Backup Rate 16   Total RR (Spontaneous) 21 breaths per minute   Insp Rise Time (sec) 3   Leak (Estimated) 20 L/min   Pt's Home Machine No   Biomedical Check Performed Yes   Settings Verified Yes   Alarm Settings   High Pressure 30   Low Pressure 5   Apnea 20   Low Ve 3   High Rate 50   Low Rate 5   Pulmonary Toilet   Pulmonary Toilet H. O.B elevated

## 2019-03-08 NOTE — PROGRESS NOTES
CM continues to follow pt plan of care. She is being assessed for possible right parietal craniotomy next week if stable. Will continue to follow and assist with supportive care referrals as appropriate.

## 2019-03-08 NOTE — PROGRESS NOTES
Patient's son, Doug David, notified of neurosurgery's desire to hold family meeting at 0 today. Destiney Trent understanding.

## 2019-03-08 NOTE — PROGRESS NOTES
Palliative Care Progress Note    Patient: Dex Lao MRN: [id number]  SSN: [id number]    YOB: 1960  Age: 59 y.o. Sex: female       Assessment/Plan:      Chief Complaint/Interval History: Resting on Optiflow. Principal Diagnosis:    · Debility, Unspecified  R53.81    Additional Diagnoses:   · Acute Respiratory Failure, Unspecified  J96.00  · Fatigue, Lethargy  R53.83  · Respiratory Failure, Acute on Chronic  J96.20  · Counseling, Encounter for Medical Advice  Z71.9  · Encounter for Palliative Care  Z51.5    Palliative Performance Scale (PPS)  PPS: 10    Medical Decision Making:   Reviewed and summarized notes over previous 24 hours. Discussed case with appropriate providers: ICU IDT  Reviewed laboratory and x-ray data. Patient resting in bed, on Optiflow. No family at bedside. Patient is more alert, tells me we are at [address] [place] [address] because of her lung cancer. Through conversation, she is able to recall that her cancer has spread. She states again today that she wants her children to make decisions for her. I supported this, and told patient that possible intubation/surgery is what we would be talking to them about today. I asked her if she had strong feelings one way or the other. She said she wanted these interventions. I asked what she thought would happen if she did not have these interventions; patient stated \"I will die\". I asked what she thought would happen if she had these interventions; patient stated \"I have the possibility of living\". Patient says she is scared to die due to the thought of leaving her children. At this point, patient requested water to drink and to be turned, declining further conversation. Dr. Denver Ceballos to meet with children to discuss craniotomy for brain metastasis. Intubation and subsequent extubation would be concerning due to patient's habitus, COPD history, and lung mass, await pulmonary input.   Also awaiting oncology input regarding her treatment options if she were to get to that point. Will continue to follow. Will discuss with interdisciplinary team.     More than 50% of this 35 minute visit was spent counseling and coordination of care as outlined above. Subjective:     Review of Systems:  A comprehensive review of systems was unobtainable due to patient factors:  lethargic     Objective:     Visit Vitals  BP (!) 157/106 (BP 1 Location: Right arm, BP Patient Position: At rest)   Pulse 92   Temp 98.8 °F (37.1 °C)   Resp 20   Ht 5' 6\" (1.676 m)   Wt 313 lb 7.9 oz (142.2 kg)   SpO2 100%   Breastfeeding? No   BMI 50.60 kg/m²       Physical Exam:    General:  Cooperative. No acute distress. Eyes:  Conjunctivae/corneas clear. Nose: Nares normal. Septum midline. Optiflow in place. Neck: Supple, symmetrical, trachea midline. Lungs:   Diminished. Unlabored on Optiflow. Heart:  Regular rate and rhythm. Abdomen:   Obese. Soft, non-tender, non-distended. Extremities: Normal, atraumatic, no cyanosis. Skin: Skin color, texture, turgor normal. No rash. Neurologic: Nonfocal.   Psych: Alert and oriented to person, place, time, and somewhat situation.       Signed By: Lisbeth Yoo NP     March 8, 2019

## 2019-03-08 NOTE — PROGRESS NOTES
Progress Note    Patient: Savannah Plascencia MRN: [id number]  SSN: [id number]    YOB: 1960  Age: 59 y.o. Sex: female      Admit Date: 3/4/2019    LOS: 3 days     Subjective:     From admission note :     \" Patient is a 60 y/o female with squamous cell carcinoma of the lung, COPD, extreme obesity, diastolic CHF, HTN, HLD, FLETCHER, pulmonary HTN, HLD who presents from home with new altered mental status. EMS reports hyperglycemia. On arrival to ED she was unresponsive with seizure activity. Glucose is 558. She was loaded with IV keppra and also given IV ativan. A head CT shows brain mass in the right parieto-occipital lobe with surrounding vasogenic edema. She then received IV decadron. She was tachycardic with WBC ct of 14.2 so sepsis protocol initiated with fluids and antibiotics. Initially family had said she was DNR but later stated to do everything Except intubation. Will admit to ICU. \"    3-5-2019  Patient has been in bed. Initially palliative consultant saw the patient and at that time her family wanted her to be a hospice candidate. However, later she changed her mind and now she is a full code. Family would like aggressive treatment now.     3-6-2019  Patient is still on BiPAP. She is more responsive. She was seen by neurosurgery today. EEG was done on 3-5-2019 which showed epileptiform activity. 3-7-2019  Patient in on high flow oxygen now. She is alert and talking. Asking and answering appropriately. She states that she is very nervous and panics with MRI study. 3-8-2019  Patient is on high flow oxygen. She is alert and oriented. No headache. No shortness of breath. She is on BiPAP at night and would not wear the mask in daytime.          Objective:     Vitals:    03/08/19 0902 03/08/19 0930 03/08/19 1001 03/08/19 1030   BP:   (!) 145/96    Pulse: 74 69 94 86   Resp: 19 18 29 18   Temp:       SpO2: 99% 99% 93% 98%   Weight:       Height:            Intake and Output:  Current Shift: 03/08 0701 - 03/08 1900  In: 330 [P.O.:330]  Out: -   Last three shifts: 03/06 1901 - 03/08 0700  In: 2220 [P.O.:720; I.V.:1500]  Out: 5625 [Urine:5625]    Physical Exam:     General:                    The patient is a female in no acute distress. She is lying flat in bed on her side. On high-flow oxygen. alert and oriented to place, time and person. Answering questions appropriately. Head:                                   Normocephalic/atraumatic. Redness of face. Eyes:                                   No palpebral pallor or scleral icterus. ENT:                                    External auricular and nasal exam within normal limits. Mucous membranes are moist.  Neck:                                   Supple, non-tender, no JVD. Lungs:                       Clear to auscultation bilaterally without wheezes or crackles. No respiratory distress or accessory muscle use. Heart:                                  Regular rate and rhythm, without murmurs, rubs, or gallops. Abdomen:                  Soft, non-tender, mildly distended with normoactive bowel sounds. Genitourinary:           No tenderness over the bladder or bilateral CVAs. Extremities:               Without clubbing, cyanosis, or edema. Skin:                                    Normal color, texture, and turgor. No rashes, lesions, or jaundice. Pulses:                      Radial and dorsalis pedis pulses present 2+ bilaterally. Capillary refill <2s. Neurologic:            Moving all four extremities well with no focal deficits.     Lab/Data Review:    Recent Results (from the past 24 hour(s))   VANCOMYCIN, RANDOM    Collection Time: 03/07/19 12:21 PM   Result Value Ref Range    Vancomycin, random 5.5 UG/ML   GLUCOSE, POC    Collection Time: 03/07/19  1:20 PM   Result Value Ref Range    Glucose (POC) 271 (H) 65 - 100 mg/dL   GLUCOSE, POC    Collection Time: 03/07/19  4:17 PM   Result Value Ref Range    Glucose (POC) 284 (H) 65 - 100 mg/dL   GLUCOSE, POC    Collection Time: 03/07/19  7:42 PM   Result Value Ref Range    Glucose (POC) 268 (H) 65 - 100 mg/dL   GLUCOSE, POC    Collection Time: 03/08/19 12:16 AM   Result Value Ref Range    Glucose (POC) 230 (H) 65 - 100 mg/dL   GLUCOSE, POC    Collection Time: 03/08/19  3:57 AM   Result Value Ref Range    Glucose (POC) 255 (H) 65 - 100 mg/dL   CBC WITH AUTOMATED DIFF    Collection Time: 03/08/19  3:59 AM   Result Value Ref Range    WBC 8.5 4.3 - 11.1 K/uL    RBC 4.51 4.05 - 5.2 M/uL    HGB 11.3 (L) 11.7 - 15.4 g/dL    HCT 39.2 35.8 - 46.3 %    MCV 86.9 79.6 - 97.8 FL    MCH 25.1 (L) 26.1 - 32.9 PG    MCHC 28.8 (L) 31.4 - 35.0 g/dL    RDW 16.1 (H) 11.9 - 14.6 %    PLATELET 535 446 - 327 K/uL    MPV 11.1 9.4 - 12.3 FL    ABSOLUTE NRBC 0.00 0.0 - 0.2 K/uL    DF AUTOMATED      NEUTROPHILS 84 (H) 43 - 78 %    LYMPHOCYTES 9 (L) 13 - 44 %    MONOCYTES 6 4.0 - 12.0 %    EOSINOPHILS 0 (L) 0.5 - 7.8 %    BASOPHILS 0 0.0 - 2.0 %    IMMATURE GRANULOCYTES 1 0.0 - 5.0 %    ABS. NEUTROPHILS 7.1 1.7 - 8.2 K/UL    ABS. LYMPHOCYTES 0.8 0.5 - 4.6 K/UL    ABS. MONOCYTES 0.5 0.1 - 1.3 K/UL    ABS. EOSINOPHILS 0.0 0.0 - 0.8 K/UL    ABS. BASOPHILS 0.0 0.0 - 0.2 K/UL    ABS. IMM.  GRANS. 0.0 0.0 - 0.5 K/UL   METABOLIC PANEL, BASIC    Collection Time: 03/08/19  3:59 AM   Result Value Ref Range    Sodium 135 (L) 136 - 145 mmol/L    Potassium 4.1 3.5 - 5.1 mmol/L    Chloride 96 (L) 98 - 107 mmol/L    CO2 30 21 - 32 mmol/L    Anion gap 9 7 - 16 mmol/L    Glucose 283 (H) 65 - 100 mg/dL    BUN 15 8 - 23 MG/DL    Creatinine 0.61 0.6 - 1.0 MG/DL    GFR est AA >60 >60 ml/min/1.73m2    GFR est non-AA >60 >60 ml/min/1.73m2    Calcium 9.9 8.3 - 10.4 MG/DL   GLUCOSE, POC    Collection Time: 03/08/19  8:40 AM   Result Value Ref Range    Glucose (POC) 250 (H) 65 - 100 mg/dL     CT head  3-5-2019  IMPRESSION:     1. Brain mass in the right parieto-occipital lobe with surrounding vasogenic edema. Contrast enhanced MRI of the brain should be considered to better Evaluate/characterize. .    XR chest   3-5-2019  IMPRESSION:     Masslike opacity in the left perihilar region, significantly more prominent  compared to July 29, 2018 CT. Findings suspicious for lung tumor. Contrast-enhanced CT chest should be considered to better characterize. EKG   3-4-2019  !! AGE AND GENDER SPECIFIC ECG ANALYSIS !! Sinus tachycardia   Low voltage QRS   Left posterior fascicular block   Possible Inferior infarct , age undetermined   Cannot rule out Anterior infarct , age undetermined   Abnormal ECG   No previous ECGs available     MRI  3-7-2019  IMPRESSION:     1. 3.1 x 3.0 x 3.5 cm posterior right parietal cystic mass with surrounding  vasogenic edema. Given the history of lung cancer, a metastasis is favored. 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     2. There is no definite evidence of other enhancing intracranial lesions within  the substantial limitation of patient motion on the postcontrast images. The  degree of motion could obscure subtle or small metastases. If there are new or  progressive neurologic progressive symptoms, a repeat MRI should be considered  when the patient is better able to lie still.     3. There is T2 hyperintensity within the body and tail of the right hippocampus. In the setting of the right-sided mass and surrounding vasogenic edema, this is  most likely to be either post ictal or an extension of the adjacent vasogenic  edema. In the setting of known systemic neoplasia, although less likely in the  absence of contralateral findings, a paraneoplastic syndrome cannot be excluded.   Directed attention on follow-up examinations is recommended.      CT-scan of the abdomen  3-7-2019  IMPRESSION:  Significant enlargement of the left lung mass which now measures  6.2 x 8.7 cm. No other new abnormality.       Current Facility-Administered Medications:     famotidine (PF) (PEPCID) 20 mg in sodium chloride 0.9% 10 mL injection, 20 mg, IntraVENous, Q12H, Laurel Grossman MD, 20 mg at 03/08/19 0841    insulin glargine (LANTUS) injection 20 Units, 20 Units, SubCUTAneous, DAILY, Laurel Grossman MD, 20 Units at 03/08/19 0853    sodium chloride (NS) flush 5-10 mL, 5-10 mL, IntraVENous, PRN, Alfredo Shabazz MD    sodium chloride (NS) flush 5-40 mL, 5-40 mL, IntraVENous, Q8H, Janette Perez MD, 10 mL at 03/08/19 0603    sodium chloride (NS) flush 5-40 mL, 5-40 mL, IntraVENous, PRN, Janette Perez MD    NUTRITIONAL SUPPORT ELECTROLYTE PRN ORDERS, , Does Not Apply, PRN, aJnette Perez MD    acetaminophen (TYLENOL) tablet 650 mg, 650 mg, Oral, Q4H PRN, Janette Perez MD    HYDROcodone-acetaminophen [facility]) 5-325 mg per tablet 1 Tab, 1 Tab, Oral, Q4H PRN, Janette Perez MD    morphine injection 2 mg, 2 mg, IntraVENous, Q4H PRN, Janette Perez MD    ondansetron [facility]) injection 4 mg, 4 mg, IntraVENous, Q4H PRN, Janette Perez MD    naloxone [facility]) injection 0.4 mg, 0.4 mg, IntraVENous, PRN, Janette Perez MD    LORazepam (ATIVAN) injection 1 mg, 1 mg, IntraVENous, Q4H PRN, Janette Perez MD, 1 mg at 03/07/19 3403    albuterol-ipratropium (DUO-NEB) 2.5 MG-0.5 MG/3 ML, 3 mL, Nebulization, Q4H PRN, Janette Perez MD    levETIRAcetam (KEPPRA) 500 mg in 0.9% sodium chloride 100 mL IVPB, 500 mg, IntraVENous, Q12H, Janette Perez MD, 500 mg at 03/08/19 0457    budesonide (PULMICORT) 500 mcg/2 ml nebulizer suspension, 500 mcg, Nebulization, BID RT, 500 mcg at 03/08/19 0756 **AND** albuterol (PROVENTIL VENTOLIN) nebulizer solution 2.5 mg, 2.5 mg, Nebulization, Q6HWA RT, Janette Perez MD, 2.5 mg at 03/08/19 0756    dexamethasone (DECADRON) injection 6 mg, 6 mg, IntraVENous, Q6H, Janette Perez MD, 6 mg at 03/08/19 0603    piperacillin-tazobactam (ZOSYN) 3.375 g in 0.9% sodium chloride (MBP/ADV) 100 mL, 3.375 g, IntraVENous, Q8H, Ponce Funez MD, Last Rate: 25 mL/hr at 03/08/19 0603, 3.375 g at 03/08/19 0603    metoprolol (LOPRESSOR) injection 1.25 mg, 1.25 mg, IntraVENous, Q6H PRN, Laurel Grossman MD, 1.25 mg at 03/05/19 1004    insulin regular (NOVOLIN R, HUMULIN R) injection, , SubCUTAneous, Q4H, Dimas Armijo MD, 9 Units at 03/08/19 0853    dexmedeTOMidine (PRECEDEX) 400 mcg in 0.9% sodium chloride 100 mL infusion, 0.2-0.7 mcg/kg/hr, IntraVENous, TITRATE, Sine, Beena Garcia MD, Stopped at 03/06/19 0631      Assessment:     Principal Problem:    Seizure ([facility] [address] (3/5/2019)    Active Problems:    Hypertension, essential, benign ()      COPD with emphysema (HCC) ()      Morbid obesity (HCC) ()      Diabetes mellitus without complication (HCC) ()      FLETCHER (obstructive sleep apnea) ()      Chronic hypoxemic respiratory failure (HCC) ()      Primary cancer of left upper lobe of lung ([facility] [address]- squamous cell (7/30/2018)      Metastatic cancer to brain ([facility] [address] (3/5/2019)      Hyperglycemia (3/5/2019)      Acute on chronic respiratory failure with hypoxia and hypercapnia ([address] (3/5/2019)      Delirium (3/6/2019)        Plan:     Hyperglycemia  Improved. On current SC regimen. Monitor. BS is in 200's ranges now. Acute on chronic respiratory failure with hypoxia and hypercapnea. History of FLETCHER  Due to brain edema, hyperglycemia initially, brain tumor. On high flow oxygen now in daytime. Pulmonary is helping. Wean oxygen as tolerated. Lung cancer with brain mass. This brain mass is either metastasis or primary brain tumor. Neurosurgery plans to do craniotomy. Continue Keppra, Dexamethasone.      I have discussed the plan of care with patient and care team.     DVT prophylaxis : SCD      Signed By: Mainor Fonseca MD     March 8, 2019

## 2019-03-08 NOTE — INTERDISCIPLINARY ROUNDS
Interdisciplinary team rounds were held 3/8/2019 with the following team members:Care Management, Nursing, Nurse Practitioner, Nutrition, Palliative Care, Pastoral Care, Pharmacy, Physical Therapy, Physician, Respiratory Therapy, Speech Therapy and Clinical Coordinator and the patient. Plan of care discussed. See clinical pathway and/or care plan for interventions and desired outcomes.

## 2019-03-08 NOTE — PROGRESS NOTES
Speech language pathology: bedside swallow note: Initial Assessment and Discharge    NAME/AGE/GENDER: Troy Ewing is a 59 y.o. female  DATE: 3/8/2019  PRIMARY DIAGNOSIS: Seizure ([facility] [address] [R56.9]  Metastatic cancer to brain ([facility] [address] [C79.31]  Hyperglycemia [R73.9]      ICD-10: Treatment Diagnosis: R13.11 Oral Dysphagia. INTERDISCIPLINARY COLLABORATION: Registered Nurse and Physician  PRECAUTIONS/ALLERGIES: Iodides tincture [iodine] ASSESSMENT:Based on the objective data described below, Ms. Theodora Resendiz presents with mild oral dysphagia secondary to absent dentition. She does not wear dentures at home \"unless I absolutely have to\". Mild difficulty with masticating meat at baseline. Patient seen with AM meal of Armenian toast and sausage. Thin liquids tolerated via cup and straw sips without difficulty. Appropriate mastication of soft solids given edentulous state. Sausage attempted, but unable to mastication. Eventually spitting out bolus. Recommend mechanical soft diet/thin liquids. Medications whole with liquid wash. No further speech therapy indicated at this time. Per report, possible craniotomy next week. Anticipate need for re-consult s/p surgery. ?????? ? ? This section established at most recent assessment??????????  PROBLEM LIST (Impairments causing functional limitations):  1. Oral dysphagia  REHABILITATION POTENTIAL FOR STATED GOALS: Good  PLAN OF CARE:   Patient will benefit from skilled intervention to address the following impairments.   RECOMMENDATIONS AND PLANNED INTERVENTIONS (Benefits and precautions of therapy have been discussed with the patient.):  · PO:  Mechanical soft  · Liquids:  regular thin  MEDICATIONS:  · With liquid  ASPIRATION PRECAUTIONS:  · Slow rate of intake  · Small bites/sips  · Upright at 90 degrees during meal  COMPENSATORY STRATEGIES/MODIFICATIONS INCLUDING:  · None  OTHER RECOMMENDATIONS (including follow up treatment recommendations):   · Patient education  RECOMMENDED DIET MODIFICATIONS DISCUSSED WITH:  · Hospitalist  · Nursing  · Patient  FREQUENCY/DURATION: No further speech therapy indicated at this time as oropharyngeal swallow function is within normal limits. RECOMMENDED REHABILITATION/EQUIPMENT: (at time of discharge pending progress): Due to the probability of continued deficits (see above) this patient will not likely need continued skilled speech therapy after discharge. SUBJECTIVE:   Initially drowsy, but easily woke. Good appetite. History of Present Injury/Illness: Ms. Alberta Denver  has a past medical history of Abnormality of gait and mobility, Acute on chronic respiratory failure ([address] (7/28/2018), Ambulatory dysfunction, Arthritis of knee, Asthma, CHF (congestive heart failure), NYHA class I ([address] Chronic hypoxemic respiratory failure ([address] COPD (chronic obstructive pulmonary disease) with emphysema ([address] Cor pulmonale ([address] Diabetes mellitus without complication ([address] Dyslipidemia, Extreme obesity with respiratory disorder ([address] Fall (7/28/2018), Hyperlipidemia, Hypertension, essential, benign, Hypomagnesemia, Insomnia (12/27/2016), Nocturnal hypoxia, Obesity with alveolar hypoventilation (HCC), Orthopnea, FLETCHER (obstructive sleep apnea), Primary pulmonary hypertension ([address] Pulmonary edema, noncardiac, and Sleeps in sitting position due to orthopnea. .  She also  has a past surgical history that includes hx cholecystectomy (1991); hx tonsillectomy; and hx tubal ligation (1993). Present Symptoms: Oral dysphagia due to absent dentition   Pain Scale 1: Numeric (0 - 10)  Pain Intensity 1: 0  Current Medications:   No current facility-administered medications on file prior to encounter. Current Outpatient Medications on File Prior to Encounter   Medication Sig Dispense Refill    furosemide (LASIX) 20 mg tablet Take 20 mg by mouth daily. Pt has 20mg tablets now      traZODone (DESYREL) 50 mg tablet Take 25 mg by mouth two (2) times a day.  take 1/2 tab at breakfast and 6pm      morphine CR (MS CONTIN) 15 mg CR tablet Take 15 mg by mouth every twelve (12) hours.  zolpidem (AMBIEN) 5 mg tablet Take 5 mg by mouth nightly.  HYDROcodone-acetaminophen (NORCO)  mg tablet Take 1 Tab by mouth every four (4) hours as needed for Pain. takes 4 times a day routinely, is in med box      senna (SENNA) 8.6 mg tablet Take 1 Tab by mouth two (2) times a day. not using      atenolol (TENORMIN) 50 mg tablet Take 25 mg by mouth daily.  baclofen (LIORESAL) 20 mg tablet Take 20 mg by mouth as needed (muscle spasms). 4 times daily as needed for muscle spasms. Takes 4 times a day routinely      predniSONE (DELTASONE) 10 mg tablet Take 20 mg by mouth daily.  OXYGEN-AIR DELIVERY SYSTEMS 2 L/min by IntraNASal route continuous.  albuterol-ipratropium (DUO-NEB) 2.5 mg-0.5 mg/3 ml nebu 3 mL by Nebulization route every three (3) hours as needed (sob or wheezing).  polyethylene glycol (MIRALAX) 17 gram packet Take 17 g by mouth daily as needed (constipation). not using      prochlorperazine (COMPAZINE) 10 mg tablet Take 10 mg by mouth every six (6) hours as needed (nausea or vomiting).  Omeprazole delayed release (PRILOSEC D/R) 20 mg tablet Take 20 mg by mouth daily.  diphenhydrAMINE (BENADRYL) 25 mg tablet Take 50 mg by mouth nightly.  ibuprofen (MOTRIN) 800 mg tablet Take 800 mg by mouth every eight (8) hours as needed (mild pain).  melatonin 5 mg cap capsule Take 5 mg by mouth nightly. currently has 3mg tabs, takes 2 tabs nightly      gabapentin (NEURONTIN) 800 mg tablet Take 800 mg by mouth four (4) times daily.  fluticasone-salmeterol (ADVAIR HFA) 115-21 mcg/actuation inhaler Take 2 Puffs by inhalation two (2) times a day.  metFORMIN (GLUMETZA ER) 500 mg TG24 24 hour tablet Take 2 Tabs by mouth daily.  traZODone (DESYREL) 100 mg tablet Take 100 mg by mouth nightly.        Current Dietary Status:     Regular/thin  Social History/Home Situation:    Support Systems: Child(kira), Family member(s)  OBJECTIVE:   Respiratory Status:  Hi flow nasal cannula  50 l/min    Oral Motor Structure/Speech:  Oral-Motor Structure/Motor Speech  Labial: No impairment  Dentition: Edentulous  Lingual: No impairment    Cognitive and Communication Status:  Neurologic State: Alert  Orientation Level: Oriented to person;Oriented to place  Cognition: Follows commands  Perception: Appears intact     Safety/Judgement: Fall prevention;Decreased insight into deficits    BEDSIDE SWALLOW EVALUATION  Oral Assessment:  Oral Assessment  Labial: No impairment  Dentition: Edentulous  Lingual: No impairment  P.O. Trials:  Patient Position: upright in bed    The patient was given tsp- small bite amounts of the following:   Consistency Presented: Thin liquid; Solid;Puree;Mixed consistency  How Presented: SLP-fed/presented;Spoon;Straw;Successive swallows    ORAL PHASE:  Bolus Acceptance: No impairment  Bolus Formation/Control: Impaired  Propulsion: No impairment  Type of Impairment: Mastication  Oral Residue: None(Patient had to spit out meat due to poor mastication)    PHARYNGEAL PHASE:  Initiation of Swallow: No impairment  Laryngeal Elevation: Functional  Aspiration Signs/Symptoms: None        Effective Modifications: None     Pharyngeal Phase Characteristics: No impairment, issues, or problems     OTHER OBSERVATIONS:  Rate/bite size: WNL   Endurance: WNL   Comments:       Tool Used: Dysphagia Outcome and Severity Scale (TANYA)    Score Comments   Normal Diet  [] 7 With no strategies or extra time needed   Functional Swallow  [x] 6 May have mild oral or pharyngeal delay       Mild Dysphagia    [] 5 Which may require one diet consistency restricted (those who demonstrate penetration which is entirely cleared on MBS would be included)   Mild-Moderate Dysphagia  [] 4 With 1-2 diet consistencies restricted       Moderate Dysphagia  [] 3 With 2 or more diet consistencies restricted       Moderately Severe Dysphagia  [] 2 With partial PO strategies (trials with ST only)       Severe Dysphagia  [] 1 With inability to tolerate any PO safely          Score:  Initial: 6 Most Recent: X (Date: --)   Interpretation of Tool: The Dysphagia Outcome and Severity Scale (TANYA) is a simple, easy-to-use, 7-point scale developed to systematically rate the functional severity of dysphagia based on objective assessment and make recommendations for diet level, independence level, and type of nutrition. Payor: [id number] / Plan: [address] [place] [address] Avenue / Product Type: Medicaid /     TREATMENT:    (In addition to Assessment/Re-Assessment sessions the following treatments were rendered)  Assessment/Reassessment only, no treatment provided today  MODALITIES:                                                                    ORAL MOTOR  EXERCISES:                                                                                                                                                                      LARYNGEAL / PHARYNGEAL EXERCISES:                                                                                                                                     __________________________________________________________________________________________________  Safety:   After treatment position/precautions:  · RN notified  · Upright in Bed  Recommendations/Intent for next treatment session: No dysphagia treatment indicated as swallow function is within functional limits. Anticipate need for re-consult s/p surgery.    Total Treatment Duration:  Time In: 0932  Time Out: [address] CCC-SLP

## 2019-03-08 NOTE — PROGRESS NOTES
Bedside, Verbal and Written shift change report given to Kendall Martin RN (oncoming nurse) by Lamberto Rodriguez RN (offgoing nurse). Report included the following information SBAR, Kardex, ED Summary, Intake/Output, MAR, Accordion and Cardiac Rhythm NSR.

## 2019-03-09 LAB
GLUCOSE BLD STRIP.AUTO-MCNC: 207 MG/DL (ref 65–100)
GLUCOSE BLD STRIP.AUTO-MCNC: 217 MG/DL (ref 65–100)
GLUCOSE BLD STRIP.AUTO-MCNC: 225 MG/DL (ref 65–100)
GLUCOSE BLD STRIP.AUTO-MCNC: 233 MG/DL (ref 65–100)
GLUCOSE BLD STRIP.AUTO-MCNC: 234 MG/DL (ref 65–100)
GLUCOSE BLD STRIP.AUTO-MCNC: 278 MG/DL (ref 65–100)
GLUCOSE BLD STRIP.AUTO-MCNC: 305 MG/DL (ref 65–100)

## 2019-03-09 PROCEDURE — 94640 AIRWAY INHALATION TREATMENT: CPT

## 2019-03-09 PROCEDURE — 94660 CPAP INITIATION&MGMT: CPT

## 2019-03-09 PROCEDURE — 94760 N-INVAS EAR/PLS OXIMETRY 1: CPT

## 2019-03-09 PROCEDURE — 74011636637 HC RX REV CODE- 636/637: Performed by: HOSPITALIST

## 2019-03-09 PROCEDURE — 74011000250 HC RX REV CODE- 250: Performed by: INTERNAL MEDICINE

## 2019-03-09 PROCEDURE — 74011250637 HC RX REV CODE- 250/637: Performed by: HOSPITALIST

## 2019-03-09 PROCEDURE — 74011250636 HC RX REV CODE- 250/636: Performed by: INTERNAL MEDICINE

## 2019-03-09 PROCEDURE — 74011636637 HC RX REV CODE- 636/637: Performed by: INTERNAL MEDICINE

## 2019-03-09 PROCEDURE — 65270000029 HC RM PRIVATE

## 2019-03-09 PROCEDURE — 74011000258 HC RX REV CODE- 258: Performed by: HOSPITALIST

## 2019-03-09 PROCEDURE — 74011000250 HC RX REV CODE- 250: Performed by: HOSPITALIST

## 2019-03-09 PROCEDURE — 77010033711 HC HIGH FLOW OXYGEN

## 2019-03-09 PROCEDURE — 99232 SBSQ HOSP IP/OBS MODERATE 35: CPT | Performed by: INTERNAL MEDICINE

## 2019-03-09 PROCEDURE — 99233 SBSQ HOSP IP/OBS HIGH 50: CPT | Performed by: INTERNAL MEDICINE

## 2019-03-09 PROCEDURE — 82962 GLUCOSE BLOOD TEST: CPT

## 2019-03-09 PROCEDURE — 74011000258 HC RX REV CODE- 258: Performed by: INTERNAL MEDICINE

## 2019-03-09 PROCEDURE — 74011250636 HC RX REV CODE- 250/636: Performed by: HOSPITALIST

## 2019-03-09 RX ADMIN — HUMAN INSULIN 12 UNITS: 100 INJECTION, SOLUTION SUBCUTANEOUS at 16:00

## 2019-03-09 RX ADMIN — BUDESONIDE 500 MCG: 0.5 INHALANT RESPIRATORY (INHALATION) at 07:59

## 2019-03-09 RX ADMIN — DEXAMETHASONE SODIUM PHOSPHATE 6 MG: 10 INJECTION INTRAMUSCULAR; INTRAVENOUS at 23:02

## 2019-03-09 RX ADMIN — SODIUM CHLORIDE 500 MG: 900 INJECTION, SOLUTION INTRAVENOUS at 04:21

## 2019-03-09 RX ADMIN — Medication 10 ML: at 15:59

## 2019-03-09 RX ADMIN — PIPERACILLIN AND TAZOBACTAM 3.38 G: 3; .375 INJECTION, POWDER, FOR SOLUTION INTRAVENOUS at 15:58

## 2019-03-09 RX ADMIN — MORPHINE SULFATE 2 MG: 2 INJECTION, SOLUTION INTRAMUSCULAR; INTRAVENOUS at 09:02

## 2019-03-09 RX ADMIN — ALBUTEROL SULFATE 2.5 MG: 2.5 SOLUTION RESPIRATORY (INHALATION) at 15:29

## 2019-03-09 RX ADMIN — FAMOTIDINE 20 MG: 10 INJECTION, SOLUTION INTRAVENOUS at 08:09

## 2019-03-09 RX ADMIN — LORAZEPAM 1 MG: 2 INJECTION INTRAMUSCULAR; INTRAVENOUS at 01:03

## 2019-03-09 RX ADMIN — HUMAN INSULIN 6 UNITS: 100 INJECTION, SOLUTION SUBCUTANEOUS at 00:20

## 2019-03-09 RX ADMIN — DEXAMETHASONE SODIUM PHOSPHATE 6 MG: 10 INJECTION INTRAMUSCULAR; INTRAVENOUS at 17:29

## 2019-03-09 RX ADMIN — HUMAN INSULIN 6 UNITS: 100 INJECTION, SOLUTION SUBCUTANEOUS at 08:10

## 2019-03-09 RX ADMIN — PIPERACILLIN AND TAZOBACTAM 3.38 G: 3; .375 INJECTION, POWDER, FOR SOLUTION INTRAVENOUS at 23:02

## 2019-03-09 RX ADMIN — HUMAN INSULIN 6 UNITS: 100 INJECTION, SOLUTION SUBCUTANEOUS at 23:29

## 2019-03-09 RX ADMIN — HUMAN INSULIN 9 UNITS: 100 INJECTION, SOLUTION SUBCUTANEOUS at 20:37

## 2019-03-09 RX ADMIN — LORAZEPAM 1 MG: 2 INJECTION INTRAMUSCULAR; INTRAVENOUS at 18:41

## 2019-03-09 RX ADMIN — MORPHINE SULFATE 2 MG: 2 INJECTION, SOLUTION INTRAMUSCULAR; INTRAVENOUS at 02:21

## 2019-03-09 RX ADMIN — DEXAMETHASONE SODIUM PHOSPHATE 6 MG: 10 INJECTION INTRAMUSCULAR; INTRAVENOUS at 05:46

## 2019-03-09 RX ADMIN — ALBUTEROL SULFATE 2.5 MG: 2.5 SOLUTION RESPIRATORY (INHALATION) at 07:58

## 2019-03-09 RX ADMIN — HYDROCODONE BITARTRATE AND ACETAMINOPHEN 1 TABLET: 5; 325 TABLET ORAL at 14:10

## 2019-03-09 RX ADMIN — HUMAN INSULIN 9 UNITS: 100 INJECTION, SOLUTION SUBCUTANEOUS at 11:22

## 2019-03-09 RX ADMIN — SODIUM CHLORIDE 500 MG: 900 INJECTION, SOLUTION INTRAVENOUS at 19:48

## 2019-03-09 RX ADMIN — INSULIN GLARGINE 20 UNITS: 100 INJECTION, SOLUTION SUBCUTANEOUS at 08:09

## 2019-03-09 RX ADMIN — MORPHINE SULFATE 0.5 MG: 2 INJECTION, SOLUTION INTRAMUSCULAR; INTRAVENOUS at 16:44

## 2019-03-09 RX ADMIN — FAMOTIDINE 20 MG: 10 INJECTION, SOLUTION INTRAVENOUS at 20:37

## 2019-03-09 RX ADMIN — HYDROCODONE BITARTRATE AND ACETAMINOPHEN 1 TABLET: 5; 325 TABLET ORAL at 22:32

## 2019-03-09 RX ADMIN — Medication 10 ML: at 05:46

## 2019-03-09 RX ADMIN — DEXAMETHASONE SODIUM PHOSPHATE 6 MG: 10 INJECTION INTRAMUSCULAR; INTRAVENOUS at 11:21

## 2019-03-09 RX ADMIN — PIPERACILLIN AND TAZOBACTAM 3.38 G: 3; .375 INJECTION, POWDER, FOR SOLUTION INTRAVENOUS at 05:46

## 2019-03-09 RX ADMIN — Medication 10 ML: at 22:28

## 2019-03-09 RX ADMIN — ALBUTEROL SULFATE 2.5 MG: 2.5 SOLUTION RESPIRATORY (INHALATION) at 19:40

## 2019-03-09 RX ADMIN — MORPHINE SULFATE 2 MG: 2 INJECTION, SOLUTION INTRAMUSCULAR; INTRAVENOUS at 20:37

## 2019-03-09 RX ADMIN — DEXAMETHASONE SODIUM PHOSPHATE 6 MG: 10 INJECTION INTRAMUSCULAR; INTRAVENOUS at 00:19

## 2019-03-09 RX ADMIN — HUMAN INSULIN 6 UNITS: 100 INJECTION, SOLUTION SUBCUTANEOUS at 04:26

## 2019-03-09 RX ADMIN — BUDESONIDE 500 MCG: 0.5 INHALANT RESPIRATORY (INHALATION) at 19:40

## 2019-03-09 NOTE — PROGRESS NOTES
Bedside, Verbal and Written shift change report given to Shirley Newberry RN (oncoming nurse) by Anibal Chew RN (offgoing nurse). Report included the following information SBAR, Kardex, ED Summary, Intake/Output, MAR, Recent Results and Cardiac Rhythm NSR.

## 2019-03-09 NOTE — PROGRESS NOTES
[employer] [facility]        Inpatient Hematology / Oncology Progress Note      Admission Date: 3/4/2019 11:10 PM  Reason for Admission/Hospital Course: Seizure [facility]) [R56.9]  Metastatic cancer to brain ([facility] [address] [C79.31]  Hyperglycemia [R73.9]      24 Hour Events:  Afebrile, VSS. Fatigued, weak. Exertional dyspnea, no cough. Up in bed, alert and oriented. Neurosurgery with plans for craniotomy on Wednesday if cleared by pulmonary. Pulmonology with plans for lung studies on Monday. Moving to 5th floor. 10 point review of systems is otherwise negative with the exception of the elements mentioned above in the HPI. Allergies   Allergen Reactions    Iodides Tincture [Iodine] Hives       OBJECTIVE:  Patient Vitals for the past 8 hrs:   BP Temp Pulse Resp SpO2   19 1535     97 %   19 1356 140/81 98 °F (36.7 °C) 77 19 94 %   19 1333   79 17 93 %   19 1323 153/81  80 19 96 %   19 1300 143/66  79 16 94 %   19 1201 144/78  82 22 95 %   19 1101 142/63 98.9 °F (37.2 °C) 72 15 94 %   19 1002 120/58  87 16 94 %   19 0902 147/62  89 17 97 %   19 0802 153/78  63 14 96 %   19 0759     97 %     Temp (24hrs), Av.3 °F (36.8 °C), Min:97.9 °F (36.6 °C), Max:98.9 °F (37.2 °C)     07 -  1900  In: 5 [P.O.:420]  Out: 1025 [Urine:1025]    Physical Exam:  Constitutional: Chronically-Ill appearing female in no acute distress, sitting comfortably in the hospital bed. HEENT: Normocephalic and atraumatic. Oropharynx is clear, mucous membranes are moist.  Neck supple. Lymph node   Deferred   Skin Warm and dry. No bruising and no rash noted. No erythema. No pallor. Respiratory Lungs are clear to auscultation bilaterally without wheezes, rales or rhonchi, normal air exchange without accessory muscle use. On optiflow. CVS Normal rate, regular rhythm and normal S1 and S2. No murmurs, gallops, or rubs. Abdomen Soft, nontender and nondistended, normoactive bowel sounds. No palpable mass. No hepatosplenomegaly. Neuro Grossly nonfocal with no obvious sensory or motor deficits. MSK Normal range of motion in general.  Trace generalized edema and no tenderness. Psych Appropriate mood and affect. Labs:      Recent Labs     03/08/19  0359   WBC 8.5   RBC 4.51   HGB 11.3*   HCT 39.2   MCV 86.9   MCH 25.1*   MCHC 28.8*   RDW 16.1*      GRANS 84*   LYMPH 9*   MONOS 6   EOS 0*   BASOS 0   IG 1   DF AUTOMATED   ANEU 7.1   ABL 0.8   ABM 0.5   TUSHAR 0.0   ABB 0.0   AIG 0.0        Recent Labs     03/08/19 0359   *   K 4.1   CL 96*   CO2 30   AGAP 9   *   BUN 15   CREA 0.61   GFRAA >60   GFRNA >60   CA 9.9         Imaging:  CT CHEST ABD PELV WO CONT [[id number]] Collected: 03/07/19 2302   Order Status: Completed Updated: 03/07/19 2308   Narrative:     CT CHEST, ABDOMEN AND PELVIS WITH CONTRAST. INDICATION: Breast cancer with brain mass, staging exam.     COMPARISON: July 2018. TECHNIQUE:   5 mm axial scans from the lung apices to the pubic symphysis   following oral contrast only.  Radiation dose reduction techniques were used for  this study.  Our CT scanners use one or more of the following:  Automated  exposure control, adjustment of the mA and or kV according to patient size,  iterative reconstruction. Findings:   CHEST:  The left lung masses increased in size and now measures 6.2 x 8.7 cm. Trace bilateral pleural effusions. There are multiple enlarged central  mediastinal lymph nodes. Aortic calcifications are present.     ABDOMEN:  No gross focal parenchymal abnormality identified within the liver or  spleen on this noncontrast exam. The gallbladder is absent. The biliary tree is  not dilated. The pancreas is unremarkable. No free fluid, acute inflammatory  changes or adenopathy. Bowel loops are not dilated. The kidneys are normal size. No radiopaque renal calculi.  No hydronephrosis. The adrenal glands are normal  size. Aorta is normal caliber and calcified. PELVIS:  No free fluid or acute inflammatory changes or bony lesions. Dorotha Danes  urinary bladder is is drained by Leyva catheter. Impression:     IMPRESSION:  Significant enlargement of the left lung mass which now measures  6.2 x 8.7 cm. No other new abnormality.           MRI BRAIN W WO CONT [[id number]] Collected: 03/07/19 1202   Order Status: Completed Updated: 03/07/19 1220   Narrative:     EXAMINATION: BRAIN MRI 3/7/2019 11:43 AM    ACCESSION NUMBER: [id number]    INDICATION: Lung cancer, unspecified; metastatic lung cancer to brain    COMPARISON: Head CT 3/5/2019    TECHNIQUE: Multiplanar multisequence MRI of the brain without and with  intravenous administration of 28 cc Dotarem contrast agent. FINDINGS:    Technically difficult exam due to patient motion. There is a 3.1 x 3.0 x 3.5 cm (AP x transverse x craniocaudal) (axial  postcontrast image 18 and coronal postcontrast image 24) peripherally enhancing  centrally necrotic mass in the posterior right parietal lobe abutting the  parieto-occipital sulcus. This corresponds to the lesion of concern prior head  CT. Peripheral reduced diffusion likely corresponds to areas of increased  cellularity. There is no evidence of associated blood products. There is surrounding vasogenic edema. The vasogenic edema results in partial  effacement of the posterior and occipital horns of the right lateral ventricle. There is no midline shift. The basilar cisterns are patent. There is no  cerebellar tonsillar ectopia or herniation.     There is T2 hyperintensity of the body and tail of the right hippocampus.      Diffusion imaging shows no evidence of acute ischemic infarction.      There are T2 hyperintensities in the periventricular and subcortical white  matter and charles, a nonspecific finding which likely represents chronic  microangiopathy.     The expected large vascular flow voids are preserved.     There is no definite evidence of other abnormal enhancing intracranial lesions  within the substantial limitation of motion degradation on the postcontrast  images. No evidence of suspicious osseous lesions within the limits of patient motion. Impression:     IMPRESSION:    1. 3.1 x 3.0 x 3.5 cm posterior right parietal cystic mass with surrounding  vasogenic edema. Given the history of lung cancer, a metastasis is favored. 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    2. There is no definite evidence of other enhancing intracranial lesions within  the substantial limitation of patient motion on the postcontrast images. The  degree of motion could obscure subtle or small metastases. If there are new or  progressive neurologic progressive symptoms, a repeat MRI should be considered  when the patient is better able to lie still. 3. There is T2 hyperintensity within the body and tail of the right hippocampus. In the setting of the right-sided mass and surrounding vasogenic edema, this is  most likely to be either post ictal or an extension of the adjacent vasogenic  edema. In the setting of known systemic neoplasia, although less likely in the  absence of contralateral findings, a paraneoplastic syndrome cannot be excluded. Directed attention on follow-up examinations is recommended. VOICE DICTATED BY: Dr. Michoacano Rojas ABD PELV W CONT [[id number]]    Order Status: Canceled    CTA CHEST ABD PELV W WO CONT [[id number]]    Order Status: Canceled    MRI BRAIN W CONT [[id number]]    Order Status: Canceled    XR CHEST PORT [[id number]] Collected: 03/05/19 0239   Order Status: Completed Updated: 03/05/19 0244   Narrative:     Portable chest xray      COMPARISON: August 1, 2018    CLINICAL HISTORY: Altered mental status. FINDINGS:    There are bilateral diffuse groundglass opacities, likely vascular congestion.   There is confluent masslike opacity in the left perihilar region. This is more  prominent compared to prior examination. Heart appears enlarged. No  pneumothorax. Impression:     IMPRESSION:    Masslike opacity in the left perihilar region, significantly more prominent  compared to July 29, 2018 CT. Findings suspicious for lung tumor. Contrast-enhanced CT chest should be considered to better characterize. DC4         CT HEAD WO CONT [[id number]] Collected: 03/05/19 0047   Order Status: Completed Updated: 03/05/19 0129   Narrative:     Noncontrast CT of the brain. COMPARISON: none    INDICATION: Altered mental status. History of lung cancer. TECHNIQUE: Contiguous axial images were obtained from the skull base through the  vertex without IV contrast. Radiation dose reduction techniques were used for  this study:  Our CT scanners use one or all of the following: Automated exposure  control, adjustment of the mA and/or kVp according to patient's size, iterative  reconstruction. FINDINGS:    There is an approximately 2 cm cystic mass in the right parieto-occipital lobe. There is surrounding vasogenic edema. There is mass effect on the occipital horn  of the right lateral ventricle. There is no midline shift or hydrocephalus. Included globes appear intact. Paranasal sinuses and the mastoid air cells are  aerated. Surrounding bones are intact. Impression:     IMPRESSION:    1. Brain mass in the right parieto-occipital lobe with surrounding vasogenic  edema. Contrast enhanced MRI of the brain should be considered to better  evaluate/characterize. .             Medications:  Current Facility-Administered Medications   Medication Dose Route Frequency    famotidine (PF) (PEPCID) 20 mg in sodium chloride 0.9% 10 mL injection  20 mg IntraVENous Q12H    insulin glargine (LANTUS) injection 20 Units  20 Units SubCUTAneous DAILY    sodium chloride (NS) flush 5-10 mL  5-10 mL IntraVENous PRN    sodium chloride (NS) flush 5-40 mL  5-40 mL IntraVENous Q8H    sodium chloride (NS) flush 5-40 mL  5-40 mL IntraVENous PRN    NUTRITIONAL SUPPORT ELECTROLYTE PRN ORDERS   Does Not Apply PRN    acetaminophen (TYLENOL) tablet 650 mg  650 mg Oral Q4H PRN    HYDROcodone-acetaminophen (NORCO) 5-325 mg per tablet 1 Tab  1 Tab Oral Q4H PRN    morphine injection 2 mg  2 mg IntraVENous Q4H PRN    ondansetron (ZOFRAN) injection 4 mg  4 mg IntraVENous Q4H PRN    naloxone (NARCAN) injection 0.4 mg  0.4 mg IntraVENous PRN    LORazepam (ATIVAN) injection 1 mg  1 mg IntraVENous Q4H PRN    albuterol-ipratropium (DUO-NEB) 2.5 MG-0.5 MG/3 ML  3 mL Nebulization Q4H PRN    levETIRAcetam (KEPPRA) 500 mg in 0.9% sodium chloride 100 mL IVPB  500 mg IntraVENous Q12H    budesonide (PULMICORT) 500 mcg/2 ml nebulizer suspension  500 mcg Nebulization BID RT    And    albuterol (PROVENTIL VENTOLIN) nebulizer solution 2.5 mg  2.5 mg Nebulization Q6HWA RT    dexamethasone (DECADRON) injection 6 mg  6 mg IntraVENous Q6H    piperacillin-tazobactam (ZOSYN) 3.375 g in 0.9% sodium chloride (MBP/ADV) 100 mL  3.375 g IntraVENous Q8H    insulin regular (NOVOLIN R, HUMULIN R) injection   SubCUTAneous Q4H         ASSESSMENT:    Problem List  Date Reviewed: 3/7/2019          Codes Class Noted    Delirium ICD-10-CM: R41.0  ICD-9-CM: 780.09  3/6/2019        * (Principal) Seizure ([facility] [address] ICD-10-CM: R56.9  ICD-9-CM: 780.39  3/5/2019        Metastatic cancer to brain [facility]) ICD-10-CM: C79.31  ICD-9-CM: 198.3  3/5/2019        Hyperglycemia ICD-10-CM: R73.9  ICD-9-CM: 790.29  3/5/2019        Acute on chronic respiratory failure with hypoxia and hypercapnia (HCC) ICD-10-CM: J96.21, J96.22  ICD-9-CM: 518.84, 786.09, 799.02  3/5/2019        Benign neoplasm of colon (Chronic) ICD-10-CM: D12.6  ICD-9-CM: 211.3  10/31/2018        Mediastinal lymphadenopathy ICD-10-CM: R59.0  ICD-9-CM: 785.6  7/30/2018        Primary cancer of left upper lobe of lung ([facility] [address]- squamous cell ICD-10-CM: C34.12  ICD-9-CM: 162.3  7/30/2018        Chronic pain (Chronic) ICD-10-CM: L71.77  ICD-9-CM: 338.29  7/28/2018        Acute respiratory failure [facility]) ICD-10-CM: J96.00  ICD-9-CM: 518.81  7/28/2018        Hypertension, essential, benign (Chronic) ICD-10-CM: I10  ICD-9-CM: 401.1  Unknown        Hyperlipidemia (Chronic) ICD-10-CM: E78.5  ICD-9-CM: 272.4  Unknown        COPD with emphysema (HCC) (Chronic) ICD-10-CM: J43.9  ICD-9-CM: 492.8  Unknown        Morbid obesity (HCC) (Chronic) ICD-10-CM: E66.01  ICD-9-CM: 278.01  Unknown        Primary pulmonary hypertension ([facility] [address] (Chronic) ICD-10-CM: I27.0  ICD-9-CM: 416.0  Unknown        Diabetes mellitus without complication (HCC) (Chronic) ICD-10-CM: E11.9  ICD-9-CM: 250.00  Unknown        Orthopnea (Chronic) ICD-10-CM: R06.01  ICD-9-CM: 786.02  Unknown        Abnormality of gait and mobility ICD-10-CM: R26.9  ICD-9-CM: 670. 2  Unknown        Insomnia (Chronic) ICD-10-CM: G47.00  ICD-9-CM: 780.52  12/27/2016        Extreme obesity with respiratory disorder (HCC) (Chronic) ICD-10-CM: E66.01, J98.9  ICD-9-CM: 278.01, 519.9  Unknown        FLETCHER (obstructive sleep apnea) (Chronic) ICD-10-CM: G47.33  ICD-9-CM: 327.23  Unknown        Chronic hypoxemic respiratory failure (HCC) (Chronic) ICD-10-CM: J96.11  ICD-9-CM: 518.83, 799.02  Unknown        Cor pulmonale (HCC) (Chronic) ICD-10-CM: I27.81  ICD-9-CM: 416.9  Unknown        CHF (congestive heart failure), NYHA class I (HCC) (Chronic) ICD-10-CM: I50.9  ICD-9-CM: 428.0  Unknown    Overview Signed 12/27/2016 11:24 AM by Memo Damon     diastolic             Obesity with alveolar hypoventilation (HCC) (Chronic) ICD-10-CM: S83.2  ICD-9-CM: 278.03  Unknown        Arthritis of knee (Chronic) ICD-10-CM: M17.10  ICD-9-CM: 716.96  Unknown        History of colonic polyps (Chronic) ICD-10-CM: Z86.010  ICD-9-CM: V12.72  8/10/2015    Overview Signed 10/31/2018 11:30 AM by Dariusz Gresham NP     Last Assessment & Plan: She has several adenomas on her initial colonoscopy. I believe there was some doubt about whether the cecal polyp on that exam was completely removed. Follow-up colonoscopy did reveal a persistent cecal polyp. This polyp was read as tubulovillous adenoma. It has been 3 years male of this month. Because of the villous pathology we will proceed with a follow-up colonoscopy. She will hold Motrin as well as aspirin for 3 days prior to the procedure. We discussed the current colorectal cancer screening guidelines as well as the risks and benefits of colonoscopy in detail. We also discussed the prep in detail and she wishes to proceed. Cause of her oxygen use we will need to do her on the inpatient side of the hospital. We spent at least 15-20 minutes procuring records and bringing them forward from the hospital electronic medical record system into today's office visit. We then spent an additional 15-20 minutes in counseling in addition to the time spent during her history and physical exam.                     PLAN:  Stage IV Squamous cell lung cancer with likely brain mets / Seizure  - never rec'd treatment, has been on home Hospice since 8/2018. Family has revoked Hospice and changed pt to full code  - CT head with brain mass in the R parieto-occipital lobe with surrounding vasogenic edema  - MRI brain pending  - CT CAP for staging when able  - On Dex/Keppra  - NS considering craniotomy  3/7  MRI completed this AM and showed right parietal cystic mass with surrounding vasogenic edema concerning for metastasis, but primary glial neoplasm could not be excluded. Await NS recommendations re: craniotomy. Full staging with CT CAP. Ongoing discussion with family regarding plans once diagnosis confirmed  3/8 Difficult situation. Discussed with pulmonary who is very concerned about being able to extubate following craniotomy given COPD and lung mass.   CT CAP without evidence of other metastatic disease but does show enlarging lung mass. Patient is clearly not a candidate for any cancer directed therapy at this time given weakness/debility. Also, would not be able to travel back and forth to radiation with current O2 needs. Of note, patient was essentially bedbound at home from other chronic conditions. Son stated that they did want treatment for her lung cancer back in August but felt \"pushed' into hospice. We will await final decisions from the rest of the team regarding plan and will follow up final biopsy results. 03/09 Plans for craniotomy on Wednesday if cleared by pulmonary. Plans for lung function testing to be done on Monday. Feeling well, moving to floor. Resp Failure  - on BiPAP  - on Zosyn/Vanc  - UCx-NGTD. BCx-pending  3/7/19 Continue vanc/zosyn. 03/09 Pan cultures negative thus far. Continues antibiotics. On optiflow. DM  - Primary team managing      Goals and plan of care reviewed with the patient. All questions answered to the best of our ability. sEtrella Mendoza NP   [facility]  [address] [place] [address]  Office : [phone]  Fax : [phone]             Attending Addendum:  I have personally performed a face to face diagnostic evaluation on this patient. I have reviewed and agree with the care plan as documented by Lin Moreira N.P. My findings are as follows: She has Lung Cancer with a brain mass, appears fatigued but awake and communicative, heart rate regular without murmurs, abdomen is non-tender, bowel sounds are positive, we will follow this patient, she is scheduled for resection of her brain mass on Wednesday.               Huan Cortez MD      [facility]ematology/Oncology  [address] [place] [address]  Office : [phone]  Fax : [phone]

## 2019-03-09 NOTE — PROGRESS NOTES
Yovanny Michelle  Admission Date: 3/4/2019             ICU Daily Progress Note: 3/9/2019     Patient is a 59 y.o.  female seen and evaluated at the request of Dr. Corinna Browning. She presented with seizure. Moved to the ICU for BiPAP. Stage 4 NSCLC with new brain met and edema on head CT. New seizure and neurology saw, Oncology consulted. She was diagnosed with squamous cell lung cancer last year after undergoing a left upper lobe biopsy. She was seen by oncology and an outpatient PET with follow up appointment was recommended but patient ended up under the care of hospice instead. She is morbidly obese and has a history of COPD with chronic dyspnea. She quit smoking 4 to 5 years ago. She is maintained on 2 liters of oxygen and has a home nebulizer. She has FLETCHER/OHS with chronic hypercapnea but she has refused CPAP/Bipap in the past.   She lives with her son and he reports that she can only ambulate a step of 2 at a time. She has been at home in hospice care since August 2018. Discussing Hospice house and revoked wanted to have tumor removed. Neurosurgery has seen and is considering a craniotomy. PC consulted and full code confirmed. Subjective:     Remains comfortable on Optiflow. Family met with Dr. Brett Montes yesterday and want to discuss prognosis and ability to tolerate surgery with us and oncology.          Current Facility-Administered Medications   Medication Dose Route Frequency    famotidine (PF) (PEPCID) 20 mg in sodium chloride 0.9% 10 mL injection  20 mg IntraVENous Q12H    insulin glargine (LANTUS) injection 20 Units  20 Units SubCUTAneous DAILY    sodium chloride (NS) flush 5-10 mL  5-10 mL IntraVENous PRN    sodium chloride (NS) flush 5-40 mL  5-40 mL IntraVENous Q8H    sodium chloride (NS) flush 5-40 mL  5-40 mL IntraVENous PRN    NUTRITIONAL SUPPORT ELECTROLYTE PRN ORDERS   Does Not Apply PRN    acetaminophen (TYLENOL) tablet 650 mg  650 mg Oral Q4H PRN    HYDROcodone-acetaminophen (NORCO) 5-325 mg per tablet 1 Tab  1 Tab Oral Q4H PRN    morphine injection 2 mg  2 mg IntraVENous Q4H PRN    ondansetron (ZOFRAN) injection 4 mg  4 mg IntraVENous Q4H PRN    naloxone (NARCAN) injection 0.4 mg  0.4 mg IntraVENous PRN    LORazepam (ATIVAN) injection 1 mg  1 mg IntraVENous Q4H PRN    albuterol-ipratropium (DUO-NEB) 2.5 MG-0.5 MG/3 ML  3 mL Nebulization Q4H PRN    levETIRAcetam (KEPPRA) 500 mg in 0.9% sodium chloride 100 mL IVPB  500 mg IntraVENous Q12H    budesonide (PULMICORT) 500 mcg/2 ml nebulizer suspension  500 mcg Nebulization BID RT    And    albuterol (PROVENTIL VENTOLIN) nebulizer solution 2.5 mg  2.5 mg Nebulization Q6HWA RT    dexamethasone (DECADRON) injection 6 mg  6 mg IntraVENous Q6H    piperacillin-tazobactam (ZOSYN) 3.375 g in 0.9% sodium chloride (MBP/ADV) 100 mL  3.375 g IntraVENous Q8H    metoprolol (LOPRESSOR) injection 1.25 mg  1.25 mg IntraVENous Q6H PRN    insulin regular (NOVOLIN R, HUMULIN R) injection   SubCUTAneous Q4H    dexmedeTOMidine (PRECEDEX) 400 mcg in 0.9% sodium chloride 100 mL infusion  0.2-0.7 mcg/kg/hr IntraVENous TITRATE     ROS:      Constitutional: negative for fever, chills, sweats  Cardiac: negative for chest pain, palpitations, syncope, edema  GI: negative for dysphagia, reflux, vomiting, diarrhea, abdominal pain, or melena  Neuro: negative for focal weakness, numbness, headache      Objective:     Vitals:    03/09/19 0802 03/09/19 0902 03/09/19 1002 03/09/19 1101   BP: 153/78 147/62 120/58 142/63   Pulse: 63 89 87 72   Resp: 14 17 16 15   Temp:    98.9 °F (37.2 °C)   SpO2: 96% 97% 94% 94%   Weight:       Height:         Intake and Output:   03/07 1901 - 03/09 0700  In: 2180 [P.O.:780; I.V.:1400]  Out: 6025 [Urine:6025]  No intake/output data recorded.     Physical Exam:          GEN: acutely ill, obese on Optiflow  HEENT:  mouth is dry  NECK:  no JVD, no retractions, no thyromegaly or masses,   LUNGS:  Decreased bases   HEART:  RRR with no M,G,R;  ABDOMEN: rounded, no pain + BM; positive bowel sounds present, protuberant   EXTREMITIES:  warm with no cyanosis, no lower leg edema  SKIN:  no jaundice or ecchymosis   NEURO: alert, verbally communicating on BiPAP    LINES PIV, small        CHEST XRAY:       LAB  Recent Labs     03/08/19  0359   WBC 8.5   HGB 11.3*   HCT 39.2        Recent Labs     03/08/19  0359   *   K 4.1   CL 96*   CO2 30   *   BUN 15   CREA 0.61     ABG:  On High FLow  No results found for: PH, PHI, PCO2, PCO2I, PO2, PO2I, HCO3, HCO3I, FIO2, FIO2I  MRI Hank  IMPRESSION:     1. 3.1 x 3.0 x 3.5 cm posterior right parietal cystic mass with surrounding  vasogenic edema. Given the history of lung cancer, a metastasis is favored. 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     2. There is no definite evidence of other enhancing intracranial lesions within  the substantial limitation of patient motion on the postcontrast images. The  degree of motion could obscure subtle or small metastases. If there are new or  progressive neurologic progressive symptoms, a repeat MRI should be considered  when the patient is better able to lie still.     3. There is T2 hyperintensity within the body and tail of the right hippocampus. In the setting of the right-sided mass and surrounding vasogenic edema, this is  most likely to be either post ictal or an extension of the adjacent vasogenic  edema. In the setting of known systemic neoplasia, although less likely in the  absence of contralateral findings, a paraneoplastic syndrome cannot be excluded. Directed attention on follow-up examinations is recommended    CT Chest/Abdomen/Pelvis:    Findings:   CHEST:  The left lung masses increased in size and now measures 6.2 x 8.7 cm. Trace bilateral pleural effusions. There are multiple enlarged central  mediastinal lymph nodes. Aortic calcifications are present.      ABDOMEN:  No gross focal parenchymal abnormality identified within the liver or  spleen on this noncontrast exam. The gallbladder is absent. The biliary tree is  not dilated. The pancreas is unremarkable. No free fluid, acute inflammatory  changes or adenopathy. Bowel loops are not dilated. The kidneys are normal size. No radiopaque renal calculi. No hydronephrosis. The adrenal glands are normal  size. Aorta is normal caliber and calcified.     PELVIS:  No free fluid or acute inflammatory changes or bony lesions. .  The  urinary bladder is is drained by Leyva catheter. IMPRESSION:      Significant enlargement of the left lung mass which now measures  6.2 x 8.7 cm. No other new abnormality.       Cultures: Blood and urine ordered     Assessment:     Hospital Problems  Date Reviewed: 3/7/2019          Codes Class Noted POA    Delirium ICD-10-CM: R41.0  ICD-9-CM: 780.09  3/6/2019 Unknown    Better. * (Principal) Seizure ([facility] ICD-10-CM: R56.9  ICD-9-CM: 780.39  3/5/2019 Yes    On Keppra    Metastatic cancer to brain [facility]) ICD-10-CM: C79.31  ICD-9-CM: 198.3  3/5/2019 Yes    Stage 4 Lung CA.      Hyperglycemia ICD-10-CM: R73.9  ICD-9-CM: 790.29  3/5/2019 Yes        Acute on chronic respiratory failure with hypoxia and hypercapnia [facility]) ICD-10-CM: J96.21, J96.22  ICD-9-CM: 518.84, 786.09, 799.02  3/5/2019 Yes    Still on Optiflow 40%/50L    Primary cancer of left upper lobe of lung ([facility]- squamous cell ICD-10-CM: C34.12  ICD-9-CM: 162.3  7/30/2018 Yes        Hypertension, essential, benign (Chronic) ICD-10-CM: I10  ICD-9-CM: 401.1  Unknown Yes        COPD with emphysema (HCC) (Chronic) ICD-10-CM: J43.9  ICD-9-CM: 492.8  Unknown Yes        Morbid obesity (HCC) (Chronic) ICD-10-CM: E66.01  ICD-9-CM: 278.01  Unknown Yes    May have element of OHS    Diabetes mellitus without complication ([address] (Chronic) ICD-10-CM: E11.9  ICD-9-CM: 250.00  Unknown Yes        FLETCHER (obstructive sleep apnea) (Chronic) ICD-10-CM: G47.33  ICD-9-CM: 327.23  Unknown Yes    BIPAP qHS    Chronic hypoxemic respiratory failure (HCC) (Chronic) ICD-10-CM: J96.11  ICD-9-CM: 518.83, 799.02  Unknown Yes                Plan     Will meet with family at some point. Pt appears to be at substantial risk for post op respiratory failure given COPD, morbid obesity, FLETCHER/OHS. Mobilize/PT. Try to get BS spirometry to stratify risk for family discussion. Likely on Monday. Decadron per NS. Stop Vanc. Continue Keppra. Ok for 8th floor with BIPAP at night for OHS. I have spoken with and examined the patient. I agree with the above assessment and plan as documented.     Christian Hernandez MD

## 2019-03-09 NOTE — PROGRESS NOTES
Neurosurgery progress note    Patient feels well, no acute changes, no SOB  Awake alert and oriented  ANGEL  Antigravity  Speech clear    - Ok with transfer to floor  - Plan for OR early next week

## 2019-03-09 NOTE — PROGRESS NOTES
Bedside, Verbal and Written shift change report given to Andrae Carbone RN (oncoming nurse) by Edyta Felder RN (offgoing nurse). Report included the following information SBAR, Kardex, ED Summary, Intake/Output, MAR, Recent Results and Cardiac Rhythm Sinus van.

## 2019-03-09 NOTE — PROGRESS NOTES
Progress Note    Patient: Jordan Peterson MRN: [id number]  SSN: [id number]    YOB: 1960  Age: 59 y.o. Sex: female      Admit Date: 3/4/2019    LOS: 4 days     Subjective:     From admission note :     \" Patient is a 58 y/o female with squamous cell carcinoma of the lung, COPD, extreme obesity, diastolic CHF, HTN, HLD, FLETHCER, pulmonary HTN, HLD who presents from home with new altered mental status. EMS reports hyperglycemia. On arrival to ED she was unresponsive with seizure activity. Glucose is 558. She was loaded with IV keppra and also given IV ativan. A head CT shows brain mass in the right parieto-occipital lobe with surrounding vasogenic edema. She then received IV decadron. She was tachycardic with WBC ct of 14.2 so sepsis protocol initiated with fluids and antibiotics. Initially family had said she was DNR but later stated to do everything Except intubation. Will admit to ICU. \"    3-5-2019  Patient has been in bed. Initially palliative consultant saw the patient and at that time her family wanted her to be a hospice candidate. However, later she changed her mind and now she is a full code. Family would like aggressive treatment now.     3-6-2019  Patient is still on BiPAP. She is more responsive. She was seen by neurosurgery today. EEG was done on 3-5-2019 which showed epileptiform activity. 3-7-2019  Patient in on high flow oxygen now. She is alert and talking. Asking and answering appropriately. She states that she is very nervous and panics with MRI study. 3-8-2019  Patient is on high flow oxygen. She is alert and oriented. No headache. No shortness of breath. She is on BiPAP at night and would not wear the mask in daytime. 3-9-2019  Patient is still on high flow oxygen in daytime and BiPAP at night. Other vital signs have been stable.          Objective:     Vitals:    03/09/19 0759 03/09/19 0802 03/09/19 0902 03/09/19 1002   BP:  153/78 147/62 120/58   Pulse:  63 89 87 Resp:  14 17 16   Temp:       SpO2: 97% 96% 97% 94%   Weight:       Height:            Intake and Output:  Current Shift: No intake/output data recorded. Last three shifts: 03/07 1901 - 03/09 0700  In: 2180 [P.O.:780; I.V.:1400]  Out: 6025 [Urine:6025]    Physical Exam:     General:                    The patient is a female in no acute distress. She is lying flat in bed on her side. On high-flow oxygen. alert and oriented to place, time and person. Answering questions appropriately. Head:                                   Normocephalic/atraumatic. Redness of face. Eyes:                                   No palpebral pallor or scleral icterus. ENT:                                    External auricular and nasal exam within normal limits. Mucous membranes are moist.  Neck:                                   Supple, non-tender, no JVD. Lungs:                       Clear to auscultation bilaterally without wheezes or crackles. No respiratory distress or accessory muscle use. Heart:                                  Regular rate and rhythm, without murmurs, rubs, or gallops. Abdomen:                  Soft, non-tender, mildly distended with normoactive bowel sounds. Genitourinary:           No tenderness over the bladder or bilateral CVAs. Extremities:               Without clubbing, cyanosis, or edema. Skin:                                    Normal color, texture, and turgor. No rashes, lesions, or jaundice. Pulses:                      Radial and dorsalis pedis pulses present 2+ bilaterally. Capillary refill <2s. Neurologic:            Moving all four extremities well with no focal deficits.     Lab/Data Review:    Recent Results (from the past 24 hour(s))   GLUCOSE, POC    Collection Time: 03/08/19 11:34 AM   Result Value Ref Range    Glucose (POC) 275 (H) 65 - 100 mg/dL   GLUCOSE, POC    Collection Time: 03/08/19  3:39 PM   Result Value Ref Range    Glucose (POC) 283 (H) 65 - 100 mg/dL   GLUCOSE, POC    Collection Time: 03/08/19  8:20 PM   Result Value Ref Range    Glucose (POC) 315 (H) 65 - 100 mg/dL   GLUCOSE, POC    Collection Time: 03/09/19 12:16 AM   Result Value Ref Range    Glucose (POC) 217 (H) 65 - 100 mg/dL   GLUCOSE, POC    Collection Time: 03/09/19  4:22 AM   Result Value Ref Range    Glucose (POC) 225 (H) 65 - 100 mg/dL   GLUCOSE, POC    Collection Time: 03/09/19  8:00 AM   Result Value Ref Range    Glucose (POC) 207 (H) 65 - 100 mg/dL     CT head  3-5-2019  IMPRESSION:     1. Brain mass in the right parieto-occipital lobe with surrounding vasogenic edema. Contrast enhanced MRI of the brain should be considered to better Evaluate/characterize. .    XR chest   3-5-2019  IMPRESSION:     Masslike opacity in the left perihilar region, significantly more prominent  compared to July 29, 2018 CT. Findings suspicious for lung tumor. Contrast-enhanced CT chest should be considered to better characterize. EKG   3-4-2019  !! AGE AND GENDER SPECIFIC ECG ANALYSIS !! Sinus tachycardia   Low voltage QRS   Left posterior fascicular block   Possible Inferior infarct , age undetermined   Cannot rule out Anterior infarct , age undetermined   Abnormal ECG   No previous ECGs available     MRI  3-7-2019  IMPRESSION:     1. 3.1 x 3.0 x 3.5 cm posterior right parietal cystic mass with surrounding  vasogenic edema. Given the history of lung cancer, a metastasis is favored. 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     2. There is no definite evidence of other enhancing intracranial lesions within  the substantial limitation of patient motion on the postcontrast images. The  degree of motion could obscure subtle or small metastases.  If there are new or  progressive neurologic progressive symptoms, a repeat MRI should be considered  when the patient is better able to lie still.     3. There is T2 hyperintensity within the body and tail of the right hippocampus. In the setting of the right-sided mass and surrounding vasogenic edema, this is  most likely to be either post ictal or an extension of the adjacent vasogenic  edema. In the setting of known systemic neoplasia, although less likely in the  absence of contralateral findings, a paraneoplastic syndrome cannot be excluded. Directed attention on follow-up examinations is recommended.      CT-scan of the abdomen  3-7-2019  IMPRESSION:  Significant enlargement of the left lung mass which now measures  6.2 x 8.7 cm. No other new abnormality.       Current Facility-Administered Medications:     famotidine (PF) (PEPCID) 20 mg in sodium chloride 0.9% 10 mL injection, 20 mg, IntraVENous, Q12H, Laurel Grossman MD, 20 mg at 03/09/19 0809    insulin glargine (LANTUS) injection 20 Units, 20 Units, SubCUTAneous, DAILY, Laurel Grossman MD, 20 Units at 03/09/19 0809    sodium chloride (NS) flush 5-10 mL, 5-10 mL, IntraVENous, PRN, Criss Moscoso MD    sodium chloride (NS) flush 5-40 mL, 5-40 mL, IntraVENous, Q8H, Sudha Davila MD, 10 mL at 03/09/19 0546    sodium chloride (NS) flush 5-40 mL, 5-40 mL, IntraVENous, PRN, Sudha Davila MD    NUTRITIONAL SUPPORT ELECTROLYTE PRN ORDERS, , Does Not Apply, PRN, Sudha Davila MD    acetaminophen (TYLENOL) tablet 650 mg, 650 mg, Oral, Q4H PRN, Sudha Davila MD    HYDROcodone-acetaminophen [facility]) 5-325 mg per tablet 1 Tab, 1 Tab, Oral, Q4H PRN, Sudha Davila MD, 1 Tab at 03/08/19 1753    morphine injection 2 mg, 2 mg, IntraVENous, Q4H PRN, Sudha Davila MD, 2 mg at 03/09/19 0902    ondansetron [facility]) injection 4 mg, 4 mg, IntraVENous, Q4H PRN, Sudha Davila MD    [facility]) injection 0.4 mg, 0.4 mg, IntraVENous, PRN, Sudha Davila MD    LORazepam (ATIVAN) injection 1 mg, 1 mg, IntraVENous, Q4H PRN, Tal Gordon MD, 1 mg at 03/09/19 0103    albuterol-ipratropium (DUO-NEB) 2.5 MG-0.5 MG/3 ML, 3 mL, Nebulization, Q4H PRN, Tal Gordon MD    levETIRAcetam (KEPPRA) 500 mg in 0.9% sodium chloride 100 mL IVPB, 500 mg, IntraVENous, Q12H, Tal Gordon MD, 500 mg at 03/09/19 0421    budesonide (PULMICORT) 500 mcg/2 ml nebulizer suspension, 500 mcg, Nebulization, BID RT, 500 mcg at 03/09/19 0759 **AND** albuterol (PROVENTIL VENTOLIN) nebulizer solution 2.5 mg, 2.5 mg, Nebulization, Q6HWA RT, Tal Gordon MD, 2.5 mg at 03/09/19 0758    dexamethasone (DECADRON) injection 6 mg, 6 mg, IntraVENous, Q6H, Tal Gordon MD, 6 mg at 03/09/19 0546    piperacillin-tazobactam (ZOSYN) 3.375 g in 0.9% sodium chloride (MBP/ADV) 100 mL, 3.375 g, IntraVENous, Q8H, Donato Field MD, Last Rate: 25 mL/hr at 03/09/19 0546, 3.375 g at 03/09/19 0546    metoprolol (LOPRESSOR) injection 1.25 mg, 1.25 mg, IntraVENous, Q6H PRN, Lynne Gramajo MD, 1.25 mg at 03/05/19 1004    insulin regular (NOVOLIN R, HUMULIN R) injection, , SubCUTAneous, Q4H, Tal Gordon MD, 6 Units at 03/09/19 0810    dexmedeTOMidine (PRECEDEX) 400 mcg in 0.9% sodium chloride 100 mL infusion, 0.2-0.7 mcg/kg/hr, IntraVENous, TITRATE, Amie Paz MD, Stopped at 03/06/19 0631      Assessment:     Principal Problem:    Seizure ([address] (3/5/2019)    Active Problems:    Hypertension, essential, benign ()      COPD with emphysema (HCC) ()      Morbid obesity (HCC) ()      Diabetes mellitus without complication (HCC) ()      FLETCHER (obstructive sleep apnea) ()      Chronic hypoxemic respiratory failure (HCC) ()      Primary cancer of left upper lobe of lung ([address]- squamous cell (7/30/2018)      Metastatic cancer to brain ([address] (3/5/2019)      Hyperglycemia (3/5/2019)      Acute on chronic respiratory failure with hypoxia and hypercapnia ([address] (3/5/2019)      Delirium (3/6/2019)        Plan:     Hyperglycemia  Improved.  On current SC regimen. Monitor closely. BS is in low 200's ranges now. Acute on chronic respiratory failure with hypoxia and hypercapnea. History of FLETCHER  Due to brain edema, hyperglycemia initially, brain tumor. On high flow oxygen now in daytime and BiPAP at night. Pulmonary is helping. Wean oxygen as tolerated. Lung cancer with brain mass. This brain mass is either metastasis or primary brain tumor. Neurosurgery plans to do craniotomy this Wednesday. Family is deciding. Continue Keppra, Dexamethasone. Oncologist is helping with decision regarding plan and goal of care as well. I have discussed the plan of care with patient and care team.     DVT prophylaxis : SCD    Move to medical floor with remote telemetry.        Signed By: Karon Montoya MD     March 9, 2019

## 2019-03-09 NOTE — PROGRESS NOTES
PT notified of MD's desire for patient to be up out of bed and into chair. PT reports that they will try to see patient tomorrow.

## 2019-03-09 NOTE — PROGRESS NOTES
TRANSFER - OUT REPORT:    Verbal report given to Norman Parents, RN (name) on Neo Enciso  being transferred to 5th floor (unit) for routine progression of care       Report consisted of patients Situation, Background, Assessment and   Recommendations(SBAR). Information from the following report(s) SBAR, Kardex, ED Summary, Procedure Summary, Intake/Output, MAR and Cardiac Rhythm NSR was reviewed with the receiving nurse. Lines:   Peripheral IV 03/05/19 Left Hand (Active)   Site Assessment Clean, dry, & intact 3/9/2019  7:01 AM   Phlebitis Assessment 0 3/9/2019  7:01 AM   Infiltration Assessment 0 3/9/2019  7:01 AM   Dressing Status Clean, dry, & intact 3/9/2019  7:01 AM   Dressing Type Transparent;Tape 3/9/2019  7:01 AM   Hub Color/Line Status Flushed;Patent 3/9/2019  7:01 AM   Alcohol Cap Used No 3/9/2019  7:01 AM       Peripheral IV 03/06/19 Right Hand (Active)   Site Assessment Clean, dry, & intact 3/9/2019  7:01 AM   Phlebitis Assessment 0 3/9/2019  7:01 AM   Infiltration Assessment 0 3/9/2019  7:01 AM   Dressing Status Clean, dry, & intact 3/9/2019  7:01 AM   Dressing Type Tape;Transparent 3/9/2019  7:01 AM   Hub Color/Line Status Flushed;Patent 3/9/2019  7:01 AM   Alcohol Cap Used No 3/9/2019  7:01 AM        Opportunity for questions and clarification was provided.       Patient transported with:   Monitor  Registered Nurse   Nonrebreather

## 2019-03-10 LAB
BACTERIA SPEC CULT: NORMAL
BACTERIA SPEC CULT: NORMAL
GLUCOSE BLD STRIP.AUTO-MCNC: 203 MG/DL (ref 65–100)
GLUCOSE BLD STRIP.AUTO-MCNC: 238 MG/DL (ref 65–100)
GLUCOSE BLD STRIP.AUTO-MCNC: 257 MG/DL (ref 65–100)
GLUCOSE BLD STRIP.AUTO-MCNC: 308 MG/DL (ref 65–100)
GLUCOSE BLD STRIP.AUTO-MCNC: 317 MG/DL (ref 65–100)
GLUCOSE BLD STRIP.AUTO-MCNC: 335 MG/DL (ref 65–100)
SERVICE CMNT-IMP: NORMAL
SERVICE CMNT-IMP: NORMAL

## 2019-03-10 PROCEDURE — 94640 AIRWAY INHALATION TREATMENT: CPT

## 2019-03-10 PROCEDURE — 74011000250 HC RX REV CODE- 250: Performed by: INTERNAL MEDICINE

## 2019-03-10 PROCEDURE — 99232 SBSQ HOSP IP/OBS MODERATE 35: CPT | Performed by: INTERNAL MEDICINE

## 2019-03-10 PROCEDURE — 74011636637 HC RX REV CODE- 636/637: Performed by: INTERNAL MEDICINE

## 2019-03-10 PROCEDURE — 74011000250 HC RX REV CODE- 250: Performed by: HOSPITALIST

## 2019-03-10 PROCEDURE — 74011250636 HC RX REV CODE- 250/636: Performed by: INTERNAL MEDICINE

## 2019-03-10 PROCEDURE — 74011250637 HC RX REV CODE- 250/637: Performed by: INTERNAL MEDICINE

## 2019-03-10 PROCEDURE — 74011000258 HC RX REV CODE- 258: Performed by: INTERNAL MEDICINE

## 2019-03-10 PROCEDURE — 82962 GLUCOSE BLOOD TEST: CPT

## 2019-03-10 PROCEDURE — 74011250637 HC RX REV CODE- 250/637: Performed by: HOSPITALIST

## 2019-03-10 PROCEDURE — 74011000258 HC RX REV CODE- 258: Performed by: HOSPITALIST

## 2019-03-10 PROCEDURE — 94760 N-INVAS EAR/PLS OXIMETRY 1: CPT

## 2019-03-10 PROCEDURE — 74011250636 HC RX REV CODE- 250/636: Performed by: HOSPITALIST

## 2019-03-10 PROCEDURE — 94660 CPAP INITIATION&MGMT: CPT

## 2019-03-10 PROCEDURE — 74011636637 HC RX REV CODE- 636/637: Performed by: HOSPITALIST

## 2019-03-10 PROCEDURE — 65270000029 HC RM PRIVATE

## 2019-03-10 PROCEDURE — 77010033711 HC HIGH FLOW OXYGEN

## 2019-03-10 RX ORDER — INSULIN GLARGINE 100 [IU]/ML
30 INJECTION, SOLUTION SUBCUTANEOUS DAILY
Status: DISCONTINUED | OUTPATIENT
Start: 2019-03-11 | End: 2019-03-13

## 2019-03-10 RX ORDER — LORAZEPAM 2 MG/ML
1 INJECTION INTRAMUSCULAR
Status: DISCONTINUED | OUTPATIENT
Start: 2019-03-10 | End: 2019-03-14 | Stop reason: HOSPADM

## 2019-03-10 RX ORDER — MORPHINE SULFATE 2 MG/ML
2 INJECTION, SOLUTION INTRAMUSCULAR; INTRAVENOUS
Status: DISCONTINUED | OUTPATIENT
Start: 2019-03-10 | End: 2019-03-14 | Stop reason: HOSPADM

## 2019-03-10 RX ORDER — HYDROCODONE BITARTRATE AND ACETAMINOPHEN 7.5; 325 MG/1; MG/1
1 TABLET ORAL
Status: DISCONTINUED | OUTPATIENT
Start: 2019-03-10 | End: 2019-03-14 | Stop reason: HOSPADM

## 2019-03-10 RX ADMIN — LORAZEPAM 1 MG: 2 INJECTION INTRAMUSCULAR; INTRAVENOUS at 23:12

## 2019-03-10 RX ADMIN — HYDROCODONE BITARTRATE AND ACETAMINOPHEN 1 TABLET: 7.5; 325 TABLET ORAL at 09:42

## 2019-03-10 RX ADMIN — DEXAMETHASONE SODIUM PHOSPHATE 6 MG: 10 INJECTION INTRAMUSCULAR; INTRAVENOUS at 23:13

## 2019-03-10 RX ADMIN — LORAZEPAM 1 MG: 2 INJECTION INTRAMUSCULAR; INTRAVENOUS at 06:40

## 2019-03-10 RX ADMIN — DEXAMETHASONE SODIUM PHOSPHATE 6 MG: 10 INJECTION INTRAMUSCULAR; INTRAVENOUS at 05:07

## 2019-03-10 RX ADMIN — HUMAN INSULIN 12 UNITS: 100 INJECTION, SOLUTION SUBCUTANEOUS at 16:28

## 2019-03-10 RX ADMIN — LORAZEPAM 1 MG: 2 INJECTION INTRAMUSCULAR; INTRAVENOUS at 18:09

## 2019-03-10 RX ADMIN — HUMAN INSULIN 6 UNITS: 100 INJECTION, SOLUTION SUBCUTANEOUS at 09:42

## 2019-03-10 RX ADMIN — HUMAN INSULIN 12 UNITS: 100 INJECTION, SOLUTION SUBCUTANEOUS at 20:11

## 2019-03-10 RX ADMIN — ONDANSETRON 4 MG: 2 INJECTION INTRAMUSCULAR; INTRAVENOUS at 09:42

## 2019-03-10 RX ADMIN — Medication 10 ML: at 21:00

## 2019-03-10 RX ADMIN — SODIUM CHLORIDE 500 MG: 900 INJECTION, SOLUTION INTRAVENOUS at 09:40

## 2019-03-10 RX ADMIN — ALBUTEROL SULFATE 2.5 MG: 2.5 SOLUTION RESPIRATORY (INHALATION) at 15:08

## 2019-03-10 RX ADMIN — Medication 10 ML: at 12:28

## 2019-03-10 RX ADMIN — ALBUTEROL SULFATE 2.5 MG: 2.5 SOLUTION RESPIRATORY (INHALATION) at 20:09

## 2019-03-10 RX ADMIN — MORPHINE SULFATE 2 MG: 2 INJECTION, SOLUTION INTRAMUSCULAR; INTRAVENOUS at 16:28

## 2019-03-10 RX ADMIN — SODIUM CHLORIDE 500 MG: 900 INJECTION, SOLUTION INTRAVENOUS at 19:46

## 2019-03-10 RX ADMIN — FAMOTIDINE 20 MG: 10 INJECTION, SOLUTION INTRAVENOUS at 07:48

## 2019-03-10 RX ADMIN — Medication 10 ML: at 05:07

## 2019-03-10 RX ADMIN — PIPERACILLIN AND TAZOBACTAM 3.38 G: 3; .375 INJECTION, POWDER, FOR SOLUTION INTRAVENOUS at 07:48

## 2019-03-10 RX ADMIN — DEXAMETHASONE SODIUM PHOSPHATE 6 MG: 10 INJECTION INTRAMUSCULAR; INTRAVENOUS at 18:09

## 2019-03-10 RX ADMIN — HUMAN INSULIN 12 UNITS: 100 INJECTION, SOLUTION SUBCUTANEOUS at 12:00

## 2019-03-10 RX ADMIN — DEXAMETHASONE SODIUM PHOSPHATE 6 MG: 10 INJECTION INTRAMUSCULAR; INTRAVENOUS at 12:28

## 2019-03-10 RX ADMIN — BUDESONIDE 500 MCG: 0.5 INHALANT RESPIRATORY (INHALATION) at 09:02

## 2019-03-10 RX ADMIN — HYDROCODONE BITARTRATE AND ACETAMINOPHEN 1 TABLET: 5; 325 TABLET ORAL at 07:48

## 2019-03-10 RX ADMIN — HYDROCODONE BITARTRATE AND ACETAMINOPHEN 1 TABLET: 7.5; 325 TABLET ORAL at 15:00

## 2019-03-10 RX ADMIN — PIPERACILLIN AND TAZOBACTAM 3.38 G: 3; .375 INJECTION, POWDER, FOR SOLUTION INTRAVENOUS at 15:32

## 2019-03-10 RX ADMIN — LORAZEPAM 1 MG: 2 INJECTION INTRAMUSCULAR; INTRAVENOUS at 00:35

## 2019-03-10 RX ADMIN — HUMAN INSULIN 9 UNITS: 100 INJECTION, SOLUTION SUBCUTANEOUS at 23:15

## 2019-03-10 RX ADMIN — MORPHINE SULFATE 2 MG: 2 INJECTION, SOLUTION INTRAMUSCULAR; INTRAVENOUS at 09:42

## 2019-03-10 RX ADMIN — HUMAN INSULIN 6 UNITS: 100 INJECTION, SOLUTION SUBCUTANEOUS at 04:26

## 2019-03-10 RX ADMIN — MORPHINE SULFATE 2 MG: 2 INJECTION, SOLUTION INTRAMUSCULAR; INTRAVENOUS at 05:47

## 2019-03-10 RX ADMIN — MORPHINE SULFATE 2 MG: 2 INJECTION, SOLUTION INTRAMUSCULAR; INTRAVENOUS at 21:23

## 2019-03-10 RX ADMIN — FAMOTIDINE 20 MG: 10 INJECTION, SOLUTION INTRAVENOUS at 20:03

## 2019-03-10 RX ADMIN — ALBUTEROL SULFATE 2.5 MG: 2.5 SOLUTION RESPIRATORY (INHALATION) at 09:02

## 2019-03-10 RX ADMIN — INSULIN HUMAN 20 UNITS: 100 INJECTION, SUSPENSION SUBCUTANEOUS at 13:59

## 2019-03-10 RX ADMIN — INSULIN GLARGINE 20 UNITS: 100 INJECTION, SOLUTION SUBCUTANEOUS at 09:42

## 2019-03-10 RX ADMIN — LORAZEPAM 1 MG: 2 INJECTION INTRAMUSCULAR; INTRAVENOUS at 09:42

## 2019-03-10 RX ADMIN — BUDESONIDE 500 MCG: 0.5 INHALANT RESPIRATORY (INHALATION) at 20:09

## 2019-03-10 RX ADMIN — PIPERACILLIN AND TAZOBACTAM 3.38 G: 3; .375 INJECTION, POWDER, FOR SOLUTION INTRAVENOUS at 23:13

## 2019-03-10 RX ADMIN — HYDROCODONE BITARTRATE AND ACETAMINOPHEN 1 TABLET: 5; 325 TABLET ORAL at 05:22

## 2019-03-10 NOTE — PROGRESS NOTES
[facility] Hematology & Oncology        Inpatient Hematology / Oncology Progress Note      Admission Date: 3/4/2019 11:10 PM  Reason for Admission/Hospital Course: Seizure [facility]) [R56.9]  Metastatic cancer to brain ([facility] [address] [C79.31]  Hyperglycemia [R73.9]      24 Hour Events:  Afebrile, VSS. Fatigued, weak. Respiratory status stable. Sleeping after receiving Morphine and Ativan. Neurosurgery with plans for craniotomy on Wednesday if cleared by pulmonary. Pulmonology with plans for lung studies on Monday. 10 point review of systems is otherwise negative with the exception of the elements mentioned above in the HPI. Allergies   Allergen Reactions    Iodides Tincture [Iodine] Hives       OBJECTIVE:  Patient Vitals for the past 8 hrs:   BP Temp Pulse Resp SpO2 Weight   03/10/19 0918     92 %    03/10/19 0906     94 %    03/10/19 0902     (!) 82 %    03/10/19 0721 115/58 98.4 °F (36.9 °C) 65 20 91 %    03/10/19 0340      312 lb 13.3 oz (141.9 kg)   03/10/19 0335 139/63 98 °F (36.7 °C) 65 18 93 %      Temp (24hrs), Av.3 °F (36.8 °C), Min:98 °F (36.7 °C), Max:98.9 °F (37.2 °C)    03/10 0701 - 03/10 1900  In: -   Out: 500 [Urine:500]    Physical Exam:  Constitutional: Chronically-Ill appearing female in no acute distress, lying on the side comfortably in the hospital bed. HEENT: Normocephalic and atraumatic. Oropharynx is clear, mucous membranes are moist.  Neck supple. Lymph node   Deferred. Skin Warm and dry. No bruising and no rash noted. No erythema. No pallor. Respiratory Decreased in bases bilaterally. Normal air exchange without accessory muscle use. On optiflow. CVS Normal rate, regular rhythm and normal S1 and S2. No murmurs, gallops, or rubs. Abdomen Soft, nontender and nondistended, normoactive bowel sounds. No palpable mass. No hepatosplenomegaly. Neuro Grossly nonfocal with no obvious sensory or motor deficits.    MSK Normal range of motion in general.  Trace generalized edema and no tenderness. Psych Sleeping. Labs:      Recent Labs     03/08/19 0359   WBC 8.5   RBC 4.51   HGB 11.3*   HCT 39.2   MCV 86.9   MCH 25.1*   MCHC 28.8*   RDW 16.1*      GRANS 84*   LYMPH 9*   MONOS 6   EOS 0*   BASOS 0   IG 1   DF AUTOMATED   ANEU 7.1   ABL 0.8   ABM 0.5   TUSHAR 0.0   ABB 0.0   AIG 0.0        Recent Labs     03/08/19 0359   *   K 4.1   CL 96*   CO2 30   AGAP 9   *   BUN 15   CREA 0.61   GFRAA >60   GFRNA >60   CA 9.9         Imaging:  CT CHEST ABD PELV WO CONT [[id number]] Collected: 03/07/19 2302   Order Status: Completed Updated: 03/07/19 2308   Narrative:     CT CHEST, ABDOMEN AND PELVIS WITH CONTRAST. INDICATION: Breast cancer with brain mass, staging exam.     COMPARISON: July 2018. TECHNIQUE:   5 mm axial scans from the lung apices to the pubic symphysis   following oral contrast only.  Radiation dose reduction techniques were used for  this study.  Our CT scanners use one or more of the following:  Automated  exposure control, adjustment of the mA and or kV according to patient size,  iterative reconstruction. Findings:   CHEST:  The left lung masses increased in size and now measures 6.2 x 8.7 cm. Trace bilateral pleural effusions. There are multiple enlarged central  mediastinal lymph nodes. Aortic calcifications are present.     ABDOMEN:  No gross focal parenchymal abnormality identified within the liver or  spleen on this noncontrast exam. The gallbladder is absent. The biliary tree is  not dilated. The pancreas is unremarkable. No free fluid, acute inflammatory  changes or adenopathy. Bowel loops are not dilated. The kidneys are normal size. No radiopaque renal calculi. No hydronephrosis. The adrenal glands are normal  size. Aorta is normal caliber and calcified. PELVIS:  No free fluid or acute inflammatory changes or bony lesions. Lanetta Perks  urinary bladder is is drained by Leyva catheter.     Impression:   IMPRESSION:  Significant enlargement of the left lung mass which now measures  6.2 x 8.7 cm. No other new abnormality.           MRI BRAIN W WO CONT [[id number]] Collected: 03/07/19 1202   Order Status: Completed Updated: 03/07/19 1220   Narrative:     EXAMINATION: BRAIN MRI 3/7/2019 11:43 AM    ACCESSION NUMBER: [id number]    INDICATION: Lung cancer, unspecified; metastatic lung cancer to brain    COMPARISON: Head CT 3/5/2019    TECHNIQUE: Multiplanar multisequence MRI of the brain without and with  intravenous administration of 28 cc Dotarem contrast agent. FINDINGS:    Technically difficult exam due to patient motion. There is a 3.1 x 3.0 x 3.5 cm (AP x transverse x craniocaudal) (axial  postcontrast image 18 and coronal postcontrast image 24) peripherally enhancing  centrally necrotic mass in the posterior right parietal lobe abutting the  parieto-occipital sulcus. This corresponds to the lesion of concern prior head  CT. Peripheral reduced diffusion likely corresponds to areas of increased  cellularity. There is no evidence of associated blood products. There is surrounding vasogenic edema. The vasogenic edema results in partial  effacement of the posterior and occipital horns of the right lateral ventricle. There is no midline shift. The basilar cisterns are patent. There is no  cerebellar tonsillar ectopia or herniation.     There is T2 hyperintensity of the body and tail of the right hippocampus.      Diffusion imaging shows no evidence of acute ischemic infarction.      There are T2 hyperintensities in the periventricular and subcortical white  matter and charles, a nonspecific finding which likely represents chronic  microangiopathy. The expected large vascular flow voids are preserved.     There is no definite evidence of other abnormal enhancing intracranial lesions  within the substantial limitation of motion degradation on the postcontrast  images.      No evidence of suspicious osseous lesions within the limits of patient motion. Impression:     IMPRESSION:    1. 3.1 x 3.0 x 3.5 cm posterior right parietal cystic mass with surrounding  vasogenic edema. Given the history of lung cancer, a metastasis is favored. 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    2. There is no definite evidence of other enhancing intracranial lesions within  the substantial limitation of patient motion on the postcontrast images. The  degree of motion could obscure subtle or small metastases. If there are new or  progressive neurologic progressive symptoms, a repeat MRI should be considered  when the patient is better able to lie still. 3. There is T2 hyperintensity within the body and tail of the right hippocampus. In the setting of the right-sided mass and surrounding vasogenic edema, this is  most likely to be either post ictal or an extension of the adjacent vasogenic  edema. In the setting of known systemic neoplasia, although less likely in the  absence of contralateral findings, a paraneoplastic syndrome cannot be excluded. Directed attention on follow-up examinations is recommended. VOICE DICTATED BY: Dr. Nile De La Cruz ABD PELV W CONT [[id number]]    Order Status: Canceled    CTA CHEST ABD PELV W WO CONT [[id number]]    Order Status: Canceled    MRI BRAIN W CONT [[id number]]    Order Status: Canceled    XR CHEST PORT [[id number]] Collected: 03/05/19 0239   Order Status: Completed Updated: 03/05/19 0244   Narrative:     Portable chest xray      COMPARISON: August 1, 2018    CLINICAL HISTORY: Altered mental status. FINDINGS:    There are bilateral diffuse groundglass opacities, likely vascular congestion. There is confluent masslike opacity in the left perihilar region. This is more  prominent compared to prior examination. Heart appears enlarged. No  pneumothorax.    Impression:     IMPRESSION:    Masslike opacity in the left perihilar region, significantly more prominent  compared to July 29, 2018 CT. Findings suspicious for lung tumor. Contrast-enhanced CT chest should be considered to better characterize. DC4         CT HEAD WO CONT [[id number]] Collected: 03/05/19 0047   Order Status: Completed Updated: 03/05/19 0129   Narrative:     Noncontrast CT of the brain. COMPARISON: none    INDICATION: Altered mental status. History of lung cancer. TECHNIQUE: Contiguous axial images were obtained from the skull base through the  vertex without IV contrast. Radiation dose reduction techniques were used for  this study:  Our CT scanners use one or all of the following: Automated exposure  control, adjustment of the mA and/or kVp according to patient's size, iterative  reconstruction. FINDINGS:    There is an approximately 2 cm cystic mass in the right parieto-occipital lobe. There is surrounding vasogenic edema. There is mass effect on the occipital horn  of the right lateral ventricle. There is no midline shift or hydrocephalus. Included globes appear intact. Paranasal sinuses and the mastoid air cells are  aerated. Surrounding bones are intact. Impression:     IMPRESSION:    1. Brain mass in the right parieto-occipital lobe with surrounding vasogenic  edema. Contrast enhanced MRI of the brain should be considered to better  evaluate/characterize. .             Medications:  Current Facility-Administered Medications   Medication Dose Route Frequency    morphine injection 2 mg  2 mg IntraVENous Q3H PRN    LORazepam (ATIVAN) injection 1 mg  1 mg IntraVENous Q4H PRN    HYDROcodone-acetaminophen (NORCO) 7.5-325 mg per tablet 1 Tab  1 Tab Oral Q4H PRN    famotidine (PF) (PEPCID) 20 mg in sodium chloride 0.9% 10 mL injection  20 mg IntraVENous Q12H    insulin glargine (LANTUS) injection 20 Units  20 Units SubCUTAneous DAILY    sodium chloride (NS) flush 5-10 mL  5-10 mL IntraVENous PRN    sodium chloride (NS) flush 5-40 mL  5-40 mL IntraVENous Q8H    sodium chloride (NS) flush 5-40 mL  5-40 mL IntraVENous PRN    NUTRITIONAL SUPPORT ELECTROLYTE PRN ORDERS   Does Not Apply PRN    acetaminophen (TYLENOL) tablet 650 mg  650 mg Oral Q4H PRN    ondansetron (ZOFRAN) injection 4 mg  4 mg IntraVENous Q4H PRN    naloxone (NARCAN) injection 0.4 mg  0.4 mg IntraVENous PRN    albuterol-ipratropium (DUO-NEB) 2.5 MG-0.5 MG/3 ML  3 mL Nebulization Q4H PRN    levETIRAcetam (KEPPRA) 500 mg in 0.9% sodium chloride 100 mL IVPB  500 mg IntraVENous Q12H    budesonide (PULMICORT) 500 mcg/2 ml nebulizer suspension  500 mcg Nebulization BID RT    And    albuterol (PROVENTIL VENTOLIN) nebulizer solution 2.5 mg  2.5 mg Nebulization Q6HWA RT    dexamethasone (DECADRON) injection 6 mg  6 mg IntraVENous Q6H    piperacillin-tazobactam (ZOSYN) 3.375 g in 0.9% sodium chloride (MBP/ADV) 100 mL  3.375 g IntraVENous Q8H    insulin regular (NOVOLIN R, HUMULIN R) injection   SubCUTAneous Q4H         ASSESSMENT:    Problem List  Date Reviewed: 3/7/2019          Codes Class Noted    Delirium ICD-10-CM: R41.0  ICD-9-CM: 780.09  3/6/2019        * (Principal) Seizure ([facility] [address] ICD-10-CM: R56.9  ICD-9-CM: 780.39  3/5/2019        Metastatic cancer to brain [facility]) ICD-10-CM: C79.31  ICD-9-CM: 198.3  3/5/2019        Hyperglycemia ICD-10-CM: R73.9  ICD-9-CM: 790.29  3/5/2019        Acute on chronic respiratory failure with hypoxia and hypercapnia (HCC) ICD-10-CM: J96.21, J96.22  ICD-9-CM: 518.84, 786.09, 799.02  3/5/2019        Benign neoplasm of colon (Chronic) ICD-10-CM: D12.6  ICD-9-CM: 211.3  10/31/2018        Mediastinal lymphadenopathy ICD-10-CM: R59.0  ICD-9-CM: 785.6  7/30/2018        Primary cancer of left upper lobe of lung ([facility]- squamous cell ICD-10-CM: C34.12  ICD-9-CM: 162.3  7/30/2018        Chronic pain (Chronic) ICD-10-CM: E60.87  ICD-9-CM: 338.29  7/28/2018        Acute respiratory failure ([facility] ICD-10-CM: J96.00  ICD-9-CM: 518.81  7/28/2018        Hypertension, essential, benign (Chronic) ICD-10-CM: I10  ICD-9-CM: 401.1  Unknown        Hyperlipidemia (Chronic) ICD-10-CM: E78.5  ICD-9-CM: 272.4  Unknown        COPD with emphysema (HCC) (Chronic) ICD-10-CM: J43.9  ICD-9-CM: 492.8  Unknown        Morbid obesity (HCC) (Chronic) ICD-10-CM: E66.01  ICD-9-CM: 278.01  Unknown        Primary pulmonary hypertension (HCC) (Chronic) ICD-10-CM: I27.0  ICD-9-CM: 416.0  Unknown        Diabetes mellitus without complication (HCC) (Chronic) ICD-10-CM: E11.9  ICD-9-CM: 250.00  Unknown        Orthopnea (Chronic) ICD-10-CM: R06.01  ICD-9-CM: 786.02  Unknown        Abnormality of gait and mobility ICD-10-CM: R26.9  ICD-9-CM: 961. 2  Unknown        Insomnia (Chronic) ICD-10-CM: G47.00  ICD-9-CM: 780.52  12/27/2016        Extreme obesity with respiratory disorder (HCC) (Chronic) ICD-10-CM: E66.01, J98.9  ICD-9-CM: 278.01, 519.9  Unknown        FLETCHER (obstructive sleep apnea) (Chronic) ICD-10-CM: G47.33  ICD-9-CM: 327.23  Unknown        Chronic hypoxemic respiratory failure (HCC) (Chronic) ICD-10-CM: J96.11  ICD-9-CM: 518.83, 799.02  Unknown        Cor pulmonale (HCC) (Chronic) ICD-10-CM: I27.81  ICD-9-CM: 416.9  Unknown        CHF (congestive heart failure), NYHA class I (HCC) (Chronic) ICD-10-CM: I50.9  ICD-9-CM: 428.0  Unknown    Overview Signed 12/27/2016 11:24 AM by Megan Tomas     diastolic             Obesity with alveolar hypoventilation (HCC) (Chronic) ICD-10-CM: Z18.3  ICD-9-CM: 278.03  Unknown        Arthritis of knee (Chronic) ICD-10-CM: M17.10  ICD-9-CM: 716.96  Unknown        History of colonic polyps (Chronic) ICD-10-CM: Z86.010  ICD-9-CM: V12.72  8/10/2015    Overview Signed 10/31/2018 11:30 AM by Joceline Hood NP     Last Assessment & Plan:   She has several adenomas on her initial colonoscopy. I believe there was some doubt about whether the cecal polyp on that exam was completely removed. Follow-up colonoscopy did reveal a persistent cecal polyp.  This polyp was read as tubulovillous adenoma. It has been 3 years male of this month. Because of the villous pathology we will proceed with a follow-up colonoscopy. She will hold Motrin as well as aspirin for 3 days prior to the procedure. We discussed the current colorectal cancer screening guidelines as well as the risks and benefits of colonoscopy in detail. We also discussed the prep in detail and she wishes to proceed. Cause of her oxygen use we will need to do her on the inpatient side of the hospital. We spent at least 15-20 minutes procuring records and bringing them forward from the hospital electronic medical record system into today's office visit. We then spent an additional 15-20 minutes in counseling in addition to the time spent during her history and physical exam.                     PLAN:  Stage IV Squamous cell lung cancer with likely brain mets / Seizure  - never rec'd treatment, has been on home Hospice since 8/2018. Family has revoked Hospice and changed pt to full code  - CT head with brain mass in the R parieto-occipital lobe with surrounding vasogenic edema  - MRI brain pending  - CT CAP for staging when able  - On Dex/Keppra  - NS considering craniotomy  3/7  MRI completed this AM and showed right parietal cystic mass with surrounding vasogenic edema concerning for metastasis, but primary glial neoplasm could not be excluded. Await NS recommendations re: craniotomy. Full staging with CT CAP. Ongoing discussion with family regarding plans once diagnosis confirmed  3/8 Difficult situation. Discussed with pulmonary who is very concerned about being able to extubate following craniotomy given COPD and lung mass. CT CAP without evidence of other metastatic disease but does show enlarging lung mass. Patient is clearly not a candidate for any cancer directed therapy at this time given weakness/debility. Also, would not be able to travel back and forth to radiation with current O2 needs.  Of note, patient was essentially bedbound at home from other chronic conditions. Son stated that they did want treatment for her lung cancer back in August but felt \"pushed' into hospice. We will await final decisions from the rest of the team regarding plan and will follow up final biopsy results. 03/10 Plans for craniotomy on Wednesday if cleared by pulmonary. Plans for lung function testing to be done on Monday. On the 5th floor now. No new complaints, respiratory status stable. Resp Failure  - on BiPAP  - on Zosyn/Vanc  - UCx-NGTD. BCx-pending  3/7/19 Continue vanc/zosyn. 03/10 Pan cultures negative thus far. Continues antibiotics. On optiflow. DM  - Primary team managing      Goals and plan of care reviewed with the patient. All questions answered to the best of our ability. Mathieu Khalil NP   [facility] Hematology & Oncology  [address] [place] [address]  Office : [phone]  Fax : [phone]       Attending Addendum:  I have personally performed a face to face diagnostic evaluation on this patient. I have reviewed and agree with the care plan as documented by Isiah Cook N.P. My findings are as follows: She has Lung Cancer and a brain mass, appears fatigued and anxious, heart rate regular without murmurs, abdomen is non-tender, bowel sounds are positive, we will follow her, she will undergo evaluation of her pulmonary reserve to withstand anesthesia tomorrow and is scheduled to undergo surgery on Wednesday, we will continue Keppra/dex for now.               Rachele Hannon MD      [facility] Hematology/Oncology  [address] [place] [address]  Office : [phone]  Fax : [phone]

## 2019-03-10 NOTE — PROGRESS NOTES
Problem: Pressure Injury - Risk of  Goal: *Prevention of pressure injury  Document Gunnar Scale and appropriate interventions in the flowsheet. Outcome: Progressing Towards Goal  Pressure Injury Interventions:  Sensory Interventions: Assess changes in LOC, Check visual cues for pain, Keep linens dry and wrinkle-free, Maintain/enhance activity level, Minimize linen layers    Moisture Interventions: Absorbent underpads, Minimize layers, Maintain skin hydration (lotion/cream)    Activity Interventions: Increase time out of bed, Pressure redistribution bed/mattress(bed type)    Mobility Interventions: Chair cushion, HOB 30 degrees or less, Pressure redistribution bed/mattress (bed type)    Nutrition Interventions: Document food/fluid/supplement intake, Offer support with meals,snacks and hydration    Friction and Shear Interventions: HOB 30 degrees or less, Foam dressings/transparent film/skin sealants, Sit at 90-degree angle, Minimize layers               Problem: Falls - Risk of  Goal: *Absence of Falls  Document Cindy Fall Risk and appropriate interventions in the flowsheet.   Outcome: Progressing Towards Goal  Fall Risk Interventions:       Mentation Interventions: Adequate sleep, hydration, pain control, Door open when patient unattended, Room close to nurse's station, Toileting rounds, Update white board, Reorient patient, More frequent rounding, Increase mobility    Medication Interventions: Patient to call before getting OOB, Bed/chair exit alarm    Elimination Interventions: Call light in reach, Patient to call for help with toileting needs, Toilet paper/wipes in reach, Toileting schedule/hourly rounds    History of Falls Interventions: Bed/chair exit alarm, Door open when patient unattended

## 2019-03-10 NOTE — PROGRESS NOTES
Problem: Pressure Injury - Risk of  Goal: *Prevention of pressure injury  Outcome: Progressing Towards Goal  Pressure Injury Interventions:  Sensory Interventions: Assess changes in LOC, Check visual cues for pain, Keep linens dry and wrinkle-free, Maintain/enhance activity level, Minimize linen layers    Moisture Interventions: Absorbent underpads    Activity Interventions: PT/OT evaluation    Mobility Interventions: HOB 30 degrees or less    Nutrition Interventions: Document food/fluid/supplement intake    Friction and Shear Interventions: HOB 30 degrees or less               Problem: Falls - Risk of  Goal: *Absence of Falls  Outcome: Progressing Towards Goal  Fall Risk Interventions:       Mentation Interventions: Adequate sleep, hydration, pain control    Medication Interventions: Bed/chair exit alarm    Elimination Interventions: Bed/chair exit alarm    History of Falls Interventions: Bed/chair exit alarm

## 2019-03-10 NOTE — PROGRESS NOTES
03/09/19 1405   Dual Skin Pressure Injury Assessment   Dual Skin Pressure Injury Assessment X   Second Care Provider (Based on [address] [place] [address]) Diaz Duarte RN   Buttocks/Ishium  Bilateral

## 2019-03-10 NOTE — PROGRESS NOTES
Neurosurgery progress note    Patient transferred to floor  Feeling well  Tolerating PO    Face symmetric  No drift  Speech clear    - Will keep NPO after MN in case there is any chance to proceed on Monday, otherwise tentative plan still remains wednesday

## 2019-03-10 NOTE — PROGRESS NOTES
Hospitalist Progress Note     Admit Date:  3/4/2019 11:10 PM   Name:  Patricia Gifford   Age:  59 y.o.  :  1960   MRN:  [id number]   PCP:  Javier Garcia, Not On File  Treatment Team: Attending Provider: Maya Mallory MD; Care Manager: Jonathan Allen, RN; Consulting Provider: Jolene Carbajal NP; Consulting Provider: Sadaf Suarez MD; Consulting Provider: Stanislav Marshall MD    Subjective:     From admission note :      \" Patient is a 60 y/o female with squamous cell carcinoma of the lung, COPD, extreme obesity, diastolic CHF, HTN, HLD, FLETCHER, pulmonary HTN, HLD who presents from home with new altered mental status. EMS reports hyperglycemia. On arrival to ED she was unresponsive with seizure activity. Glucose is 558. She was loaded with IV keppra and also given IV ativan. A head CT shows brain mass in the right parieto-occipital lobe with surrounding vasogenic edema. She then received IV decadron. She was tachycardic with WBC ct of 14.2 so sepsis protocol initiated with fluids and antibiotics. Initially family had said she was DNR but later stated to do everything Except intubation. Will admit to ICU. \"     3-5-2019  Patient has been in bed. Initially palliative consultant saw the patient and at that time her family wanted her to be a hospice candidate. However, later she changed her mind and now she is a full code. Family would like aggressive treatment now.      3-6-2019  Patient is still on BiPAP. She is more responsive. She was seen by neurosurgery today. EEG was done on 3-5-2019 which showed epileptiform activity.      3-7-2019  Patient in on high flow oxygen now. She is alert and talking. Asking and answering appropriately. She states that she is very nervous and panics with MRI study.      3-8-2019  Patient is on high flow oxygen. She is alert and oriented. No headache. No shortness of breath.   She is on BiPAP at night and would not wear the mask in daytime.      3-9-2019  Patient is still on high flow oxygen in daytime and BiPAP at night. Other vital signs have been stable. 3/10/19:  Asked for more meds re: pt. Agitation and pain. She is on high flow oxygen and slight dose increase analgesia given. Objective:     Patient Vitals for the past 24 hrs:   Temp Pulse Resp BP SpO2   03/10/19 1150 97.6 °F (36.4 °C) 65 20 133/67 97 %   03/10/19 0918     92 %   03/10/19 0906     94 %   03/10/19 0902     (!) 82 %   03/10/19 0721 98.4 °F (36.9 °C) 65 20 115/58 91 %   03/10/19 0335 98 °F (36.7 °C) 65 18 139/63 93 %   03/10/19 0006     100 %   03/09/19 2340 98.1 °F (36.7 °C) 60 18 142/79 99 %   03/09/19 2230     98 %   03/09/19 1940     93 %   03/09/19 1935 98.6 °F (37 °C) 91 18 131/64 95 %   03/09/19 1535     97 %   03/09/19 1356 98 °F (36.7 °C) 77 19 140/81 94 %   03/09/19 1333  79 17  93 %   03/09/19 1323  80 19 153/81 96 %   03/09/19 1300  79 16 143/66 94 %     Oxygen Therapy  O2 Sat (%): 97 % (03/10/19 1150)  Pulse via Oximetry: 82 beats per minute (03/10/19 0918)  O2 Device: Nasal cannula (03/10/19 0918)  O2 Flow Rate (L/min): 4 l/min (03/10/19 0918)  O2 Temperature: 87.8 °F (31 °C) (03/10/19 0906)  FIO2 (%): 35 % (03/10/19 0906)    Intake/Output Summary (Last 24 hours) at 3/10/2019 1251  Last data filed at 3/10/2019 1235  Gross per 24 hour   Intake 2208 ml   Output 5225 ml   Net -3017 ml         Physical Examination:  Physical Exam   Constitutional: She appears distressed. HENT:   Head: Atraumatic. Right Ear: External ear normal.   Left Ear: External ear normal.   Eyes: Conjunctivae are normal. Pupils are equal, round, and reactive to light. Neck: No thyromegaly present. Cardiovascular: Normal rate. Exam reveals no gallop. Pulmonary/Chest: Effort normal. No stridor. She has no rales. Abdominal: Soft. There is no rebound. Musculoskeletal: She exhibits no tenderness or deformity. Neurological: She has normal reflexes. She exhibits normal muscle tone.    Skin: Skin is warm. No rash noted. She is not diaphoretic. Data Review:  I have reviewed all labs, meds, telemetry events, and studies from the last 24 hours.     Recent Results (from the past 24 hour(s))   GLUCOSE, POC    Collection Time: 03/09/19  3:52 PM   Result Value Ref Range    Glucose (POC) 305 (H) 65 - 100 mg/dL   GLUCOSE, POC    Collection Time: 03/09/19  8:02 PM   Result Value Ref Range    Glucose (POC) 278 (H) 65 - 100 mg/dL   GLUCOSE, POC    Collection Time: 03/09/19 11:22 PM   Result Value Ref Range    Glucose (POC) 234 (H) 65 - 100 mg/dL   GLUCOSE, POC    Collection Time: 03/10/19  4:07 AM   Result Value Ref Range    Glucose (POC) 238 (H) 65 - 100 mg/dL   GLUCOSE, POC    Collection Time: 03/10/19  8:06 AM   Result Value Ref Range    Glucose (POC) 203 (H) 65 - 100 mg/dL   GLUCOSE, POC    Collection Time: 03/10/19 11:52 AM   Result Value Ref Range    Glucose (POC) 317 (H) 65 - 100 mg/dL        All Micro Results     Procedure Component Value Units Date/Time    CULTURE, BLOOD [[id number]] Collected:  03/05/19 0334    Order Status:  Completed Specimen:  Blood Updated:  03/10/19 1223     Special Requests: --        LEFT  HAND       Culture result: NO GROWTH 5 DAYS       CULTURE, BLOOD [[id number]] Collected:  03/05/19 0410    Order Status:  Completed Specimen:  Blood Updated:  03/10/19 1223     Special Requests: --        RIGHT  HAND       Culture result: NO GROWTH 5 DAYS       CULTURE, URINE [[id number]] Collected:  03/05/19 0504    Order Status:  Completed Specimen:  Urine from [place] Specimen Updated:  03/07/19 0829     Special Requests: NO SPECIAL REQUESTS        Culture result: NO GROWTH 2 DAYS       CULTURE, URINE [[id number]] Collected:  03/05/19 1645    Order Status:  Canceled Specimen:  Urine from [place] Specimen           Current Meds:  Current Facility-Administered Medications   Medication Dose Route Frequency    morphine injection 2 mg  2 mg IntraVENous Q3H PRN    LORazepam (ATIVAN) injection 1 mg  1 mg IntraVENous Q4H PRN    HYDROcodone-acetaminophen (NORCO) 7.5-325 mg per tablet 1 Tab  1 Tab Oral Q4H PRN    famotidine (PF) (PEPCID) 20 mg in sodium chloride 0.9% 10 mL injection  20 mg IntraVENous Q12H    insulin glargine (LANTUS) injection 20 Units  20 Units SubCUTAneous DAILY    sodium chloride (NS) flush 5-10 mL  5-10 mL IntraVENous PRN    sodium chloride (NS) flush 5-40 mL  5-40 mL IntraVENous Q8H    sodium chloride (NS) flush 5-40 mL  5-40 mL IntraVENous PRN    NUTRITIONAL SUPPORT ELECTROLYTE PRN ORDERS   Does Not Apply PRN    acetaminophen (TYLENOL) tablet 650 mg  650 mg Oral Q4H PRN    ondansetron (ZOFRAN) injection 4 mg  4 mg IntraVENous Q4H PRN    naloxone (NARCAN) injection 0.4 mg  0.4 mg IntraVENous PRN    albuterol-ipratropium (DUO-NEB) 2.5 MG-0.5 MG/3 ML  3 mL Nebulization Q4H PRN    levETIRAcetam (KEPPRA) 500 mg in 0.9% sodium chloride 100 mL IVPB  500 mg IntraVENous Q12H    budesonide (PULMICORT) 500 mcg/2 ml nebulizer suspension  500 mcg Nebulization BID RT    And    albuterol (PROVENTIL VENTOLIN) nebulizer solution 2.5 mg  2.5 mg Nebulization Q6HWA RT    dexamethasone (DECADRON) injection 6 mg  6 mg IntraVENous Q6H    piperacillin-tazobactam (ZOSYN) 3.375 g in 0.9% sodium chloride (MBP/ADV) 100 mL  3.375 g IntraVENous Q8H    insulin regular (NOVOLIN R, HUMULIN R) injection   SubCUTAneous Q4H       Diet:  DIET DIABETIC CONSISTENT CARB    Other Studies (last 24 hours):  No results found.     Assessment and Plan:     Hospital Problems as of 3/10/2019 Date Reviewed: 3/7/2019          Codes Class Noted - Resolved POA    Delirium ICD-10-CM: R41.0  ICD-9-CM: 780.09  3/6/2019 - Present Unknown        * (Principal) Seizure ([facility] [address] ICD-10-CM: R56.9  ICD-9-CM: 780.39  3/5/2019 - Present Yes        Metastatic cancer to brain [facility]) ICD-10-CM: C79.31  ICD-9-CM: 198.3  3/5/2019 - Present Yes        Hyperglycemia ICD-10-CM: R73.9  ICD-9-CM: 790.29  3/5/2019 - Present Yes        Acute on chronic respiratory failure with hypoxia and hypercapnia [facility]) ICD-10-CM: J96.21, J96.22  ICD-9-CM: 518.84, 786.09, 799.02  3/5/2019 - Present Yes        Primary cancer of left upper lobe of lung ([facility] [address]- squamous cell ICD-10-CM: C34.12  ICD-9-CM: 162.3  7/30/2018 - Present Yes        Hypertension, essential, benign (Chronic) ICD-10-CM: I10  ICD-9-CM: 401.1  Unknown - Present Yes        Hyperlipidemia (Chronic) ICD-10-CM: E78.5  ICD-9-CM: 272.4  Unknown - Present         COPD with emphysema (HCC) (Chronic) ICD-10-CM: J43.9  ICD-9-CM: 492.8  Unknown - Present Yes        Morbid obesity ([facility] [address] (Chronic) ICD-10-CM: E66.01  ICD-9-CM: 278.01  Unknown - Present Yes        [facility] (Chronic) ICD-10-CM: I27.0  ICD-9-CM: 416.0  Unknown - Present         Diabetes mellitus without complication (HCC) (Chronic) ICD-10-CM: E11.9  ICD-9-CM: 250.00  Unknown - Present Yes        FLETCHER (obstructive sleep apnea) (Chronic) ICD-10-CM: G47.33  ICD-9-CM: 327.23  Unknown - Present Yes        Chronic hypoxemic respiratory failure (HCC) (Chronic) ICD-10-CM: J96.11  ICD-9-CM: 518.83, 799.02  Unknown - Present Yes        CHF (congestive heart failure), NYHA class I (HCC) (Chronic) ICD-10-CM: I50.9  ICD-9-CM: 428.0  Unknown - Present     Overview Signed 12/27/2016 11:24 AM by Peter hidalgo                   A/P:    Lung cancer with brain mass. This brain mass is either metastasis or primary brain tumor. Neurosurgery plans to do craniotomy this Wednesday. Family is deciding. Continue Keppra, Dexamethasone. Oncologist is helping with decision regarding plan and goal of care as well. This is unchanged     Acute on chronic respiratory failure with hypoxia and hypercapnea. History of FLETCHER  On high flow oxygen daytime and BiPAP at night.      Hyperglycemia  Titrate insulin prn     Agitation     Prn benzodiaz.  Analgesia if pain     DVT prophylaxis : SCD

## 2019-03-10 NOTE — PROGRESS NOTES
END OF SHIFT NOTE:    Intake/Output  No intake/output data recorded. Voiding: YES  Catheter: YES  Drain:              Stool:  0 occurrences. Stool Assessment  Stool Color: Brown (03/08/19 1014)  Stool Appearance: Loose (03/10/19 0721)  Stool Amount: Medium (03/08/19 1014)  Stool Source/Status: Incontinence; Rectum (03/08/19 1014)    Emesis:  0 occurrences. VITAL SIGNS  Patient Vitals for the past 12 hrs:   Temp Pulse Resp BP SpO2   03/10/19 1531 98.6 °F (37 °C) 78 20 143/73 92 %   03/10/19 1508     97 %   03/10/19 1150 97.6 °F (36.4 °C) 65 20 133/67 97 %   03/10/19 0918     92 %   03/10/19 0906     94 %   03/10/19 0902     (!) 82 %   03/10/19 0721 98.4 °F (36.9 °C) 65 20 115/58 91 %       Pain Assessment  Pain 1  Pain Scale 1: Numeric (0 - 10) (03/10/19 1626)  Pain Intensity 1: 8 (03/10/19 1626)  Patient Stated Pain Goal: 0 (03/10/19 0745)  Pain Reassessment 1: Yes (03/10/19 1549)  Pain Onset 1: chronic (03/10/19 0547)  Pain Location 1: Generalized (03/10/19 1626)  Pain Orientation 1: Other (comment)(generalized) (03/10/19 0547)  Pain Description 1: Constant; Aching (03/10/19 0949)  Pain Intervention(s) 1: Medication (see MAR); Repositioned (03/10/19 1626)    Ambulating  No    Additional Information: Pt medicated for pain and nausea multiple times this shift. Pt will be NPO after midnight per Neurosurgery for possible procedure. Pt has difficulty maintaining PIV's and may benefit from central access if further treatment is pursued . Shift report given to oncoming nurse at the bedside.     Wendy Chamberlain RN

## 2019-03-10 NOTE — PROGRESS NOTES
Problem: Falls - Risk of  Goal: *Absence of Falls  Outcome: Not Progressing Towards Goal  Fall Risk Interventions:       Mentation Interventions: Adequate sleep, hydration, pain control, Bed/chair exit alarm, Door open when patient unattended, Toileting rounds, Update white board, Reorient patient, More frequent rounding    Medication Interventions: Bed/chair exit alarm, Teach patient to arise slowly    Elimination Interventions: Bed/chair exit alarm, Call light in reach, Patient to call for help with toileting needs, Toilet paper/wipes in reach, Toileting schedule/hourly rounds    History of Falls Interventions: Bed/chair exit alarm, Door open when patient unattended

## 2019-03-10 NOTE — PROGRESS NOTES
July Crowder  Admission Date: 3/4/2019             Daily Progress Note: 3/10/2019    The patient's chart is reviewed and the patient is discussed with the staff.      58 y.o.  female seen and evaluated at the request of Dr. Niurka Bell  She presented with seizure. Moved to the ICU for BiPAP. Stage 4 NSCLC with new brain met and edema on head CT. New seizure and neurology saw, Oncology consulted.  Vance Lynn was diagnosed with squamous cell lung cancer last year after undergoing a left upper lobe biopsy. She was seen by oncology and an outpatient PET with follow up appointment was recommended but patient ended up under the care of hospice instead. She is morbidly obese and has a history of COPD with chronic dyspnea. She quit smoking 4 to 5 years ago. She is maintained on 2 liters of oxygen and has a home nebulizer. She has FLETCHER/OHS with chronic hypercapnea but she has refused CPAP/Bipap in the past.   She lives with her son and he reports that she can only ambulate a step of 2 at a time. She has been at home in hospice care since August 2018. Discussing Hospice house and revoked wanted to have tumor removed. Neurosurgery has seen and is considering a craniotomy. PC consulted and full code confirmed.       Subjective:   Moved out of icu yesterday- now on 4L hf O2, no c/os      Current Facility-Administered Medications   Medication Dose Route Frequency    morphine injection 2 mg  2 mg IntraVENous Q3H PRN    LORazepam (ATIVAN) injection 1 mg  1 mg IntraVENous Q4H PRN    HYDROcodone-acetaminophen (NORCO) 7.5-325 mg per tablet 1 Tab  1 Tab Oral Q4H PRN    [START ON 3/11/2019] insulin glargine (LANTUS) injection 30 Units  30 Units SubCUTAneous DAILY    insulin NPH (NOVOLIN N, HUMULIN N) injection 20 Units  20 Units SubCUTAneous ONCE    famotidine (PF) (PEPCID) 20 mg in sodium chloride 0.9% 10 mL injection  20 mg IntraVENous Q12H    sodium chloride (NS) flush 5-10 mL  5-10 mL IntraVENous PRN    sodium chloride (NS) flush 5-40 mL  5-40 mL IntraVENous Q8H    sodium chloride (NS) flush 5-40 mL  5-40 mL IntraVENous PRN    NUTRITIONAL SUPPORT ELECTROLYTE PRN ORDERS   Does Not Apply PRN    acetaminophen (TYLENOL) tablet 650 mg  650 mg Oral Q4H PRN    ondansetron (ZOFRAN) injection 4 mg  4 mg IntraVENous Q4H PRN    naloxone (NARCAN) injection 0.4 mg  0.4 mg IntraVENous PRN    albuterol-ipratropium (DUO-NEB) 2.5 MG-0.5 MG/3 ML  3 mL Nebulization Q4H PRN    levETIRAcetam (KEPPRA) 500 mg in 0.9% sodium chloride 100 mL IVPB  500 mg IntraVENous Q12H    budesonide (PULMICORT) 500 mcg/2 ml nebulizer suspension  500 mcg Nebulization BID RT    And    albuterol (PROVENTIL VENTOLIN) nebulizer solution 2.5 mg  2.5 mg Nebulization Q6HWA RT    dexamethasone (DECADRON) injection 6 mg  6 mg IntraVENous Q6H    piperacillin-tazobactam (ZOSYN) 3.375 g in 0.9% sodium chloride (MBP/ADV) 100 mL  3.375 g IntraVENous Q8H    insulin regular (NOVOLIN R, HUMULIN R) injection   SubCUTAneous Q4H       Review of Systems    Constitutional: negative for fever, chills, sweats  Cardiovascular: negative for chest pain, palpitations, syncope, edema  Gastrointestinal:  negative for dysphagia, reflux, vomiting, diarrhea, abdominal pain, or melena  Neurologic:  negative for focal weakness, numbness, headache    Objective:     Vitals:    03/10/19 0902 03/10/19 0906 03/10/19 0918 03/10/19 1150   BP:    133/67   Pulse:    65   Resp:    20   Temp:    97.6 °F (36.4 °C)   SpO2: (!) 82% 94% 92% 97%   Weight:       Height:         Intake and Output:   03/08 1901 - 03/10 0700  In: 2409 [P.O.:1760; I.V.:649]  Out: 7539 [Urine:6275]  03/10 0701 - 03/10 1900  In: 419 [P.O.:250;  I.V.:169]  Out: 500 [Urine:500]    Physical Exam:   Constitution:  the patient is well developed and in no acute distress  EENMT:  Sclera clear, pupils equal, oral mucosa moist  Respiratory: clear with decreased breath sounds  Cardiovascular:  RRR without M,G,R  Gastrointestinal: soft and non-tender; with positive bowel sounds. Musculoskeletal: warm without cyanosis. There is no lower leg edema. Skin:  no jaundice or rashes, no wounds   Neurologic: no gross neuro deficits     Psychiatric:  Lethargic but will wake up and answer some questions    CXR:       LAB  Recent Labs     03/10/19  1152 03/10/19  0806 03/10/19  0407 03/09/19  2322 03/09/19 2002   GLUCPOC 317* 203* 238* 234* 278*      Recent Labs     03/08/19  0359   WBC 8.5   HGB 11.3*   HCT 39.2        Recent Labs     03/08/19  0359   *   K 4.1   CL 96*   CO2 30   *   BUN 15   CREA 0.61   CA 9.9     No results for input(s): PH, PCO2, PO2, HCO3, PHI, PCO2I, PO2I, HCO3I in the last 72 hours. No results for input(s): LCAD, LAC in the last 72 hours.       Assessment:  (Medical Decision Making)     Hospital Problems  Date Reviewed: 3/7/2019          Codes Class Noted POA    Delirium ICD-10-CM: R41.0  ICD-9-CM: 780.09  3/6/2019 Unknown        * (Principal) Seizure ([facility] [address] ICD-10-CM: R56.9  ICD-9-CM: 780.39  3/5/2019 Yes        Metastatic cancer to brain [facility]) ICD-10-CM: C79.31  ICD-9-CM: 198.3  3/5/2019 Yes        Hyperglycemia ICD-10-CM: R73.9  ICD-9-CM: 790.29  3/5/2019 Yes        Acute on chronic respiratory failure with hypoxia and hypercapnia (HCC) ICD-10-CM: J96.21, J96.22  ICD-9-CM: 518.84, 786.09, 799.02  3/5/2019 Yes        Primary cancer of left upper lobe of lung ([facility] [address]- squamous cell ICD-10-CM: C34.12  ICD-9-CM: 162.3  7/30/2018 Yes        Hypertension, essential, benign (Chronic) ICD-10-CM: I10  ICD-9-CM: 401.1  Unknown Yes        COPD with emphysema (HCC) (Chronic) ICD-10-CM: J43.9  ICD-9-CM: 492.8  Unknown Yes        Morbid obesity (HCC) (Chronic) ICD-10-CM: E66.01  ICD-9-CM: 278.01  Unknown Yes        Di[facility] (Chronic) ICD-10-CM: E11.9  ICD-9-CM: 250.00  Unknown Yes        FLETCHER (obstructive sleep apnea) (Chronic) ICD-10-CM: G47.33  ICD-9-CM: 327.23  Unknown Yes        Chronic hypoxemic respiratory failure (HCC) (Chronic) ICD-10-CM: J96.11  ICD-9-CM: 518.83, 799.02  Unknown Yes              Plan:  (Medical Decision Making)   1   Wean O2 as tolerated  2   pfts tomorrow  3   bipap hs,  She did not cooperate with this last pm  4   Decadron per ns    More than 50% of the time documented was spent in face-to-face contact with the patient and in the care of the patient on the floor/unit where the patient is located.     Evangeline Sacks, MD

## 2019-03-10 NOTE — PROGRESS NOTES
03/10/19 0902   Oxygen Therapy   O2 Sat (%) (!) 82 %   Pulse via Oximetry 80 beats per minute   O2 Device Room air  (placed back on Opti-Flow 45L and 35%)     O2 sat Increased to 94% on Opti-Flow.

## 2019-03-10 NOTE — PROGRESS NOTES
Problem: Pressure Injury - Risk of  Goal: *Prevention of pressure injury  Document Gunnar Scale and appropriate interventions in the flowsheet. Outcome: Progressing Towards Goal  Pressure Injury Interventions:  Sensory Interventions: Assess changes in LOC, Check visual cues for pain, Keep linens dry and wrinkle-free, Maintain/enhance activity level, Minimize linen layers    Moisture Interventions: Absorbent underpads    Activity Interventions: PT/OT evaluation    Mobility Interventions: HOB 30 degrees or less    Nutrition Interventions: Document food/fluid/supplement intake    Friction and Shear Interventions: HOB 30 degrees or less               Problem: Falls - Risk of  Goal: *Absence of Falls  Document Cindy Fall Risk and appropriate interventions in the flowsheet.   Outcome: Progressing Towards Goal  Fall Risk Interventions:       Mentation Interventions: Adequate sleep, hydration, pain control    Medication Interventions: Bed/chair exit alarm    Elimination Interventions: Bed/chair exit alarm    History of Falls Interventions: Bed/chair exit alarm

## 2019-03-10 NOTE — PROGRESS NOTES
END OF SHIFT NOTE:    Intake/Output  03/09 1901 - 03/10 0700  In: 6450 [P.O.:1340; I.V.:449]  Out: 2550 [Urine:2550]   Voiding: YES  Catheter: YES  Drain:              Stool:  0 occurrences. Stool Assessment  Stool Color: Brown (03/08/19 1014)  Stool Appearance: Loose (03/08/19 1014)  Stool Amount: Medium (03/08/19 1014)  Stool Source/Status: Incontinence; Rectum (03/08/19 1014)    Emesis:  0 occurrences. VITAL SIGNS  Patient Vitals for the past 12 hrs:   Temp Pulse Resp BP SpO2   03/10/19 0335 98 °F (36.7 °C) 65 18 139/63 93 %   03/10/19 0006     100 %   03/09/19 2340 98.1 °F (36.7 °C) 60 18 142/79 99 %   03/09/19 2230     98 %   03/09/19 1940     93 %   03/09/19 1935 98.6 °F (37 °C) 91 18 131/64 95 %       Pain Assessment  Pain 1  Pain Scale 1: Numeric (0 - 10) (03/10/19 0547)  Pain Intensity 1: 8 (03/10/19 0547)  Patient Stated Pain Goal: 0 (03/10/19 0547)  Pain Reassessment 1: Yes (03/09/19 2332)  Pain Onset 1: chronic (03/10/19 0547)  Pain Location 1: Generalized (03/10/19 0547)  Pain Orientation 1: Other (comment)(generalized) (03/10/19 0547)  Pain Description 1: Aching (03/10/19 0547)  Pain Intervention(s) 1: Medication (see MAR); Repositioned (03/10/19 0547)    Ambulating  No    Additional Information:   Gets confused;restless;medicated prn;needs attended to. Constantly on her call light early part of the shift. Used optiflow 02 most of the night;stayed on BIPAP only for like an hour as pt takes it off to eat/drink. Shift report given to oncoming nurse Sherry Graff at the bedside.     Winston Willis RN

## 2019-03-10 NOTE — PROGRESS NOTES
Critical Care Outreach Nurse Progress Report:    Subjective: In to assess pt secondary to discharge from critical care area. MEWS Score: 1 (03/09/19 1935)    Vitals:    03/09/19 1356 03/09/19 1535 03/09/19 1935 03/09/19 1940   BP: 140/81  131/64    Pulse: 77  91    Resp: 19  18    Temp: 98 °F (36.7 °C)  98.6 °F (37 °C)    SpO2: 94% 97% 95% 93%   Weight:       Height:            Objective:     Pain Intensity 1: 0 (03/09/19 1735)  Pain Location 1: Generalized  Pain Intervention(s) 1: Medication (see MAR)  Patient Stated Pain Goal: 0    Assessment: Patient is alert. She is oriented to person and place. She is aware that she is being hospitalized because of cancer but she does not appear to understand the specifics or the plan of care. She is disoriented to time stating year as 1995 x3 attempts and she is unable to name the current president. Respirations are even and regular. Oxygen saturation 96% on airvo at 43% and 50 L. Patient denies dyspnea. Lung sounds are diminished throughout. Hr 70. Pulse regular. Patient complaining of generalized pain. Plan: Primary RN at bedside providing pain medication. Will continue to monitor per outreach protocol.

## 2019-03-11 LAB
GLUCOSE BLD STRIP.AUTO-MCNC: 137 MG/DL (ref 65–100)
GLUCOSE BLD STRIP.AUTO-MCNC: 174 MG/DL (ref 65–100)
GLUCOSE BLD STRIP.AUTO-MCNC: 177 MG/DL (ref 65–100)
GLUCOSE BLD STRIP.AUTO-MCNC: 219 MG/DL (ref 65–100)
GLUCOSE BLD STRIP.AUTO-MCNC: 251 MG/DL (ref 65–100)
GLUCOSE BLD STRIP.AUTO-MCNC: 262 MG/DL (ref 65–100)

## 2019-03-11 PROCEDURE — 74011000250 HC RX REV CODE- 250: Performed by: HOSPITALIST

## 2019-03-11 PROCEDURE — 99233 SBSQ HOSP IP/OBS HIGH 50: CPT | Performed by: INTERNAL MEDICINE

## 2019-03-11 PROCEDURE — 77030019605

## 2019-03-11 PROCEDURE — 99232 SBSQ HOSP IP/OBS MODERATE 35: CPT | Performed by: INTERNAL MEDICINE

## 2019-03-11 PROCEDURE — 99233 SBSQ HOSP IP/OBS HIGH 50: CPT | Performed by: NURSE PRACTITIONER

## 2019-03-11 PROCEDURE — 97530 THERAPEUTIC ACTIVITIES: CPT

## 2019-03-11 PROCEDURE — 65270000029 HC RM PRIVATE

## 2019-03-11 PROCEDURE — 74011250636 HC RX REV CODE- 250/636: Performed by: INTERNAL MEDICINE

## 2019-03-11 PROCEDURE — 94660 CPAP INITIATION&MGMT: CPT

## 2019-03-11 PROCEDURE — 82962 GLUCOSE BLOOD TEST: CPT

## 2019-03-11 PROCEDURE — 74011000250 HC RX REV CODE- 250: Performed by: INTERNAL MEDICINE

## 2019-03-11 PROCEDURE — 74011250637 HC RX REV CODE- 250/637: Performed by: INTERNAL MEDICINE

## 2019-03-11 PROCEDURE — 74011000258 HC RX REV CODE- 258: Performed by: INTERNAL MEDICINE

## 2019-03-11 PROCEDURE — 74011000258 HC RX REV CODE- 258: Performed by: HOSPITALIST

## 2019-03-11 PROCEDURE — 94760 N-INVAS EAR/PLS OXIMETRY 1: CPT

## 2019-03-11 PROCEDURE — 77010033678 HC OXYGEN DAILY

## 2019-03-11 PROCEDURE — 74011250636 HC RX REV CODE- 250/636: Performed by: HOSPITALIST

## 2019-03-11 PROCEDURE — 94640 AIRWAY INHALATION TREATMENT: CPT

## 2019-03-11 PROCEDURE — 74011636637 HC RX REV CODE- 636/637: Performed by: HOSPITALIST

## 2019-03-11 RX ADMIN — HYDROCODONE BITARTRATE AND ACETAMINOPHEN 1 TABLET: 7.5; 325 TABLET ORAL at 19:09

## 2019-03-11 RX ADMIN — PIPERACILLIN AND TAZOBACTAM 3.38 G: 3; .375 INJECTION, POWDER, FOR SOLUTION INTRAVENOUS at 23:47

## 2019-03-11 RX ADMIN — FAMOTIDINE 20 MG: 10 INJECTION, SOLUTION INTRAVENOUS at 20:56

## 2019-03-11 RX ADMIN — SODIUM CHLORIDE 500 MG: 900 INJECTION, SOLUTION INTRAVENOUS at 07:41

## 2019-03-11 RX ADMIN — DEXAMETHASONE SODIUM PHOSPHATE 6 MG: 10 INJECTION INTRAMUSCULAR; INTRAVENOUS at 05:32

## 2019-03-11 RX ADMIN — HUMAN INSULIN 9 UNITS: 100 INJECTION, SOLUTION SUBCUTANEOUS at 20:43

## 2019-03-11 RX ADMIN — HYDROCODONE BITARTRATE AND ACETAMINOPHEN 1 TABLET: 7.5; 325 TABLET ORAL at 05:32

## 2019-03-11 RX ADMIN — HUMAN INSULIN 3 UNITS: 100 INJECTION, SOLUTION SUBCUTANEOUS at 04:01

## 2019-03-11 RX ADMIN — PIPERACILLIN AND TAZOBACTAM 3.38 G: 3; .375 INJECTION, POWDER, FOR SOLUTION INTRAVENOUS at 16:39

## 2019-03-11 RX ADMIN — ALBUTEROL SULFATE 2.5 MG: 2.5 SOLUTION RESPIRATORY (INHALATION) at 19:14

## 2019-03-11 RX ADMIN — DEXAMETHASONE SODIUM PHOSPHATE 6 MG: 10 INJECTION INTRAMUSCULAR; INTRAVENOUS at 12:20

## 2019-03-11 RX ADMIN — Medication 10 ML: at 21:03

## 2019-03-11 RX ADMIN — BUDESONIDE 500 MCG: 0.5 INHALANT RESPIRATORY (INHALATION) at 08:59

## 2019-03-11 RX ADMIN — MORPHINE SULFATE 2 MG: 2 INJECTION, SOLUTION INTRAMUSCULAR; INTRAVENOUS at 07:41

## 2019-03-11 RX ADMIN — PIPERACILLIN AND TAZOBACTAM 3.38 G: 3; .375 INJECTION, POWDER, FOR SOLUTION INTRAVENOUS at 08:28

## 2019-03-11 RX ADMIN — FAMOTIDINE 20 MG: 10 INJECTION, SOLUTION INTRAVENOUS at 08:28

## 2019-03-11 RX ADMIN — LORAZEPAM 1 MG: 2 INJECTION INTRAMUSCULAR; INTRAVENOUS at 04:03

## 2019-03-11 RX ADMIN — SODIUM CHLORIDE 500 MG: 900 INJECTION, SOLUTION INTRAVENOUS at 19:54

## 2019-03-11 RX ADMIN — HYDROCODONE BITARTRATE AND ACETAMINOPHEN 1 TABLET: 7.5; 325 TABLET ORAL at 14:55

## 2019-03-11 RX ADMIN — HUMAN INSULIN 6 UNITS: 100 INJECTION, SOLUTION SUBCUTANEOUS at 16:40

## 2019-03-11 RX ADMIN — MORPHINE SULFATE 2 MG: 2 INJECTION, SOLUTION INTRAMUSCULAR; INTRAVENOUS at 22:31

## 2019-03-11 RX ADMIN — ALBUTEROL SULFATE 2.5 MG: 2.5 SOLUTION RESPIRATORY (INHALATION) at 14:11

## 2019-03-11 RX ADMIN — LORAZEPAM 1 MG: 2 INJECTION INTRAMUSCULAR; INTRAVENOUS at 22:31

## 2019-03-11 RX ADMIN — HUMAN INSULIN 9 UNITS: 100 INJECTION, SOLUTION SUBCUTANEOUS at 23:47

## 2019-03-11 RX ADMIN — BUDESONIDE 500 MCG: 0.5 INHALANT RESPIRATORY (INHALATION) at 19:14

## 2019-03-11 RX ADMIN — Medication 10 ML: at 05:37

## 2019-03-11 RX ADMIN — HUMAN INSULIN 3 UNITS: 100 INJECTION, SOLUTION SUBCUTANEOUS at 12:20

## 2019-03-11 RX ADMIN — MORPHINE SULFATE 2 MG: 2 INJECTION, SOLUTION INTRAMUSCULAR; INTRAVENOUS at 01:14

## 2019-03-11 RX ADMIN — DEXAMETHASONE SODIUM PHOSPHATE 6 MG: 10 INJECTION INTRAMUSCULAR; INTRAVENOUS at 17:58

## 2019-03-11 RX ADMIN — DEXAMETHASONE SODIUM PHOSPHATE 6 MG: 10 INJECTION INTRAMUSCULAR; INTRAVENOUS at 23:48

## 2019-03-11 RX ADMIN — MORPHINE SULFATE 2 MG: 2 INJECTION, SOLUTION INTRAMUSCULAR; INTRAVENOUS at 04:02

## 2019-03-11 RX ADMIN — HYDROCODONE BITARTRATE AND ACETAMINOPHEN 1 TABLET: 7.5; 325 TABLET ORAL at 23:48

## 2019-03-11 RX ADMIN — ALBUTEROL SULFATE 2.5 MG: 2.5 SOLUTION RESPIRATORY (INHALATION) at 08:59

## 2019-03-11 NOTE — PROGRESS NOTES
[facility] Hematology & Oncology        Inpatient Hematology / Oncology Progress Note      Admission Date: 3/4/2019 11:10 PM  Reason for Admission/Hospital Course: Seizure [facility]) [R56.9]  Metastatic cancer to brain ([facility] [address] [C79.31]  Hyperglycemia [R73.9]      24 Hour Events:  Afebrile, VSS. Fatigued, weak. Respiratory status stable. Neurosurgery with plans for craniotomy on Wednesday if cleared by pulmonary. Pulmonology with plans for PFTs today  C/o back pain      10 point review of systems is otherwise negative with the exception of the elements mentioned above in the HPI. Allergies   Allergen Reactions    Iodides Tincture [Iodine] Hives       OBJECTIVE:  Patient Vitals for the past 8 hrs:   BP Temp Pulse Resp SpO2   19 1135 120/76 97.4 °F (36.3 °C) (!) 56 18 99 %   19 0642 125/62 97.5 °F (36.4 °C) (!) 59 19 94 %     Temp (24hrs), Av.7 °F (36.5 °C), Min:97.4 °F (36.3 °C), Max:98.6 °F (37 °C)    No intake/output data recorded. Physical Exam:  Constitutional: Chronically-Ill appearing female in no acute distress, lying on the side comfortably in the hospital bed. HEENT: Normocephalic and atraumatic. Oropharynx is clear, mucous membranes are moist.  Neck supple. Lymph node   Deferred. Skin Warm and dry. No bruising and no rash noted. No erythema. No pallor. Respiratory Decreased in bases bilaterally. Normal air exchange without accessory muscle use. On optiflow. CVS Mildly bradycardic rate, regular rhythm and normal S1 and S2. No murmurs, gallops, or rubs. Abdomen Soft, nontender and nondistended, normoactive bowel sounds. No palpable mass. No hepatosplenomegaly. Neuro Grossly nonfocal with no obvious sensory or motor deficits. MSK Normal range of motion in general.  Trace generalized edema and no tenderness. Psych Sleeping.          Labs:      No results for input(s): WBC, RBC, HGB, HCT, MCV, MCH, MCHC, RDW, PLT, GRANS, LYMPH, MONOS, EOS, BASOS, IG, DF, ANEU, ABL, ABM, TUSHAR, ABB, AIG, HGBEXT, HCTEXT, PLTEXT, HGBEXT, HCTEXT, PLTEXT in the last 72 hours. No lab exists for component: MPV     No results for input(s): NA, K, CL, CO2, AGAP, GLU, BUN, CREA, BUCR, GFRAA, GFRNA, CA, TBIL, GPT, SGOT, AP, TP, ALB, GLOB, AGRAT, MG, PHOS in the last 72 hours. Imaging:  CT CHEST ABD PELV WO CONT [[id number]] Collected: 03/07/19 2302   Order Status: Completed Updated: 03/07/19 2308   Narrative:     CT CHEST, ABDOMEN AND PELVIS WITH CONTRAST. INDICATION: Breast cancer with brain mass, staging exam.     COMPARISON: July 2018. TECHNIQUE:   5 mm axial scans from the lung apices to the pubic symphysis   following oral contrast only.  Radiation dose reduction techniques were used for  this study.  Our CT scanners use one or more of the following:  Automated  exposure control, adjustment of the mA and or kV according to patient size,  iterative reconstruction. Findings:   CHEST:  The left lung masses increased in size and now measures 6.2 x 8.7 cm. Trace bilateral pleural effusions. There are multiple enlarged central  mediastinal lymph nodes. Aortic calcifications are present.     ABDOMEN:  No gross focal parenchymal abnormality identified within the liver or  spleen on this noncontrast exam. The gallbladder is absent. The biliary tree is  not dilated. The pancreas is unremarkable. No free fluid, acute inflammatory  changes or adenopathy. Bowel loops are not dilated. The kidneys are normal size. No radiopaque renal calculi. No hydronephrosis. The adrenal glands are normal  size. Aorta is normal caliber and calcified. PELVIS:  No free fluid or acute inflammatory changes or bony lesions. Dorotha Danes  urinary bladder is is drained by Leyva catheter. Impression:     IMPRESSION:  Significant enlargement of the left lung mass which now measures  6.2 x 8.7 cm.  No other new abnormality.           MRI BRAIN W WO CONT [[id number]] Collected: 03/07/19 1202   Order Status: Completed Updated: 03/07/19 1220   Narrative:     EXAMINATION: BRAIN MRI 3/7/2019 11:43 AM    ACCESSION NUMBER: [id number]    INDICATION: Lung cancer, unspecified; metastatic lung cancer to brain    COMPARISON: Head CT 3/5/2019    TECHNIQUE: Multiplanar multisequence MRI of the brain without and with  intravenous administration of 28 cc Dotarem contrast agent. FINDINGS:    Technically difficult exam due to patient motion. There is a 3.1 x 3.0 x 3.5 cm (AP x transverse x craniocaudal) (axial  postcontrast image 18 and coronal postcontrast image 24) peripherally enhancing  centrally necrotic mass in the posterior right parietal lobe abutting the  parieto-occipital sulcus. This corresponds to the lesion of concern prior head  CT. Peripheral reduced diffusion likely corresponds to areas of increased  cellularity. There is no evidence of associated blood products. There is surrounding vasogenic edema. The vasogenic edema results in partial  effacement of the posterior and occipital horns of the right lateral ventricle. There is no midline shift. The basilar cisterns are patent. There is no  cerebellar tonsillar ectopia or herniation.     There is T2 hyperintensity of the body and tail of the right hippocampus.      Diffusion imaging shows no evidence of acute ischemic infarction.      There are T2 hyperintensities in the periventricular and subcortical white  matter and charles, a nonspecific finding which likely represents chronic  microangiopathy. The expected large vascular flow voids are preserved.     There is no definite evidence of other abnormal enhancing intracranial lesions  within the substantial limitation of motion degradation on the postcontrast  images. No evidence of suspicious osseous lesions within the limits of patient motion. Impression:     IMPRESSION:    1. 3.1 x 3.0 x 3.5 cm posterior right parietal cystic mass with surrounding  vasogenic edema.  Given the history of lung cancer, a metastasis is favored. 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    2. There is no definite evidence of other enhancing intracranial lesions within  the substantial limitation of patient motion on the postcontrast images. The  degree of motion could obscure subtle or small metastases. If there are new or  progressive neurologic progressive symptoms, a repeat MRI should be considered  when the patient is better able to lie still. 3. There is T2 hyperintensity within the body and tail of the right hippocampus. In the setting of the right-sided mass and surrounding vasogenic edema, this is  most likely to be either post ictal or an extension of the adjacent vasogenic  edema. In the setting of known systemic neoplasia, although less likely in the  absence of contralateral findings, a paraneoplastic syndrome cannot be excluded. Directed attention on follow-up examinations is recommended. VOICE DICTATED BY: Dr. Rao Hopper ABD PELV W CONT [[id number]]    Order Status: Canceled    CTA CHEST ABD PELV W WO CONT [[id number]]    Order Status: Canceled    MRI BRAIN W CONT [[id number]]    Order Status: Canceled    XR CHEST PORT [[id number]] Collected: 03/05/19 0239   Order Status: Completed Updated: 03/05/19 0244   Narrative:     Portable chest xray      COMPARISON: August 1, 2018    CLINICAL HISTORY: Altered mental status. FINDINGS:    There are bilateral diffuse groundglass opacities, likely vascular congestion. There is confluent masslike opacity in the left perihilar region. This is more  prominent compared to prior examination. Heart appears enlarged. No  pneumothorax. Impression:     IMPRESSION:    Masslike opacity in the left perihilar region, significantly more prominent  compared to July 29, 2018 CT. Findings suspicious for lung tumor. Contrast-enhanced CT chest should be considered to better characterize.             DC4         CT HEAD WO CONT [[id number]] Collected: 03/05/19 0047   Order Status: Completed Updated: 03/05/19 0129   Narrative:     Noncontrast CT of the brain. COMPARISON: none    INDICATION: Altered mental status. History of lung cancer. TECHNIQUE: Contiguous axial images were obtained from the skull base through the  vertex without IV contrast. Radiation dose reduction techniques were used for  this study:  Our CT scanners use one or all of the following: Automated exposure  control, adjustment of the mA and/or kVp according to patient's size, iterative  reconstruction. FINDINGS:    There is an approximately 2 cm cystic mass in the right parieto-occipital lobe. There is surrounding vasogenic edema. There is mass effect on the occipital horn  of the right lateral ventricle. There is no midline shift or hydrocephalus. Included globes appear intact. Paranasal sinuses and the mastoid air cells are  aerated. Surrounding bones are intact. Impression:     IMPRESSION:    1. Brain mass in the right parieto-occipital lobe with surrounding vasogenic  edema. Contrast enhanced MRI of the brain should be considered to better  evaluate/characterize. .             Medications:  Current Facility-Administered Medications   Medication Dose Route Frequency    morphine injection 2 mg  2 mg IntraVENous Q3H PRN    LORazepam (ATIVAN) injection 1 mg  1 mg IntraVENous Q4H PRN    HYDROcodone-acetaminophen (NORCO) 7.5-325 mg per tablet 1 Tab  1 Tab Oral Q4H PRN    insulin glargine (LANTUS) injection 30 Units  30 Units SubCUTAneous DAILY    famotidine (PF) (PEPCID) 20 mg in sodium chloride 0.9% 10 mL injection  20 mg IntraVENous Q12H    sodium chloride (NS) flush 5-10 mL  5-10 mL IntraVENous PRN    sodium chloride (NS) flush 5-40 mL  5-40 mL IntraVENous Q8H    sodium chloride (NS) flush 5-40 mL  5-40 mL IntraVENous PRN    NUTRITIONAL SUPPORT ELECTROLYTE PRN ORDERS   Does Not Apply PRN    acetaminophen (TYLENOL) tablet 650 mg  650 mg Oral Q4H PRN    ondansetron (ZOFRAN) injection 4 mg  4 mg IntraVENous Q4H PRN    naloxone (NARCAN) injection 0.4 mg  0.4 mg IntraVENous PRN    albuterol-ipratropium (DUO-NEB) 2.5 MG-0.5 MG/3 ML  3 mL Nebulization Q4H PRN    levETIRAcetam (KEPPRA) 500 mg in 0.9% sodium chloride 100 mL IVPB  500 mg IntraVENous Q12H    budesonide (PULMICORT) 500 mcg/2 ml nebulizer suspension  500 mcg Nebulization BID RT    And    albuterol (PROVENTIL VENTOLIN) nebulizer solution 2.5 mg  2.5 mg Nebulization Q6HWA RT    dexamethasone (DECADRON) injection 6 mg  6 mg IntraVENous Q6H    piperacillin-tazobactam (ZOSYN) 3.375 g in 0.9% sodium chloride (MBP/ADV) 100 mL  3.375 g IntraVENous Q8H    insulin regular (NOVOLIN R, HUMULIN R) injection   SubCUTAneous Q4H         ASSESSMENT:    Problem List  Date Reviewed: 3/7/2019          Codes Class Noted    Delirium ICD-10-CM: R41.0  ICD-9-CM: 780.09  3/6/2019        * (Principal) Seizure ([facility] [address] ICD-10-CM: R56.9  ICD-9-CM: 780.39  3/5/2019        Metastatic cancer to brain [facility]) ICD-10-CM: C79.31  ICD-9-CM: 198.3  3/5/2019        Hyperglycemia ICD-10-CM: R73.9  ICD-9-CM: 790.29  3/5/2019        Acute on chronic respiratory failure with hypoxia and hypercapnia (HCC) ICD-10-CM: J96.21, J96.22  ICD-9-CM: 518.84, 786.09, 799.02  3/5/2019        Benign neoplasm of colon (Chronic) ICD-10-CM: D12.6  ICD-9-CM: 211.3  10/31/2018        Mediastinal lymphadenopathy ICD-10-CM: R59.0  ICD-9-CM: 785.6  7/30/2018        Primary cancer of left upper lobe of lung ([facility] [address]- squamous cell ICD-10-CM: C34.12  ICD-9-CM: 162.3  7/30/2018        Chronic pain (Chronic) ICD-10-CM: S94.19  ICD-9-CM: 338.29  7/28/2018        Acute respiratory failure ([facility] ICD-10-CM: J96.00  ICD-9-CM: 518.81  7/28/2018        Hypertension, essential, benign (Chronic) ICD-10-CM: I10  ICD-9-CM: 401.1  Unknown        Hyperlipidemia (Chronic) ICD-10-CM: E78.5  ICD-9-CM: 272.4  Unknown        COPD with emphysema (HCC) (Chronic) ICD-10-CM: J43.9  ICD-9-CM: 492.8  Unknown        Morbid obesity (HCC) (Chronic) ICD-10-CM: E66.01  ICD-9-CM: 278.01  Unknown        Primary pulmonary hypertension (HCC) (Chronic) ICD-10-CM: I27.0  ICD-9-CM: 416.0  Unknown        Diabetes mellitus without complication (HCC) (Chronic) ICD-10-CM: E11.9  ICD-9-CM: 250.00  Unknown        Orthopnea (Chronic) ICD-10-CM: R06.01  ICD-9-CM: 786.02  Unknown        Abnormality of gait and mobility ICD-10-CM: R26.9  ICD-9-CM: 762. 2  Unknown        Insomnia (Chronic) ICD-10-CM: G47.00  ICD-9-CM: 780.52  12/27/2016        Extreme obesity with respiratory disorder (HCC) (Chronic) ICD-10-CM: E66.01, J98.9  ICD-9-CM: 278.01, 519.9  Unknown        FLETCHER (obstructive sleep apnea) (Chronic) ICD-10-CM: G47.33  ICD-9-CM: 327.23  Unknown        Chronic hypoxemic respiratory failure (HCC) (Chronic) ICD-10-CM: J96.11  ICD-9-CM: 518.83, 799.02  Unknown        Cor pulmonale (HCC) (Chronic) ICD-10-CM: I27.81  ICD-9-CM: 416.9  Unknown        CHF (congestive heart failure), NYHA class I (HCC) (Chronic) ICD-10-CM: I50.9  ICD-9-CM: 428.0  Unknown    Overview Signed 12/27/2016 11:24 AM by Bravo Cespedes     diastolic             Obesity with alveolar hypoventilation (HCC) (Chronic) ICD-10-CM: I96.7  ICD-9-CM: 278.03  Unknown        Arthritis of knee (Chronic) ICD-10-CM: M17.10  ICD-9-CM: 716.96  Unknown        History of colonic polyps (Chronic) ICD-10-CM: Z86.010  ICD-9-CM: V12.72  8/10/2015    Overview Signed 10/31/2018 11:30 AM by Taz Riley NP     Last Assessment & Plan:   She has several adenomas on her initial colonoscopy. I believe there was some doubt about whether the cecal polyp on that exam was completely removed. Follow-up colonoscopy did reveal a persistent cecal polyp. This polyp was read as tubulovillous adenoma. It has been 3 years male of this month. Because of the villous pathology we will proceed with a follow-up colonoscopy.  She will hold Motrin as well as aspirin for 3 days prior to the procedure. We discussed the current colorectal cancer screening guidelines as well as the risks and benefits of colonoscopy in detail. We also discussed the prep in detail and she wishes to proceed. Cause of her oxygen use we will need to do her on the inpatient side of the hospital. We spent at least 15-20 minutes procuring records and bringing them forward from the hospital electronic medical record system into today's office visit. We then spent an additional 15-20 minutes in counseling in addition to the time spent during her history and physical exam.                     PLAN:  Stage IV Squamous cell lung cancer with likely brain mets / Seizure  - never rec'd treatment, has been on home Hospice since 8/2018. Family has revoked Hospice and changed pt to full code  - CT head with brain mass in the R parieto-occipital lobe with surrounding vasogenic edema  - MRI brain pending  - CT CAP for staging when able  - On Dex/Keppra  - NS considering craniotomy  3/7  MRI completed this AM and showed right parietal cystic mass with surrounding vasogenic edema concerning for metastasis, but primary glial neoplasm could not be excluded. Await NS recommendations re: craniotomy. Full staging with CT CAP. Ongoing discussion with family regarding plans once diagnosis confirmed  3/8 Difficult situation. Discussed with pulmonary who is very concerned about being able to extubate following craniotomy given COPD and lung mass. CT CAP without evidence of other metastatic disease but does show enlarging lung mass. Patient is clearly not a candidate for any cancer directed therapy at this time given weakness/debility. Also, would not be able to travel back and forth to radiation with current O2 needs. Of note, patient was essentially bedbound at home from other chronic conditions. Son stated that they did want treatment for her lung cancer back in August but felt \"pushed' into hospice.  We will await final decisions from the rest of the team regarding plan and will follow up final biopsy results. 03/11 Plans for craniotomy on Wednesday if cleared by pulmonary. Plans for PFTs to be done today. No new complaints, respiratory status stable. Resp Failure  - on BiPAP  - on Zosyn/Vanc  - UCx-NGTD. BCx-pending  3/7/19 Continue vanc/zosyn. 03/10 Pan cultures negative thus far. Continues antibiotics. On optiflow. 3/11  Cultures NG. Con't Zosyn. On O2 via NC. DM  - Primary team managing      Goals and plan of care reviewed with the patient. All questions answered to the best of our ability. Carola Lebron NP   [facility]  [address] [place] [address]  Office : [phone]  Fax : [phone]     Attending Addendum:  Patient seen with NP. Pt with known Stage IV squamous cell carcinoma of lung with new diagnosis of likely brain metastasis. She presented with a seizure. She has been on hospice since 8/2018. CT head on admission with right parieto/occipital lobe mass with vasogenic edema. MRI with parietal cystic mass concerning for metastatic disease vs primary glial mass. NS considering resection. Pulmonary with concern regarding ability to extubate due to baseline COPD/morbid obesity and lung mass. CT CAP without other metastatic disease. I personally performed a face to face diagnostic evaluation on this patient. My findings are as follows: awake and alert, able to answer questions, lungs with decreased BS, heart regular, abdomen obese and no LE edema. c/w keppra and dex. NS planning to take pt for tumor resection on 3/13/19. Pulm evaluated pt today. POA to be here tomorrow - will speak to her when she's here to update. I have reviewed and agree with the care plan.               Corine Schulz MD  [facility]  [address]  Office : [phone]  Fax : [phone]

## 2019-03-11 NOTE — PROGRESS NOTES
NEUROSURGERY PROGRESS NOTE:   Admit Date: 3/4/2019  Subjective:   Patient more alert and overall status improving significantly. Her blood glucose has been less than 200. She was transferred to the floor over the weekend. Objective:  Visit Vitals  /62 (BP 1 Location: Left arm, BP Patient Position: At rest)   Pulse (!) 59   Temp 97.5 °F (36.4 °C)   Resp 19   Ht 5' 6\" (1.676 m)   Wt 309 lb 1.4 oz (140.2 kg)   SpO2 94%   Breastfeeding? No   BMI 49.89 kg/m²   Awake, alert, and oriented to person, place, not year, stated she did not care who the president was at this time  Eyes open spontaneously   PERRL, EOMI  Patient with 5/5 strength in the bilateral upper and bilateral lower extremities    Patient did not cooperate with drift and pronation assessment     Assessment and Plan:   Kaitlynn Ferreira 59 y.o. female multiple medical co-morbidities who was admitted with altered mental status, seizure activity, Acute respiratory failure, severe hyperglycemia which blood glucose levels in the 500s, and a leukocytosis. She was found have a new right 2.6 x 2.8 x 2.5 mixed cystic and nodular temporal-occipital lesion with a central hypointense core and surrounding vasogenic edema with local mass effect and absence or mid-line shift. MRI Brain WWO contrast demonstrated a solitary 3.1 x 3.0 x 3.5 cm posterior right parietal cystic mass with surrounding vasogenic edema that is likely consistent with metastatic lung carcinoma.  Her CT Chest/Abdomen/Pelvis W contrast demonstrated interval enlargement of her left upper lobe mass that now measures 6.2 x 8.7 cm.   - Recommend q4H neuro checks   - Recommend Keppra 500 mg BID   - Continue decadron 4 mg q6H for cerebral edema   - Recommend GI Prophylaxis in the form of a PPI or H2 blocker while in the acute care setting and on high dose steroids   - If pulmonary thick she is safe to proceed with intubation and surgery would plan to proceed with surgery for a right parietal craniotomy for resection on Wednesday, 3/13/2019.   - Please call with questions or concerns    Karla Patino.  Braulio Drumright, [facility]

## 2019-03-11 NOTE — PROGRESS NOTES
Milan Gurrola  Admission Date: 3/4/2019             Daily Progress Note: 3/11/2019    The patient's chart is reviewed and the patient is discussed with the staff.      58 y.o.  female seen and evaluated at the request of Dr. Ilene Robles  She presented with seizure. Moved to the ICU for BiPAP. Stage 4 NSCLC with new brain met and edema on head CT. New seizure and neurology saw, Oncology consulted.  Spencer Suarez was diagnosed with squamous cell lung cancer last year after undergoing a left upper lobe biopsy. She was seen by oncology and an outpatient PET with follow up appointment was recommended but patient ended up under the care of hospice instead. She is morbidly obese and has a history of COPD with chronic dyspnea. She quit smoking 4 to 5 years ago. She is maintained on 2 liters of oxygen and has a home nebulizer. She has FLETCHER/OHS with chronic hypercapnea but she has refused CPAP/Bipap in the past.   She lives with her son and he reports that she can only ambulate a step of 2 at a time. She has been at home in hospice care since August 2018. Discussing Hospice house and revoked wanted to have tumor removed. Neurosurgery has seen and is considering a craniotomy. PC consulted and full code confirmed. Subjective:   Reports slightly better. No seizures. On steroids. On 2L NC. Was using 1.5L at home. No new complaints. Ate breakfast this morning. No new complaints.      Current Facility-Administered Medications   Medication Dose Route Frequency    morphine injection 2 mg  2 mg IntraVENous Q3H PRN    LORazepam (ATIVAN) injection 1 mg  1 mg IntraVENous Q4H PRN    HYDROcodone-acetaminophen (NORCO) 7.5-325 mg per tablet 1 Tab  1 Tab Oral Q4H PRN    insulin glargine (LANTUS) injection 30 Units  30 Units SubCUTAneous DAILY    famotidine (PF) (PEPCID) 20 mg in sodium chloride 0.9% 10 mL injection  20 mg IntraVENous Q12H    sodium chloride (NS) flush 5-10 mL  5-10 mL IntraVENous PRN    sodium chloride (NS) flush 5-40 mL  5-40 mL IntraVENous Q8H    sodium chloride (NS) flush 5-40 mL  5-40 mL IntraVENous PRN    NUTRITIONAL SUPPORT ELECTROLYTE PRN ORDERS   Does Not Apply PRN    acetaminophen (TYLENOL) tablet 650 mg  650 mg Oral Q4H PRN    ondansetron (ZOFRAN) injection 4 mg  4 mg IntraVENous Q4H PRN    naloxone (NARCAN) injection 0.4 mg  0.4 mg IntraVENous PRN    albuterol-ipratropium (DUO-NEB) 2.5 MG-0.5 MG/3 ML  3 mL Nebulization Q4H PRN    levETIRAcetam (KEPPRA) 500 mg in 0.9% sodium chloride 100 mL IVPB  500 mg IntraVENous Q12H    budesonide (PULMICORT) 500 mcg/2 ml nebulizer suspension  500 mcg Nebulization BID RT    And    albuterol (PROVENTIL VENTOLIN) nebulizer solution 2.5 mg  2.5 mg Nebulization Q6HWA RT    dexamethasone (DECADRON) injection 6 mg  6 mg IntraVENous Q6H    piperacillin-tazobactam (ZOSYN) 3.375 g in 0.9% sodium chloride (MBP/ADV) 100 mL  3.375 g IntraVENous Q8H    insulin regular (NOVOLIN R, HUMULIN R) injection   SubCUTAneous Q4H     Review of Systems  Constitutional: negative for fever, chills, sweats  Cardiovascular: negative for chest pain, palpitations, syncope, edema  Gastrointestinal:  negative for dysphagia, reflux, vomiting, diarrhea, abdominal pain, or melena  Neurologic:  negative for focal weakness, numbness, headache    Objective:     Vitals:    03/10/19 2308 03/11/19 0413 03/11/19 0642 03/11/19 1135   BP: 129/71 141/78 125/62 120/76   Pulse: 70 62 (!) 59 (!) 56   Resp: 18 18 19 18   Temp: 97.6 °F (36.4 °C) 97.5 °F (36.4 °C) 97.5 °F (36.4 °C) 97.4 °F (36.3 °C)   SpO2: 93% 97% 94% 99%   Weight:       Height:         Intake and Output:   03/09 1901 - 03/11 0700  In: 3168 [P.O.:2550; I.V.:618]  Out: 5050 [Urine:5050]  No intake/output data recorded.     Physical Exam:   Constitution:  the patient is well developed and in no acute distress  EENMT:  Sclera clear, pupils equal, oral mucosa moist  Respiratory: clear with decreased breath sounds  Cardiovascular:  RRR without M,G,R  Gastrointestinal: soft and non-tender; with positive bowel sounds. Musculoskeletal: warm without cyanosis. There is no lower leg edema. Skin:  no jaundice or rashes, no wounds   Neurologic: no gross neuro deficits     Psychiatric:  Lethargic but will wake up and answer some questions    CXR: None today    LAB  Recent Labs     03/11/19  1056 03/11/19  0925 03/11/19  0401 03/10/19  2312 03/10/19  2009   GLUCPOC 174* 137* 177* 257* 308*      No results for input(s): WBC, HGB, HCT, PLT, INR, HGBEXT, HCTEXT, PLTEXT, HGBEXT, HCTEXT, PLTEXT in the last 72 hours. No lab exists for component: INREXT, INREXT  No results for input(s): NA, K, CL, CO2, GLU, BUN, CREA, MG, CA, PHOS, TROIQ, ALB, TBIL, TBILI, GPT, ALT, SGOT, BNPP in the last 72 hours. No lab exists for component: TROIP  No results for input(s): PH, PCO2, PO2, HCO3, PHI, PCO2I, PO2I, HCO3I in the last 72 hours. No results for input(s): LCAD, LAC in the last 72 hours.     Assessment:  (Medical Decision Making)     Hospital Problems  Date Reviewed: 3/7/2019          Codes Class Noted POA    Delirium ICD-10-CM: R41.0  ICD-9-CM: 780.09  3/6/2019 Unknown        * ([facility] ICD-10-CM: R56.9  ICD-9-CM: 780.39  3/5/2019 Yes        Metastatic cancer to brain [facility]) ICD-10-CM: C79.31  ICD-9-CM: 198.3  3/5/2019 Yes        Hyperglycemia ICD-10-CM: R73.9  ICD-9-CM: 790.29  3/5/2019 Yes        Acute on chronic respiratory failure with hypoxia and hypercapnia (HCC) ICD-10-CM: J96.21, J96.22  ICD-9-CM: 518.84, 786.09, 799.02  3/5/2019 Yes        Primary cancer of left upper lobe of lung ([facility]- squamous cell ICD-10-CM: C34.12  ICD-9-CM: 162.3  7/30/2018 Yes        Hypertension, essential, benign (Chronic) ICD-10-CM: I10  ICD-9-CM: 401.1  Unknown Yes        COPD with emphysema (HCC) (Chronic) ICD-10-CM: J43.9  ICD-9-CM: 492.8  Unknown Yes        Morbid obesity (HCC) (Chronic) ICD-10-CM: E66.01  ICD-9-CM: 278.01  Unknown Yes        Diabetes mellitus without complication (HCC) (Chronic) ICD-10-CM: E11.9  ICD-9-CM: 250.00  Unknown Yes        FLETCHER (obstructive sleep apnea) (Chronic) ICD-10-CM: G47.33  ICD-9-CM: 327.23  Unknown Yes        Chronic hypoxemic respiratory failure (HCC) (Chronic) ICD-10-CM: J96.11  ICD-9-CM: 518.83, 799.02  Unknown Yes              Plan:  (Medical Decision Making)   1   Wean O2 as tolerated  2   pfts ordered for today  3   Refused BIPAP again last night  4   Decadron per ns  5   Will likely need rehab and needs chemo/rads plan with oncology given proceeding with neurosurgery for stage 4 lung cancer. 6    Patient has an intermediate risk for respiratory failure following surgery (10% risk of mechanical ventilation for more than 48 hours)     The patient does not require additional evaluation or therapy prior to undergoing non-thoracic surgery. The preventive strategies for pulmonary complications are included below. PREOP:  1. Smoking cessation: complete  2. Optimize inhaler regimen: complete  3. Treat for COPD or asthma flare with Prednisone or delay elective surgery: continue current steroids, demonstrates significant improvement and on home O2 dose  4. Delay surgery for active respiratory infection and do not use \"preventive antibiotics: for respiratory infection: no signs of active respiratory infection. 5. Preop IS, breathing exercises and ambulation as able. INTRAOP:  1. Less invasive (laproscopic) and short procedure (<3 hours) when possible  2. Nerve block rather than general anesthesia when possible  3. Administer Albuterol neb 30 minutes prior to intubation  4. Avoid neuromuscular blockade when possible    POSTOP:  1. Post op IS, breathing exercise and ambulation as soon as able  2. Epidural anesthesia instead of opioids when able  3. Consider CPAP post op for 4-6 hours in higher risk and longer procedures  4.  Avoid NGT after abdominal surgery when not needed for GI symptom control    Discussed with Hazel daughter, risks of surgery with respiratory failure. More than 50% of the time documented was spent in face-to-face contact with the patient and in the care of the patient on the floor/unit where the patient is located.     Jack Lloyd MD

## 2019-03-11 NOTE — PROGRESS NOTES
Dr. Jami Valentino paged regarding pt's NPO order. Per MD, pt may eat. Planning for surgery on 3/13.

## 2019-03-11 NOTE — PROGRESS NOTES
END OF SHIFT NOTE:    Intake/Output  03/11 0701 - 03/11 1900  In: 377 [P.O.:240; I.V.:137]  Out: 1200 [Urine:1200]   Voiding: YES  Catheter: YES  Drain:              Stool:  1 occurrences. Stool Assessment  Stool Color: Brown (03/11/19 1509)  Stool Appearance: Loose (03/11/19 1509)  Stool Amount: Small (03/11/19 1509)  Stool Source/Status: Rectum (03/11/19 1509)    Emesis:  0 occurrences. VITAL SIGNS  Patient Vitals for the past 12 hrs:   Temp Pulse Resp BP SpO2   03/11/19 1440 98.1 °F (36.7 °C) 95 18 114/57 96 %   03/11/19 1411     95 %   03/11/19 1135 97.4 °F (36.3 °C) (!) 56 18 120/76 99 %   03/11/19 0859     94 %   03/11/19 0642 97.5 °F (36.4 °C) (!) 59 19 125/62 94 %       Pain Assessment  Pain 1  Pain Scale 1: Visual (03/11/19 1555)  Pain Intensity 1: 0 (03/11/19 1555)  Patient Stated Pain Goal: 0 (03/11/19 1455)  Pain Reassessment 1: Patient sleeping (03/11/19 1555)  Pain Onset 1: chronic (03/11/19 1455)  Pain Location 1: Back (03/11/19 1455)  Pain Orientation 1: Lower (03/11/19 1455)  Pain Description 1: Sharp (03/11/19 1455)  Pain Intervention(s) 1: Medication (see MAR) (03/11/19 1455)    Ambulating  Yes    Additional Information: PRN norco and morphine given 1x today for lower back pain. Waiting on PFTs. This RN requested that pulmonary update family as they were waiting on their recommendation. Per neurosurgery, planning for surgery on Wednesday, 3/13 if family consents. Luis Dory, pt's daughter and POA requests updates on any surgery plans as family is not sure if they want to proceed. Pt AAOx3 today. Cardiac monitoring d/c per MD.     Shift report given to oncoming nurse at the bedside.     Cielo Saez

## 2019-03-11 NOTE — PROGRESS NOTES
Date of Outreach Update:  Neo Enciso was seen and assessed. Previous Outreach assessment has been reviewed. There have been no significant clinical changes since the completion of the last dated Outreach assessment. Patient sleeping. O2 sat 82% on room air. Reapplied 2L NC and O2 sat no 93%. Will continue to follow up per outreach protocol.     Signed By:   Des Nunez    March 11, 2019 3:20 AM

## 2019-03-11 NOTE — PROGRESS NOTES
Dr. Kasie Aaron notified of current lantus order with pt's bs 137 and NPO diet order. Per MD, hold lantus.

## 2019-03-11 NOTE — PROGRESS NOTES
Problem: Pressure Injury - Risk of  Goal: *Prevention of pressure injury  Document Gunnar Scale and appropriate interventions in the flowsheet. Outcome: Progressing Towards Goal  Pressure Injury Interventions:  Sensory Interventions: Assess changes in LOC    Moisture Interventions: Absorbent underpads    Activity Interventions: Pressure redistribution bed/mattress(bed type)    Mobility Interventions: Assess need for specialty bed    Nutrition Interventions: Document food/fluid/supplement intake    Friction and Shear Interventions: Foam dressings/transparent film/skin sealants               Problem: Falls - Risk of  Goal: *Absence of Falls  Document Cindy Fall Risk and appropriate interventions in the flowsheet.   Outcome: Progressing Towards Goal  Fall Risk Interventions:       Mentation Interventions: Adequate sleep, hydration, pain control    Medication Interventions: Evaluate medications/consider consulting pharmacy    Elimination Interventions: Call light in reach    History of Falls Interventions: Door open when patient unattended

## 2019-03-11 NOTE — PROGRESS NOTES
Chart screened by  for discharge planning. No needs identified at this time.   Please consult  if any new issues arise

## 2019-03-11 NOTE — PROGRESS NOTES
[facility] CRITICAL CARE OUTREACH NURSE PROGRESS REPORT      SUBJECTIVE: Called to assess patient secondary to transfer from CCU. MEWS Score: 1 (03/10/19 1951)  Vitals:    03/10/19 1531 03/10/19 1951 03/10/19 2010 03/10/19 2029   BP: 143/73 121/65     Pulse: 78 82     Resp: 20 19     Temp: 98.6 °F (37 °C) 97.6 °F (36.4 °C)     SpO2: 92% 94% 95%    Weight:    140.2 kg (309 lb 1.4 oz)   Height:            LAB DATA:    Recent Labs     03/08/19 0359   *   K 4.1   CL 96*   CO2 30   AGAP 9   *   BUN 15   CREA 0.61   GFRAA >60   GFRNA >60   CA 9.9        Recent Labs     03/08/19 0359   WBC 8.5   HGB 11.3*   HCT 39.2             OBJECTIVE: On arrival to room, I found patient to be watching TV, primary RN at bedside. .         Pain Assessment  Pain Intensity 1: 0 (03/10/19 1951)  Pain Location 1: Generalized  Pain Intervention(s) 1: Medication (see MAR), Repositioned  Patient Stated Pain Goal: 0      ASSESSMENT:  Patient A&Ox4, able to follow commands, speech clear. Breath sounds diminished with symmetrical chest expansion on 2L NC. RT at bedside for patient's breathing treatment. HR 88 and O2 sat 95%. PLAN:  Will wear BiPap for bed, encouraged to wear as long as possible tonight.

## 2019-03-11 NOTE — PROGRESS NOTES
Palliative Care Progress Note    Patient: Rosmery Watkins MRN: [id number]  SSN: [id number]    YOB: 1960  Age: 59 y.o. Sex: female       Assessment/Plan:      Chief Complaint/Interval History: Resting on O2 via HFNC     Principal Diagnosis:    · Pain, general  R52    Additional Diagnoses:   · Acute Respiratory Failure, Unspecified  J96.00  · Debility, Unspecified  R53.81  · Fatigue, Lethargy  R53.83  · Respiratory Failure, Acute on Chronic  J96.20  · Counseling, Encounter for Medical Advice  Z71.9  · Encounter for Palliative Care  Z51.5    Palliative Performance Scale (PPS)  PPS: 30    Medical Decision Making:   Reviewed and summarized notes and events over the weekend. Discussed case with appropriate providers: Daniel Irizarry NP  Reviewed laboratory and x-ray data. Patient resting in bed, no family present. Patient moaning, uncomfortable and asking to be turned. CNAs at the bedside to reposition. RN made aware of patient's request for pain medication. Patient is alert and oriented to person, place, and somewhat situation. Says it is 18. When asked what about discussions regarding surgery were about, she said \"lung cancer\". Reviewed with patient that conversations are ongoing about a craniotomy for brain lesion; she said \"it's stage IV\". Confirmed patient's understanding. I expressed concern to patient about her ability to rehab and tolerate further treatment for her cancer in the future. Patient says Jody All is my POA\", indicating she wanted me to talk to [facility]. Reassured her of our ongoing care. Called and spoke to [facility] ([phone]). Reviewed patient's condition and current plan of care: PFTs pending for pulmonary's input pending regarding her surgical risk from respiratory standpoint. [facility] says she wants to make sure interventions will not negatively impact patient's quality of life. Patient is mainly chair-bound and quite debilitated at baseline.   She does not want to negatively impact patient's quality or quantity of life. Kathy Souza does not work tomorrow. She will be at the hospital around noon and would like to speak to pulmonologist as oncologist.    Will continue to follow. Will discuss with interdisciplinary team.     More than 50% of this 35 minute visit was spent counseling and coordination of care as outlined above. Subjective:     Review of Systems:  A comprehensive review of systems was negative except for the following:  Musculoskeletal: Positive for generalized pain due to position     Objective:     Visit Vitals  /76 (BP 1 Location: Left arm, BP Patient Position: At rest)   Pulse (!) 56   Temp 97.4 °F (36.3 °C)   Resp 18   Ht 5' 6\" (1.676 m)   Wt 309 lb 1.4 oz (140.2 kg)   SpO2 95%   Breastfeeding? No   BMI 49.89 kg/m²       Physical Exam:    General:  Cooperative. No acute distress. Eyes:  Conjunctivae/corneas clear. Nose: Nares normal. Septum midline. O2 via HFNC   Neck: Supple, symmetrical, trachea midline. Lungs:   Diminished. Unlabored. Heart:  Regular rate and rhythm. Abdomen:   Obese. Soft, non-tender, non-distended. Extremities: Normal, atraumatic, no cyanosis. Skin: Skin color, texture, turgor normal. No rash. Neurologic: Nonfocal.   Psych: Alert and oriented to person, place, and somewhat situation.       Signed By: Maribell Schneider NP     March 11, 2019

## 2019-03-11 NOTE — PROGRESS NOTES
Occupational Therapy Note:  OT chart reviewed and treatment attempted. Patient with NP at this time. Will check back as schedule permits and patient is available for occupational therapy treatment.    Kyleigh Mi, OTR/L

## 2019-03-11 NOTE — PROGRESS NOTES
Hospitalist Progress Note     Admit Date:  3/4/2019 11:10 PM   Name:  Seth Mosley   Age:  59 y.o.  :  1960   MRN:  [id number]   PCP:  Ana Rosa Cannon, Not On File  Treatment Team: Attending Provider: Paula Robbins MD; Care Manager: Karyn Reinoso, RN; Consulting Provider: Serenity Jacobs NP; Consulting Provider: Aaliyah Delcid MD; Consulting Provider: Latrell Mohan MD; Physical Therapist: Jesus Pelletier PT; Occupational Therapist: Anastacio Bledsoe, OTR/L    Subjective:     From admission note :      \" Patient is a 58 y/o female with squamous cell carcinoma of the lung, COPD, extreme obesity, diastolic CHF, HTN, HLD, FLETCHER, pulmonary HTN, HLD who presents from home with new altered mental status. EMS reports hyperglycemia. On arrival to ED she was unresponsive with seizure activity. Glucose is 558. She was loaded with IV keppra and also given IV ativan. A head CT shows brain mass in the right parieto-occipital lobe with surrounding vasogenic edema. She then received IV decadron. She was tachycardic with WBC ct of 14.2 so sepsis protocol initiated with fluids and antibiotics. Initially family had said she was DNR but later stated to do everything Except intubation. Will admit to ICU. \"     3-5-2019  Patient has been in bed. Initially palliative consultant saw the patient and at that time her family wanted her to be a hospice candidate. However, later she changed her mind and now she is a full code. Family would like aggressive treatment now.      3-6-2019  Patient is still on BiPAP. She is more responsive. She was seen by neurosurgery today. EEG was done on 3-5-2019 which showed epileptiform activity.      3-7-2019  Patient in on high flow oxygen now. She is alert and talking. Asking and answering appropriately. She states that she is very nervous and panics with MRI study.      3-8-2019  Patient is on high flow oxygen. She is alert and oriented. No headache.  No shortness of breath.  She is on BiPAP at night and would not wear the mask in daytime.      3-9-2019  Patient is still on high flow oxygen in daytime and BiPAP at night. Other vital signs have been stable.      3/10/19:  Asked for more meds re: pt. Agitation and pain. She is on high flow oxygen and slight dose increase analgesia given. 3/11/19:  Patient much more relaxed today. Neurosurgery note says possible cranitomy Wednesday if pulmonary feels surgical risk not prohibitive. Called re: sugar am 137 and \"NPO for possible surgery\" and insulin basal held. Objective:     Patient Vitals for the past 24 hrs:   Temp Pulse Resp BP SpO2   03/11/19 0642 97.5 °F (36.4 °C) (!) 59 19 125/62 94 %   03/11/19 0413 97.5 °F (36.4 °C) 62 18 141/78 97 %   03/10/19 2308 97.6 °F (36.4 °C) 70 18 129/71 93 %   03/10/19 2010     95 %   03/10/19 1951 97.6 °F (36.4 °C) 82 19 121/65 94 %   03/10/19 1531 98.6 °F (37 °C) 78 20 143/73 92 %   03/10/19 1508     97 %   03/10/19 1150 97.6 °F (36.4 °C) 65 20 133/67 97 %     Oxygen Therapy  O2 Sat (%): 94 % (03/11/19 0642)  Pulse via Oximetry: 80 beats per minute (03/10/19 2010)  O2 Device: Nasal cannula (03/10/19 2010)  O2 Flow Rate (L/min): 2 l/min (03/10/19 2010)  O2 Temperature: 87.8 °F (31 °C) (03/10/19 0906)  FIO2 (%): 28 % (03/10/19 2010)    Intake/Output Summary (Last 24 hours) at 3/11/2019 0942  Last data filed at 3/11/2019 0730  Gross per 24 hour   Intake 1379 ml   Output 2500 ml   Net -1121 ml         Physical Examination:  Physical Exam   Constitutional: No distress. HENT:   Mouth/Throat: Oropharynx is clear and moist. No oropharyngeal exudate. Eyes: Conjunctivae are normal. No scleral icterus. Neck: No JVD present. No tracheal deviation present. Cardiovascular: Normal rate. No murmur heard. Pulmonary/Chest: Breath sounds normal. No respiratory distress. She exhibits no tenderness. Abdominal: There is no tenderness. There is no rebound and no guarding.    Musculoskeletal: She exhibits no tenderness or deformity. Neurological: She is alert. GCS score is 15. Skin: Skin is dry. She is not diaphoretic. No erythema. Data Review:  I have reviewed all labs, meds, telemetry events, and studies from the last 24 hours.     Recent Results (from the past 24 hour(s))   GLUCOSE, POC    Collection Time: 03/10/19 11:52 AM   Result Value Ref Range    Glucose (POC) 317 (H) 65 - 100 mg/dL   GLUCOSE, POC    Collection Time: 03/10/19  4:15 PM   Result Value Ref Range    Glucose (POC) 335 (H) 65 - 100 mg/dL   GLUCOSE, POC    Collection Time: 03/10/19  8:09 PM   Result Value Ref Range    Glucose (POC) 308 (H) 65 - 100 mg/dL   GLUCOSE, POC    Collection Time: 03/10/19 11:12 PM   Result Value Ref Range    Glucose (POC) 257 (H) 65 - 100 mg/dL   GLUCOSE, POC    Collection Time: 03/11/19  4:01 AM   Result Value Ref Range    Glucose (POC) 177 (H) 65 - 100 mg/dL        All Micro Results     Procedure Component Value Units Date/Time    CULTURE, BLOOD [[id number]] Collected:  03/05/19 0334    Order Status:  Completed Specimen:  Blood Updated:  03/10/19 1223     Special Requests: --        LEFT  HAND       Culture result: NO GROWTH 5 DAYS       CULTURE, BLOOD [[id number]] Collected:  03/05/19 0410    Order Status:  Completed Specimen:  Blood Updated:  03/10/19 1223     Special Requests: --        RIGHT  HAND       Culture result: NO GROWTH 5 DAYS       CULTURE, URINE [[id number]] Collected:  03/05/19 0504    Order Status:  Completed Specimen:  Urine from [place] Specimen Updated:  03/07/19 0829     Special Requests: NO SPECIAL REQUESTS        Culture result: NO GROWTH 2 DAYS       CULTURE, URINE [[id number]] Collected:  03/05/19 1645    Order Status:  Canceled Specimen:  Urine from [place] Specimen           Current Meds:  Current Facility-Administered Medications   Medication Dose Route Frequency    morphine injection 2 mg  2 mg IntraVENous Q3H PRN    LORazepam (ATIVAN) injection 1 mg  1 mg IntraVENous Q4H PRN    HYDROcodone-acetaminophen (NORCO) 7.5-325 mg per tablet 1 Tab  1 Tab Oral Q4H PRN    insulin glargine (LANTUS) injection 30 Units  30 Units SubCUTAneous DAILY    famotidine (PF) (PEPCID) 20 mg in sodium chloride 0.9% 10 mL injection  20 mg IntraVENous Q12H    sodium chloride (NS) flush 5-10 mL  5-10 mL IntraVENous PRN    sodium chloride (NS) flush 5-40 mL  5-40 mL IntraVENous Q8H    sodium chloride (NS) flush 5-40 mL  5-40 mL IntraVENous PRN    NUTRITIONAL SUPPORT ELECTROLYTE PRN ORDERS   Does Not Apply PRN    acetaminophen (TYLENOL) tablet 650 mg  650 mg Oral Q4H PRN    ondansetron (ZOFRAN) injection 4 mg  4 mg IntraVENous Q4H PRN    naloxone (NARCAN) injection 0.4 mg  0.4 mg IntraVENous PRN    albuterol-ipratropium (DUO-NEB) 2.5 MG-0.5 MG/3 ML  3 mL Nebulization Q4H PRN    levETIRAcetam (KEPPRA) 500 mg in 0.9% sodium chloride 100 mL IVPB  500 mg IntraVENous Q12H    budesonide (PULMICORT) 500 mcg/2 ml nebulizer suspension  500 mcg Nebulization BID RT    And    albuterol (PROVENTIL VENTOLIN) nebulizer solution 2.5 mg  2.5 mg Nebulization Q6HWA RT    dexamethasone (DECADRON) injection 6 mg  6 mg IntraVENous Q6H    piperacillin-tazobactam (ZOSYN) 3.375 g in 0.9% sodium chloride (MBP/ADV) 100 mL  3.375 g IntraVENous Q8H    insulin regular (NOVOLIN R, HUMULIN R) injection   SubCUTAneous Q4H       Diet:  DIET NPO    Other Studies (last 24 hours):  No results found.     Assessment and Plan:     Hospital Problems as of 3/11/2019 Date Reviewed: 3/7/2019          Codes Class Noted - Resolved POA    Delirium ICD-10-CM: R41.0  ICD-9-CM: 780.09  3/6/2019 - Present Unknown        * (Principal) Seizure ([facility] [address] ICD-10-CM: R56.9  ICD-9-CM: 780.39  3/5/2019 - Present Yes        Metastatic cancer to brain [facility]) ICD-10-CM: C79.31  ICD-9-CM: 198.3  3/5/2019 - Present Yes        Hyperglycemia ICD-10-CM: R73.9  ICD-9-CM: 790.29  3/5/2019 - Present Yes        Acute on chronic respiratory failure with hypoxia and hypercapnia [facility]) ICD-10-CM: N16.80, G61.66  ICD-9-CM: 518.84, 786.09, 799.02  3/5/2019 - Present Yes        Primary cancer of left upper lobe of lung ([facility] [address]- squamous cell ICD-10-CM: C34.12  ICD-9-CM: 162.3  7/30/2018 - Present Yes        Hypertension, essential, benign (Chronic) ICD-10-CM: I10  ICD-9-CM: 401.1  Unknown - Present Yes        Hyperlipidemia (Chronic) ICD-10-CM: E78.5  ICD-9-CM: 272.4  Unknown - Present         COPD with emphysema (HCC) (Chronic) ICD-10-CM: J43.9  ICD-9-CM: 492.8  Unknown - Present Yes        Morbid obesity ([facility] [address] (Chronic) ICD-10-CM: E66.01  ICD-9-CM: 278.01  Unknown - Present Yes        Primary pulmonary hypertension ([facility] (Chronic) ICD-10-CM: I27.0  ICD-9-CM: 416.0  Unknown - Present         Diabetes mellitus without complication (HCC) (Chronic) ICD-10-CM: E11.9  ICD-9-CM: 250.00  Unknown - Present Yes        FLETCHER (obstructive sleep apnea) (Chronic) ICD-10-CM: G47.33  ICD-9-CM: 327.23  Unknown - Present Yes        Chronic hypoxemic respiratory failure (HCC) (Chronic) ICD-10-CM: J96.11  ICD-9-CM: 518.83, 799.02  Unknown - Present Yes        CHF (congestive heart failure), NYHA class I (HCC) (Chronic) ICD-10-CM: I50.9  ICD-9-CM: 428.0  Unknown - Present     Overview Signed 12/27/2016 11:24 AM by Adia Hooker     diastolic                   A/P:        Lung cancer (sq.cell ca) with brain mass. This brain mass is either metastasis or primary brain tumor. Neurosurgery plans to do craniotomy this Wednesday. Family is deciding.   Continue Keppra, Dexamethasone--insulin for sugar control        Acute on chronic respiratory failure with hypoxia and hypercapnea--resolved    History of FLETCHER  On high flow oxygen daytime and BiPAP at night.      Hyperglycemia  Titrate insulin prn held this am for sugar 137 and \"npo\" per nursing     Agitation                Prn benzodiaz.  Analgesia if pain--she is improved and this plan is unchanges     DVT prophylaxis : SCD

## 2019-03-12 ENCOUNTER — ANESTHESIA EVENT (OUTPATIENT)
Dept: SURGERY | Age: 59
End: 2019-03-12

## 2019-03-12 LAB
GLUCOSE BLD STRIP.AUTO-MCNC: 167 MG/DL (ref 65–100)
GLUCOSE BLD STRIP.AUTO-MCNC: 168 MG/DL (ref 65–100)
GLUCOSE BLD STRIP.AUTO-MCNC: 222 MG/DL (ref 65–100)
GLUCOSE BLD STRIP.AUTO-MCNC: 281 MG/DL (ref 65–100)
GLUCOSE BLD STRIP.AUTO-MCNC: 341 MG/DL (ref 65–100)

## 2019-03-12 PROCEDURE — 74011250636 HC RX REV CODE- 250/636: Performed by: INTERNAL MEDICINE

## 2019-03-12 PROCEDURE — 99232 SBSQ HOSP IP/OBS MODERATE 35: CPT | Performed by: INTERNAL MEDICINE

## 2019-03-12 PROCEDURE — 82962 GLUCOSE BLOOD TEST: CPT

## 2019-03-12 PROCEDURE — 97530 THERAPEUTIC ACTIVITIES: CPT

## 2019-03-12 PROCEDURE — 77010033678 HC OXYGEN DAILY

## 2019-03-12 PROCEDURE — 94640 AIRWAY INHALATION TREATMENT: CPT

## 2019-03-12 PROCEDURE — 65270000029 HC RM PRIVATE

## 2019-03-12 PROCEDURE — 94760 N-INVAS EAR/PLS OXIMETRY 1: CPT

## 2019-03-12 PROCEDURE — 74011000250 HC RX REV CODE- 250: Performed by: HOSPITALIST

## 2019-03-12 PROCEDURE — 74011000258 HC RX REV CODE- 258: Performed by: HOSPITALIST

## 2019-03-12 PROCEDURE — 94010 BREATHING CAPACITY TEST: CPT

## 2019-03-12 PROCEDURE — 74011250637 HC RX REV CODE- 250/637: Performed by: INTERNAL MEDICINE

## 2019-03-12 PROCEDURE — 74011000250 HC RX REV CODE- 250: Performed by: INTERNAL MEDICINE

## 2019-03-12 PROCEDURE — 94660 CPAP INITIATION&MGMT: CPT

## 2019-03-12 PROCEDURE — 99233 SBSQ HOSP IP/OBS HIGH 50: CPT | Performed by: INTERNAL MEDICINE

## 2019-03-12 PROCEDURE — 74011250636 HC RX REV CODE- 250/636: Performed by: HOSPITALIST

## 2019-03-12 PROCEDURE — 74011000258 HC RX REV CODE- 258: Performed by: INTERNAL MEDICINE

## 2019-03-12 PROCEDURE — 74011636637 HC RX REV CODE- 636/637: Performed by: HOSPITALIST

## 2019-03-12 PROCEDURE — 99233 SBSQ HOSP IP/OBS HIGH 50: CPT | Performed by: NURSE PRACTITIONER

## 2019-03-12 PROCEDURE — 74011636637 HC RX REV CODE- 636/637: Performed by: INTERNAL MEDICINE

## 2019-03-12 RX ORDER — ACETAMINOPHEN 500 MG
1000 TABLET ORAL
Status: CANCELLED | OUTPATIENT
Start: 2019-03-12

## 2019-03-12 RX ORDER — INSULIN LISPRO 100 [IU]/ML
INJECTION, SOLUTION INTRAVENOUS; SUBCUTANEOUS
Status: DISCONTINUED | OUTPATIENT
Start: 2019-03-13 | End: 2019-03-14 | Stop reason: HOSPADM

## 2019-03-12 RX ORDER — SODIUM CHLORIDE, SODIUM LACTATE, POTASSIUM CHLORIDE, CALCIUM CHLORIDE 600; 310; 30; 20 MG/100ML; MG/100ML; MG/100ML; MG/100ML
150 INJECTION, SOLUTION INTRAVENOUS CONTINUOUS
Status: CANCELLED | OUTPATIENT
Start: 2019-03-12

## 2019-03-12 RX ORDER — MIDAZOLAM HYDROCHLORIDE 1 MG/ML
2 INJECTION, SOLUTION INTRAMUSCULAR; INTRAVENOUS
Status: CANCELLED | OUTPATIENT
Start: 2019-03-12 | End: 2019-03-13

## 2019-03-12 RX ORDER — FAMOTIDINE 20 MG/1
20 TABLET, FILM COATED ORAL ONCE
Status: CANCELLED | OUTPATIENT
Start: 2019-03-12 | End: 2019-03-12

## 2019-03-12 RX ORDER — HYDROCODONE BITARTRATE AND ACETAMINOPHEN 5; 325 MG/1; MG/1
1 TABLET ORAL AS NEEDED
Status: CANCELLED | OUTPATIENT
Start: 2019-03-12

## 2019-03-12 RX ORDER — FENTANYL CITRATE 50 UG/ML
100 INJECTION, SOLUTION INTRAMUSCULAR; INTRAVENOUS ONCE
Status: CANCELLED | OUTPATIENT
Start: 2019-03-12 | End: 2019-03-12

## 2019-03-12 RX ORDER — LIDOCAINE HYDROCHLORIDE 10 MG/ML
0.1 INJECTION INFILTRATION; PERINEURAL AS NEEDED
Status: CANCELLED | OUTPATIENT
Start: 2019-03-12

## 2019-03-12 RX ORDER — SODIUM CHLORIDE 9 MG/ML
50 INJECTION, SOLUTION INTRAVENOUS CONTINUOUS
Status: CANCELLED | OUTPATIENT
Start: 2019-03-12

## 2019-03-12 RX ORDER — SODIUM CHLORIDE, SODIUM LACTATE, POTASSIUM CHLORIDE, CALCIUM CHLORIDE 600; 310; 30; 20 MG/100ML; MG/100ML; MG/100ML; MG/100ML
150 INJECTION, SOLUTION INTRAVENOUS CONTINUOUS
Status: CANCELLED | OUTPATIENT
Start: 2019-03-12 | End: 2019-03-13

## 2019-03-12 RX ORDER — HYDROMORPHONE HYDROCHLORIDE 2 MG/ML
0.5 INJECTION, SOLUTION INTRAMUSCULAR; INTRAVENOUS; SUBCUTANEOUS
Status: CANCELLED | OUTPATIENT
Start: 2019-03-12

## 2019-03-12 RX ADMIN — Medication 10 ML: at 12:36

## 2019-03-12 RX ADMIN — ALBUTEROL SULFATE 2.5 MG: 2.5 SOLUTION RESPIRATORY (INHALATION) at 19:10

## 2019-03-12 RX ADMIN — DEXAMETHASONE SODIUM PHOSPHATE 6 MG: 10 INJECTION INTRAMUSCULAR; INTRAVENOUS at 12:00

## 2019-03-12 RX ADMIN — LORAZEPAM 1 MG: 2 INJECTION INTRAMUSCULAR; INTRAVENOUS at 10:53

## 2019-03-12 RX ADMIN — HYDROCODONE BITARTRATE AND ACETAMINOPHEN 1 TABLET: 7.5; 325 TABLET ORAL at 03:46

## 2019-03-12 RX ADMIN — DEXAMETHASONE SODIUM PHOSPHATE 6 MG: 10 INJECTION INTRAMUSCULAR; INTRAVENOUS at 05:51

## 2019-03-12 RX ADMIN — SODIUM CHLORIDE 500 MG: 900 INJECTION, SOLUTION INTRAVENOUS at 19:44

## 2019-03-12 RX ADMIN — PIPERACILLIN AND TAZOBACTAM 3.38 G: 3; .375 INJECTION, POWDER, FOR SOLUTION INTRAVENOUS at 09:00

## 2019-03-12 RX ADMIN — MORPHINE SULFATE 2 MG: 2 INJECTION, SOLUTION INTRAMUSCULAR; INTRAVENOUS at 15:31

## 2019-03-12 RX ADMIN — MORPHINE SULFATE 2 MG: 2 INJECTION, SOLUTION INTRAMUSCULAR; INTRAVENOUS at 10:53

## 2019-03-12 RX ADMIN — FAMOTIDINE 20 MG: 10 INJECTION, SOLUTION INTRAVENOUS at 19:44

## 2019-03-12 RX ADMIN — Medication 10 ML: at 22:05

## 2019-03-12 RX ADMIN — HUMAN INSULIN 6 UNITS: 100 INJECTION, SOLUTION SUBCUTANEOUS at 12:35

## 2019-03-12 RX ADMIN — INSULIN GLARGINE 30 UNITS: 100 INJECTION, SOLUTION SUBCUTANEOUS at 09:01

## 2019-03-12 RX ADMIN — HUMAN INSULIN 9 UNITS: 100 INJECTION, SOLUTION SUBCUTANEOUS at 17:24

## 2019-03-12 RX ADMIN — BUDESONIDE 500 MCG: 0.5 INHALANT RESPIRATORY (INHALATION) at 07:46

## 2019-03-12 RX ADMIN — LORAZEPAM 1 MG: 2 INJECTION INTRAMUSCULAR; INTRAVENOUS at 19:44

## 2019-03-12 RX ADMIN — LORAZEPAM 1 MG: 2 INJECTION INTRAMUSCULAR; INTRAVENOUS at 15:25

## 2019-03-12 RX ADMIN — Medication 10 ML: at 05:51

## 2019-03-12 RX ADMIN — MORPHINE SULFATE 2 MG: 2 INJECTION, SOLUTION INTRAMUSCULAR; INTRAVENOUS at 19:44

## 2019-03-12 RX ADMIN — HUMAN INSULIN 3 UNITS: 100 INJECTION, SOLUTION SUBCUTANEOUS at 09:01

## 2019-03-12 RX ADMIN — HUMAN INSULIN 3 UNITS: 100 INJECTION, SOLUTION SUBCUTANEOUS at 03:59

## 2019-03-12 RX ADMIN — SODIUM CHLORIDE 500 MG: 900 INJECTION, SOLUTION INTRAVENOUS at 07:47

## 2019-03-12 RX ADMIN — DEXAMETHASONE SODIUM PHOSPHATE 6 MG: 10 INJECTION INTRAMUSCULAR; INTRAVENOUS at 17:24

## 2019-03-12 RX ADMIN — BUDESONIDE 500 MCG: 0.5 INHALANT RESPIRATORY (INHALATION) at 19:10

## 2019-03-12 RX ADMIN — FAMOTIDINE 20 MG: 10 INJECTION, SOLUTION INTRAVENOUS at 07:47

## 2019-03-12 RX ADMIN — HYDROCODONE BITARTRATE AND ACETAMINOPHEN 1 TABLET: 7.5; 325 TABLET ORAL at 07:40

## 2019-03-12 RX ADMIN — ALBUTEROL SULFATE 2.5 MG: 2.5 SOLUTION RESPIRATORY (INHALATION) at 14:06

## 2019-03-12 RX ADMIN — ALBUTEROL SULFATE 2.5 MG: 2.5 SOLUTION RESPIRATORY (INHALATION) at 07:46

## 2019-03-12 NOTE — PROGRESS NOTES
[facility]        Inpatient Hematology / Oncology Progress Note      Admission Date: 3/4/2019 11:10 PM  Reason for Admission/Hospital Course: Seizure [facility]) [R56.9]  Metastatic cancer to brain ([facility] [address] [facility] [C79.31]  Hyperglycemia [R73.9]      24 Hour Events:  Afebrile, VSS. Fatigued, weak. Neurosurgery with plans for craniotomy tomorrow  Pt wanting more time to think about whether she wants to proceed with craniotomy tomorrow or not  Son and daughter at bedside      10 point review of systems is otherwise negative with the exception of the elements mentioned above in the HPI. Allergies   Allergen Reactions    Iodides Tincture [Iodine] Hives       OBJECTIVE:  Patient Vitals for the past 8 hrs:   BP Temp Pulse Resp SpO2   19 1407     96 %   19 1112 148/79 97.7 °F (36.5 °C) 65 18 94 %   19 0748     91 %   19 0722 130/57 97.9 °F (36.6 °C) 63 18 92 %     Temp (24hrs), Av.7 °F (36.5 °C), Min:97.3 °F (36.3 °C), Max:98 °F (36.7 °C)     0701 -  1900  In: 480 [P.O.:480]  Out: 700 [Urine:700]    Physical Exam:  Constitutional: Chronically-Ill appearing female in no acute distress, lying comfortably in the hospital bed. HEENT: Normocephalic and atraumatic. Oropharynx is clear, mucous membranes are moist.  Neck supple. Lymph node   Deferred. Skin Warm and dry. No bruising and no rash noted. No erythema. No pallor. Respiratory Decreased in bases bilaterally. Normal air exchange without accessory muscle use. On O2 via NC.   CVS Normal rate, regular rhythm and normal S1 and S2. No murmurs, gallops, or rubs. Abdomen Soft, nontender and nondistended, normoactive bowel sounds. No palpable mass. No hepatosplenomegaly. Neuro Grossly nonfocal with no obvious sensory or motor deficits. MSK Normal range of motion in general.  Trace generalized edema and no tenderness. Psych Calm, cooperative.        Labs:      No results for input(s): WBC, RBC, HGB, HCT, MCV, MCH, MCHC, RDW, PLT, GRANS, LYMPH, MONOS, EOS, BASOS, IG, DF, ANEU, ABL, ABM, TUSHAR, ABB, AIG, HGBEXT, HCTEXT, PLTEXT, HGBEXT, HCTEXT, PLTEXT in the last 72 hours. No lab exists for component: MPV     No results for input(s): NA, K, CL, CO2, AGAP, GLU, BUN, CREA, BUCR, GFRAA, GFRNA, CA, TBIL, GPT, SGOT, AP, TP, ALB, GLOB, AGRAT, MG, PHOS in the last 72 hours. Imaging:  CT CHEST ABD PELV WO CONT [[id number]] Collected: 03/07/19 2302   Order Status: Completed Updated: 03/07/19 2308   Narrative:     CT CHEST, ABDOMEN AND PELVIS WITH CONTRAST. INDICATION: Breast cancer with brain mass, staging exam.     COMPARISON: July 2018. TECHNIQUE:   5 mm axial scans from the lung apices to the pubic symphysis   following oral contrast only.  Radiation dose reduction techniques were used for  this study.  Our CT scanners use one or more of the following:  Automated  exposure control, adjustment of the mA and or kV according to patient size,  iterative reconstruction. Findings:   CHEST:  The left lung masses increased in size and now measures 6.2 x 8.7 cm. Trace bilateral pleural effusions. There are multiple enlarged central  mediastinal lymph nodes. Aortic calcifications are present.     ABDOMEN:  No gross focal parenchymal abnormality identified within the liver or  spleen on this noncontrast exam. The gallbladder is absent. The biliary tree is  not dilated. The pancreas is unremarkable. No free fluid, acute inflammatory  changes or adenopathy. Bowel loops are not dilated. The kidneys are normal size. No radiopaque renal calculi. No hydronephrosis. The adrenal glands are normal  size. Aorta is normal caliber and calcified. PELVIS:  No free fluid or acute inflammatory changes or bony lesions. Lanetta Perks  urinary bladder is is drained by Leyva catheter. Impression:     IMPRESSION:  Significant enlargement of the left lung mass which now measures  6.2 x 8.7 cm.  No other new abnormality.           MRI BRAIN W WO CONT [[id number]] Collected: 03/07/19 1202   Order Status: Completed Updated: 03/07/19 1220   Narrative:     EXAMINATION: BRAIN MRI 3/7/2019 11:43 AM    ACCESSION NUMBER: [id number]    INDICATION: Lung cancer, unspecified; metastatic lung cancer to brain    COMPARISON: Head CT 3/5/2019    TECHNIQUE: Multiplanar multisequence MRI of the brain without and with  intravenous administration of 28 cc Dotarem contrast agent. FINDINGS:    Technically difficult exam due to patient motion. There is a 3.1 x 3.0 x 3.5 cm (AP x transverse x craniocaudal) (axial  postcontrast image 18 and coronal postcontrast image 24) peripherally enhancing  centrally necrotic mass in the posterior right parietal lobe abutting the  parieto-occipital sulcus. This corresponds to the lesion of concern prior head  CT. Peripheral reduced diffusion likely corresponds to areas of increased  cellularity. There is no evidence of associated blood products. There is surrounding vasogenic edema. The vasogenic edema results in partial  effacement of the posterior and occipital horns of the right lateral ventricle. There is no midline shift. The basilar cisterns are patent. There is no  cerebellar tonsillar ectopia or herniation.     There is T2 hyperintensity of the body and tail of the right hippocampus.      Diffusion imaging shows no evidence of acute ischemic infarction.      There are T2 hyperintensities in the periventricular and subcortical white  matter and charles, a nonspecific finding which likely represents chronic  microangiopathy. The expected large vascular flow voids are preserved.     There is no definite evidence of other abnormal enhancing intracranial lesions  within the substantial limitation of motion degradation on the postcontrast  images. No evidence of suspicious osseous lesions within the limits of patient motion.    Impression:     IMPRESSION:    1. 3.1 x 3.0 x 3.5 cm posterior right parietal cystic mass with surrounding  vasogenic edema. Given the history of lung cancer, a metastasis is favored. 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    2. There is no definite evidence of other enhancing intracranial lesions within  the substantial limitation of patient motion on the postcontrast images. The  degree of motion could obscure subtle or small metastases. If there are new or  progressive neurologic progressive symptoms, a repeat MRI should be considered  when the patient is better able to lie still. 3. There is T2 hyperintensity within the body and tail of the right hippocampus. In the setting of the right-sided mass and surrounding vasogenic edema, this is  most likely to be either post ictal or an extension of the adjacent vasogenic  edema. In the setting of known systemic neoplasia, although less likely in the  absence of contralateral findings, a paraneoplastic syndrome cannot be excluded. Directed attention on follow-up examinations is recommended. VOICE DICTATED BY: Dr. Jamar Ames ABD PELV W CONT [[id number]]    Order Status: Canceled    CTA CHEST ABD PELV W WO CONT [[id number]]    Order Status: Canceled    MRI BRAIN W CONT [[id number]]    Order Status: Canceled    XR CHEST PORT [[id number]] Collected: 03/05/19 0239   Order Status: Completed Updated: 03/05/19 0244   Narrative:     Portable chest xray      COMPARISON: August 1, 2018    CLINICAL HISTORY: Altered mental status. FINDINGS:    There are bilateral diffuse groundglass opacities, likely vascular congestion. There is confluent masslike opacity in the left perihilar region. This is more  prominent compared to prior examination. Heart appears enlarged. No  pneumothorax. Impression:     IMPRESSION:    Masslike opacity in the left perihilar region, significantly more prominent  compared to July 29, 2018 CT. Findings suspicious for lung tumor.   Contrast-enhanced CT chest should be considered to better characterize. DC4         CT HEAD WO CONT [[id number]] Collected: 03/05/19 0047   Order Status: Completed Updated: 03/05/19 0129   Narrative:     Noncontrast CT of the brain. COMPARISON: none    INDICATION: Altered mental status. History of lung cancer. TECHNIQUE: Contiguous axial images were obtained from the skull base through the  vertex without IV contrast. Radiation dose reduction techniques were used for  this study:  Our CT scanners use one or all of the following: Automated exposure  control, adjustment of the mA and/or kVp according to patient's size, iterative  reconstruction. FINDINGS:    There is an approximately 2 cm cystic mass in the right parieto-occipital lobe. There is surrounding vasogenic edema. There is mass effect on the occipital horn  of the right lateral ventricle. There is no midline shift or hydrocephalus. Included globes appear intact. Paranasal sinuses and the mastoid air cells are  aerated. Surrounding bones are intact. Impression:     IMPRESSION:    1. Brain mass in the right parieto-occipital lobe with surrounding vasogenic  edema. Contrast enhanced MRI of the brain should be considered to better  evaluate/characterize. .             Medications:  Current Facility-Administered Medications   Medication Dose Route Frequency    morphine injection 2 mg  2 mg IntraVENous Q3H PRN    LORazepam (ATIVAN) injection 1 mg  1 mg IntraVENous Q4H PRN    HYDROcodone-acetaminophen (NORCO) 7.5-325 mg per tablet 1 Tab  1 Tab Oral Q4H PRN    insulin glargine (LANTUS) injection 30 Units  30 Units SubCUTAneous DAILY    famotidine (PF) (PEPCID) 20 mg in sodium chloride 0.9% 10 mL injection  20 mg IntraVENous Q12H    sodium chloride (NS) flush 5-10 mL  5-10 mL IntraVENous PRN    sodium chloride (NS) flush 5-40 mL  5-40 mL IntraVENous Q8H    sodium chloride (NS) flush 5-40 mL  5-40 mL IntraVENous PRN    NUTRITIONAL SUPPORT ELECTROLYTE PRN ORDERS Does Not Apply PRN    acetaminophen (TYLENOL) tablet 650 mg  650 mg Oral Q4H PRN    ondansetron (ZOFRAN) injection 4 mg  4 mg IntraVENous Q4H PRN    naloxone (NARCAN) injection 0.4 mg  0.4 mg IntraVENous PRN    albuterol-ipratropium (DUO-NEB) 2.5 MG-0.5 MG/3 ML  3 mL Nebulization Q4H PRN    levETIRAcetam (KEPPRA) 500 mg in 0.9% sodium chloride 100 mL IVPB  500 mg IntraVENous Q12H    budesonide (PULMICORT) 500 mcg/2 ml nebulizer suspension  500 mcg Nebulization BID RT    And    albuterol (PROVENTIL VENTOLIN) nebulizer solution 2.5 mg  2.5 mg Nebulization Q6HWA RT    dexamethasone (DECADRON) injection 6 mg  6 mg IntraVENous Q6H    insulin regular (NOVOLIN R, HUMULIN R) injection   SubCUTAneous Q4H         ASSESSMENT:    Problem List  Date Reviewed: 3/7/2019          Codes Class Noted    Delirium ICD-10-CM: R41.0  ICD-9-CM: 780.09  3/6/2019        * (Principal) [facility] ICD-10-CM: R56.9  ICD-9-CM: 780.39  3/5/2019        Metastatic cancer to brain [facility]) ICD-10-CM: C79.31  ICD-9-CM: 198.3  3/5/2019        Hyperglycemia ICD-10-CM: R73.9  ICD-9-CM: 790.29  3/5/2019        Acute on chronic respiratory failure with hypoxia and hypercapnia (HCC) ICD-10-CM: J96.21, J96.22  ICD-9-CM: 518.84, 786.09, 799.02  3/5/2019        Benign neoplasm of colon (Chronic) ICD-10-CM: D12.6  ICD-9-CM: 211.3  10/31/2018        Mediastinal lymphadenopathy ICD-10-CM: R59.0  ICD-9-CM: 785.6  7/30/2018        Primary cancer of left upper lobe of lung ([facility]- squamous cell ICD-10-CM: C34.12  ICD-9-CM: 162.3  7/30/2018        Chronic pain (Chronic) ICD-10-CM: A86.94  ICD-9-CM: 338.29  7/28/2018        Acute respiratory failure ([facility] [address] [facility] ICD-10-CM: J96.00  ICD-9-CM: 518.81  7/28/2018        Hypertension, essential, benign (Chronic) ICD-10-CM: I10  ICD-9-CM: 401.1  Unknown        Hyperlipidemia (Chronic) ICD-10-CM: E78.5  ICD-9-CM: 272.4  Unknown        COPD with emphysema (HCC) (Chronic) ICD-10-CM: J43.9  ICD-9-CM: 492.8  Unknown Morbid obesity (HCC) (Chronic) ICD-10-CM: E66.01  ICD-9-CM: 278.01  Unknown        Primary pulmonary hypertension (HCC) (Chronic) ICD-10-CM: I27.0  ICD-9-CM: 416.0  Unknown        Diabetes mellitus without complication (HCC) (Chronic) ICD-10-CM: E11.9  ICD-9-CM: 250.00  Unknown        Orthopnea (Chronic) ICD-10-CM: R06.01  ICD-9-CM: 786.02  Unknown        Abnormality of gait and mobility ICD-10-CM: R26.9  ICD-9-CM: 847. 2  Unknown        Insomnia (Chronic) ICD-10-CM: G47.00  ICD-9-CM: 780.52  12/27/2016        Extreme obesity with respiratory disorder (HCC) (Chronic) ICD-10-CM: E66.01, J98.9  ICD-9-CM: 278.01, 519.9  Unknown        FLETCHER (obstructive sleep apnea) (Chronic) ICD-10-CM: G47.33  ICD-9-CM: 327.23  Unknown        Chronic hypoxemic respiratory failure (HCC) (Chronic) ICD-10-CM: J96.11  ICD-9-CM: 518.83, 799.02  Unknown        Cor pulmonale (HCC) (Chronic) ICD-10-CM: I27.81  ICD-9-CM: 416.9  Unknown        CHF (congestive heart failure), NYHA class I (HCC) (Chronic) ICD-10-CM: I50.9  ICD-9-CM: 428.0  Unknown    Overview Signed 12/27/2016 11:24 AM by Donna De     diastolic             Obesity with alveolar hypoventilation (HCC) (Chronic) ICD-10-CM: S99.3  ICD-9-CM: 278.03  Unknown        Arthritis of knee (Chronic) ICD-10-CM: M17.10  ICD-9-CM: 716.96  Unknown        History of colonic polyps (Chronic) ICD-10-CM: Z86.010  ICD-9-CM: V12.72  8/10/2015    Overview Signed 10/31/2018 11:30 AM by Felicia Montana NP     Last Assessment & Plan:   She has several adenomas on her initial colonoscopy. I believe there was some doubt about whether the cecal polyp on that exam was completely removed. Follow-up colonoscopy did reveal a persistent cecal polyp. This polyp was read as tubulovillous adenoma. It has been 3 years male of this month. Because of the villous pathology we will proceed with a follow-up colonoscopy. She will hold Motrin as well as aspirin for 3 days prior to the procedure.  We discussed the current colorectal cancer screening guidelines as well as the risks and benefits of colonoscopy in detail. We also discussed the prep in detail and she wishes to proceed. Cause of her oxygen use we will need to do her on the inpatient side of the hospital. We spent at least 15-20 minutes procuring records and bringing them forward from the hospital electronic medical record system into today's office visit. We then spent an additional 15-20 minutes in counseling in addition to the time spent during her history and physical exam.                     PLAN:  Stage IV Squamous cell lung cancer with likely brain mets / Seizure  - never rec'd treatment, has been on home Hospice since 8/2018. Family has revoked Hospice and changed pt to full code  - CT head with brain mass in the R parieto-occipital lobe with surrounding vasogenic edema  - MRI brain pending  - CT CAP for staging when able  - On Dex/Keppra  - NS considering craniotomy  3/7  MRI completed this AM and showed right parietal cystic mass with surrounding vasogenic edema concerning for metastasis, but primary glial neoplasm could not be excluded. Await NS recommendations re: craniotomy. Full staging with CT CAP. Ongoing discussion with family regarding plans once diagnosis confirmed  3/8 Difficult situation. Discussed with pulmonary who is very concerned about being able to extubate following craniotomy given COPD and lung mass. CT CAP without evidence of other metastatic disease but does show enlarging lung mass. Patient is clearly not a candidate for any cancer directed therapy at this time given weakness/debility. Also, would not be able to travel back and forth to radiation with current O2 needs. Of note, patient was essentially bedbound at home from other chronic conditions. Son stated that they did want treatment for her lung cancer back in August but felt \"pushed' into hospice.  We will await final decisions from the rest of the team regarding plan and will follow up final biopsy results. 03/11 Plans for craniotomy on Wednesday if cleared by pulmonary. Plans for PFTs to be done today. No new complaints, respiratory status stable. 3/12 Discussion with patient and family regarding prognosis and options. Pt would like more time to think about whether she would like to proceed with craniotomy tomorrow or not. Resp Failure  - on BiPAP  - on Zosyn/Vanc  - UCx-NGTD. BCx-pending  3/7/19 Continue vanc/zosyn. 03/10 Pan cultures negative thus far. Continues antibiotics. On optiflow. 3/11  Cultures NG. Con't Zosyn. On O2 via NC. DM  - Primary team managing      Goals and plan of care reviewed with the patient. All questions answered to the best of our ability. Radha Morales NP   [facility] Hematology & Oncology  [address] [place], [place] [address] [address]  Office : [phone]  Fax : [phone]     Attending Addendum:  Patient seen with NP. Pt with known Stage IV squamous cell carcinoma of lung with new diagnosis of likely brain metastasis. She presented with a seizure. She has been on hospice since 8/2018. CT head on admission with right parieto/occipital lobe mass with vasogenic edema. MRI with parietal cystic mass concerning for metastatic disease vs primary glial mass. NS considering resection. Pulmonary with concern regarding ability to extubate due to baseline COPD/morbid obesity and lung mass. CT CAP without other metastatic disease. I personally performed a face to face diagnostic evaluation on this patient. My findings are as follows: awake and alert, able to answer questions but does not open eyes, lungs with decreased BS, heart regular, abdomen obese and no LE edema. c/w keppra and dex. Labs reviewed. We spoke about the prognosis at length with pts children (Yuki CARNES) and son with whom the pt resides, as well as the pt herself.   She is aware that the prognosis of patients with lung cancer that develop brain metastasis is poor.  She also understands that we cannot sure her disease. The risks inherent to neurosurgery were discussed with the pt and her family at length. We also talked about the pt's end of life wishes which include spending more time with her grandchildren. When presented with a scenario that she may have neurologic deficit after surgery or not be able to come off vent she verbalized that these risks may outweigh the benefit of surgery for her. We also talked about the risks and benefits of systemic therapy. Dr Judge Habermann present for part of the discussion. She would like to think about this important decision some more and will let us know once she speaks to her children. Her wishes will be respected. Palliative care present for part of our discussion with the children.   The pt and family appreciated the discussion.       I have reviewed and agree with the care plan.                 Mary Bello MD  [facility] Hematology and Oncology  [address] [place] [address]  Office : [phone]  Fax : [phone]

## 2019-03-12 NOTE — PROGRESS NOTES
CM spoke with RN. Pt is wishing to cancel surgery scheduled today. Pt and Family are interested in [facility]. Spoke with son and daughter. Both agreed to talk to a representative from [facility].   Family spoke with [facility] will follow-up with family in the am.  CM will continue to follow

## 2019-03-12 NOTE — PROGRESS NOTES
END OF SHIFT NOTE:    Intake/Output  03/11 1901 - 03/12 0700  In: 0 [P.O.:355; I.V.:200]  Out: 1000 [Urine:1000]   Voiding: YES  Catheter: YES  Drain:              Stool:  0 occurrences. Stool Assessment  Stool Color: Brown (03/11/19 1509)  Stool Appearance: Loose (03/11/19 1509)  Stool Amount: Small (03/11/19 1509)  Stool Source/Status: Rectum (03/11/19 1509)    Emesis:  0 occurrences. VITAL SIGNS  Patient Vitals for the past 12 hrs:   Temp Pulse Resp BP SpO2   03/12/19 0410     97 %   03/11/19 2358 97.8 °F (36.6 °C) 66 18 128/75 95 %   03/11/19 2302     97 %   03/11/19 2018     92 %   03/11/19 2014 98 °F (36.7 °C) 99 18 113/66 (!) 84 %   03/11/19 1915     91 %       Pain Assessment  Pain 1  Pain Scale 1: Visual (03/12/19 0440)  Pain Intensity 1: 0 (03/12/19 0440)  Patient Stated Pain Goal: 0 (03/12/19 0440)  Pain Reassessment 1: Patient sleeping (03/12/19 0440)  Pain Onset 1: chronic (03/11/19 1455)  Pain Location 1: Back (03/12/19 0346)  Pain Orientation 1: Lower (03/12/19 0346)  Pain Description 1: Aching (03/12/19 0346)  Pain Intervention(s) 1: Medication (see MAR) (03/12/19 0346)    Ambulating  No    Additional Information:     Pt confused and somewhat agitated last night. Seems restless. Repositioned appx q2 hrs and as pt desired. Possible craniotomy wed? Wore bipap for most of night, now back on HFNC. Norco given x3, Ativan given x1, Morphine given x1. At end of shift, peripheral IV clotted off and unable to flush. This RN could not place another. ICU rover on day shift aware to come when shift begins to attempt to place another. Shift report will be given to oncoming nurse at the bedside.     Viola Arrington RN

## 2019-03-12 NOTE — ANESTHESIA PREPROCEDURE EVALUATION
Anesthetic History   No history of anesthetic complications            Review of Systems / Medical History  Patient summary reviewed and pertinent labs reviewed    Pulmonary    COPD (80 pack year history, stopped smoking 5 years ago): severe    Sleep apnea  Shortness of breath      Comments: Pulmonary HTN   Neuro/Psych              Cardiovascular    Hypertension      CHF: orthopnea, PND        Exercise tolerance: <4 METS     GI/Hepatic/Renal                Endo/Other    Diabetes    Morbid obesity     Other Findings              Physical Exam    Airway  Mallampati: I  TM Distance: 4 - 6 cm  Neck ROM: normal range of motion   Mouth opening: Normal     Cardiovascular    Rhythm: regular  Rate: normal    Murmur: Grade 1, Mitral area     Dental    Dentition: Edentulous     Pulmonary      Decreased breath sounds: bilateral           Abdominal         Other Findings            Anesthetic Plan    ASA: 4  Anesthesia type: general    Monitoring Plan: Arterial line    Post procedure ventilation   Induction: Intravenous  Anesthetic plan and risks discussed with: Patient      Per Nursing Staff - Procedure likely to be cancelled

## 2019-03-12 NOTE — PROGRESS NOTES
Problem: Pressure Injury - Risk of  Goal: *Prevention of pressure injury  Document Gunnar Scale and appropriate interventions in the flowsheet. Outcome: Progressing Towards Goal  Pressure Injury Interventions:  Sensory Interventions: Assess changes in LOC, Assess need for specialty bed, Float heels, Keep linens dry and wrinkle-free, Minimize linen layers, Maintain/enhance activity level, Turn and reposition approx. every two hours (pillows and wedges if needed), Pressure redistribution bed/mattress (bed type)    Moisture Interventions: Absorbent underpads, Check for incontinence Q2 hours and as needed, Limit adult briefs, Minimize layers, Internal/External urinary devices    Activity Interventions: Assess need for specialty bed, Increase time out of bed, Pressure redistribution bed/mattress(bed type)    Mobility Interventions: Assess need for specialty bed, HOB 30 degrees or less, Pressure redistribution bed/mattress (bed type)    Nutrition Interventions: Document food/fluid/supplement intake, Offer support with meals,snacks and hydration    Friction and Shear Interventions: Foam dressings/transparent film/skin sealants, HOB 30 degrees or less, Lift sheet, Minimize layers               Problem: Falls - Risk of  Goal: *Absence of Falls  Document Cindy Fall Risk and appropriate interventions in the flowsheet.   Outcome: Progressing Towards Goal  Fall Risk Interventions:       Mentation Interventions: Adequate sleep, hydration, pain control, Door open when patient unattended, More frequent rounding, Reorient patient, Room close to nurse's station    Medication Interventions: Patient to call before getting OOB, Teach patient to arise slowly    Elimination Interventions: Call light in reach, Patient to call for help with toileting needs    History of Falls Interventions: Door open when patient unattended, Room close to nurse's station

## 2019-03-12 NOTE — PROGRESS NOTES
Palliative Care Progress Note    Patient: Ranjeet Moore MRN: [id number]  SSN: [id number]    YOB: 1960  Age: 59 y.o. Sex: female       Assessment/Plan:      Chief Complaint/Interval History: Resting on O2 2L via HFNC     Principal Diagnosis:    · Pain, general  R52    Additional Diagnoses:   · Acute Respiratory Failure, Unspecified  J96.00  · Debility, Unspecified  R53.81  · Fatigue, Lethargy  R53.83  · Respiratory Failure, Acute on Chronic  J96.20  · Counseling, Encounter for Medical Advice  Z71.9  · Encounter for Palliative Care  Z51.5    Palliative Performance Scale (PPS)  PPS: 30    Medical Decision Making:   Reviewed and summarized notes over the past 24 hours. Discussed case with appropriate providers: Logan King, primary RN; Dr. Sarah Elise and Carola Lebron, NP; Dr. Shannan Champion laboratory and x-ray data. Patient resting in bed, son Rey Fleischer is at the bedside. Patient reports needing to have BM. CNAs aware and are assisting patient. Daughter Forest Ennis and son Rey Fleischer here to speak to pulmonology and oncology about patient's risks of surgery, her debility following surgery, and her prognosis to determine goals and plan of care. Participated in meeting with Leo Lagos and Rey Fleischer, and Dr. Tulio Shabazz initially. Dr. Tulio Shabazz reviewed patient's pulmonary risk following surgery. Patient has intermediate risk for ventilator requirements beyond a couple of days. He counseled that her pulmonary risk is only part of the equation to determine goals of care. Informed Hazel and Rey Fleischer that patient would need rehab following hospitalization if aggressive treatment is desired, based on therapy's notes and recommendations. Dr. Sarah Elise and Carola Lebron joined meeting at that time. Dr. Sarah lEise provided information about patient's diagnosis and prognosis (both if brain lesion proved metastatic lung cancer vs another type).   Team expressed concern about patient's debility, and the unlikelihood of patient being a chemotherapy candidate, as patient has been mainly chair bound for [facility] [employer] and [employer] appreciation of above information. They ask further questions about hospice, sitter availability, hospice facility, etc.  Answered these questions. They are considering proceeding with surgery vs returning home with [facility]. They indicate that if patient needed hospice facility placement, they would desire [facility] by [address] [place] [address]). Dr. Florentino Bethea discussed further with patient when she was available. Patient is considering her options and understands she and family need to make a decision today. Will discuss with interdisciplinary team.     More than 50% of this 35 minute visit was spent counseling and coordination of care as outlined above. Subjective:     Review of Systems:  A comprehensive review of systems was negative except for the following:  Musculoskeletal: Positive for generalized pain due to position  GI: Positive for needing to have BM     Objective:     Visit Vitals  /79 (BP 1 Location: Right arm, BP Patient Position: Head of bed elevated (Comment degrees))   Pulse 65   Temp 97.7 °F (36.5 °C)   Resp 18   Ht 5' 6\" (1.676 m)   Wt 312 lb 13.3 oz (141.9 kg)   SpO2 96%   Breastfeeding? No   BMI 50.49 kg/m²       Physical Exam:    General:  Cooperative. No acute distress. Eyes:  Conjunctivae/corneas clear. Nose: Nares normal. Septum midline. O2 via HFNC   Neck: Supple, symmetrical, trachea midline. Lungs:   Diminished. Unlabored. Heart:  Regular rate and rhythm. Abdomen:   Obese. Soft, non-tender, non-distended. Extremities: Normal, atraumatic, no cyanosis. Skin: Skin color, texture, turgor normal. No rash. Neurologic: Nonfocal.   Psych: Alert and oriented to person, place, and somewhat situation.       Signed By: Marcial Pathak NP     March 12, 2019

## 2019-03-12 NOTE — PROGRESS NOTES
Problem: Mobility Impaired (Adult and Pediatric)  Goal: *Acute Goals and Plan of Care (Insert Text)  STG:  (1.)Ms. Daniel Roth will move from supine to sit and sit to supine  with CONTACT GUARD ASSIST within 3 treatment day(s). (2.)Ms. Daniel Roth will transfer from bed to chair and chair to bed with MINIMAL ASSIST using the least restrictive device within 3 treatment day(s). (3.)Ms. Daniel Roth will ambulate with MINIMAL ASSIST for 30 feet with the least restrictive device within 3 treatment day(s). LTG:  (1.)Ms. Daniel Roth will move from supine to sit and sit to supine  in bed with STAND BY ASSIST within 7 treatment day(s). (2.)Ms. Daniel Roth will transfer from bed to chair and chair to bed with STAND BY ASSIST using the least restrictive device within 7 treatment day(s). (3.)Ms. Daniel Roth will ambulate with CONTACT GUARD ASSIST for 75 feet with the least restrictive device within 7 treatment day(s). ________________________________________________________________________________________________      PHYSICAL THERAPY: Daily Note and AM 3/12/2019  INPATIENT: PT Visit Days : 2  Payor: [address] [place], [place] [address] / Plan: [address] [place] [address] / Product Type: Medicaid /       NAME/AGE/GENDER: Neo Enciso is a 59 y.o. female   PRIMARY DIAGNOSIS: Seizure ([address] [R56.9]  Metastatic cancer to brain ([facility] [C79.31]  Hyperglycemia [R73.9] Seizure ([address] Seizure ([facility]  Procedure(s) (LRB):  RIGHT CRANIOTOMY  FOR RESECTION OF TUMOR WITH STEALTH & REP / MRI @  0900 / ROOM 3101 (N/A)     ICD-10: Treatment Diagnosis:    · Generalized Muscle Weakness (M62.81)  · Difficulty in walking, Not elsewhere classified (R26.2)  · History of falling (Z91.81)   Precaution/Allergies: Iodides tincture [iodine]      ASSESSMENT:     Ms. Daniel Roth is supine on arrival, 2 L/min O2. Pt appears more confused in conversation this date but A & O x 2. Pt required less assist for bed mobility and positioning to sitting EOB.  Pt with improved sit to stand, performed x 2 attempts. Pt able to weight shift with RW, a few side steps toward HOB. Pt then impulsively returned to sitting, stating she needs to have BM; returned to supine with MOD/MAX A x 2, left with needs in reach and PCT aware. Pt making progress today with less assist required. PT to cont to follow for acute care needs. Pt to have craniotomy tomorrow. per initial:  Pt lives with her son and daughter in 1 story home with 5 steps to enter with railing available. Pt reports gait with use of rollator or wheelchair dependent on how she is feeling that day. Pt reports independence with ADLs and requires use of 1.5 L supplemental O2 at baseline. Pt reports 2 falls. This section established at most recent assessment   PROBLEM LIST (Impairments causing functional limitations):  1. Decreased Strength  2. Decreased ADL/Functional Activities  3. Decreased Transfer Abilities  4. Decreased Ambulation Ability/Technique  5. Decreased Balance  6. Increased Pain  7. Decreased Activity Tolerance  8. Decreased Pacing Skills  9. Increased Fatigue  10. Increased Shortness of Breath  11. Decreased [place] with Home Exercise Program   INTERVENTIONS PLANNED: (Benefits and precautions of physical therapy have been discussed with the patient.)  1. Balance Exercise  2. Bed Mobility  3. Family Education  4. Gait Training  5. Home Exercise Program (HEP)  6. Neuromuscular Re-education/Strengthening  7. Range of Motion (ROM)  8. Therapeutic Activites  9. Therapeutic Exercise/Strengthening  10. Transfer Training     TREATMENT PLAN: Frequency/Duration: 3 times a week for duration of hospital stay  Rehabilitation Potential For Stated Goals: Good     RECOMMENDED REHABILITATION/EQUIPMENT: (at time of discharge pending progress): Due to the probability of continued deficits (see above) this patient will likely need continued skilled physical therapy after discharge.   Equipment:    None at this time              HISTORY:   History of Present Injury/Illness (Reason for Referral):  See H&P below  Patient is a 60 y/o female with squamous cell carcinoma of the lung, COPD, extreme obesity, diastolic CHF, HTN, HLD, FLETCHER, pulmonary HTN, HLD who presents from home with new altered mental status. EMS reports hyperglycemia. On arrival to ED she was unresponsive with seizure activity. Glucose is 558. She was loaded with IV keppra and also given IV ativan. A head CT shows brain mass in the right parieto-occipital lobe with surrounding vasogenic edema. She then received IV decadron. She was tachycardic with WBC ct of 14.2 so sepsis protocol initiated with fluids and antibiotics. Initially family had said she was DNR but later stated to do everything Except intubation. Will admit to ICU.     Past Medical History/Comorbidities:   Ms. Dre Scott  has a past medical history of Abnormality of gait and mobility, Acute on chronic respiratory failure ([address] (7/28/2018), Ambulatory dysfunction, Arthritis of knee, Asthma, CHF (congestive heart failure), NYHA class I ([address] Chronic hypoxemic respiratory failure ([address] COPD (chronic obstructive pulmonary disease) with emphysema ([address] Cor pulmonale ([address] Diabetes mellitus without complication ([address] Dyslipidemia, Extreme obesity with respiratory disorder ([address] Fall (7/28/2018), Hyperlipidemia, Hypertension, essential, benign, Hypomagnesemia, Insomnia (12/27/2016), Nocturnal hypoxia, Obesity with alveolar hypoventilation (HCC), Orthopnea, FLETCHER (obstructive sleep apnea), Primary pulmonary hypertension ([address] Pulmonary edema, noncardiac, and Sleeps in sitting position due to orthopnea. Ms. Dre Scott  has a past surgical history that includes hx cholecystectomy (1991); hx tonsillectomy; and hx tubal ligation (1993).   Social History/Living Environment:   Home Environment: Private residence  # Steps to Enter: 5  Rails to Enter: Yes  One/Two Story Residence: One story  Living Alone: No  Support Systems: Child(kira)  Patient Expects to be Discharged to[de-identified] Unknown  Current DME Used/Available at Home: Wheelchair  Tub or Shower Type: Tub/Shower combination  Prior Level of Function/Work/Activity:  Use of rollator or wheelchair for mobility, 2 falls   Number of Personal Factors/Comorbidities that affect the Plan of Care: 3+: HIGH COMPLEXITY   EXAMINATION:   Most Recent Physical Functioning:   Gross Assessment:                  Posture:  Posture (WDL): Exceptions to WDL  Posture Assessment: Forward head, Rounded shoulders  Balance:  Sitting: Impaired  Sitting - Static: Prop sitting  Sitting - Dynamic: Occassional  Standing: Impaired  Standing - Static: Constant support;Poor  Standing - Dynamic : Poor Bed Mobility:  Supine to Sit: Minimum assistance;Assist x2  Sit to Supine: Moderate assistance;Maximum assistance;Assist x2  Wheelchair Mobility:     Transfers:  Sit to Stand: Moderate assistance;Maximum assistance;Assist x2  Stand to Sit: Moderate assistance;Maximum assistance;Assist x2  Gait:     Base of Support: Center of gravity altered; Widened  Speed/Margo: Pace decreased (<100 feet/min)  Step Length: Left shortened;Right shortened  Gait Abnormalities: Decreased step clearance; Steppage gait;Trunk sway increased(improved weight shifting)  Distance (ft): 1 Feet (ft)(few side steps toward [facility])  Assistive Device: Walker, rolling  Ambulation - Level of Assistance: Maximum assistance;Assist x2  Interventions: Safety awareness training; Tactile cues; Verbal cues      Body Structures Involved:  1. Nerves  2. Lungs  3. Bones  4. Joints  5. Muscles  6. Ligaments Body Functions Affected:  1. Mental  2. Sensory/Pain  3. Cardio  4. Respiratory  5. Neuromusculoskeletal  6. Movement Related Activities and Participation Affected:  1. General Tasks and Demands  2. Mobility  3. Self Care  4. Domestic Life  5. Interpersonal Interactions and Relationships  6.  Community, Social and [place]   Number of elements that affect the Plan of Care: 4+: HIGH COMPLEXITY   CLINICAL PRESENTATION:   Presentation: Evolving clinical presentation with changing clinical characteristics: MODERATE COMPLEXITY   CLINICAL DECISION MAKIN [facility] Inpatient Short Form  How much difficulty does the patient currently have. .. Unable A Lot A Little None   1. Turning over in bed (including adjusting bedclothes, sheets and blankets)? [] 1   [] 2   [x] 3   [] 4   2. Sitting down on and standing up from a chair with arms ( e.g., wheelchair, bedside commode, etc.)   [x] 1   [] 2   [] 3   [] 4   3. Moving from lying on back to sitting on the side of the bed? [] 1   [] 2   [x] 3   [] 4   How much help from another person does the patient currently need. .. Total A Lot A Little None   4. Moving to and from a bed to a chair (including a wheelchair)? [x] 1   [] 2   [] 3   [] 4   5. Need to walk in hospital room? [x] 1   [] 2   [] 3   [] 4   6. Climbing 3-5 steps with a railing? [x] 1   [] 2   [] 3   [] 4   © , Trustees of [address] [place] [address], under license to [employer]. All rights reserved      Score:  Initial: 10 Most Recent: X (Date: -- )    Interpretation of Tool:  Represents activities that are increasingly more difficult (i.e. Bed mobility, Transfers, Gait). Medical Necessity:     · Patient is expected to demonstrate progress in strength, balance, coordination and functional technique to decrease assistance required with gait, transfers, and functional mobility. .  Reason for Services/Other Comments:  · Patient continues to require skilled intervention due to  decreased strength, decreased balance, decreased functional tolerance, decreased cardiopulmonary endurance affecting participation in basic ADLs and functional tasks.    Use of outcome tool(s) and clinical judgement create a POC that gives a: Questionable prediction of patient's progress: MODERATE COMPLEXITY          TREATMENT:   (In addition to Assessment/Re-Assessment sessions the following treatments were rendered)   Pre-treatment Symptoms/Complaints:  \"I have to get back in the bed\"  Pain: Initial:   Pain Intensity 1: 0  Post Session:  0/10 in supine post mobility     Therapeutic Activity: (    15 min): Therapeutic activities including Bed transfers, static sitting balance, weight shifting, sit to stand attempts, LE advancement attempts to improve mobility, strength, balance and coordination. Required moderate to maximal verbal and tactile cues Safety awareness training; Tactile cues; Verbal cues to promote static and dynamic balance in standing and promote coordination of bilateral, lower extremity(s). Braces/Orthotics/Lines/Etc:   · IV  · small catheter  · O2 Device: Heated, Hi flow nasal cannula  Treatment/Session Assessment:    · Response to Treatment: Mod to max A x 2 for mobility  · Interdisciplinary Collaboration:   o Physical Therapist  o Registered Nurse  o Rehabilitation Attendant  · After treatment position/precautions:   o Supine in bed  o Bed/Chair-wheels locked  o Bed in low position  o Call light within reach  o PCT at bedside for cleaning   · Compliance with Program/Exercises: Will assess as treatment progresses  · Recommendations/Intent for next treatment session: \"Next visit will focus on advancements to more challenging activities and reduction in assistance provided\".   Total Treatment Duration:  PT Patient Time In/Time Out  Time In: 1140  Time Out: Yfn Navarro 1060, PT

## 2019-03-12 NOTE — PROGRESS NOTES
Seth Mosley  Admission Date: 3/4/2019             Daily Progress Note: 3/12/2019    The patient's chart is reviewed and the patient is discussed with the staff.      58 y.o.  female seen and evaluated at the request of Dr. Manuela Gabriel  She presented with seizure. Moved to the ICU for BiPAP. Stage 4 NSCLC with new brain met and edema on head CT. New seizure and neurology saw, Oncology consulted.  Jaspal Loyd was diagnosed with squamous cell lung cancer last year after undergoing a left upper lobe biopsy. She was seen by oncology and an outpatient PET with follow up appointment was recommended but patient ended up under the care of hospice instead. She is morbidly obese and has a history of COPD with chronic dyspnea. She quit smoking 4 to 5 years ago. She is maintained on 2 liters of oxygen and has a home nebulizer. She has FLETCHER/OHS with chronic hypercapnea but she has refused CPAP/Bipap in the past.   She lives with her son and he reports that she can only ambulate a step of 2 at a time. She has been at home in hospice care since August 2018. Discussing Hospice house and revoked wanted to have tumor removed. Neurosurgery has seen and is considering a craniotomy. PC consulted and full code confirmed. Subjective:   Unchanged overall. Reportedly to have family meeting here today, but no one has showed thus far.      Current Facility-Administered Medications   Medication Dose Route Frequency    morphine injection 2 mg  2 mg IntraVENous Q3H PRN    LORazepam (ATIVAN) injection 1 mg  1 mg IntraVENous Q4H PRN    HYDROcodone-acetaminophen (NORCO) 7.5-325 mg per tablet 1 Tab  1 Tab Oral Q4H PRN    insulin glargine (LANTUS) injection 30 Units  30 Units SubCUTAneous DAILY    famotidine (PF) (PEPCID) 20 mg in sodium chloride 0.9% 10 mL injection  20 mg IntraVENous Q12H    sodium chloride (NS) flush 5-10 mL  5-10 mL IntraVENous PRN    sodium chloride (NS) flush 5-40 mL  5-40 mL IntraVENous Q8H  sodium chloride (NS) flush 5-40 mL  5-40 mL IntraVENous PRN    NUTRITIONAL SUPPORT ELECTROLYTE PRN ORDERS   Does Not Apply PRN    acetaminophen (TYLENOL) tablet 650 mg  650 mg Oral Q4H PRN    ondansetron (ZOFRAN) injection 4 mg  4 mg IntraVENous Q4H PRN    naloxone (NARCAN) injection 0.4 mg  0.4 mg IntraVENous PRN    albuterol-ipratropium (DUO-NEB) 2.5 MG-0.5 MG/3 ML  3 mL Nebulization Q4H PRN    levETIRAcetam (KEPPRA) 500 mg in 0.9% sodium chloride 100 mL IVPB  500 mg IntraVENous Q12H    budesonide (PULMICORT) 500 mcg/2 ml nebulizer suspension  500 mcg Nebulization BID RT    And    albuterol (PROVENTIL VENTOLIN) nebulizer solution 2.5 mg  2.5 mg Nebulization Q6HWA RT    dexamethasone (DECADRON) injection 6 mg  6 mg IntraVENous Q6H    piperacillin-tazobactam (ZOSYN) 3.375 g in 0.9% sodium chloride (MBP/ADV) 100 mL  3.375 g IntraVENous Q8H    insulin regular (NOVOLIN R, HUMULIN R) injection   SubCUTAneous Q4H     Review of Systems  Constitutional: negative for fever, chills, sweats  Cardiovascular: negative for chest pain, palpitations, syncope, edema  Gastrointestinal:  negative for dysphagia, reflux, vomiting, diarrhea, abdominal pain, or melena  Neurologic:  negative for focal weakness, numbness, headache    Objective:     Vitals:    03/12/19 0410 03/12/19 0722 03/12/19 0748 03/12/19 1112   BP:  130/57  148/79   Pulse:  63  65   Resp:  18  18   Temp:  97.9 °F (36.6 °C)  97.7 °F (36.5 °C)   SpO2: 97% 92% 91% 94%   Weight:       Height:         Intake and Output:   03/10 1901 - 03/12 0700  In: 2112 [P.O.:1775; I.V.:337]  Out: 4600 [Urine:4600]  No intake/output data recorded. Physical Exam:   Constitution:  the patient is well developed and in no acute distress  EENMT:  Sclera clear, pupils equal, oral mucosa moist  Respiratory: clear with decreased breath sounds  Cardiovascular:  RRR without M,G,R  Gastrointestinal: soft and non-tender; with positive bowel sounds.   Musculoskeletal: warm without cyanosis. There is no lower leg edema. Skin:  no jaundice or rashes, no wounds   Neurologic: no gross neuro deficits     Psychiatric:  Lethargic but will wake up and answer some questions    CXR: None today    LAB  Recent Labs     03/12/19  1211 03/12/19  0759 03/12/19  0355 03/11/19  2322 03/11/19 2022   GLUCPOC 222* 167* 168* 251* 262*      No results for input(s): WBC, HGB, HCT, PLT, INR, HGBEXT, HCTEXT, PLTEXT, HGBEXT, HCTEXT, PLTEXT in the last 72 hours. No lab exists for component: INREXT, INREXT  No results for input(s): NA, K, CL, CO2, GLU, BUN, CREA, MG, CA, PHOS, TROIQ, ALB, TBIL, TBILI, GPT, ALT, SGOT, BNPP in the last 72 hours. No lab exists for component: TROIP  No results for input(s): PH, PCO2, PO2, HCO3, PHI, PCO2I, PO2I, HCO3I in the last 72 hours. No results for input(s): LCAD, LAC in the last 72 hours.     Assessment:  (Medical Decision Making)     Hospital Problems  Date Reviewed: 3/7/2019          Codes Class Noted POA    Delirium ICD-10-CM: R41.0  ICD-9-CM: 780.09  3/6/2019 Unknown        * (Principal) Seizure ([facility] ICD-10-CM: R56.9  ICD-9-CM: 780.39  3/5/2019 Yes        Metastatic cancer to brain [facility]) ICD-10-CM: C79.31  ICD-9-CM: 198.3  3/5/2019 Yes        Hyperglycemia ICD-10-CM: R73.9  ICD-9-CM: 790.29  3/5/2019 Yes        Acute on chronic respiratory failure with hypoxia and hypercapnia (HCC) ICD-10-CM: J96.21, J96.22  ICD-9-CM: 518.84, 786.09, 799.02  3/5/2019 Yes        Primary cancer of left upper lobe of lung ([facility]- squamous cell ICD-10-CM: C34.12  ICD-9-CM: 162.3  7/30/2018 Yes        Hypertension, essential, benign (Chronic) ICD-10-CM: I10  ICD-9-CM: 401.1  Unknown Yes        COPD with emphysema (HCC) (Chronic) ICD-10-CM: J43.9  ICD-9-CM: 492.8  Unknown Yes        Morbid obesity ([facility] (Chronic) ICD-10-CM: E66.01  ICD-9-CM: 278.01  Unknown Yes        Diabetes mellitus without complication ([facility] (Chronic) ICD-10-CM: E11.9  ICD-9-CM: 250.00  Unknown Yes        FLETCHER (obstructive sleep apnea) (Chronic) ICD-10-CM: G47.33  ICD-9-CM: 327.23  Unknown Yes        Chronic hypoxemic respiratory failure (HCC) (Chronic) ICD-10-CM: J96.11  ICD-9-CM: 518.83, 799.02  Unknown Yes              Plan:  (Medical Decision Making)   1   Wean O2 as tolerated  2   Moderately severe restriction on spirometry bedside with FEV1 of 53% and just over 1L  3   Using BIPAP at night intermittently  4   Decadron per ns  5   Will likely need rehab and needs chemo/rads plan with oncology given proceeding with neurosurgery for stage 4 lung cancer. 6    Patient has an intermediate risk for respiratory failure following surgery (10% risk of mechanical ventilation for more than 48 hours)     Met with daughter, son, oncology and PC at bedside. Discussed risk for respiratory complications and also likelihood of functional recovery enough to receive systemic therapy which seems low since she hasn't been able to walk more then 5 feet for nearly a year. Family and oncology to meet with patient and discuss plan, currently leaning towards not proceeding with surgery given low benefit. The patient does not require additional evaluation or therapy prior to undergoing non-thoracic surgery. The preventive strategies for pulmonary complications are included below. PREOP:  1. Smoking cessation: complete  2. Optimize inhaler regimen: complete  3. Treat for COPD or asthma flare with Prednisone or delay elective surgery: continue current steroids, demonstrates significant improvement and on home O2 dose  4. Delay surgery for active respiratory infection and do not use \"preventive antibiotics: for respiratory infection: no signs of active respiratory infection. 5. Preop IS, breathing exercises and ambulation as able. INTRAOP:  1. Less invasive (laproscopic) and short procedure (<3 hours) when possible  2. Nerve block rather than general anesthesia when possible  3. Administer Albuterol neb 30 minutes prior to intubation  4.  Avoid neuromuscular blockade when possible    POSTOP:  1. Post op IS, breathing exercise and ambulation as soon as able  2. Epidural anesthesia instead of opioids when able  3. Consider CPAP post op for 4-6 hours in higher risk and longer procedures  4. Avoid NGT after abdominal surgery when not needed for GI symptom control    Will sign off, call with questions. More than 50% of the time documented was spent in face-to-face contact with the patient and in the care of the patient on the floor/unit where the patient is located.     Erwin Colon MD

## 2019-03-12 NOTE — PROGRESS NOTES
This RN was giving the pt pain meds when she stated, \"I am sure that I do NOT want to go through with the surgery [crainotomy]. \" She wants to go home with hospice but if that is not possible, then she would like to go to the [facility] per the family and pt. Dr. Luis Pimentel, hospitalist, notified and verbalized understanding. Preop has also been notified stating they will notify anesthesiology. This RN called the neuro team and Dr. Lemuel Diaz is currently in a surgery but Haris Sandoval stated she will relay the message to him about the surgery being cancelled and he will return my call on the floor. SW is talking with pt's son and daughter (daughter is the POA). [id number] [facility] speaking with the family since they happen to be on the floor.

## 2019-03-12 NOTE — PROGRESS NOTES
END OF SHIFT NOTE:    Intake/Output  03/12 0701 - 03/12 1900  In: 720 [P.O.:720]  Out: 1450 [Urine:1450]   Voiding: YES  Catheter: YES  Drain:              Stool:  1 occurrences. Stool Assessment  Stool Color: Brown (03/12/19 1514)  Stool Appearance: Loose (03/12/19 1514)  Stool Amount: Medium (03/12/19 1514)  Stool Source/Status: Rectum (03/12/19 1514)    Emesis:  0 occurrences. VITAL SIGNS  Patient Vitals for the past 12 hrs:   Temp Pulse Resp BP SpO2   03/12/19 1513 98 °F (36.7 °C) 88 18 125/61 90 %   03/12/19 1407     96 %   03/12/19 1112 97.7 °F (36.5 °C) 65 18 148/79 94 %   03/12/19 0748     91 %   03/12/19 0722 97.9 °F (36.6 °C) 63 18 130/57 92 %       Pain Assessment  Pain 1  Pain Scale 1: Visual (03/12/19 1601)  Pain Intensity 1: 0 (03/12/19 1601)  Patient Stated Pain Goal: 0 (03/12/19 1601)  Pain Reassessment 1: Patient sleeping (03/12/19 1601)  Pain Onset 1: chronic (03/11/19 1455)  Pain Location 1: Generalized (03/12/19 1531)  Pain Orientation 1: Left;Right (03/12/19 1531)  Pain Description 1: Aching (03/12/19 1531)  Pain Intervention(s) 1: Medication (see MAR) (03/12/19 1531)    Ambulating  No    Additional Information:   -Pt does NOT want to go through with surgery tomorrow   -Wants to go home with hospice (see other note)   -Morphine, ativan and norco given       Shift report given to oncoming nurse at the bedside.     Clemencia Melissa

## 2019-03-12 NOTE — PROGRESS NOTES
Per case management and charge nurse, Dr. Marie Cai came and saw the pt and confirmed that she does NOT want to go through with the surgery tomorrow morning. She wants to go home with hospice instead.

## 2019-03-12 NOTE — PROGRESS NOTES
Hospitalist Progress Note     Admit Date:  3/4/2019 11:10 PM   Name:  Rocío Richards   Age:  59 y.o.  :  1960   MRN:  [id number]   PCP:  Young Hamilton, Not On File  Treatment Team: Attending Provider: Eric Barbosa DO; Consulting Provider: Edison Benavides NP; Consulting Provider: Duane Almaguer MD; Consulting Provider: Terrence Das MD; Care Manager: Danny Ratliff RN    Subjective:   Ms Sreedhar Anderson is a 58 y/o female with squamous cell carcinoma of the lung, COPD, extreme obesity, diastolic CHF, HTN, HLD, FLETCHER, pulmonary HTN, HLD who was admitted for new altered mental status. EMS reported hyperglycemia. On arrival to ED she was unresponsive with seizure activity. Glucose of 558. She was loaded with IV keppra and also given IV ativan. A head CT showed brain mass in the right parieto-occipital lobe with surrounding vasogenic edema. She then received IV decadron. She was tachycardic with WBC ct of 14.2 so sepsis protocol initiated with fluids and antibiotics. Initially family had said she was DNR but later stated to do everything Except intubation. She was admitted under ICU for monitoring on Bipap. Neurosurgery consulted with plans of brain biopsy and craniotomy on 3/13/18. Her respiratory status improved and she was moved out of the ICU. Pulmonary did preoperative evaluation, but on 3/12/19 patient and family changed their minds and cancelled surgery and opted for hospice services. Consult placed, awaiting evaluation.      On my assessment today she was found in mild distress due to lower back pain. She did not have anymore seizures events.        Objective:     Patient Vitals for the past 24 hrs:   Temp Pulse Resp BP SpO2   19 0722 97.9 °F (36.6 °C) 63 18 130/57 92 %   19 0410     97 %   19 0351 97.3 °F (36.3 °C) 65 17 124/64 95 %   19 2358 97.8 °F (36.6 °C) 66 18 128/75 95 %   19 2302     97 %   19     92 %   19 98 °F (36.7 °C) 99 18 113/66 (!) 84 %   03/11/19 1915     91 %   03/11/19 1440 98.1 °F (36.7 °C) 95 18 114/57 96 %   03/11/19 1411     95 %   03/11/19 1135 97.4 °F (36.3 °C) (!) 56 18 120/76 99 %   03/11/19 0859     94 %     Oxygen Therapy  O2 Sat (%): 92 % (03/12/19 0722)  Pulse via Oximetry: 55 beats per minute (03/12/19 0410)  O2 Device: Hi flow nasal cannula (03/12/19 0722)  O2 Flow Rate (L/min): 2 l/min (03/11/19 2018)  O2 Temperature: 87.8 °F (31 °C) (03/10/19 0906)  FIO2 (%): 30 % (03/12/19 0410)    Intake/Output Summary (Last 24 hours) at 3/12/2019 0744  Last data filed at 3/12/2019 0639  Gross per 24 hour   Intake 1392 ml   Output 2800 ml   Net -1408 ml         Physical Examination:  Physical Exam   Constitutional: No distress. HENT:   Mouth/Throat: Oropharynx is clear and moist. No oropharyngeal exudate. Eyes: Conjunctivae are normal. No scleral icterus. Neck: No JVD present. No tracheal deviation present. Cardiovascular: Normal rate. No murmur heard. Pulmonary/Chest: Breath sounds normal. No respiratory distress. She exhibits no tenderness. Abdominal: There is no tenderness. There is no rebound and no guarding. Musculoskeletal: She exhibits no tenderness or deformity. Neurological: She is alert. GCS score is 15. Skin: Skin is dry. She is not diaphoretic. No erythema. Data Review:  I have reviewed all labs, meds, telemetry events, and studies from the last 24 hours.     Recent Results (from the past 24 hour(s))   GLUCOSE, POC    Collection Time: 03/11/19  9:25 AM   Result Value Ref Range    Glucose (POC) 137 (H) 65 - 100 mg/dL   GLUCOSE, POC    Collection Time: 03/11/19 10:56 AM   Result Value Ref Range    Glucose (POC) 174 (H) 65 - 100 mg/dL   GLUCOSE, POC    Collection Time: 03/11/19  4:01 PM   Result Value Ref Range    Glucose (POC) 219 (H) 65 - 100 mg/dL   GLUCOSE, POC    Collection Time: 03/11/19  8:22 PM   Result Value Ref Range    Glucose (POC) 262 (H) 65 - 100 mg/dL   GLUCOSE, POC Collection Time: 03/11/19 11:22 PM   Result Value Ref Range    Glucose (POC) 251 (H) 65 - 100 mg/dL   GLUCOSE, POC    Collection Time: 03/12/19  3:55 AM   Result Value Ref Range    Glucose (POC) 168 (H) 65 - 100 mg/dL        All Micro Results     Procedure Component Value Units Date/Time    CULTURE, BLOOD [[id number]] Collected:  03/05/19 0334    Order Status:  Completed Specimen:  Blood Updated:  03/10/19 1223     Special Requests: --        LEFT  HAND       Culture result: NO GROWTH 5 DAYS       CULTURE, BLOOD [[id number]] Collected:  03/05/19 0410    Order Status:  Completed Specimen:  Blood Updated:  03/10/19 1223     Special Requests: --        RIGHT  HAND       Culture result: NO GROWTH 5 DAYS       CULTURE, URINE [[id number]] Collected:  03/05/19 0504    Order Status:  Completed Specimen:  Urine from [place] Specimen Updated:  03/07/19 0829     Special Requests: NO SPECIAL REQUESTS        Culture result: NO GROWTH 2 DAYS       CULTURE, URINE [[id number]] Collected:  03/05/19 1645    Order Status:  Canceled Specimen:  Urine from [place] Specimen           Current Meds:  Current Facility-Administered Medications   Medication Dose Route Frequency    morphine injection 2 mg  2 mg IntraVENous Q3H PRN    LORazepam (ATIVAN) injection 1 mg  1 mg IntraVENous Q4H PRN    HYDROcodone-acetaminophen (NORCO) 7.5-325 mg per tablet 1 Tab  1 Tab Oral Q4H PRN    insulin glargine (LANTUS) injection 30 Units  30 Units SubCUTAneous DAILY    famotidine (PF) (PEPCID) 20 mg in sodium chloride 0.9% 10 mL injection  20 mg IntraVENous Q12H    sodium chloride (NS) flush 5-10 mL  5-10 mL IntraVENous PRN    sodium chloride (NS) flush 5-40 mL  5-40 mL IntraVENous Q8H    sodium chloride (NS) flush 5-40 mL  5-40 mL IntraVENous PRN    NUTRITIONAL SUPPORT ELECTROLYTE PRN ORDERS   Does Not Apply PRN    acetaminophen (TYLENOL) tablet 650 mg  650 mg Oral Q4H PRN    ondansetron (ZOFRAN) injection 4 mg  4 mg IntraVENous Q4H PRN    naloxone [facility]) injection 0.4 mg  0.4 mg IntraVENous PRN    albuterol-ipratropium (DUO-NEB) 2.5 MG-0.5 MG/3 ML  3 mL Nebulization Q4H PRN    levETIRAcetam (KEPPRA) 500 mg in 0.9% sodium chloride 100 mL IVPB  500 mg IntraVENous Q12H    budesonide (PULMICORT) 500 mcg/2 ml nebulizer suspension  500 mcg Nebulization BID RT    And    albuterol (PROVENTIL VENTOLIN) nebulizer solution 2.5 mg  2.5 mg Nebulization Q6HWA RT    dexamethasone (DECADRON) injection 6 mg  6 mg IntraVENous Q6H    piperacillin-tazobactam (ZOSYN) 3.375 g in 0.9% sodium chloride (MBP/ADV) 100 mL  3.375 g IntraVENous Q8H    insulin regular (NOVOLIN R, HUMULIN R) injection   SubCUTAneous Q4H       Diet:  DIET DIABETIC CONSISTENT CARB    Other Studies (last 24 hours):  No results found.     Assessment and Plan:     Hospital Problems as of 3/12/2019 Date Reviewed: 3/7/2019          Codes Class Noted - Resolved POA    Delirium ICD-10-CM: R41.0  ICD-9-CM: 780.09  3/6/2019 - Present Unknown        * (Principal) Seizure ([facility] [address] ICD-10-CM: R56.9  ICD-9-CM: 780.39  3/5/2019 - Present Yes        Metastatic cancer to brain [facility]) ICD-10-CM: C79.31  ICD-9-CM: 198.3  3/5/2019 - Present Yes        Hyperglycemia ICD-10-CM: R73.9  ICD-9-CM: 790.29  3/5/2019 - Present Yes        Acute on chronic respiratory failure with hypoxia and hypercapnia (HCC) ICD-10-CM: J96.21, J96.22  ICD-9-CM: 518.84, 786.09, 799.02  3/5/2019 - Present Yes        Primary cancer of left upper lobe of lung ([facility] [address]- squamous cell ICD-10-CM: C34.12  ICD-9-CM: 162.3  7/30/2018 - Present Yes        Hypertension, essential, benign (Chronic) ICD-10-CM: I10  ICD-9-CM: 401.1  Unknown - Present Yes        Hyperlipidemia (Chronic) ICD-10-CM: E78.5  ICD-9-CM: 272.4  Unknown - Present         COPD with emphysema (HCC) (Chronic) ICD-10-CM: J43.9  ICD-9-CM: 492.8  Unknown - Present Yes        Morbid obesity (HCC) (Chronic) ICD-10-CM: E66.01  ICD-9-CM: 278.01  Unknown - Present Yes        Primary pulmonary hypertension (HCC) (Chronic) ICD-10-CM: I27.0  ICD-9-CM: 416.0  Unknown - Present         Diabetes mellitus without complication (HCC) (Chronic) ICD-10-CM: E11.9  ICD-9-CM: 250.00  Unknown - Present Yes        FLETCHER (obstructive sleep apnea) (Chronic) ICD-10-CM: G47.33  ICD-9-CM: 327.23  Unknown - Present Yes        Chronic hypoxemic respiratory failure (HCC) (Chronic) ICD-10-CM: J96.11  ICD-9-CM: 518.83, 799.02  Unknown - Present Yes        CHF (congestive heart failure), NYHA class I (HCC) (Chronic) ICD-10-CM: I50.9  ICD-9-CM: 428.0  Unknown - Present     Overview Signed 12/27/2016 11:24 AM by Tom Ash     diastolic                   A/P:        -Lung cancer (sq.cell ca) with brain mass:  Continue Keppra, Dexamethasone--insulin for sugar control   Patient and family declined surgery  Plan for hospice. Consulted placed    -Acute on chronic respiratory failure with hypoxia and hypercapnea--resolved    History of FLETCHER  BiPAP at night.   Pulmonary f/u      -Type 2 DM with Hyperglycemia: Titrate insulin prn.  Change HISS to resistant        DVT prophylaxis : SCD    Code status: full     Disposition: home with hospice tomorrow

## 2019-03-12 NOTE — PROGRESS NOTES
Problem: Self Care Deficits Care Plan (Adult)  Goal: *Acute Goals and Plan of Care (Insert Text)  1. Pt will toilet with CGA   2. Pt will complete functional mobility for ADLs with CGA  3. Pt will complete lower body dressing with CGA using AE as needed  4. Pt will complete grooming and hygiene at sink with CGA  5. Pt will demonstrate independence with HEP to promote increased BUE strength and functional use for ADLs  6. Pt will tolerate 23 minutes functional activity with min or fewer rest breaks to promote increased endurance for ADLs  7. Pt will complete bed mobility with SBA in prep for ADLs    Timeframe: 7 days      OCCUPATIONAL THERAPY: Daily Note 3/12/2019  INPATIENT: OT Visit Days: 1  Payor: [id number] / Plan: [place] / Product Type: Medicaid /      NAME/AGE/GENDER: Al Zhang is a 59 y.o. female   PRIMARY DIAGNOSIS:  Seizure ([address] [R56.9]  Metastatic cancer to brain ([facility] [address] [C79.31]  Hyperglycemia [R73.9] Seizure ([facility] [address] Seizure ([facility] [address]  Procedure(s) (LRB):  RIGHT CRANIOTOMY  FOR RESECTION OF TUMOR WITH STEALTH & REP / MRI @  0900 / ROOM 3101 (N/A)     ICD-10: Treatment Diagnosis:    · Generalized Muscle Weakness (M62.81)  · Dizziness and Giddiness (R42)   Precautions/Allergies:    falls Iodides tincture [iodine]      ASSESSMENT:     Ms. Aloma Kanner was admitted with AMS and seizures, has a history of lung cancer and workup was + R brain mass d/t metastatic cancer. Pt is scheduled for a craniotomy next week. Pt lives with her son and daughter and uses a rollator or WC for mobility, is independent with ADLs at baseline. This session, pt presented sidelying in bed, lethargic but agreeable to OT session. Pt A&O x2, confused with impaired safety awareness, attention, and safety awareness. Pt transferred to the edge of the bed with mod assistance, CGA for sitting balance. Pt required total assistance to don socks.  Standing attempted 2xs with max X2, however unable to clear bottom. Pt returned to bed with mod-max X2. Pt rolled from side to side with min assistance for positioning. Pt endorsed severe pain in hips and low back throughout session made worse w/ mobility, unable to position pt comfortably despite multiple attempts. Pt appeared to put forth limited effort this session, poor progress towards goals. Continue POC. This section established at most recent assessment   PROBLEM LIST (Impairments causing functional limitations):  1. Decreased Strength  2. Decreased ADL/Functional Activities  3. Decreased Transfer Abilities  4. Decreased Balance  5. Decreased Activity Tolerance  6. Increased Fatigue  7. Decreased Cognition   INTERVENTIONS PLANNED: (Benefits and precautions of occupational therapy have been discussed with the patient.)  1. Activities of daily living training  2. Adaptive equipment training  3. Balance training  4. Therapeutic activity  5. Therapeutic exercise     TREATMENT PLAN: Frequency/Duration: Follow patient 3 times/ week to address above goals. Rehabilitation Potential For Stated Goals: Good     RECOMMENDED REHABILITATION/EQUIPMENT: (at time of discharge pending progress): Due to the probability of continued deficits (see above) this patient will likely need continued skilled occupational therapy after discharge.   Equipment:    None at this time              OCCUPATIONAL PROFILE AND HISTORY:   History of Present Injury/Illness (Reason for Referral):  See H&P  Past Medical History/Comorbidities:   Ms. Celina Martell  has a past medical history of Abnormality of gait and mobility, Acute on chronic respiratory failure ([address] (7/28/2018), Ambulatory dysfunction, Arthritis of knee, Asthma, CHF (congestive heart failure), NYHA class I ([address] Chronic hypoxemic respiratory failure ([address] COPD (chronic obstructive pulmonary disease) with emphysema ([address] Cor pulmonale ([address] Diabetes mellitus without complication ([address] Dyslipidemia, Extreme obesity with respiratory disorder [facility]), Fall (7/28/2018), Hyperlipidemia, Hypertension, essential, benign, Hypomagnesemia, Insomnia (12/27/2016), Nocturnal hypoxia, Obesity with alveolar hypoventilation (HCC), Orthopnea, FLETCHER (obstructive sleep apnea), Primary pulmonary hypertension ([facility] [address] Pulmonary edema, noncardiac, and Sleeps in sitting position due to orthopnea. Ms. Leonid Muhammad  has a past surgical history that includes hx cholecystectomy (1991); hx tonsillectomy; and hx tubal ligation (1993). Social History/Living Environment:   Home Environment: Private residence  # Steps to Enter: 5  Rails to Enter: Yes  One/Two Story Residence: One story  Living Alone: No  Support Systems: Child(kira)  Patient Expects to be Discharged to[de-identified] Unknown  Current DME Used/Available at Home: Wheelchair  Tub or Shower Type: Tub/Shower combination  Prior Level of Function/Work/Activity:  Independent with ADLs, lives with son and daughter     Number of Personal Factors/Comorbidities that affect the Plan of Care: Expanded review of therapy/medical records (1-2):  MODERATE COMPLEXITY   ASSESSMENT OF OCCUPATIONAL PERFORMANCE[de-identified]   Activities of Daily Living:   Basic ADLs (From Assessment) Complex ADLs (From Assessment)   Feeding: Setup  Oral Facial Hygiene/Grooming: Minimum assistance  Bathing: Moderate assistance  Upper Body Dressing: Minimum assistance  Lower Body Dressing: Moderate assistance  Toileting: Moderate assistance Instrumental ADL  Meal Preparation: Maximum assistance  Homemaking: Maximum assistance  Medication Management: Maximum assistance  Financial Management: Maximum assistance   Grooming/Bathing/Dressing Activities of Daily Living                             Bed/Mat Mobility  Supine to Sit: Moderate assistance  Sit to Supine:  Moderate assistance;Assist x2  Sit to Stand: Maximum assistance;Assist x2       Most Recent Physical Functioning:   Gross Assessment:  AROM: Generally decreased, functional  Strength: Generally decreased, functional  Coordination: Generally decreased, functional               Posture:  Posture (WDL): Exceptions to WDL  Posture Assessment: Forward head, Rounded shoulders  Balance:  Sitting: Impaired  Sitting - Static: Fair (occasional)  Sitting - Dynamic: Fair (occasional)  Standing: Impaired  Standing - Static: Poor  Standing - Dynamic : Poor Bed Mobility:  Supine to Sit: Moderate assistance  Sit to Supine: Moderate assistance;Assist x2  Wheelchair Mobility:     Transfers:  Sit to Stand: Maximum assistance;Assist x2  Stand to Sit: Maximum assistance;Assist x2            Patient Vitals for the past 6 hrs:   BP BP Patient Position SpO2 O2 Flow Rate (L/min) Pulse   19 1112 148/79 Head of bed elevated (Comment degrees) 94 % 2.5 l/min 65   19 1140    2.5 l/min    19 1407   96 % 2 l/min    19 1513 125/61 Lying left side 90 % 2 l/min 88       Mental Status  Neurologic State: Confused, Alert  Orientation Level: Oriented to person, Oriented to place, Disoriented to situation, Disoriented to time  Cognition: Decreased attention/concentration, Decreased command following, Impaired decision making, Memory loss  Perception: Appears intact  Perseveration: No perseveration noted  Safety/Judgement: Awareness of environment, Fall prevention                          Physical Skills Involved:  1. Balance  2. Strength  3. Activity Tolerance  4. Fine Motor Control Cognitive Skills Affected (resulting in the inability to perform in a timely and safe manner):  1. Executive Function  2. Sustained Attention  3. Divided Attention Psychosocial Skills Affected:  1. Habits/Routines  2. Environmental Adaptation   Number of elements that affect the Plan of Care: 3-5:  MODERATE COMPLEXITY   CLINICAL DECISION MAKIN [facility] [address] [address] AM-PAC 6 Clicks   Daily Activity Inpatient Short Form  How much help from another person does the patient currently need. .. Total A Lot A Little None   1.   Putting on and taking off regular lower body clothing? [] 1   [x] 2   [] 3   [] 4   2. Bathing (including washing, rinsing, drying)? [] 1   [x] 2   [] 3   [] 4   3. Toileting, which includes using toilet, bedpan or urinal?   [] 1   [x] 2   [] 3   [] 4   4. Putting on and taking off regular upper body clothing? [] 1   [] 2   [x] 3   [] 4   5. Taking care of personal grooming such as brushing teeth? [] 1   [] 2   [x] 3   [] 4   6. Eating meals? [] 1   [] 2   [] 3   [x] 4   © 2007, Trustees of [facility] [employer], under license to [employer]. All rights reserved      Score:  Initial: 16 Most Recent: X (Date: -- )    Interpretation of Tool:  Represents activities that are increasingly more difficult (i.e. Bed mobility, Transfers, Gait). Medical Necessity:     · Patient demonstrates good rehab potential due to higher previous functional level. Reason for Services/Other Comments:  · Patient continues to require present interventions due to patient's inability to independently complete ADLs. Use of outcome tool(s) and clinical judgement create a POC that gives a: MODERATE COMPLEXITY         TREATMENT:   (In addition to Assessment/Re-Assessment sessions the following treatments were rendered)     Pre-treatment Symptoms/Complaints:    Pain: Initial:   Pain Intensity 1: 6  Pain Location 1: Leg, Back  Pain Intervention(s) 1: (pre-medicated)  Post Session:  4     Therapeutic Activity: (15 min): Therapeutic activities including Bed transfers and standing transfers to improve mobility, balance and endurance for ADLs. Required moderate to max Safety awareness training; Tactile cues; Verbal cues to promote static and dynamic balance in sitting.        Braces/Orthotics/Lines/Etc:   · small catheter  · O2 Device: Heated, Hi flow nasal cannula  Treatment/Session Assessment:    · Response to Treatment:  fatigue  · Interdisciplinary Collaboration:   o Occupational Therapist  o Registered Nurse  o Rehabilitation Attendant  · After treatment position/precautions: o Supine in bed  o Bed/Chair-wheels locked  o Call light within reach  o RN notified  o Side rails x 3   · Compliance with Program/Exercises: Compliant most of the time. · Recommendations/Intent for next treatment session: \"Next visit will focus on advancements to more challenging activities and reduction in assistance provided\".   Total Treatment Duration:  OT Patient Time In/Time Out  Time In: 7494  Time Out: [facility] [address]

## 2019-03-13 ENCOUNTER — APPOINTMENT (OUTPATIENT)
Dept: MRI IMAGING | Age: 59
DRG: 100 | End: 2019-03-13
Attending: NEUROLOGICAL SURGERY
Payer: OTHER MISCELLANEOUS

## 2019-03-13 ENCOUNTER — ANESTHESIA (OUTPATIENT)
Dept: SURGERY | Age: 59
End: 2019-03-13

## 2019-03-13 LAB
GLUCOSE BLD STRIP.AUTO-MCNC: 202 MG/DL (ref 65–100)
GLUCOSE BLD STRIP.AUTO-MCNC: 207 MG/DL (ref 65–100)
GLUCOSE BLD STRIP.AUTO-MCNC: 216 MG/DL (ref 65–100)
GLUCOSE BLD STRIP.AUTO-MCNC: 302 MG/DL (ref 65–100)

## 2019-03-13 PROCEDURE — 94760 N-INVAS EAR/PLS OXIMETRY 1: CPT

## 2019-03-13 PROCEDURE — 77010033678 HC OXYGEN DAILY

## 2019-03-13 PROCEDURE — 74011250636 HC RX REV CODE- 250/636: Performed by: INTERNAL MEDICINE

## 2019-03-13 PROCEDURE — 74011000258 HC RX REV CODE- 258: Performed by: HOSPITALIST

## 2019-03-13 PROCEDURE — 94640 AIRWAY INHALATION TREATMENT: CPT

## 2019-03-13 PROCEDURE — 94660 CPAP INITIATION&MGMT: CPT

## 2019-03-13 PROCEDURE — 74011000250 HC RX REV CODE- 250: Performed by: HOSPITALIST

## 2019-03-13 PROCEDURE — 74011250636 HC RX REV CODE- 250/636: Performed by: HOSPITALIST

## 2019-03-13 PROCEDURE — 74011250637 HC RX REV CODE- 250/637: Performed by: INTERNAL MEDICINE

## 2019-03-13 PROCEDURE — 65270000029 HC RM PRIVATE

## 2019-03-13 PROCEDURE — 99232 SBSQ HOSP IP/OBS MODERATE 35: CPT | Performed by: NURSE PRACTITIONER

## 2019-03-13 PROCEDURE — 74011636637 HC RX REV CODE- 636/637: Performed by: INTERNAL MEDICINE

## 2019-03-13 PROCEDURE — 82962 GLUCOSE BLOOD TEST: CPT

## 2019-03-13 PROCEDURE — 74011000250 HC RX REV CODE- 250: Performed by: INTERNAL MEDICINE

## 2019-03-13 RX ORDER — INSULIN LISPRO 100 [IU]/ML
5 INJECTION, SOLUTION INTRAVENOUS; SUBCUTANEOUS
Status: DISCONTINUED | OUTPATIENT
Start: 2019-03-14 | End: 2019-03-14 | Stop reason: HOSPADM

## 2019-03-13 RX ORDER — INSULIN GLARGINE 100 [IU]/ML
35 INJECTION, SOLUTION SUBCUTANEOUS DAILY
Status: DISCONTINUED | OUTPATIENT
Start: 2019-03-14 | End: 2019-03-14 | Stop reason: HOSPADM

## 2019-03-13 RX ORDER — NYSTATIN 100000 [USP'U]/G
POWDER TOPICAL 2 TIMES DAILY
Status: DISCONTINUED | OUTPATIENT
Start: 2019-03-13 | End: 2019-03-14 | Stop reason: HOSPADM

## 2019-03-13 RX ADMIN — DEXAMETHASONE SODIUM PHOSPHATE 6 MG: 10 INJECTION INTRAMUSCULAR; INTRAVENOUS at 06:02

## 2019-03-13 RX ADMIN — Medication 10 ML: at 13:15

## 2019-03-13 RX ADMIN — Medication 10 ML: at 21:16

## 2019-03-13 RX ADMIN — MORPHINE SULFATE 2 MG: 2 INJECTION, SOLUTION INTRAMUSCULAR; INTRAVENOUS at 20:12

## 2019-03-13 RX ADMIN — HYDROCODONE BITARTRATE AND ACETAMINOPHEN 1 TABLET: 7.5; 325 TABLET ORAL at 16:34

## 2019-03-13 RX ADMIN — DEXAMETHASONE SODIUM PHOSPHATE 6 MG: 10 INJECTION INTRAMUSCULAR; INTRAVENOUS at 21:03

## 2019-03-13 RX ADMIN — MORPHINE SULFATE 2 MG: 2 INJECTION, SOLUTION INTRAMUSCULAR; INTRAVENOUS at 05:18

## 2019-03-13 RX ADMIN — ALBUTEROL SULFATE 2.5 MG: 2.5 SOLUTION RESPIRATORY (INHALATION) at 13:17

## 2019-03-13 RX ADMIN — DEXAMETHASONE SODIUM PHOSPHATE 6 MG: 10 INJECTION INTRAMUSCULAR; INTRAVENOUS at 01:37

## 2019-03-13 RX ADMIN — ALBUTEROL SULFATE 2.5 MG: 2.5 SOLUTION RESPIRATORY (INHALATION) at 21:43

## 2019-03-13 RX ADMIN — INSULIN LISPRO 12 UNITS: 100 INJECTION, SOLUTION INTRAVENOUS; SUBCUTANEOUS at 16:34

## 2019-03-13 RX ADMIN — HYDROCODONE BITARTRATE AND ACETAMINOPHEN 1 TABLET: 7.5; 325 TABLET ORAL at 07:37

## 2019-03-13 RX ADMIN — SODIUM CHLORIDE 500 MG: 900 INJECTION, SOLUTION INTRAVENOUS at 21:03

## 2019-03-13 RX ADMIN — ALBUTEROL SULFATE 2.5 MG: 2.5 SOLUTION RESPIRATORY (INHALATION) at 07:30

## 2019-03-13 RX ADMIN — NYSTATIN: 100000 POWDER TOPICAL at 18:13

## 2019-03-13 RX ADMIN — DEXAMETHASONE SODIUM PHOSPHATE 6 MG: 10 INJECTION INTRAMUSCULAR; INTRAVENOUS at 12:03

## 2019-03-13 RX ADMIN — BUDESONIDE 500 MCG: 0.5 INHALANT RESPIRATORY (INHALATION) at 07:30

## 2019-03-13 RX ADMIN — LORAZEPAM 1 MG: 2 INJECTION INTRAMUSCULAR; INTRAVENOUS at 01:37

## 2019-03-13 RX ADMIN — NYSTATIN: 100000 POWDER TOPICAL at 12:10

## 2019-03-13 RX ADMIN — FAMOTIDINE 20 MG: 10 INJECTION, SOLUTION INTRAVENOUS at 07:38

## 2019-03-13 RX ADMIN — BUDESONIDE 500 MCG: 0.5 INHALANT RESPIRATORY (INHALATION) at 21:43

## 2019-03-13 RX ADMIN — MORPHINE SULFATE 2 MG: 2 INJECTION, SOLUTION INTRAMUSCULAR; INTRAVENOUS at 10:58

## 2019-03-13 RX ADMIN — INSULIN LISPRO 6 UNITS: 100 INJECTION, SOLUTION INTRAVENOUS; SUBCUTANEOUS at 08:58

## 2019-03-13 RX ADMIN — HYDROCODONE BITARTRATE AND ACETAMINOPHEN 1 TABLET: 7.5; 325 TABLET ORAL at 12:10

## 2019-03-13 RX ADMIN — INSULIN LISPRO 6 UNITS: 100 INJECTION, SOLUTION INTRAVENOUS; SUBCUTANEOUS at 12:07

## 2019-03-13 RX ADMIN — FAMOTIDINE 20 MG: 10 INJECTION, SOLUTION INTRAVENOUS at 21:02

## 2019-03-13 RX ADMIN — HYDROCODONE BITARTRATE AND ACETAMINOPHEN 1 TABLET: 7.5; 325 TABLET ORAL at 21:03

## 2019-03-13 RX ADMIN — SODIUM CHLORIDE 500 MG: 900 INJECTION, SOLUTION INTRAVENOUS at 07:37

## 2019-03-13 RX ADMIN — DEXAMETHASONE SODIUM PHOSPHATE 6 MG: 10 INJECTION INTRAMUSCULAR; INTRAVENOUS at 18:13

## 2019-03-13 RX ADMIN — MORPHINE SULFATE 2 MG: 2 INJECTION, SOLUTION INTRAMUSCULAR; INTRAVENOUS at 14:46

## 2019-03-13 RX ADMIN — Medication 10 ML: at 06:02

## 2019-03-13 RX ADMIN — LORAZEPAM 1 MG: 2 INJECTION INTRAMUSCULAR; INTRAVENOUS at 21:02

## 2019-03-13 RX ADMIN — MORPHINE SULFATE 2 MG: 2 INJECTION, SOLUTION INTRAMUSCULAR; INTRAVENOUS at 01:38

## 2019-03-13 RX ADMIN — INSULIN GLARGINE 30 UNITS: 100 INJECTION, SOLUTION SUBCUTANEOUS at 08:58

## 2019-03-13 NOTE — HOSPICE
Discussed hospice philosophy, services and levels of care with son and daughter in law. They are on board with these measures and understand comfort care. Case discussed with Dr. Thomas Marc and patient approved for [facility] level of care at [facility] when a bed is available. Will notify CM once a bed becomes available.  Thank you for this referral.  Sintia Mathew, RN, BSN  Community Nurse Liaison  [facility]  [phone]

## 2019-03-13 NOTE — PROGRESS NOTES
Order for Hospice Consult. Family would like to use a different Hospice than [address] [place] came to talk to Son and daughter on 3/12 about home hospice options. Spoke with Hazel (daughter) who is the  [facility] for pt. She stated to me they were still in family discussion about hospice. I did explain to her that since they had made a decision to cancel surgery yesterday that we would need to proceed with discharge planning from this point. Requested that she could get back in touch with me today on a decision on her mothers care from here. She agreed she would. Spoke with MD about situation and will keep MD up to date. [facility] is aware that family does not wish their services at this time. CM will continue to follow.

## 2019-03-13 NOTE — PROGRESS NOTES
Problem: Pressure Injury - Risk of  Goal: *Prevention of pressure injury  Document Gunnar Scale and appropriate interventions in the flowsheet. Outcome: Progressing Towards Goal  Pressure Injury Interventions:  Sensory Interventions: Assess changes in LOC, Discuss PT/OT consult with provider, Check visual cues for pain    Moisture Interventions: Absorbent underpads, Apply protective barrier, creams and emollients, Check for incontinence Q2 hours and as needed    Activity Interventions: Assess need for specialty bed, Pressure redistribution bed/mattress(bed type), PT/OT evaluation    Mobility Interventions: Assess need for specialty bed, Pressure redistribution bed/mattress (bed type), PT/OT evaluation    Nutrition Interventions: Document food/fluid/supplement intake, Offer support with meals,snacks and hydration    Friction and Shear Interventions: Apply protective barrier, creams and emollients, HOB 30 degrees or less, Foam dressings/transparent film/skin sealants, Feet elevated on foot rest               Problem: Falls - Risk of  Goal: *Absence of Falls  Document Cindy Fall Risk and appropriate interventions in the flowsheet.   Outcome: Progressing Towards Goal  Fall Risk Interventions:       Mentation Interventions: Bed/chair exit alarm, Door open when patient unattended, Reorient patient    Medication Interventions: Bed/chair exit alarm, Patient to call before getting OOB, Teach patient to arise slowly    Elimination Interventions: Bed/chair exit alarm, Call light in reach, Patient to call for help with toileting needs, Toileting schedule/hourly rounds    History of Falls Interventions: Bed/chair exit alarm, Consult care management for discharge planning, Investigate reason for fall

## 2019-03-13 NOTE — PROGRESS NOTES
END OF SHIFT NOTE:    Intake/Output  03/13 0701 - 03/13 1900  In: 240 [P.O.:240]  Out: 400 [Urine:400]   Voiding: YES  Catheter: YES  Drain:              Stool:  0 occurrences. Stool Assessment  Stool Color: Brown (03/13/19 1411)  Stool Appearance: Loose (03/13/19 1411)  Stool Amount: Medium (03/13/19 1411)  Stool Source/Status: Rectum (03/13/19 1411)    Emesis:  0 occurrences. VITAL SIGNS  Patient Vitals for the past 12 hrs:   Temp Pulse Resp BP SpO2   03/13/19 1530 98.1 °F (36.7 °C) (!) 58 18 145/78 94 %   03/13/19 1318     94 %   03/13/19 1130 98.9 °F (37.2 °C) (!) 55 18 137/75 95 %   03/13/19 0740 98.1 °F (36.7 °C) 63 18 142/71 91 %   03/13/19 0731     95 %       Pain Assessment  Pain 1  Pain Scale 1: Numeric (0 - 10) (03/13/19 1637)  Pain Intensity 1: 8 (03/13/19 1637)  Patient Stated Pain Goal: 0 (03/13/19 1536)  Pain Reassessment 1: Patient sleeping (03/13/19 1536)  Pain Onset 1: pta (03/13/19 1210)  Pain Location 1: Generalized (03/13/19 1637)  Pain Orientation 1: Left;Right (03/13/19 1058)  Pain Description 1: Aching;Constant (03/13/19 1637)  Pain Intervention(s) 1: Medication (see MAR) (03/13/19 1637)    Ambulating  No    Additional Information:   -Pt very demanding and rude today  -Requesting pain medication all day  -Looking into home with hospice tomorrow 3/14  -Leyva in place  -No family to visit patient today     Shift report given to oncoming nurse at the bedside.     Isa Palencia, RN

## 2019-03-13 NOTE — PROGRESS NOTES
OT Note: Discontinued Occupational Therapy orders as patient requested comfort and rest. She is also planning on d/c with hospice.      Ingrid Martin OT

## 2019-03-13 NOTE — PROGRESS NOTES
Hospitalist Progress Note     Admit Date:  3/4/2019 11:10 PM   Name:  Savannah Plascencia   Age:  59 y.o.  :  1960   MRN:  [id number]   PCP:  Vineet Squires, Not On File  Treatment Team: Attending Provider: Emilee Pop DO; Consulting Provider: Bhaskar Vera NP; Consulting Provider: Dennis Sorensen MD; Care Manager: Aleena Blanc, RN; Care Manager: Damion Iris is a 58 y/o female with squamous cell carcinoma of the lung, COPD, extreme obesity, diastolic CHF, HTN, HLD, FLETCHER, pulmonary HTN, HLD who was admitted for new altered mental status. EMS reported hyperglycemia. On arrival to ED she was unresponsive with seizure activity. Glucose of 558. She was loaded with IV keppra and also given IV ativan. A head CT showed brain mass in the right parieto-occipital lobe with surrounding vasogenic edema. She then received IV decadron. She was tachycardic with WBC ct of 14.2 so sepsis protocol initiated with fluids and antibiotics. Initially family had said she was DNR but later stated to do everything Except intubation. She was admitted under ICU for monitoring on Bipap. Neurosurgery consulted with plans of brain biopsy and craniotomy on 3/13/18. Her respiratory status improved and she was moved out of the ICU. Pulmonary did preoperative evaluation, but on 3/12/19 patient and family changed their minds and cancelled surgery and opted for hospice services. Awaiting family final decision.      Subjective: On my assessment today she complained of pain all over her body.      Objective:     Patient Vitals for the past 24 hrs:   Temp Pulse Resp BP SpO2   19 0400     97 %   19 0327 98.1 °F (36.7 °C) 60 17 131/61 98 %   19 0147  62  138/79    19 2342 98.1 °F (36.7 °C) (!) 55 25 (!) 137/99 100 %   19 2254     97 %   19 1952 97.7 °F (36.5 °C) 99 18 120/54 98 %   19 1910     94 %   19 1513 98 °F (36.7 °C) 88 18 125/61 90 %   19 1407     96 %   03/12/19 1112 97.7 °F (36.5 °C) 65 18 148/79 94 %   03/12/19 0748     91 %     Oxygen Therapy  O2 Sat (%): 97 % (03/13/19 0400)  Pulse via Oximetry: 90 beats per minute (03/13/19 0400)  O2 Device: BIPAP (03/13/19 0400)  O2 Flow Rate (L/min): 2 l/min (03/12/19 1910)  O2 Temperature: 87.8 °F (31 °C) (03/10/19 0906)  FIO2 (%): 30 % (03/13/19 0400)    Intake/Output Summary (Last 24 hours) at 3/13/2019 0729  Last data filed at 3/13/2019 0550  Gross per 24 hour   Intake 720 ml   Output 3100 ml   Net -2380 ml         Physical Examination:  Physical Exam   Constitutional: No distress. HENT:   Mouth/Throat: Oropharynx is clear and moist. No oropharyngeal exudate. Eyes: Conjunctivae are normal. No scleral icterus. Neck: No JVD present. No tracheal deviation present. Cardiovascular: Normal rate. No murmur heard. Pulmonary/Chest: Breath sounds normal. No respiratory distress. She exhibits no tenderness. Abdominal: There is no tenderness. There is no rebound and no guarding. Musculoskeletal: She exhibits no tenderness or deformity. Neurological: She is alert. GCS score is 15. Skin: Skin is dry. She is not diaphoretic. No erythema. Data Review:  I have reviewed all labs, meds, telemetry events, and studies from the last 24 hours.     Recent Results (from the past 24 hour(s))   GLUCOSE, POC    Collection Time: 03/12/19  7:59 AM   Result Value Ref Range    Glucose (POC) 167 (H) 65 - 100 mg/dL   GLUCOSE, POC    Collection Time: 03/12/19 12:11 PM   Result Value Ref Range    Glucose (POC) 222 (H) 65 - 100 mg/dL   GLUCOSE, POC    Collection Time: 03/12/19  4:43 PM   Result Value Ref Range    Glucose (POC) 281 (H) 65 - 100 mg/dL   GLUCOSE, POC    Collection Time: 03/12/19  8:29 PM   Result Value Ref Range    Glucose (POC) 341 (H) 65 - 100 mg/dL        All Micro Results     Procedure Component Value Units Date/Time    CULTURE, BLOOD [[id number]] Collected:  03/05/19 0334    Order Status:  Completed Specimen:  Blood Updated:  03/10/19 1223     Special Requests: --        LEFT  HAND       Culture result: NO GROWTH 5 DAYS       CULTURE, BLOOD [[id number]] Collected:  03/05/19 0410    Order Status:  Completed Specimen:  Blood Updated:  03/10/19 1223     Special Requests: --        RIGHT  HAND       Culture result: NO GROWTH 5 DAYS       CULTURE, URINE [[id number]] Collected:  03/05/19 0504    Order Status:  Completed Specimen:  Urine from Leyva Specimen Updated:  03/07/19 0829     Special Requests: NO SPECIAL REQUESTS        Culture result: NO GROWTH 2 DAYS       CULTURE, URINE [[id number]] Collected:  03/05/19 1645    Order Status:  Canceled Specimen:  Urine from Leyva Specimen           Current Meds:  Current Facility-Administered Medications   Medication Dose Route Frequency    insulin lispro (HUMALOG) injection   SubCUTAneous TIDAC    morphine injection 2 mg  2 mg IntraVENous Q3H PRN    LORazepam (ATIVAN) injection 1 mg  1 mg IntraVENous Q4H PRN    HYDROcodone-acetaminophen (NORCO) 7.5-325 mg per tablet 1 Tab  1 Tab Oral Q4H PRN    insulin glargine (LANTUS) injection 30 Units  30 Units SubCUTAneous DAILY    famotidine (PF) (PEPCID) 20 mg in sodium chloride 0.9% 10 mL injection  20 mg IntraVENous Q12H    sodium chloride (NS) flush 5-10 mL  5-10 mL IntraVENous PRN    sodium chloride (NS) flush 5-40 mL  5-40 mL IntraVENous Q8H    sodium chloride (NS) flush 5-40 mL  5-40 mL IntraVENous PRN    NUTRITIONAL SUPPORT ELECTROLYTE PRN ORDERS   Does Not Apply PRN    acetaminophen (TYLENOL) tablet 650 mg  650 mg Oral Q4H PRN    ondansetron (ZOFRAN) injection 4 mg  4 mg IntraVENous Q4H PRN    naloxone (NARCAN) injection 0.4 mg  0.4 mg IntraVENous PRN    albuterol-ipratropium (DUO-NEB) 2.5 MG-0.5 MG/3 ML  3 mL Nebulization Q4H PRN    levETIRAcetam (KEPPRA) 500 mg in 0.9% sodium chloride 100 mL IVPB  500 mg IntraVENous Q12H    budesonide (PULMICORT) 500 mcg/2 ml nebulizer suspension  500 mcg Nebulization BID RT And    albuterol (PROVENTIL VENTOLIN) nebulizer solution 2.5 mg  2.5 mg Nebulization Q6HWA RT    dexamethasone (DECADRON) injection 6 mg  6 mg IntraVENous Q6H       Diet:  DIET DIABETIC CONSISTENT CARB  DIET NPO    Other Studies (last 24 hours):  No results found.     Assessment and Plan:     Hospital Problems as of 3/13/2019 Date Reviewed: 3/7/2019          Codes Class Noted - Resolved POA    Delirium ICD-10-CM: R41.0  ICD-9-CM: 780.09  3/6/2019 - Present Unknown        * (Principal) Seizure ([facility] ICD-10-CM: R56.9  ICD-9-CM: 780.39  3/5/2019 - Present Yes        Metastatic cancer to brain [facility]) ICD-10-CM: C79.31  ICD-9-CM: 198.3  3/5/2019 - Present Yes        Hyperglycemia ICD-10-CM: R73.9  ICD-9-CM: 790.29  3/5/2019 - Present Yes        Acute on chronic respiratory failure with hypoxia and hypercapnia (HCC) ICD-10-CM: J96.21, J96.22  ICD-9-CM: 518.84, 786.09, 799.02  3/5/2019 - Present Yes        Primary cancer of left upper lobe of lung ([facility]- squamous cell ICD-10-CM: C34.12  ICD-9-CM: 162.3  7/30/2018 - Present Yes        Hypertension, essential, benign (Chronic) ICD-10-CM: I10  ICD-9-CM: 401.1  Unknown - Present Yes        Hyperlipidemia (Chronic) ICD-10-CM: E78.5  ICD-9-CM: 272.4  Unknown - Present         COPD with emphysema (HCC) (Chronic) ICD-10-CM: J43.9  ICD-9-CM: 492.8  Unknown - Present Yes        Morbid obesity ([facility] (Chronic) ICD-10-CM: E66.01  ICD-9-CM: 278.01  Unknown - Present Yes        Primary pulmonary hypertension ([facility] (Chronic) ICD-10-CM: I27.0  ICD-9-CM: 416.0  Unknown - Present         Diabetes mellitus without complication (HCC) (Chronic) ICD-10-CM: E11.9  ICD-9-CM: 250.00  Unknown - Present Yes        FLETCHER (obstructive sleep apnea) (Chronic) ICD-10-CM: G47.33  ICD-9-CM: 327.23  Unknown - Present Yes        Chronic hypoxemic respiratory failure (HCC) (Chronic) ICD-10-CM: J96.11  ICD-9-CM: 518.83, 799.02  Unknown - Present Yes        CHF (congestive heart failure), NYHA class I ([facility] (Chronic) ICD-10-CM: I50.9  ICD-9-CM: 428.0  Unknown - Present     Overview Signed 12/27/2016 11:24 AM by Lior Seals     diastolic                   A/P:        -Lung cancer (sq.cell ca) with brain mass:  Continue Keppra, Dexamethasone--insulin for sugar control   Patient and family declined surgery  Plan for hospice. Family members met with hospice team, they would like home hospice but not [facility]. They are still deciding which hospice services they would like.     -Acute on chronic respiratory failure with hypoxia and hypercapnea--resolved    History of FLETCHER  BiPAP at night.      -Type 2 DM with Hyperglycemia: Titrate insulin prn. Change HISS to resistant        DVT prophylaxis : SCD    Code status: full     Disposition: home with hospice. Family still has not decide hospice services. CM contacted daughter today. Will attempt to obtain a definitive answer tomorrow.

## 2019-03-13 NOTE — INTERDISCIPLINARY ROUNDS
Interdisciplinary rounds with charge nurse, provider, and .     Presenting Problem: Squamous cell lung ca  Daily Goal hospice consult  Expected Discharge Date: 3/14  Expected Discharge Needs: DC with [facility]  Patient/Family Concerns addressed

## 2019-03-13 NOTE — PROGRESS NOTES
Noted plans for discharge home with Hospice. We will sign off; please do not hesitate to call with any questions.       Jose Mijares NP  [facility]

## 2019-03-13 NOTE — PROGRESS NOTES
Initiated 's visit due to hospice consult. Conveyed care and concern to Ms. Lan Ladd, preferred name Germán Normlolis. Patient stated that she was \"not big on chaplains. \" As a result of receiving this information I ended the visit to support patient's preference.       Nicolette Honeycutt 66  Board Certified

## 2019-03-13 NOTE — PROGRESS NOTES
Problem: Falls - Risk of  Goal: *Absence of Falls  Document Cindy Fall Risk and appropriate interventions in the flowsheet.   Outcome: Progressing Towards Goal  Fall Risk Interventions:       Mentation Interventions: Bed/chair exit alarm, Door open when patient unattended, Reorient patient    Medication Interventions: Bed/chair exit alarm, Patient to call before getting OOB, Teach patient to arise slowly    Elimination Interventions: Bed/chair exit alarm, Call light in reach, Patient to call for help with toileting needs, Toileting schedule/hourly rounds    History of Falls Interventions: Bed/chair exit alarm, Door open when patient unattended, Investigate reason for fall

## 2019-03-13 NOTE — PROGRESS NOTES
Palliative Care Progress Note    Patient: Erick King MRN: [id number]  SSN: [id number]    YOB: 1960  Age: 59 y.o. Sex: female       Assessment/Plan:      Chief Complaint/Interval History: Sleepy, reports generalized pain       Principal Diagnosis:    · Pain, general  R52    Additional Diagnoses:   · Acute Respiratory Failure, Unspecified  J96.00  · Debility, Unspecified  R53.81  · Fatigue, Lethargy  R53.83  · Respiratory Failure, Acute on Chronic  J96.20  · Counseling, Encounter for Medical Advice  Z71.9  · Encounter for Palliative Care  Z51.5    Palliative Performance Scale (PPS)  PPS: 30    Medical Decision Making:   Reviewed and summarized notes over the past 24 hours. Discussed case with appropriate providers: Abi Watkins CM  Reviewed laboratory and x-ray data- none    Pt sleepy, resting in bed, no distress noted. No family at bedside. Pt will awaken to voice, and answer questions, but quickly falls back asleep. She reports generalized pain at present. Pt confirmed that she does not want to proceed with craniotomy, and would like hospice support at discharge. Jewell Bello, assisting with discharge home with hospice. Family apparently spoke with [facility] yesterday afternoon. Of note, pt has a DNR on file but is listed as a full code here. Will attempt to clarify with daughter. Will continue to follow. Will discuss with interdisciplinary team.     More than 50% of this 25 minute visit was spent counseling and coordination of care as outlined above. Subjective:     Review of Systems:  A comprehensive review of systems was negative except for the following:  Musculoskeletal: Positive for generalized pain due to position  GI: Positive for needing to have BM     Objective:     Visit Vitals  /61 (BP 1 Location: Left arm, BP Patient Position: At rest)   Pulse 60   Temp 98.1 °F (36.7 °C)   Resp 17   Ht 5' 6\" (1.676 m)   Wt 305 lb 5.4 oz (138.5 kg)   SpO2 95%   Breastfeeding?  No BMI 49.28 kg/m²       Physical Exam:    General:  Cooperative. No acute distress. Eyes:  Conjunctivae/corneas clear. Nose: Nares normal. Septum midline. O2 via HFNC   Neck: Supple, symmetrical, trachea midline. Lungs:   Diminished. Unlabored. Heart:  Regular rate and rhythm. Abdomen:   Obese. Soft, non-tender, non-distended. Extremities: Normal, atraumatic, no cyanosis. Skin: Skin color, texture, turgor normal. No rash. Neurologic: Nonfocal.   Psych: Alert and oriented to person, place, and somewhat situation.       Signed By: Tao Heaton NP     March 13, 2019

## 2019-03-13 NOTE — PROCEDURES
[facility] NOTE    Name:  Taj Snow  MR#:  [id number]  :  1960  ACCOUNT #:  [de-identified]  DATE OF SERVICE:  2019    INTERPRETATION OF SPIROMETRY    Forced vital capacity was decreased to 1.46 liters which is 56% of predicted and FEV1 was decreased to 1.01 liters, which is 53% of predicted. FEV1% was reduced to 69%. IMPRESSION:  Abnormal spirometry consistent with a moderately severe obstructive defect. Reduction in FVC is probably due to obstruction, but lung volumes would be helpful to confirm this.         Ramírez Banuelos MD [id number]  D:  2019 20:51  T:  2019 8:11  JOB #:  [id number]

## 2019-03-13 NOTE — PROGRESS NOTES
END OF SHIFT NOTE:    Intake/Output  03/12 1901 - 03/13 0700  In: -   Out: 1350 [[id number]]   Voiding: YES  Catheter: YES  Drain:              Stool:  0 occurrences. Stool Assessment  Stool Color: Brown (03/12/19 1845)  Stool Appearance: Loose (03/12/19 1845)  Stool Amount: Medium (03/12/19 1845)  Stool Source/Status: Rectum (03/12/19 1845)    Emesis:  0 occurrences. VITAL SIGNS  Patient Vitals for the past 12 hrs:   Temp Pulse Resp BP SpO2   03/13/19 0400     97 %   03/13/19 0327 98.1 °F (36.7 °C) 60 17 131/61 98 %   03/13/19 0147  62  138/79    03/12/19 2342 98.1 °F (36.7 °C) (!) 55 25 (!) 137/99 100 %   03/12/19 2254     97 %   03/12/19 1952 97.7 °F (36.5 °C) 99 18 120/54 98 %   03/12/19 1910     94 %       Pain Assessment  Pain 1  Pain Scale 1: Visual (03/13/19 0225)  Pain Intensity 1: 0 (03/13/19 0225)  Patient Stated Pain Goal: 0 (03/13/19 0225)  Pain Reassessment 1: Yes (03/13/19 0225)  Pain Onset 1: chronic (03/13/19 0137)  Pain Location 1: Generalized (03/13/19 0137)  Pain Orientation 1: Right;Left (03/13/19 0137)  Pain Description 1: Aching (03/13/19 0137)  Pain Intervention(s) 1: Medication (see MAR) (03/13/19 0137)    Ambulating  No    Additional Information: Patient given pain medication 3 times during the night and ativan once. Complained of pain all night and very needy. Shift report given to oncoming nurse at the bedside.     Devon Mijares

## 2019-03-14 VITALS
DIASTOLIC BLOOD PRESSURE: 65 MMHG | SYSTOLIC BLOOD PRESSURE: 119 MMHG | TEMPERATURE: 98 F | BODY MASS INDEX: 47.09 KG/M2 | RESPIRATION RATE: 20 BRPM | HEIGHT: 66 IN | OXYGEN SATURATION: 92 % | HEART RATE: 90 BPM | WEIGHT: 293 LBS

## 2019-03-14 LAB
GLUCOSE BLD STRIP.AUTO-MCNC: 234 MG/DL (ref 65–100)
GLUCOSE BLD STRIP.AUTO-MCNC: 274 MG/DL (ref 65–100)

## 2019-03-14 PROCEDURE — 74011250637 HC RX REV CODE- 250/637: Performed by: INTERNAL MEDICINE

## 2019-03-14 PROCEDURE — 74011000258 HC RX REV CODE- 258: Performed by: HOSPITALIST

## 2019-03-14 PROCEDURE — 82962 GLUCOSE BLOOD TEST: CPT

## 2019-03-14 PROCEDURE — 74011250636 HC RX REV CODE- 250/636: Performed by: INTERNAL MEDICINE

## 2019-03-14 PROCEDURE — 94760 N-INVAS EAR/PLS OXIMETRY 1: CPT

## 2019-03-14 PROCEDURE — 74011250636 HC RX REV CODE- 250/636: Performed by: HOSPITALIST

## 2019-03-14 PROCEDURE — 74011636637 HC RX REV CODE- 636/637: Performed by: INTERNAL MEDICINE

## 2019-03-14 PROCEDURE — 94640 AIRWAY INHALATION TREATMENT: CPT

## 2019-03-14 PROCEDURE — 74011000250 HC RX REV CODE- 250: Performed by: HOSPITALIST

## 2019-03-14 PROCEDURE — 77010033678 HC OXYGEN DAILY

## 2019-03-14 PROCEDURE — 99231 SBSQ HOSP IP/OBS SF/LOW 25: CPT | Performed by: NURSE PRACTITIONER

## 2019-03-14 PROCEDURE — 74011000250 HC RX REV CODE- 250: Performed by: INTERNAL MEDICINE

## 2019-03-14 RX ORDER — MORPHINE SULFATE 15 MG/1
15 TABLET, FILM COATED, EXTENDED RELEASE ORAL EVERY 12 HOURS
Qty: 6 TAB | Refills: 0 | Status: SHIPPED | OUTPATIENT
Start: 2019-03-14 | End: 2019-03-17

## 2019-03-14 RX ORDER — DEXAMETHASONE 0.5 MG/1
2 TABLET ORAL 2 TIMES DAILY WITH MEALS
Qty: 240 TAB | Refills: 0 | Status: SHIPPED | OUTPATIENT
Start: 2019-03-14 | End: 2019-04-13

## 2019-03-14 RX ORDER — INSULIN LISPRO 100 [IU]/ML
INJECTION, SOLUTION INTRAVENOUS; SUBCUTANEOUS
Qty: 1 PACKAGE | Refills: 1 | Status: SHIPPED | OUTPATIENT
Start: 2019-03-14

## 2019-03-14 RX ORDER — HYDROCODONE BITARTRATE AND ACETAMINOPHEN 7.5; 325 MG/1; MG/1
1 TABLET ORAL
Qty: 12 TAB | Refills: 0 | Status: SHIPPED | OUTPATIENT
Start: 2019-03-14 | End: 2019-03-17

## 2019-03-14 RX ORDER — LEVETIRACETAM 500 MG/1
500 TABLET ORAL 2 TIMES DAILY
Qty: 60 TAB | Refills: 0 | Status: SHIPPED | OUTPATIENT
Start: 2019-03-14

## 2019-03-14 RX ORDER — ACETAMINOPHEN 325 MG/1
650 TABLET ORAL
Qty: 20 TAB | Refills: 0 | Status: SHIPPED | OUTPATIENT
Start: 2019-03-14

## 2019-03-14 RX ORDER — ONDANSETRON 4 MG/1
4 TABLET, FILM COATED ORAL
Qty: 20 TAB | Refills: 0 | Status: SHIPPED | OUTPATIENT
Start: 2019-03-14

## 2019-03-14 RX ORDER — CLONAZEPAM 1 MG/1
1 TABLET ORAL
Status: DISCONTINUED | OUTPATIENT
Start: 2019-03-14 | End: 2019-03-14

## 2019-03-14 RX ADMIN — INSULIN LISPRO 5 UNITS: 100 INJECTION, SOLUTION INTRAVENOUS; SUBCUTANEOUS at 13:05

## 2019-03-14 RX ADMIN — ALBUTEROL SULFATE 2.5 MG: 2.5 SOLUTION RESPIRATORY (INHALATION) at 14:20

## 2019-03-14 RX ADMIN — SODIUM CHLORIDE 500 MG: 900 INJECTION, SOLUTION INTRAVENOUS at 08:22

## 2019-03-14 RX ADMIN — INSULIN LISPRO 5 UNITS: 100 INJECTION, SOLUTION INTRAVENOUS; SUBCUTANEOUS at 08:23

## 2019-03-14 RX ADMIN — LORAZEPAM 1 MG: 2 INJECTION INTRAMUSCULAR; INTRAVENOUS at 09:24

## 2019-03-14 RX ADMIN — INSULIN LISPRO 9 UNITS: 100 INJECTION, SOLUTION INTRAVENOUS; SUBCUTANEOUS at 08:24

## 2019-03-14 RX ADMIN — INSULIN LISPRO 6 UNITS: 100 INJECTION, SOLUTION INTRAVENOUS; SUBCUTANEOUS at 13:04

## 2019-03-14 RX ADMIN — FAMOTIDINE 20 MG: 10 INJECTION, SOLUTION INTRAVENOUS at 08:22

## 2019-03-14 RX ADMIN — DEXAMETHASONE SODIUM PHOSPHATE 6 MG: 10 INJECTION INTRAMUSCULAR; INTRAVENOUS at 05:55

## 2019-03-14 RX ADMIN — Medication 10 ML: at 14:04

## 2019-03-14 RX ADMIN — MORPHINE SULFATE 2 MG: 2 INJECTION, SOLUTION INTRAMUSCULAR; INTRAVENOUS at 09:24

## 2019-03-14 RX ADMIN — NYSTATIN: 100000 POWDER TOPICAL at 08:22

## 2019-03-14 RX ADMIN — HYDROCODONE BITARTRATE AND ACETAMINOPHEN 1 TABLET: 7.5; 325 TABLET ORAL at 14:03

## 2019-03-14 RX ADMIN — MORPHINE SULFATE 2 MG: 2 INJECTION, SOLUTION INTRAMUSCULAR; INTRAVENOUS at 02:46

## 2019-03-14 RX ADMIN — INSULIN GLARGINE 35 UNITS: 100 INJECTION, SOLUTION SUBCUTANEOUS at 08:22

## 2019-03-14 RX ADMIN — Medication 10 ML: at 05:55

## 2019-03-14 RX ADMIN — DEXAMETHASONE SODIUM PHOSPHATE 6 MG: 10 INJECTION INTRAMUSCULAR; INTRAVENOUS at 13:05

## 2019-03-14 RX ADMIN — ALBUTEROL SULFATE 2.5 MG: 2.5 SOLUTION RESPIRATORY (INHALATION) at 07:26

## 2019-03-14 RX ADMIN — MORPHINE SULFATE 2 MG: 2 INJECTION, SOLUTION INTRAMUSCULAR; INTRAVENOUS at 05:55

## 2019-03-14 RX ADMIN — BUDESONIDE 500 MCG: 0.5 INHALANT RESPIRATORY (INHALATION) at 07:26

## 2019-03-14 NOTE — PROGRESS NOTES
Discharge instructions reviewed with patient/ daughter. Patient verbalized/ esigned agreement/ understanding. Patient awaiting EMS transport to arrive for transport to home.

## 2019-03-14 NOTE — PROGRESS NOTES
END OF SHIFT NOTE:    Intake/Output  03/13 1901 - 03/14 0700  In: 508 [P.O.:360; I.V.:148]  Out: 1600 [Urine:1600]   Voiding: YES  Catheter: YES  Drain:              Stool:  1 occurrences. Stool Assessment  Stool Color: [place] Ripper (03/13/19 1955)  Stool Appearance: Loose (03/13/19 1955)  Stool Amount: Large (03/13/19 1955)  Stool Source/Status: Rectum (03/13/19 1955)    Emesis:  0 occurrences. VITAL SIGNS  Patient Vitals for the past 12 hrs:   Temp Pulse Resp BP SpO2   03/14/19 0244 97.9 °F (36.6 °C) 60 18 125/73 95 %   03/14/19 0000     95 %   03/13/19 2143 97.7 °F (36.5 °C) 85 18 135/69 96 %   03/13/19 1850 97.8 °F (36.6 °C) 88 18 132/65 94 %       Pain Assessment  Pain 1  Pain Scale 1: Visual (03/14/19 0345)  Pain Intensity 1: 0 (03/14/19 0345)  Patient Stated Pain Goal: 0 (03/14/19 0345)  Pain Reassessment 1: Patient sleeping (03/14/19 0345)  Pain Onset 1: chronic (03/14/19 0246)  Pain Location 1: Generalized (03/14/19 0246)  Pain Orientation 1: Right;Left (03/14/19 0246)  Pain Description 1: Aching;Constant (03/14/19 0246)  Pain Intervention(s) 1: Medication (see MAR) (03/14/19 0246)    Ambulating  No    Additional Information: Patient received pain medication several times. Was very needy with turning and repositioning. Shift report given to oncoming nurse at the bedside.     Theodora Zamudio

## 2019-03-14 NOTE — PROGRESS NOTES
Pt refusing to wear NC while she eats. States, \"it messes with me. I can't do it when I eat. I do this all the time. \"

## 2019-03-14 NOTE — DISCHARGE INSTRUCTIONS
Patient Education        Seizure: Care Instructions  Your Care Instructions    Seizures are caused by abnormal patterns of electrical signals in the brain. They are different for each person. Seizures can affect movement, speech, vision, or awareness. Some people have only slight shaking of a hand and do not pass out. Other people may pass out and have violent shaking of the whole body. Some people appear to stare into space. They are awake, but they can't respond normally. Later, they may not remember what happened. You may need tests to identify the type and cause of the seizures. A seizure may occur only once, or you may have them more than one time. Taking medicines as directed and following up with your doctor may help keep you from having more seizures. The doctor has checked you carefully, but problems can develop later. If you notice any problems or new symptoms, get medical treatment right away. Follow-up care is a key part of your treatment and safety. Be sure to make and go to all appointments, and call your doctor if you are having problems. It's also a good idea to know your test results and keep a list of the medicines you take. How can you care for yourself at home? · Be safe with medicines. Take your medicines exactly as prescribed. Call your doctor if you think you are having a problem with your medicine. · Do not do any activity that could be dangerous to you or others until your doctor says it is safe to do so. For example, do not drive a car, operate machinery, swim, or climb ladders. · Be sure that anyone treating you for any health problem knows that you have had a seizure and what medicines you are taking for it. · Identify and avoid things that may make you more likely to have a seizure. These may include lack of sleep, alcohol or drug use, stress, or not eating. · Make sure you go to your follow-up appointment. When should you call for help?   Call 911 anytime you think you may need emergency care. For example, call if:    · You have another seizure.     · You have more than one seizure in 24 hours.     · You have new symptoms, such as trouble walking, speaking, or thinking clearly.    Call your doctor now or seek immediate medical care if:    · You are not acting normally.    Watch closely for changes in your health, and be sure to contact your doctor if you have any problems. Where can you learn more? Go to http://jose elias-dionicio.info/. Enter N827 in the search box to learn more about \"Seizure: Care Instructions. \"  Current as of: Bianca 3, 2018  Content Version: 11.9  © 3194-3962 [employer]. Care instructions adapted under license by [employer] ([employer]) (which disclaims liability or warranty for this information). If you have questions about a medical condition or this instruction, always ask your healthcare professional. [address] any warranty or liability for your use of this information. DISCHARGE SUMMARY from Nurse    PATIENT INSTRUCTIONS:    After general anesthesia or intravenous sedation, for 24 hours or while taking prescription Narcotics:  · Limit your activities  · Do not drive and operate hazardous machinery  · Do not make important personal or business decisions  · Do  not drink alcoholic beverages  · If you have not urinated within 8 hours after discharge, please contact your surgeon on call.     Report the following to your surgeon:  · Excessive pain, swelling, redness or odor of or around the surgical area  · Temperature over 100.5  · Nausea and vomiting lasting longer than 4 hours or if unable to take medications  · Any signs of decreased circulation or nerve impairment to extremity: change in color, persistent  numbness, tingling, coldness or increase pain  · Any questions    What to do at Home:  Recommended activity: {discharge activity:72115}, ***    If you experience any of the following symptoms ***, please follow up with ***. *  Please give a list of your current medications to your Primary Care Provider. *  Please update this list whenever your medications are discontinued, doses are      changed, or new medications (including over-the-counter products) are added. *  Please carry medication information at all times in case of emergency situations. These are general instructions for a healthy lifestyle:    No smoking/ No tobacco products/ Avoid exposure to second hand smoke  Surgeon General's Warning:  Quitting smoking now greatly reduces serious risk to your health. Obesity, smoking, and sedentary lifestyle greatly increases your risk for illness    A healthy diet, regular physical exercise & weight monitoring are important for maintaining a healthy lifestyle    You may be retaining fluid if you have a history of heart failure or if you experience any of the following symptoms:  Weight gain of 3 pounds or more overnight or 5 pounds in a week, increased swelling in our hands or feet or shortness of breath while lying flat in bed. Please call your doctor as soon as you notice any of these symptoms; do not wait until your next office visit. Recognize signs and symptoms of STROKE:    F-face looks uneven    A-arms unable to move or move unevenly    S-speech slurred or non-existent    T-time-call 911 as soon as signs and symptoms begin-DO NOT go       Back to bed or wait to see if you get better-TIME IS BRAIN. Warning Signs of HEART ATTACK     Call 911 if you have these symptoms:   Chest discomfort. Most heart attacks involve discomfort in the center of the chest that lasts more than a few minutes, or that goes away and comes back. It can feel like uncomfortable pressure, squeezing, fullness, or pain.  Discomfort in other areas of the upper body. Symptoms can include pain or discomfort in one or both arms, the back, neck, jaw, or stomach.    Shortness of breath with or without chest discomfort.  Other signs may include breaking out in a cold sweat, nausea, or lightheadedness. Don't wait more than five minutes to call 911 - MINUTES MATTER! Fast action can save your life. Calling 911 is almost always the fastest way to get lifesaving treatment. Emergency Medical Services staff can begin treatment when they arrive -- up to an hour sooner than if someone gets to the hospital by car. The discharge information has been reviewed with the {PATIENT PARENT GUARDIAN:12330}. The {PATIENT PARENT GUARDIAN:85968} verbalized understanding. Discharge medications reviewed with the {Dishcarge meds reviewed HIIR:70940} and appropriate educational materials and side effects teaching were provided.   ___________________________________________________________________________________________________________________________________

## 2019-03-14 NOTE — PROGRESS NOTES
Pt has discharge orders. She will be transported home by Valdemar Kahn at 315pm. [facility] has delivered DME. Milestones met. No other needs at this time. Care Management Interventions  PCP Verified by CM:  Yes  Transition of Care Consult (CM Consult): Discharge [address]: Yes  Current Support Network: Family Lives [place], Relative's Home  Confirm Follow Up Transport: Family  Plan discussed with Pt/Family/Caregiver: Yes  Freedom of Choice Offered: Yes  [address] Provided?: No  Discharge Location  Discharge Placement: Unable to determine at this time Home with [facility]

## 2019-03-14 NOTE — PROGRESS NOTES
Problem: Pressure Injury - Risk of  Goal: *Prevention of pressure injury  Document Gunnar Scale and appropriate interventions in the flowsheet. Outcome: Progressing Towards Goal  Pressure Injury Interventions:  Sensory Interventions: Assess changes in LOC, Check visual cues for pain, Discuss PT/OT consult with provider    Moisture Interventions: Absorbent underpads, Apply protective barrier, creams and emollients, Check for incontinence Q2 hours and as needed    Activity Interventions: Increase time out of bed, Pressure redistribution bed/mattress(bed type), PT/OT evaluation, Assess need for specialty bed    Mobility Interventions: Assess need for specialty bed, Pressure redistribution bed/mattress (bed type), PT/OT evaluation    Nutrition Interventions: Document food/fluid/supplement intake, Offer support with meals,snacks and hydration    Friction and Shear Interventions: Apply protective barrier, creams and emollients, Foam dressings/transparent film/skin sealants, HOB 30 degrees or less               Problem: Falls - Risk of  Goal: *Absence of Falls  Document Cindy Fall Risk and appropriate interventions in the flowsheet.   Outcome: Progressing Towards Goal  Fall Risk Interventions:       Mentation Interventions: Bed/chair exit alarm, Door open when patient unattended, Reorient patient    Medication Interventions: Bed/chair exit alarm, Patient to call before getting OOB, Teach patient to arise slowly    Elimination Interventions: Bed/chair exit alarm, Call light in reach, Patient to call for help with toileting needs, Toileting schedule/hourly rounds    History of Falls Interventions: Bed/chair exit alarm, Door open when patient unattended, Room close to nurse's station

## 2019-03-14 NOTE — DISCHARGE SUMMARY
Discharge Summary     Patient: Negin Ascencio MRN: [id number]  SSN: [id number]    YOB: 1960  Age: 59 y.o.   Sex: female       Admit Date: 3/4/2019    Discharge Date: 3/14/2019      Admission Diagnoses: Seizure [facility]) [R56.9]  Metastatic cancer to [facility]) [C79.31]  Hyperglycemia [R73.9]    Discharge Diagnoses:   Problem List as of 3/14/2019 Date Reviewed: 3/7/2019          Codes Class Noted - Resolved    Delirium ICD-10-CM: R41.0  ICD-9-CM: 780.09  3/6/2019 - Present        * (Principal) Seizure ([facility] ICD-10-CM: R56.9  ICD-9-CM: 780.39  3/5/2019 - Present        Metastatic cancer to [facility]) ICD-10-CM: C79.31  ICD-9-CM: 198.3  3/5/2019 - Present        Hyperglycemia ICD-10-CM: R73.9  ICD-9-CM: 790.29  3/5/2019 - Present        Acute on chronic respiratory failure with hypoxia and hypercapnia (HCC) ICD-10-CM: J96.21, J96.22  ICD-9-CM: 518.84, 786.09, 799.02  3/5/2019 - Present        Benign neoplasm of colon (Chronic) ICD-10-CM: D12.6  ICD-9-CM: 211.3  10/31/2018 - Present        Mediastinal lymphadenopathy ICD-10-CM: R59.0  ICD-9-CM: 785.6  7/30/2018 - Present        Primary cancer of left upper lobe of lung ([facility]- squamous cell ICD-10-CM: C34.12  ICD-9-CM: 162.3  7/30/2018 - Present        Chronic pain (Chronic) ICD-10-CM: G89.29  ICD-9-CM: 338.29  7/28/2018 - Present        Acute respiratory failure ([facility] ICD-10-CM: J96.00  ICD-9-CM: 518.81  7/28/2018 - Present        Hypertension, essential, benign (Chronic) ICD-10-CM: I10  ICD-9-CM: 401.1  Unknown - Present        Hyperlipidemia (Chronic) ICD-10-CM: E78.5  ICD-9-CM: 272.4  Unknown - Present        COPD with emphysema (HCC) (Chronic) ICD-10-CM: J43.9  ICD-9-CM: 492.8  Unknown - Present        Morbid obesity ([facility] (Chronic) ICD-10-CM: E66.01  ICD-9-CM: 278.01  Unknown - Present        Primary pulmonary hypertension ([facility] (Chronic) ICD-10-CM: I27.0  ICD-9-CM: 416.0  Unknown - Present        Diabetes mellitus without complication ([facility] (Chronic) ICD-10-CM: E11.9  ICD-9-CM: 250.00  Unknown - Present        Orthopnea (Chronic) ICD-10-CM: R06.01  ICD-9-CM: 786.02  Unknown - Present        Abnormality of gait and mobility ICD-10-CM: R26.9  ICD-9-CM: 625. 2  Unknown - Present        Insomnia (Chronic) ICD-10-CM: G47.00  ICD-9-CM: 780.52  12/27/2016 - Present        Extreme obesity with respiratory disorder (HCC) (Chronic) ICD-10-CM: E66.01, J98.9  ICD-9-CM: 278.01, 519.9  Unknown - Present        FLETCHER (obstructive sleep apnea) (Chronic) ICD-10-CM: G47.33  ICD-9-CM: 327.23  Unknown - Present        Chronic hypoxemic respiratory failure (HCC) (Chronic) ICD-10-CM: J96.11  ICD-9-CM: 518.83, 799.02  Unknown - Present        Cor pulmonale (HCC) (Chronic) ICD-10-CM: I27.81  ICD-9-CM: 416.9  Unknown - Present        CHF (congestive heart failure), NYHA class I (HCC) (Chronic) ICD-10-CM: I50.9  ICD-9-CM: 428.0  Unknown - Present    Overview Signed 12/27/2016 11:24 AM by Fabby Napier     diastolic             Obesity with alveolar hypoventilation (HCC) (Chronic) ICD-10-CM: S22.4  ICD-9-CM: 278.03  Unknown - Present        Arthritis of knee (Chronic) ICD-10-CM: M17.10  ICD-9-CM: 716.96  Unknown - Present        History of colonic polyps (Chronic) ICD-10-CM: Z86.010  ICD-9-CM: V12.72  8/10/2015 - Present    Overview Signed 10/31/2018 11:30 AM by Alesha Espinoza NP     Last Assessment & Plan:   She has several adenomas on her initial colonoscopy. I believe there was some doubt about whether the cecal polyp on that exam was completely removed. Follow-up colonoscopy did reveal a persistent cecal polyp. This polyp was read as tubulovillous adenoma. It has been 3 years male of this month. Because of the villous pathology we will proceed with a follow-up colonoscopy. She will hold Motrin as well as aspirin for 3 days prior to the procedure. We discussed the current colorectal cancer screening guidelines as well as the risks and benefits of colonoscopy in detail.  We also discussed the prep in detail and she wishes to proceed. Cause of her oxygen use we will need to do her on the inpatient side of the hospital. We spent at least 15-20 minutes procuring records and bringing them forward from the hospital electronic medical record system into today's office visit. We then spent an additional 15-20 minutes in counseling in addition to the time spent during her history and physical exam.             RESOLVED: Pulmonary infiltrates ICD-10-CM: R91.8  ICD-9-CM: 793.19  7/28/2018 - 3/5/2019        RESOLVED: COPD exacerbation ([facility] ICD-10-CM: J44.1  ICD-9-CM: 491.21  7/28/2018 - 3/5/2019        RESOLVED: Hypomagnesemia ICD-10-CM: A70.85  ICD-9-CM: 275.2  Unknown - 3/5/2019               Discharge Condition: Poor    Hospital Course:   Ms Adolph Landry is a 58 y/o female with squamous cell carcinoma of the lung, COPD, extreme obesity, diastolic CHF, HTN, HLD, FLETCHER, pulmonary HTN, HLD who was admitted for new altered mental status. EMS reported hyperglycemia. On arrival to ED she was unresponsive with seizure activity. Glucose of 558. She was loaded with IV keppra and also given IV ativan. A head CT showed brain mass in the right parieto-occipital lobe with surrounding vasogenic edema. She then received IV decadron. She was tachycardic with WBC ct of 14.2 so sepsis protocol initiated with fluids and antibiotics. She was admitted under ICU for monitoring on Bipap. Neurosurgery consulted with plans of brain biopsy and craniotomy on 3/13/18, but patient and family declined at the end to pursue surgery and opted for hospice services. Her respiratory status is better. She will go to home hospice via [facility] services. Physical Exam   Constitutional: No distress. HENT:   Mouth/Throat: Oropharynx is clear and moist. No oropharyngeal exudate. Eyes: Conjunctivae are normal. No scleral icterus. Neck: No JVD present. No tracheal deviation present. Cardiovascular: Normal rate.    No murmur heard.  Pulmonary/Chest: Breath sounds normal. No respiratory distress. She exhibits no tenderness. Abdominal: There is no tenderness. There is no rebound and no guarding. Musculoskeletal: She exhibits no tenderness or deformity. Neurological: alert, oriented x 3, she is able to move all 4 limbs   Skin: Skin is dry. She is not diaphoretic. No erythema.      Consults: Pulmonary/Critical Care and neurosurgery     Significant Diagnostic Studies: see note     Disposition: Home with hospice     Discharge Medications:   Current Discharge Medication List      START taking these medications    Details   HYDROcodone-acetaminophen (NORCO) 7.5-325 mg per tablet Take 1 Tab by mouth every six (6) hours as needed for Pain for up to 3 days. Max Daily Amount: 4 Tabs. Qty: 12 Tab, Refills: 0    Associated Diagnoses: Metastatic cancer to brain ([address]      acetaminophen (TYLENOL) 325 mg tablet Take 2 Tabs by mouth every six (6) hours as needed for Pain or Fever. Qty: 20 Tab, Refills: 0      dexamethasone (DECADRON) 0.5 mg tablet Take 4 Tabs by mouth two (2) times daily (with meals) for 30 days. Qty: 240 Tab, Refills: 0      ondansetron hcl (ZOFRAN) 4 mg tablet Take 1 Tab by mouth every eight (8) hours as needed for Nausea. Qty: 20 Tab, Refills: 0      levETIRAcetam (KEPPRA) 500 mg tablet Take 1 Tab by mouth two (2) times a day. Qty: 60 Tab, Refills: 0      insulin lispro (HUMALOG) 100 unit/mL kwikpen INITIATE INSULIN CORRECTIVE PROTOCOL:  Very Insulin Resistant  For Blood Sugar (mg/dL) of:              Less than 150 =   0 units  150 -199 =   3 units  200 -249 =   6 units  250 -299 =   9 units  300 -349 =   12 units  350 and above =   15 units and Call Physician  Initiate Hypoglycemic protocol if  Qty: 1 Package, Refills: 1         CONTINUE these medications which have CHANGED    Details   morphine CR (MS CONTIN) 15 mg CR tablet Take 1 Tab by mouth every twelve (12) hours for 3 days.  Max Daily Amount: 30 mg.  Qty: 6 Tab, Refills: 0    Associated Diagnoses: Metastatic cancer to brain ([facility] [facility]         CONTINUE these medications which have NOT CHANGED    Details   glyBURIDE (DIABETA) 5 mg tablet Take 5 mg by mouth two (2) times daily (with meals). senna (SENNA) 8.6 mg tablet Take 1 Tab by mouth two (2) times a day. not using      baclofen (LIORESAL) 20 mg tablet Take 20 mg by mouth as needed (muscle spasms). 4 times daily as needed for muscle spasms. Takes 4 times a day routinely      OXYGEN-AIR DELIVERY SYSTEMS 2 L/min by IntraNASal route continuous. albuterol-ipratropium (DUO-NEB) 2.5 mg-0.5 mg/3 ml nebu 3 mL by Nebulization route every three (3) hours as needed (sob or wheezing). polyethylene glycol (MIRALAX) 17 gram packet Take 17 g by mouth daily as needed (constipation). not using      fluticasone-salmeterol (ADVAIR HFA) 115-21 mcg/actuation inhaler Take 2 Puffs by inhalation two (2) times a day. metFORMIN (GLUMETZA ER) 500 mg TG24 24 hour tablet Take 2 Tabs by mouth daily. traZODone (DESYREL) 100 mg tablet Take 100 mg by mouth nightly.          STOP taking these medications       ketoprofen 10 % crpk Comments:   Reason for Stopping:         furosemide (LASIX) 20 mg tablet Comments:   Reason for Stopping:         zolpidem (AMBIEN) 5 mg tablet Comments:   Reason for Stopping:         HYDROcodone-acetaminophen (NORCO)  mg tablet Comments:   Reason for Stopping:         atenolol (TENORMIN) 50 mg tablet Comments:   Reason for Stopping:         predniSONE (DELTASONE) 10 mg tablet Comments:   Reason for Stopping:         prochlorperazine (COMPAZINE) 10 mg tablet Comments:   Reason for Stopping:         Omeprazole delayed release (PRILOSEC D/R) 20 mg tablet Comments:   Reason for Stopping:         diphenhydrAMINE (BENADRYL) 25 mg tablet Comments:   Reason for Stopping:         ibuprofen (MOTRIN) 800 mg tablet Comments:   Reason for Stopping:         melatonin 5 mg cap capsule Comments:   Reason for Stopping: gabapentin (NEURONTIN) 800 mg tablet Comments:   Reason for Stopping:               Activity: Activity as tolerated  Diet: Regular Diet  Wound Care: None needed    No orders of the defined types were placed in this encounter.       Signed By: Andi Grant MD     March 14, 2019

## 2021-12-16 NOTE — PROGRESS NOTES
Dex Lao  Admission Date: 3/4/2019             ICU Daily Progress Note: 3/8/2019     Patient is a 59 y.o.  female seen and evaluated at the request of Dr. Deisi Damon. She presented with seizure. Moved to the ICU for BiPAP. Stage 4 NSCLC with new brain met and edema on head CT. New seizure and neurology saw, Oncology consulted. She was diagnosed with squamous cell lung cancer last year after undergoing a left upper lobe biopsy. She was seen by oncology and an outpatient PET with follow up appointment was recommended but patient ended up under the care of hospice instead. She is morbidly obese and has a history of COPD with chronic dyspnea. She quit smoking 4 to 5 years ago. She is maintained on 2 liters of oxygen and has a home nebulizer. She has FLETCHER/OHS with chronic hypercapnea but she has refused CPAP/Bipap in the past.   She lives with her son and he reports that she can only ambulate a step of 2 at a time. She has been at home in hospice care since August 2018. Discussing Hospice house and revoked wanted to have tumor removed. Neurosurgery has seen and is considering a craniotomy. PC consulted and full code confirmed. Subjective:     Over the past 24 hours:  MRI completed. Discussing craniotomy. Demanding to have BiPAP taken off. Chest CT shows increased left lung mass, remains on high flow O2 and BiPAP Q HS for OHS (did not wear at home). No cancer treatment to date; she was previously in hospice care. Oncology has seen.          Current Facility-Administered Medications   Medication Dose Route Frequency    famotidine (PF) (PEPCID) 20 mg in sodium chloride 0.9% 10 mL injection  20 mg IntraVENous Q12H    insulin glargine (LANTUS) injection 20 Units  20 Units SubCUTAneous DAILY    vancomycin (VANCOCIN) 1500 mg in  ml infusion  1,500 mg IntraVENous Q12H    sodium chloride (NS) flush 5-10 mL  5-10 mL IntraVENous PRN    sodium chloride (NS) flush 5-40 mL  5-40 mL IntraVENous Q8H  sodium chloride (NS) flush 5-40 mL  5-40 mL IntraVENous PRN    NUTRITIONAL SUPPORT ELECTROLYTE PRN ORDERS   Does Not Apply PRN    acetaminophen (TYLENOL) tablet 650 mg  650 mg Oral Q4H PRN    HYDROcodone-acetaminophen (NORCO) 5-325 mg per tablet 1 Tab  1 Tab Oral Q4H PRN    morphine injection 2 mg  2 mg IntraVENous Q4H PRN    ondansetron (ZOFRAN) injection 4 mg  4 mg IntraVENous Q4H PRN    naloxone (NARCAN) injection 0.4 mg  0.4 mg IntraVENous PRN    LORazepam (ATIVAN) injection 1 mg  1 mg IntraVENous Q4H PRN    albuterol-ipratropium (DUO-NEB) 2.5 MG-0.5 MG/3 ML  3 mL Nebulization Q4H PRN    levETIRAcetam (KEPPRA) 500 mg in 0.9% sodium chloride 100 mL IVPB  500 mg IntraVENous Q12H    budesonide (PULMICORT) 500 mcg/2 ml nebulizer suspension  500 mcg Nebulization BID RT    And    albuterol (PROVENTIL VENTOLIN) nebulizer solution 2.5 mg  2.5 mg Nebulization Q6HWA RT    dexamethasone (DECADRON) injection 6 mg  6 mg IntraVENous Q6H    piperacillin-tazobactam (ZOSYN) 3.375 g in 0.9% sodium chloride (MBP/ADV) 100 mL  3.375 g IntraVENous Q8H    metoprolol (LOPRESSOR) injection 1.25 mg  1.25 mg IntraVENous Q6H PRN    insulin regular (NOVOLIN R, HUMULIN R) injection   SubCUTAneous Q4H    dexmedeTOMidine (PRECEDEX) 400 mcg in 0.9% sodium chloride 100 mL infusion  0.2-0.7 mcg/kg/hr IntraVENous TITRATE         Objective:     Vitals:    03/08/19 0400 03/08/19 0503 03/08/19 0600 03/08/19 0701   BP: 144/85 157/72 149/65 (!) 157/106   Pulse: (!) 49 90 (!) 47 92   Resp: 22 26 19 20   Temp: 98 °F (36.7 °C)   (P) 98.8 °F (37.1 °C)   SpO2: 100% 100% 100% 100%   Weight:       Height:         Intake and Output:   03/06 1901 - 03/08 0700  In: 2220 [P.O.:720; I.V.:1500]  Out: 5625 [Urine:5625]  No intake/output data recorded.     Physical Exam:          GEN: acutely ill, obese on BiPAP  HEENT:  on BiPAP, mouth is dry  NECK:  no JVD, no retractions, no thyromegaly or masses,   LUNGS:  Decreased bases on BiPAP  HEART:  RRR with no M,G,R;  ABDOMEN: rounded, no pain + BM; positive bowel sounds present, protuberant   EXTREMITIES:  warm with no cyanosis, no lower leg edema  SKIN:  no jaundice or ecchymosis   NEURO: alert, verbally communicating on BiPAP    LINES PIV, small  DRIPS: precedex  NUTRITION: ADA    Ventilator Settings  Mode FIO2 Rate Tidal Volume Pressure PEEP      40 %                 Peak airway pressure:     Minute ventilation: 13.3 l/min       CHEST XRAY:     LAB  Recent Labs     03/08/19  0359 03/06/19  0340   WBC 8.5 10.8   HGB 11.3* 10.4*   HCT 39.2 37.0    376     Recent Labs     03/08/19  0359 03/06/19  0340   * 140   K 4.1 4.4   CL 96* 102   CO2 30 30   * 331*   BUN 15 14   CREA 0.61 0.65     ABG:  On High FLow  Lab Results   Component Value Date/Time    PHI 7.411 03/07/2019 09:03 AM    PCO2I 53.0 (H) 03/07/2019 09:03 AM    PO2I 76 03/07/2019 09:03 AM    HCO3I 33.6 (H) 03/07/2019 09:03 AM    FIO2I 40 03/07/2019 09:03 AM     MRI [facility]  IMPRESSION:     1. 3.1 x 3.0 x 3.5 cm posterior right parietal cystic mass with surrounding  vasogenic edema. Given the history of lung cancer, a metastasis is favored. 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     2. There is no definite evidence of other enhancing intracranial lesions within  the substantial limitation of patient motion on the postcontrast images. The  degree of motion could obscure subtle or small metastases. If there are new or  progressive neurologic progressive symptoms, a repeat MRI should be considered  when the patient is better able to lie still.     3. There is T2 hyperintensity within the body and tail of the right hippocampus. In the setting of the right-sided mass and surrounding vasogenic edema, this is  most likely to be either post ictal or an extension of the adjacent vasogenic  edema.  In the setting of known systemic neoplasia, although less likely in the  absence of contralateral findings, Is This A New Presentation, Or A Follow-Up?: Skin Lesions a paraneoplastic syndrome cannot be excluded.   Directed attention on follow-up examinations is recommended      Cultures: Blood and urine ordered     Assessment:     Patient Active Problem List   Diagnosis Code    Hypertension, essential, benign I10    Hyperlipidemia E78.5    COPD with emphysema ([address] J43.9    Morbid obesity ([address] E66.01    Primary pulmonary hypertension ([address] I27.0    Diabetes mellitus without complication ([address] Y56.6    Orthopnea R06.01    Abnormality of gait and mobility R26.9    Insomnia G47.00    Extreme obesity with respiratory disorder (HCC) E66.01, J98.9    FLETCHER (obstructive sleep apnea) G47.33    Chronic hypoxemic respiratory failure (HCC) J96.11    Cor pulmonale (HCC) I27.81    CHF (congestive heart failure), NYHA class I (HCC) I50.9    Obesity with alveolar hypoventilation (HCC) E66.2    Arthritis of knee M17.10    Chronic pain G89.29    Acute respiratory failure (HCC) J96.00    Mediastinal lymphadenopathy R59.0    Primary cancer of left upper lobe of lung (HCC)- squamous cell C34.12    Benign neoplasm of colon D12.6    History of colonic polyps Z86.010    Seizure (HCC) R56.9    Metastatic cancer to brain (HCC) C79.31    Hyperglycemia R73.9    Acute on chronic respiratory failure with hypoxia and hypercapnia (HCC) J96.21, J96.22    Delirium R41.0       Plan   Principal Problem:    Seizure ([address] (3/5/2019)  [facility]y is [facility]  With Brain mets      COPD with emphysema ([address] ()  With chronic respiratory failuer      Morbid obesity ([address] ()      Diabetes mellitus without complication (HCC) ()  Now with hyperglycemia  Not eating      FLETCHER (obstructive sleep apnea) ()  On BiPAP with acute respiratory failure      Chronic hypoxemic respiratory failure (HCC) ()  Worse requiring BiPAP      Primary cancer of left upper lobe of lung ([address]- squamous cell (7/30/2018)      Metastatic cancer to brain [facility]) (3/5/2019)  Neurology and neurosurgery following  Previously in home hospice  Now on BiPAP with hypercapnic respiratory failure  MRI ordered      Hyperglycemia (3/5/2019)  improved      Acute on chronic respiratory failure ([address] (3/5/2019)  On high flow O2, BiPAP Q HS    Plan:  -- PC, neurology, neurosurgery following   -- neurosurgery planning for craniotomy    -- planning for craniotomy maybe Monday  -- will need BiPAP Q HS, on Optiflow during the day  -- Keppra and decadron 6 mg Q 6 hours ordered  -- zosyn, vancomycin D4  -- PT/OT, speech    -- Chest CT lung masses increased in size, has not received treatment as she was in hospice care. Oncology has seen. ? Goals of care with increased mass, respiratory failure, and no chemo-.  PC following- need to discuss ST and LT goals prior to sending for craniotomy. High O2 needs ongoing with lung mass, COPD, immobility independent of new neuro/MRI findings. From a functional standpoint, doubt she could tolerate cancer treatment with STAGE IV disease. She is HIGH risk for prolonged respiratory failure and likely will not tolerate extubation if intubation/mechanical vent needed. Tacos Maharaj, NP-C    Lungs:  Decreased BS bilaterally  Heart:  RRR with no Murmur/Rubs/Gallops    Additional Comments:  Pt with morbid obesity, FLETCHER, likely OHS, stage 4 lung CA. Pt with very poor functional status only able to ambulate minimally. Poor candidate for surgery with high liklihood for prolonged post op respiratory failure. I have spoken with and examined the patient. I agree with the above assessment and plan as documented.     Rosa Elena Stephen MD

## 2022-01-19 NOTE — PROGRESS NOTES
Bedside, Verbal and Written shift change report given to Jennifer Sutton RN (oncoming nurse) by Arthur Gallardo RN (offgoing nurse).  Report included the following information SBAR, Kardex, ED Summary, Intake/Output, MAR, Recent Results and Cardiac Rhythm ST. right upper arm

## 2022-08-25 NOTE — PROGRESS NOTES
[facility] Units: 0 Palliative Care Progress Note    Patient: Negin Ascencio MRN: [id number]  SSN: [id number]    YOB: 1960  Age: 59 y.o. Sex: female       Assessment/Plan:      Chief Complaint/Interval History: Reports pain \"all over\"       Principal Diagnosis:    · Pain, general  R52    Additional Diagnoses:   · Debility, Unspecified  R53.81  · Fatigue, Lethargy  R53.83  · Respiratory Failure, Acute on Chronic  J96.20  · Counseling, Encounter for Medical Advice  Z71.9  · Encounter for Palliative Care  Z51.5    Palliative Performance Scale (PPS)  PPS: 30    Medical Decision Making:   Reviewed and summarized notes over the past 24 hours. Discussed case with appropriate providers: Gerard De La Torre CM  Reviewed laboratory and x-ray data- none    Pt alert, resting in bed, no distress noted. Gadiel Jones RN, at bedside administering medications. No family at bedside. Family has elected to take pt home with [facility] today. Pt voiced happiness that she will be going home. She denies needs at this time. Discussed with Gerard De La Torre CM. No further PC needs- we will sign off. Thank you for allowing us to participate in Ms Shelton's care. Will discuss with interdisciplinary team.     More than 50% of this 15 minute visit was spent counseling and coordination of care as outlined above. Subjective:     Review of Systems:  A comprehensive review of systems was negative except for the following:  Musculoskeletal: Positive for generalized pain        Objective:     Visit Vitals  /59 (BP 1 Location: Left arm, BP Patient Position: At rest)   Pulse 68   Temp 97.5 °F (36.4 °C)   Resp 16   Ht 5' 6\" (1.676 m)   Wt 305 lb 6.4 oz (138.5 kg)   SpO2 99%   Breastfeeding? No   BMI 49.29 kg/m²       Physical Exam:    General:  Cooperative. No acute distress. Eyes:  Conjunctivae/corneas clear. Nose: Nares normal. Septum midline. Neck: Supple, symmetrical, trachea midline. Lungs:   Diminished. Unlabored.    Heart:  Regular rate and Show Glabellar Units: Yes rhythm. Abdomen:   Obese. Soft, non-tender, non-distended. Extremities: Normal, atraumatic, no cyanosis. Skin: Skin color, texture, turgor normal. No rash.    Neurologic: Nonfocal.   Psych: Alert and oriented       Signed By: Aki Mc NP     March 14, 2019 Post-Care Instructions: Patient instructed to not lie down for 4 hours and limit physical activity for 24 hours. Additional Area 2 Units: 4 Periorbital Skin Units: 20 Show Right And Left Brow Units: No Detail Level: Detailed Forehead Units: 5 Glabellar Complex Units: 24 Additional Area 1 Location: upper lip Consent: Verbal consent obtained. Risks include but not limited to lid/brow ptosis, bruising, swelling, diplopia, temporary effect, incomplete chemical denervation. Show Inventory Tab: Show Additional Area 2 Location: chin

## (undated) DEVICE — FORCEPS BX L115CM ALGTR JAW STP DISP

## (undated) DEVICE — SINGLE USE ASPIRATION NEEDLE: Brand: SINGLE USE ASPIRATION NEEDLE

## (undated) DEVICE — CANNULA NSL ORAL AD FOR CAPNOFLEX CO2 O2 AIRLFE

## (undated) DEVICE — SINGLE USE SUCTION VALVE MAJ-209: Brand: SINGLE USE SUCTION VALVE (STERILE)

## (undated) DEVICE — SINGLE USE BIOPSY VALVE MAJ-210: Brand: SINGLE USE BIOPSY VALVE (STERILE)

## (undated) DEVICE — KENDALL RADIOLUCENT FOAM MONITORING ELECTRODE RECTANGULAR SHAPE: Brand: KENDALL

## (undated) DEVICE — BRONCHOSCOPY PACK: Brand: MEDLINE INDUSTRIES, INC.

## (undated) DEVICE — SET EXTN L6IN W/ S STL CLMP

## (undated) DEVICE — SOL INJ SOD CL0.9% 10ML PREFIL --

## (undated) DEVICE — Device: Brand: BALLOON

## (undated) DEVICE — (D)SYR 10ML 1/5ML GRAD NSAF -- PKGING CHANGE USE ITEM 338027